# Patient Record
Sex: FEMALE | Race: WHITE | NOT HISPANIC OR LATINO | Employment: FULL TIME | URBAN - METROPOLITAN AREA
[De-identification: names, ages, dates, MRNs, and addresses within clinical notes are randomized per-mention and may not be internally consistent; named-entity substitution may affect disease eponyms.]

---

## 2017-04-07 ENCOUNTER — HOSPITAL ENCOUNTER (OUTPATIENT)
Dept: CT IMAGING | Facility: HOSPITAL | Age: 52
Discharge: HOME | End: 2017-04-07
Admitting: NURSE PRACTITIONER

## 2017-04-07 ENCOUNTER — TRANSCRIBE ORDERS (OUTPATIENT)
Dept: ADMINISTRATIVE | Facility: HOSPITAL | Age: 52
End: 2017-04-07

## 2017-04-07 DIAGNOSIS — R10.32 LLQ PAIN: Primary | ICD-10-CM

## 2017-04-07 DIAGNOSIS — R10.32 LLQ PAIN: ICD-10-CM

## 2017-04-07 PROCEDURE — 0 IOPAMIDOL 61 % SOLUTION: Performed by: NURSE PRACTITIONER

## 2017-04-07 PROCEDURE — 74170 CT ABD WO CNTRST FLWD CNTRST: CPT

## 2017-04-07 PROCEDURE — 82565 ASSAY OF CREATININE: CPT

## 2017-04-07 RX ADMIN — IOPAMIDOL 85 ML: 612 INJECTION, SOLUTION INTRAVENOUS at 13:39

## 2017-04-07 RX ADMIN — BARIUM SULFATE 450 ML: 21 SUSPENSION ORAL at 12:15

## 2017-04-11 LAB — CREAT BLDA-MCNC: 0.6 MG/DL (ref 0.6–1.3)

## 2017-07-19 ENCOUNTER — LAB (OUTPATIENT)
Dept: LAB | Facility: HOSPITAL | Age: 52
End: 2017-07-19

## 2017-07-19 ENCOUNTER — OFFICE VISIT (OUTPATIENT)
Dept: FAMILY MEDICINE CLINIC | Facility: CLINIC | Age: 52
End: 2017-07-19

## 2017-07-19 VITALS
TEMPERATURE: 97.6 F | HEART RATE: 73 BPM | OXYGEN SATURATION: 96 % | DIASTOLIC BLOOD PRESSURE: 78 MMHG | BODY MASS INDEX: 31.33 KG/M2 | HEIGHT: 59 IN | SYSTOLIC BLOOD PRESSURE: 122 MMHG | WEIGHT: 155.4 LBS

## 2017-07-19 DIAGNOSIS — Z00.00 WELLNESS EXAMINATION: Primary | ICD-10-CM

## 2017-07-19 DIAGNOSIS — E55.9 VITAMIN D DEFICIENCY: ICD-10-CM

## 2017-07-19 DIAGNOSIS — R74.8 ELEVATED LIVER ENZYMES: ICD-10-CM

## 2017-07-19 DIAGNOSIS — Z00.00 WELLNESS EXAMINATION: ICD-10-CM

## 2017-07-19 LAB
25(OH)D3 SERPL-MCNC: 37.9 NG/ML
ALBUMIN SERPL-MCNC: 4.7 G/DL (ref 3.2–4.8)
ALBUMIN/GLOB SERPL: 1.8 G/DL (ref 1.5–2.5)
ALP SERPL-CCNC: 108 U/L (ref 25–100)
ALT SERPL W P-5'-P-CCNC: 38 U/L (ref 7–40)
ANION GAP SERPL CALCULATED.3IONS-SCNC: 6 MMOL/L (ref 3–11)
ARTICHOKE IGE QN: 128 MG/DL (ref 0–130)
AST SERPL-CCNC: 34 U/L (ref 0–33)
BASOPHILS # BLD AUTO: 0.02 10*3/MM3 (ref 0–0.2)
BASOPHILS NFR BLD AUTO: 0.3 % (ref 0–1)
BILIRUB SERPL-MCNC: 0.9 MG/DL (ref 0.3–1.2)
BUN BLD-MCNC: 10 MG/DL (ref 9–23)
BUN/CREAT SERPL: 20 (ref 7–25)
CALCIUM SPEC-SCNC: 10.5 MG/DL (ref 8.7–10.4)
CHLORIDE SERPL-SCNC: 103 MMOL/L (ref 99–109)
CHOLEST SERPL-MCNC: 190 MG/DL (ref 0–200)
CO2 SERPL-SCNC: 29 MMOL/L (ref 20–31)
CREAT BLD-MCNC: 0.5 MG/DL (ref 0.6–1.3)
DEPRECATED RDW RBC AUTO: 43.2 FL (ref 37–54)
EOSINOPHIL # BLD AUTO: 0.23 10*3/MM3 (ref 0–0.3)
EOSINOPHIL NFR BLD AUTO: 3.2 % (ref 0–3)
ERYTHROCYTE [DISTWIDTH] IN BLOOD BY AUTOMATED COUNT: 13.2 % (ref 11.3–14.5)
GFR SERPL CREATININE-BSD FRML MDRD: 131 ML/MIN/1.73
GFR SERPL CREATININE-BSD FRML MDRD: >150 ML/MIN/1.73
GLOBULIN UR ELPH-MCNC: 2.6 GM/DL
GLUCOSE BLD-MCNC: 114 MG/DL (ref 70–100)
HCT VFR BLD AUTO: 44 % (ref 34.5–44)
HDLC SERPL-MCNC: 44 MG/DL (ref 40–60)
HGB BLD-MCNC: 15.2 G/DL (ref 11.5–15.5)
IMM GRANULOCYTES # BLD: 0.01 10*3/MM3 (ref 0–0.03)
IMM GRANULOCYTES NFR BLD: 0.1 % (ref 0–0.6)
LYMPHOCYTES # BLD AUTO: 2.29 10*3/MM3 (ref 0.6–4.8)
LYMPHOCYTES NFR BLD AUTO: 32 % (ref 24–44)
MCH RBC QN AUTO: 31.3 PG (ref 27–31)
MCHC RBC AUTO-ENTMCNC: 34.5 G/DL (ref 32–36)
MCV RBC AUTO: 90.5 FL (ref 80–99)
MONOCYTES # BLD AUTO: 0.36 10*3/MM3 (ref 0–1)
MONOCYTES NFR BLD AUTO: 5 % (ref 0–12)
NEUTROPHILS # BLD AUTO: 4.25 10*3/MM3 (ref 1.5–8.3)
NEUTROPHILS NFR BLD AUTO: 59.4 % (ref 41–71)
PLATELET # BLD AUTO: 245 10*3/MM3 (ref 150–450)
PMV BLD AUTO: 10.5 FL (ref 6–12)
POTASSIUM BLD-SCNC: 4 MMOL/L (ref 3.5–5.5)
PROT SERPL-MCNC: 7.3 G/DL (ref 5.7–8.2)
RBC # BLD AUTO: 4.86 10*6/MM3 (ref 3.89–5.14)
SODIUM BLD-SCNC: 138 MMOL/L (ref 132–146)
TRIGL SERPL-MCNC: 251 MG/DL (ref 0–150)
TSH SERPL DL<=0.05 MIU/L-ACNC: 1.62 MIU/ML (ref 0.35–5.35)
WBC NRBC COR # BLD: 7.16 10*3/MM3 (ref 3.5–10.8)

## 2017-07-19 PROCEDURE — 85025 COMPLETE CBC W/AUTO DIFF WBC: CPT | Performed by: NURSE PRACTITIONER

## 2017-07-19 PROCEDURE — 36415 COLL VENOUS BLD VENIPUNCTURE: CPT

## 2017-07-19 PROCEDURE — 80061 LIPID PANEL: CPT | Performed by: NURSE PRACTITIONER

## 2017-07-19 PROCEDURE — 82306 VITAMIN D 25 HYDROXY: CPT | Performed by: NURSE PRACTITIONER

## 2017-07-19 PROCEDURE — 80074 ACUTE HEPATITIS PANEL: CPT | Performed by: NURSE PRACTITIONER

## 2017-07-19 PROCEDURE — 84443 ASSAY THYROID STIM HORMONE: CPT | Performed by: NURSE PRACTITIONER

## 2017-07-19 PROCEDURE — 99396 PREV VISIT EST AGE 40-64: CPT | Performed by: NURSE PRACTITIONER

## 2017-07-19 PROCEDURE — 80053 COMPREHEN METABOLIC PANEL: CPT | Performed by: NURSE PRACTITIONER

## 2017-07-19 NOTE — PATIENT INSTRUCTIONS
Diverticulosis  Diverticulosis is the condition that develops when small pouches (diverticula) form in the wall of your colon. Your colon, or large intestine, is where water is absorbed and stool is formed. The pouches form when the inside layer of your colon pushes through weak spots in the outer layers of your colon.  CAUSES   No one knows exactly what causes diverticulosis.  RISK FACTORS  · Being older than 50. Your risk for this condition increases with age. Diverticulosis is rare in people younger than 40 years. By age 80, almost everyone has it.  · Eating a low-fiber diet.  · Being frequently constipated.  · Being overweight.  · Not getting enough exercise.  · Smoking.  · Taking over-the-counter pain medicines, like aspirin and ibuprofen.  SYMPTOMS   Most people with diverticulosis do not have symptoms.  DIAGNOSIS   Because diverticulosis often has no symptoms, health care providers often discover the condition during an exam for other colon problems. In many cases, a health care provider will diagnose diverticulosis while using a flexible scope to examine the colon (colonoscopy).  TREATMENT   If you have never developed an infection related to diverticulosis, you may not need treatment. If you have had an infection before, treatment may include:  · Eating more fruits, vegetables, and grains.  · Taking a fiber supplement.  · Taking a live bacteria supplement (probiotic).  · Taking medicine to relax your colon.  HOME CARE INSTRUCTIONS   · Drink at least 6-8 glasses of water each day to prevent constipation.  · Try not to strain when you have a bowel movement.  · Keep all follow-up appointments.  If you have had an infection before:   · Increase the fiber in your diet as directed by your health care provider or dietitian.  · Take a dietary fiber supplement if your health care provider approves.  · Only take medicines as directed by your health care provider.  SEEK MEDICAL CARE IF:   · You have abdominal  pain.  · You have bloating.  · You have cramps.  · You have not gone to the bathroom in 3 days.  SEEK IMMEDIATE MEDICAL CARE IF:   · Your pain gets worse.  · Your bloating becomes very bad.  · You have a fever or chills, and your symptoms suddenly get worse.  · You begin vomiting.  · You have bowel movements that are bloody or black.  MAKE SURE YOU:  · Understand these instructions.  · Will watch your condition.  · Will get help right away if you are not doing well or get worse.     This information is not intended to replace advice given to you by your health care provider. Make sure you discuss any questions you have with your health care provider.     Document Released: 09/14/2005 Document Revised: 12/23/2014 Document Reviewed: 11/12/2014  [employer] Interactive Patient Education ©2017 [employer]    Sciatica  Sciatica is pain, numbness, weakness, or tingling along your sciatic nerve. The sciatic nerve starts in the lower back and goes down the back of each leg. Sciatica happens when this nerve is pinched or has pressure put on it. Sciatica usually goes away on its own or with treatment. Sometimes, sciatica may keep coming back (recur).  HOME CARE  Medicines  · Take over-the-counter and prescription medicines only as told by your doctor.  · Do not drive or use heavy machinery while taking prescription pain medicine.  Managing Pain  · If directed, put ice on the affected area.    Put ice in a plastic bag.    Place a towel between your skin and the bag.    Leave the ice on for 20 minutes, 2-3 times a day.  · After icing, apply heat to the affected area before you exercise or as often as told by your doctor. Use the heat source that your doctor tells you to use, such as a moist heat pack or a heating pad.    Place a towel between your skin and the heat source.    Leave the heat on for 20-30 minutes.    Remove the heat if your skin turns bright red. This is especially important if you are unable to feel pain, heat,  or cold. You may have a greater risk of getting burned.  Activity  · Return to your normal activities as told by your doctor. Ask your doctor what activities are safe for you.  ¨ Avoid activities that make your sciatica worse.  · Take short rests during the day. Rest in a lying or standing position. This is usually better than sitting to rest.  ¨ When you rest for a long time, do some physical activity or stretching between periods of rest.  ¨ Avoid sitting for a long time without moving. Get up and move around at least one time each hour.  · Exercise and stretch regularly, as told by your doctor.  · Do not lift anything that is heavier than 10 lb (4.5 kg) while you have symptoms of sciatica.  ¨ Avoid lifting heavy things even when you do not have symptoms.  ¨ Avoid lifting heavy things over and over.  · When you lift objects, always lift in a way that is safe for your body. To do this, you should:  ¨ Bend your knees.  ¨ Keep the object close to your body.  ¨ Avoid twisting.  General Instructions  · Use good posture.    Avoid leaning forward when you are sitting.    Avoid hunching over when you are standing.  · Stay at a healthy weight.  · Wear comfortable shoes that support your feet. Avoid wearing high heels.  · Avoid sleeping on a mattress that is too soft or too hard. You might have less pain if you sleep on a mattress that is firm enough to support your back.  · Keep all follow-up visits as told by your doctor. This is important.  GET HELP IF:  · You have pain that:    Wakes you up when you are sleeping.    Gets worse when you lie down.    Is worse than the pain you have had in the past.    Lasts longer than 4 weeks.  · You lose weight for without trying.  GET HELP RIGHT AWAY IF:  · You cannot control when you pee (urinate) or poop (have a bowel movement).  · You have weakness in any of these areas and it gets worse.    Lower back.    Lower belly (pelvis).    Butt (buttocks).    Legs.  · You have redness or  swelling of your back.  · You have a burning feeling when you pee.     This information is not intended to replace advice given to you by your health care provider. Make sure you discuss any questions you have with your health care provider.     Document Released: 09/26/2009 Document Revised: 04/10/2017 Document Reviewed: 08/26/2016  [employer] Interactive Patient Education ©2017 [employer]

## 2017-07-19 NOTE — PROGRESS NOTES
Patient Care Team:  Jose Brooks MD as PCP - General (Internal Medicine)     Chief complaint: Patient is in today for a physical          Patient is a 51 y.o. female who presents for her yearly physical exam.     JUANITO Aiken is here for an annual physical, including labs. She denies chest pain, dizziness, shortness of breath, or edema. She has a history of a cystocele, rectocele and bladder prolapse with pessary use and has an appointment Friday with her urologist for follow-up. Her last pelvic exam was last year at her gynecologist at [facility] and prefers not to have a pelvic exam today, she had a hysterectomy in 2013, due to endometriosis. She was seen in the gyn office 2 weeks ago to be screened for BV due to vaginal odor, screen was negative, denies dysuria or discharge.  Patient states she has been notified that a mammogram is due and will make the appointment when her schedule allows, but does not want it ordered today.  Patient has a history of diverticulitis earlier this year, a colonoscopy in 2013 was normal. She takes Linzess for IBS with constipation, and states this controls symptoms.  Patient does not exercise regularly, she eats a regular diet without added fiber. She does feel tired at times, but this is not a new problem. She requests information about sciatica for which she sees a chiropractor.    Review of Systems   Constitutional: Positive for fatigue (chronic). Negative for activity change, appetite change, chills and fever.   HENT: Positive for postnasal drip, rhinorrhea and sneezing. Negative for ear pain.    Eyes: Negative for photophobia, pain, redness and visual disturbance.   Respiratory: Negative for cough, shortness of breath and wheezing.    Cardiovascular: Negative for chest pain, palpitations and leg swelling.   Gastrointestinal: Positive for constipation (controlled with Linzess). Negative for abdominal pain, blood in stool, diarrhea, nausea, rectal pain and  vomiting.   Endocrine: Negative for polyphagia and polyuria.   Genitourinary: Negative for dysuria, frequency, hematuria, pelvic pain, vaginal bleeding and vaginal discharge.   Musculoskeletal: Positive for arthralgias and back pain. Negative for joint swelling and myalgias.   Allergic/Immunologic: Positive for environmental allergies. Negative for food allergies and immunocompromised state.   Neurological: Positive for numbness (occasionally in left foot, sees chiropractor). Negative for dizziness, tremors, weakness, light-headedness and headaches.   Psychiatric/Behavioral: Positive for sleep disturbance (occasional insomnia). Negative for agitation, behavioral problems, confusion and dysphoric mood.           History  Past Medical History:   Diagnosis Date   • Allergic rhinitis    • BV (bacterial vaginosis)    • Cataracts, bilateral    • Diverticulitis large intestine w/o perforation or abscess w/o bleeding 04/2017   • Fibrocystic breast disease    • Glaucoma    • Hyperlipidemia    • Hypertension    • IBS (irritable bowel syndrome)    • Insomnia    • Obesity (BMI 30.0-34.9)    • Stress bladder incontinence, female    • Varicose veins of legs       Past Surgical History:   Procedure Laterality Date   • BLADDER SUSPENSION  10/27/2007   • HYSTERECTOMY  12/17/2013    NU      Allergies   Allergen Reactions   • Ace Inhibitors Cough   • Morphine And Related Rash      Family History   Problem Relation Age of Onset   • Diabetes Mother    • Dementia Mother    • Hypothyroidism Mother    • Hyperlipidemia Mother    • ALS Father 70   • Diabetes Sister    • Hyperlipidemia Sister    • Hypothyroidism Sister    • Hypertension Brother      Social History     Social History   • Marital status: Single     Spouse name: N/A   • Number of children: N/A   • Years of education: N/A     Occupational History   • Not on file.     Social History Main Topics   • Smoking status: Never Smoker   • Smokeless tobacco: Never Used   • Alcohol use 0.6  "oz/week     1 Cans of beer per week   • Drug use: No   • Sexual activity: Yes     Partners: Male     Other Topics Concern   • Not on file     Social History Narrative   • No narrative on file        Current Outpatient Prescriptions:   •  Cholecalciferol (VITAMIN D3) 5000 UNITS capsule capsule, Take 5,000 Units by mouth Daily., Disp: , Rfl:   •  ESTRACE VAGINAL 0.1 MG/GM vaginal cream, insert 1/2 gram vaginally three times a week, Disp: , Rfl: 0  •  estradiol (MINIVELLE, VIVELLE-DOT) 0.05 MG/24HR patch, , Disp: , Rfl: 0  •  fluticasone (FLONASE) 50 MCG/ACT nasal spray, 2 sprays into each nostril Daily., Disp: , Rfl:   •  folic acid (FOLVITE) 1 MG tablet, , Disp: , Rfl: 0  •  latanoprost (XALATAN) 0.005 % ophthalmic solution, , Disp: , Rfl: 1  •  linaclotide (LINZESS) 290 MCG capsule capsule, Take 290 mcg by mouth Every Morning Before Breakfast., Disp: , Rfl:   •  Loratadine (CLARITIN) 10 MG capsule, Take 1 tablet by mouth., Disp: , Rfl:   •  simvastatin (ZOCOR) 20 MG tablet, , Disp: , Rfl: 0  •  valsartan-hydrochlorothiazide (DIOVAN-HCT) 80-12.5 MG per tablet, , Disp: , Rfl: 0   Immunization History   Administered Date(s) Administered   • Influenza Whole 08/01/2016   • Tdap 01/19/2012                   /78  Pulse 73  Temp 97.6 °F (36.4 °C) (Oral)   Ht 58.5\" (148.6 cm)  Wt 155 lb 6.4 oz (70.5 kg)  SpO2 96%  BMI 31.93 kg/m2      Physical Exam   Constitutional: She is oriented to person, place, and time. She appears well-developed and well-nourished.   HENT:   Head: Normocephalic and atraumatic.   Right Ear: External ear normal. A middle ear effusion is present.   Left Ear: External ear normal. A middle ear effusion is present.   Nose: Nose normal.   Mouth/Throat: Oropharynx is clear and moist.   Eyes: Conjunctivae and EOM are normal. Pupils are equal, round, and reactive to light. Right eye exhibits no discharge. Left eye exhibits no discharge. No scleral icterus.   Neck: Normal range of motion. Neck " supple. No JVD present. No tracheal deviation present. No thyromegaly present.   Cardiovascular: Normal rate, regular rhythm, normal heart sounds and intact distal pulses.    No murmur heard.  No carotid bruit   Pulmonary/Chest: Effort normal and breath sounds normal. No respiratory distress. She has no wheezes. Right breast exhibits no inverted nipple, no mass, no nipple discharge, no skin change and no tenderness. Left breast exhibits no inverted nipple, no mass, no nipple discharge, no skin change and no tenderness.   Fibrocystic changes noted in breasts bilaterally     Abdominal: Soft. Bowel sounds are normal. She exhibits no distension and no mass. There is no tenderness. No hernia.   Genitourinary: There is no rash, tenderness or lesion on the right labia. There is no rash, tenderness or lesion on the left labia. No vaginal discharge found.   Genitourinary Comments: Patient declined pelvic exam, but did allow external exam of vulva, no discharge or lesions noted.   Musculoskeletal: She exhibits no edema, tenderness or deformity.   Normal ROM of major joints   Lymphadenopathy:     She has no cervical adenopathy.   Neurological: She is alert and oriented to person, place, and time. She has normal reflexes.   Skin: Skin is warm and dry. No rash noted. No erythema.   Psychiatric: She has a normal mood and affect. Her behavior is normal. Judgment and thought content normal.                 Diagnoses and all orders for this visit:    Wellness examination  -     Comprehensive metabolic panel; Future  -     Lipid panel; Future  -     TSH; Future  -     CBC w AUTO Differential; Future    Vitamin D deficiency  -     Vitamin D 25 Hydroxy; Future        Annual screening labs were ordered today to evaluate glucose, kidney, liver, and thyroid function. A CBC was ordered to evaluate for anemia. Vitamin D level ordered to evaluate for current deficiency.  I will contact patient regarding test results and provide instructions  regarding any necessary changes in plan of care.  Patient will continue current medications as prescribed.    Nutrition and activity goals reviewed including: mainly water to drink, limit white flour/processed sugar, high protein, high fiber carbs, good breakfast, working toward 150 mins cardio per week, weight training 2x/week.   Patient was given a handout about diverticulosis and encouraged to increase fiber, may take metamucil daily.   Patient was also given a handout on sciatica per her request.  Patient was encouraged to make appointment for mammogram and follow-up with gynecologist and urologist per their recommendations. Status post hysterectomy for endometriosis, PAP not applicable.  Patient voiced understanding of all instructions and denied further questions.  JERRICA Traylor   7/19/2017   11:25 AM

## 2017-07-20 LAB
HAV IGM SERPL QL IA: NORMAL
HBV CORE IGM SERPL QL IA: NORMAL
HBV SURFACE AG SERPL QL IA: NORMAL
HCV AB SER DONR QL: NORMAL

## 2017-07-21 ENCOUNTER — TELEPHONE (OUTPATIENT)
Dept: FAMILY MEDICINE CLINIC | Facility: CLINIC | Age: 52
End: 2017-07-21

## 2017-07-25 NOTE — TELEPHONE ENCOUNTER
PATIENT HAS ADDITIONAL QUESTIONS REGARDING LAB RESULTS.  WANTS TO KNOW IF CERTAIN CALORIE FREE SODAS (COKE ZERO/ SPRITE ZERO) ARE OKAY TO DRINK INSTEAD OF JUST DRINKING WATER.  PLEASE GIVE HER A CALL.

## 2017-07-27 NOTE — TELEPHONE ENCOUNTER
Called and left patient a message to call the office back to discuss her questions.    Patient called back and I explained to her that she should try to avoid any kind of soda. I suggested that she try putting some flavor packets in her water to make it taste better for her. Patient stated that she would try this but still might have an occasional soda every once in a while.

## 2017-07-28 RX ORDER — FOLIC ACID 1 MG/1
1 TABLET ORAL DAILY
Qty: 30 TABLET | Refills: 2 | Status: SHIPPED | OUTPATIENT
Start: 2017-07-28 | End: 2017-12-11 | Stop reason: SDUPTHER

## 2017-07-28 RX ORDER — SIMVASTATIN 20 MG
20 TABLET ORAL NIGHTLY
Qty: 30 TABLET | Refills: 2 | Status: SHIPPED | OUTPATIENT
Start: 2017-07-28 | End: 2017-12-11 | Stop reason: SDUPTHER

## 2017-07-28 RX ORDER — VALSARTAN AND HYDROCHLOROTHIAZIDE 80; 12.5 MG/1; MG/1
1 TABLET, FILM COATED ORAL DAILY
Qty: 30 TABLET | Refills: 2 | Status: SHIPPED | OUTPATIENT
Start: 2017-07-28 | End: 2017-12-19 | Stop reason: SDUPTHER

## 2017-08-07 ENCOUNTER — OFFICE VISIT (OUTPATIENT)
Dept: FAMILY MEDICINE CLINIC | Facility: CLINIC | Age: 52
End: 2017-08-07

## 2017-08-07 VITALS
HEART RATE: 79 BPM | SYSTOLIC BLOOD PRESSURE: 122 MMHG | OXYGEN SATURATION: 95 % | HEIGHT: 59 IN | DIASTOLIC BLOOD PRESSURE: 82 MMHG | BODY MASS INDEX: 30.72 KG/M2 | WEIGHT: 152.4 LBS

## 2017-08-07 DIAGNOSIS — J01.10 ACUTE FRONTAL SINUSITIS, RECURRENCE NOT SPECIFIED: Primary | ICD-10-CM

## 2017-08-07 PROCEDURE — 99213 OFFICE O/P EST LOW 20 MIN: CPT | Performed by: NURSE PRACTITIONER

## 2017-08-07 RX ORDER — AMOXICILLIN AND CLAVULANATE POTASSIUM 875; 125 MG/1; MG/1
1 TABLET, FILM COATED ORAL 2 TIMES DAILY
Qty: 14 TABLET | Refills: 0 | Status: SHIPPED | OUTPATIENT
Start: 2017-08-07 | End: 2017-08-14

## 2017-08-07 RX ORDER — AZELASTINE 1 MG/ML
2 SPRAY, METERED NASAL 2 TIMES DAILY
COMMUNITY
End: 2017-08-07

## 2017-08-07 NOTE — PATIENT INSTRUCTIONS
Take Claritin and Flonase daily. Do not use Astelin or decongestants such as sudafed because of hypertension.   Take antibiotic only if symptoms do not improve with above reccomendations  Nutrition and activity goals reviewed including: mainly water to drink, limit white flour/processed sugar, high protein, high fiber carbs, good breakfast, working toward 150 mins cardio per week, weight training 2x/week.

## 2017-08-07 NOTE — PROGRESS NOTES
Subjective   Nelli Morales is a 51 y.o. female.     History of Present Illness   Patient is here today with complaint of runny nose, sinus pressure, drainage that started early last week, has taken sudafed, helped some, also has  taken Claritin and Astelin daily for a week, but isn't helping.   States she is has having green drainage. Denies fever and chills, but feels symptoms are getting worse.  The following portions of the patient's history were reviewed and updated as appropriate: allergies, current medications, past family history, past medical history, past social history, past surgical history and problem list.    Review of Systems   Constitutional: Negative for chills and fever.   HENT: Positive for postnasal drip, rhinorrhea, sinus pressure and sneezing. Negative for congestion, ear pain and sore throat.    Eyes: Negative for redness and itching.   Respiratory: Negative for cough, shortness of breath and wheezing.    Cardiovascular: Negative for chest pain and palpitations.   Gastrointestinal: Negative for diarrhea, nausea and vomiting.   Genitourinary: Negative for difficulty urinating.   Allergic/Immunologic: Positive for environmental allergies.   Neurological: Positive for headaches (sinus pressure). Negative for dizziness and light-headedness.       Objective   Physical Exam   Constitutional: She appears well-developed and well-nourished.   HENT:   Right Ear: A middle ear effusion is present.   Left Ear: A middle ear effusion is present.   Nose: Mucosal edema present.   Mouth/Throat: Uvula is midline and oropharynx is clear and moist.   Eyes: EOM are normal. Pupils are equal, round, and reactive to light. No scleral icterus.   Neck: Normal range of motion. Neck supple.   Cardiovascular: Normal rate, regular rhythm and normal heart sounds.    No murmur heard.  Pulmonary/Chest: Effort normal. No respiratory distress.   Musculoskeletal: She exhibits no edema, tenderness or deformity.   Vitals  reviewed.      Assessment/Plan   Nelli was seen today for sinus problem.    Diagnoses and all orders for this visit:    Acute frontal sinusitis, recurrence not specified  -     amoxicillin-clavulanate (AUGMENTIN) 875-125 MG per tablet; Take 1 tablet by mouth 2 (Two) Times a Day for 7 days.        I advised patient to continue daily Claritin. Stop Astelin and start Flonase daily. Avoid decongestants such as sudafed because of hypertension.   Take antibiotic only if symptoms do not improve in a few days with Claritin and Flonase.  I discussed her lab results again .Nutrition and activity goals reviewed including: mainly water to drink, limit white flour/processed sugar, high protein, high fiber carbs, good breakfast, working toward 150 mins cardio per week, weight training 2x/week.  Patient was encouraged to keep me informed of any acute changes, lack of improvement, or any new concerning symptoms.Patient voiced understanding of all instructions and denied further questions.

## 2017-09-20 ENCOUNTER — OFFICE VISIT (OUTPATIENT)
Dept: FAMILY MEDICINE CLINIC | Facility: CLINIC | Age: 52
End: 2017-09-20

## 2017-09-20 VITALS
OXYGEN SATURATION: 98 % | SYSTOLIC BLOOD PRESSURE: 118 MMHG | BODY MASS INDEX: 32.32 KG/M2 | TEMPERATURE: 98.6 F | WEIGHT: 154 LBS | DIASTOLIC BLOOD PRESSURE: 76 MMHG | HEART RATE: 84 BPM | HEIGHT: 58 IN

## 2017-09-20 DIAGNOSIS — L23.7 POISON IVY: Primary | ICD-10-CM

## 2017-09-20 PROCEDURE — 99213 OFFICE O/P EST LOW 20 MIN: CPT | Performed by: NURSE PRACTITIONER

## 2017-09-20 PROCEDURE — 96372 THER/PROPH/DIAG INJ SC/IM: CPT | Performed by: NURSE PRACTITIONER

## 2017-09-20 RX ORDER — INFLUENZA VIRUS VACCINE 15; 15; 15; 15 UG/.5ML; UG/.5ML; UG/.5ML; UG/.5ML
SUSPENSION INTRAMUSCULAR
Refills: 0 | COMMUNITY
Start: 2017-08-26 | End: 2017-11-15 | Stop reason: HOSPADM

## 2017-09-20 RX ORDER — METHYLPREDNISOLONE ACETATE 80 MG/ML
80 INJECTION, SUSPENSION INTRA-ARTICULAR; INTRALESIONAL; INTRAMUSCULAR; SOFT TISSUE ONCE
Status: COMPLETED | OUTPATIENT
Start: 2017-09-20 | End: 2017-09-20

## 2017-09-20 RX ORDER — PNEUMOCOCCAL 13-VALENT CONJUGATE VACCINE 2.2; 2.2; 2.2; 2.2; 2.2; 4.4; 2.2; 2.2; 2.2; 2.2; 2.2; 2.2; 2.2 UG/.5ML; UG/.5ML; UG/.5ML; UG/.5ML; UG/.5ML; UG/.5ML; UG/.5ML; UG/.5ML; UG/.5ML; UG/.5ML; UG/.5ML; UG/.5ML; UG/.5ML
INJECTION, SUSPENSION INTRAMUSCULAR
Refills: 0 | COMMUNITY
Start: 2017-08-26 | End: 2017-11-15 | Stop reason: HOSPADM

## 2017-09-20 RX ADMIN — METHYLPREDNISOLONE ACETATE 80 MG: 80 INJECTION, SUSPENSION INTRA-ARTICULAR; INTRALESIONAL; INTRAMUSCULAR; SOFT TISSUE at 12:03

## 2017-09-20 NOTE — PROGRESS NOTES
Subjective   Nelli Morales is a 51 y.o. female.     History of Present Illness   Nelli is here today with complaint of pruritic rash, started Sunday, but has gotten worse. She had been working outside in the yard pulling weeds and thinks she was exposed to poison ivy. She has been using Caladryl clear that has helped some.  The following portions of the patient's history were reviewed and updated as appropriate: allergies, current medications, past family history, past medical history, past social history, past surgical history and problem list.    Review of Systems   Constitutional: Negative for chills and fever.   Eyes: Negative for itching.   Respiratory: Negative for shortness of breath and wheezing.    Cardiovascular: Negative for chest pain.   Genitourinary: Negative for difficulty urinating.   Skin: Positive for rash (scattered on arms, face and neck).   Allergic/Immunologic: Positive for environmental allergies.   Neurological: Negative for dizziness, light-headedness and headaches.       Objective   Physical Exam   Constitutional: She is oriented to person, place, and time. She appears well-developed and well-nourished. No distress.   HENT:   Head: Normocephalic and atraumatic.   Right Ear: External ear normal. A middle ear effusion is present.   Left Ear: External ear normal. A middle ear effusion is present.   Mouth/Throat: Oropharynx is clear and moist. No oropharyngeal exudate.   Eyes: Conjunctivae are normal. No scleral icterus.   Neck: Normal range of motion.   Cardiovascular: Normal rate, regular rhythm and normal heart sounds.    No murmur heard.  Pulmonary/Chest: Effort normal and breath sounds normal. No respiratory distress. She has no wheezes.   Musculoskeletal: She exhibits no edema, tenderness or deformity.   Lymphadenopathy:     She has no cervical adenopathy.   Neurological: She is alert and oriented to person, place, and time.   Skin: Skin is warm and dry. Rash ( red papules scattered from  hand to upper right arm, left forearm, face, neck, chin,) noted.   Psychiatric: She has a normal mood and affect. Her behavior is normal. Judgment and thought content normal.   Vitals reviewed.      Assessment/Plan   Nelli was seen today for rash on arm and legs.    Diagnoses and all orders for this visit:    Poison ivy  -     methylPREDNISolone acetate (DEPO-medrol) injection 80 mg; Inject 1 mL into the shoulder, thigh, or buttocks 1 (One) Time.        Patient was given a depo-medrol injection for poison ivy, advised her to continue using the Caladryl lotion, continue Claritin, wash skin with warm water and soap after any exposure. Avoid scratching, may use triple antibiotic ointment for any open areas and scratches to reduce risk of infection.  Patient was encouraged to keep me informed of any acute changes, lack of improvement, or any new concerning symptoms.Patient voiced understanding of all instructions and denied further questions.

## 2017-09-22 ENCOUNTER — TELEPHONE (OUTPATIENT)
Dept: FAMILY MEDICINE CLINIC | Facility: CLINIC | Age: 52
End: 2017-09-22

## 2017-09-25 ENCOUNTER — TELEPHONE (OUTPATIENT)
Dept: FAMILY MEDICINE CLINIC | Facility: CLINIC | Age: 52
End: 2017-09-25

## 2017-09-25 RX ORDER — FLUTICASONE PROPIONATE 50 MCG
2 SPRAY, SUSPENSION (ML) NASAL DAILY
Qty: 1 BOTTLE | Refills: 1 | Status: SHIPPED | OUTPATIENT
Start: 2017-09-25 | End: 2018-04-02 | Stop reason: SDUPTHER

## 2017-09-25 NOTE — TELEPHONE ENCOUNTER
[facility] [phone] P    NEED NOSE SPRAY    [facility] CALLED IN    TOLD PATIENT TO HAVE [facility] FAX REQUEST

## 2017-11-15 ENCOUNTER — OFFICE VISIT (OUTPATIENT)
Dept: FAMILY MEDICINE CLINIC | Facility: CLINIC | Age: 52
End: 2017-11-15

## 2017-11-15 VITALS
WEIGHT: 156.6 LBS | HEART RATE: 84 BPM | OXYGEN SATURATION: 98 % | DIASTOLIC BLOOD PRESSURE: 84 MMHG | SYSTOLIC BLOOD PRESSURE: 128 MMHG | BODY MASS INDEX: 32.87 KG/M2 | TEMPERATURE: 97.6 F | HEIGHT: 58 IN

## 2017-11-15 DIAGNOSIS — S91.332A PENETRATING WOUND OF LEFT FOOT, INITIAL ENCOUNTER: ICD-10-CM

## 2017-11-15 DIAGNOSIS — Z23 TETANUS-DIPHTHERIA (TD) VACCINATION: Primary | ICD-10-CM

## 2017-11-15 PROCEDURE — 90714 TD VACC NO PRESV 7 YRS+ IM: CPT | Performed by: NURSE PRACTITIONER

## 2017-11-15 PROCEDURE — 99213 OFFICE O/P EST LOW 20 MIN: CPT | Performed by: NURSE PRACTITIONER

## 2017-11-15 PROCEDURE — 90471 IMMUNIZATION ADMIN: CPT | Performed by: NURSE PRACTITIONER

## 2017-11-15 RX ORDER — PREDNISONE 10 MG/1
TABLET ORAL
Refills: 0 | COMMUNITY
Start: 2017-09-23 | End: 2017-11-15 | Stop reason: HOSPADM

## 2017-11-15 NOTE — PROGRESS NOTES
Subjective   Nelli Morales is a 51 y.o. female.   Chief Complaint   Patient presents with   • Foot Injury     stepped on rusted nail and needs a tetanus shot       History of Present Illness   Patient is here today requesting a tetanus shot after stepping on a sunitha nail on Saturday, denies redness, drainage or current pain.  The following portions of the patient's history were reviewed and updated as appropriate: allergies, current medications, past family history, past medical history, past social history, past surgical history and problem list.    Review of Systems   Constitutional: Negative for chills and fever.   Respiratory: Negative for shortness of breath.    Cardiovascular: Negative for chest pain.   Musculoskeletal:        Foot wound, occ pain when walking   Allergic/Immunologic: Positive for environmental allergies.       Objective   Physical Exam   Constitutional: She is oriented to person, place, and time. She appears well-developed and well-nourished. No distress.   HENT:   Head: Normocephalic and atraumatic.   Right Ear: External ear normal. Tympanic membrane is not erythematous and not bulging. A middle ear effusion is present.   Left Ear: External ear normal. Tympanic membrane is not erythematous and not bulging. A middle ear effusion is present.   Mouth/Throat: Oropharynx is clear and moist. No oropharyngeal exudate.   Eyes: Pupils are equal, round, and reactive to light. No scleral icterus.   Neck: Normal range of motion. Neck supple. No thyromegaly present.   Cardiovascular: Normal rate, regular rhythm and normal heart sounds.    No murmur heard.  Pulmonary/Chest: Effort normal and breath sounds normal. No respiratory distress. She has no wheezes. She has no rales.   Musculoskeletal:   Left 2nd metatarsal plantar surface, small puncture wound, no erythema, edema or drainage noted   Neurological: She is alert and oriented to person, place, and time.   Skin: Skin is warm and dry. She is not  diaphoretic.   Psychiatric: She has a normal mood and affect. Her behavior is normal. Judgment and thought content normal.   Vitals reviewed.      Assessment/Plan Patient was given tetanus injection  Nelli was seen today for foot injury.    Diagnoses and all orders for this visit:    Tetanus-diphtheria (Td) vaccination  -     Td Vaccine Greater Than or Equal To 6yo With Preservative IM    Penetrating wound of left foot, initial encounter        Patient was given tetanus injection and advised to keep wound clean and dry, may use neosporin, monitor for signs/symptoms of infection, fever redness, warmth, edema, or drainage. Come in for follow up if this happens.  Patient was encouraged to keep me informed of any acute changes, lack of improvement, or any new concerning symptoms.Patient voiced understanding of all instructions and denied further questions.

## 2017-11-30 ENCOUNTER — TELEPHONE (OUTPATIENT)
Dept: FAMILY MEDICINE CLINIC | Facility: CLINIC | Age: 52
End: 2017-11-30

## 2017-11-30 NOTE — PATIENT INSTRUCTIONS
Sciatica  Sciatica is pain, numbness, weakness, or tingling along your sciatic nerve. The sciatic nerve starts in the lower back and goes down the back of each leg. Sciatica happens when this nerve is pinched or has pressure put on it. Sciatica usually goes away on its own or with treatment. Sometimes, sciatica may keep coming back (recur).  HOME CARE  Medicines  · Take over-the-counter and prescription medicines only as told by your doctor.  · Do not drive or use heavy machinery while taking prescription pain medicine.  Managing Pain  · If directed, put ice on the affected area.    Put ice in a plastic bag.    Place a towel between your skin and the bag.    Leave the ice on for 20 minutes, 2-3 times a day.  · After icing, apply heat to the affected area before you exercise or as often as told by your doctor. Use the heat source that your doctor tells you to use, such as a moist heat pack or a heating pad.    Place a towel between your skin and the heat source.    Leave the heat on for 20-30 minutes.    Remove the heat if your skin turns bright red. This is especially important if you are unable to feel pain, heat, or cold. You may have a greater risk of getting burned.  Activity  · Return to your normal activities as told by your doctor. Ask your doctor what activities are safe for you.  ¨ Avoid activities that make your sciatica worse.  · Take short rests during the day. Rest in a lying or standing position. This is usually better than sitting to rest.  ¨ When you rest for a long time, do some physical activity or stretching between periods of rest.  ¨ Avoid sitting for a long time without moving. Get up and move around at least one time each hour.  · Exercise and stretch regularly, as told by your doctor.  · Do not lift anything that is heavier than 10 lb (4.5 kg) while you have symptoms of sciatica.  ¨ Avoid lifting heavy things even when you do not have symptoms.  ¨ Avoid lifting heavy things over and  over.  · When you lift objects, always lift in a way that is safe for your body. To do this, you should:  ¨ Bend your knees.  ¨ Keep the object close to your body.  ¨ Avoid twisting.  General Instructions  · Use good posture.    Avoid leaning forward when you are sitting.    Avoid hunching over when you are standing.  · Stay at a healthy weight.  · Wear comfortable shoes that support your feet. Avoid wearing high heels.  · Avoid sleeping on a mattress that is too soft or too hard. You might have less pain if you sleep on a mattress that is firm enough to support your back.  · Keep all follow-up visits as told by your doctor. This is important.  GET HELP IF:  · You have pain that:    Wakes you up when you are sleeping.    Gets worse when you lie down.    Is worse than the pain you have had in the past.    Lasts longer than 4 weeks.  · You lose weight for without trying.  GET HELP RIGHT AWAY IF:  · You cannot control when you pee (urinate) or poop (have a bowel movement).  · You have weakness in any of these areas and it gets worse.    Lower back.    Lower belly (pelvis).    Butt (buttocks).    Legs.  · You have redness or swelling of your back.  · You have a burning feeling when you pee.     This information is not intended to replace advice given to you by your health care provider. Make sure you discuss any questions you have with your health care provider.     Document Released: 09/26/2009 Document Revised: 04/10/2017 Document Reviewed: 08/26/2016  [employer] Patient Education ©2017 [employer]

## 2017-12-11 RX ORDER — SIMVASTATIN 20 MG
20 TABLET ORAL NIGHTLY
Qty: 30 TABLET | Refills: 2 | Status: SHIPPED | OUTPATIENT
Start: 2017-12-11 | End: 2018-03-15 | Stop reason: SDUPTHER

## 2017-12-11 RX ORDER — FOLIC ACID 1 MG/1
1 TABLET ORAL DAILY
Qty: 30 TABLET | Refills: 2 | Status: SHIPPED | OUTPATIENT
Start: 2017-12-11 | End: 2018-03-15 | Stop reason: SDUPTHER

## 2017-12-19 ENCOUNTER — TELEPHONE (OUTPATIENT)
Dept: FAMILY MEDICINE CLINIC | Facility: CLINIC | Age: 52
End: 2017-12-19

## 2017-12-19 RX ORDER — VALSARTAN AND HYDROCHLOROTHIAZIDE 80; 12.5 MG/1; MG/1
1 TABLET, FILM COATED ORAL DAILY
Qty: 30 TABLET | Refills: 2 | Status: SHIPPED | OUTPATIENT
Start: 2017-12-19 | End: 2018-03-15 | Stop reason: SDUPTHER

## 2017-12-19 NOTE — TELEPHONE ENCOUNTER
E-Rx Valsartan HCTZ 80-12.5 mg tablets, take 1 tablet po qd #30, 2 refills to [facility] on [address] [facility] [address].

## 2017-12-22 ENCOUNTER — OFFICE VISIT (OUTPATIENT)
Dept: FAMILY MEDICINE CLINIC | Facility: CLINIC | Age: 52
End: 2017-12-22

## 2017-12-22 VITALS
TEMPERATURE: 96.4 F | OXYGEN SATURATION: 98 % | HEART RATE: 80 BPM | DIASTOLIC BLOOD PRESSURE: 78 MMHG | SYSTOLIC BLOOD PRESSURE: 122 MMHG | WEIGHT: 156 LBS | BODY MASS INDEX: 32.75 KG/M2 | HEIGHT: 58 IN

## 2017-12-22 DIAGNOSIS — N81.6 CYSTOCELE WITH RECTOCELE: ICD-10-CM

## 2017-12-22 DIAGNOSIS — B96.89 BACTERIAL VAGINOSIS: ICD-10-CM

## 2017-12-22 DIAGNOSIS — M54.31 SCIATICA OF RIGHT SIDE: ICD-10-CM

## 2017-12-22 DIAGNOSIS — N81.10 CYSTOCELE WITH RECTOCELE: ICD-10-CM

## 2017-12-22 DIAGNOSIS — N76.0 BACTERIAL VAGINOSIS: ICD-10-CM

## 2017-12-22 DIAGNOSIS — R35.0 URINARY FREQUENCY: Primary | ICD-10-CM

## 2017-12-22 PROBLEM — E66.09 CLASS 1 OBESITY DUE TO EXCESS CALORIES WITHOUT SERIOUS COMORBIDITY WITH BODY MASS INDEX (BMI) OF 32.0 TO 32.9 IN ADULT: Status: ACTIVE | Noted: 2017-12-22

## 2017-12-22 PROBLEM — E66.811 CLASS 1 OBESITY DUE TO EXCESS CALORIES WITHOUT SERIOUS COMORBIDITY WITH BODY MASS INDEX (BMI) OF 32.0 TO 32.9 IN ADULT: Status: ACTIVE | Noted: 2017-12-22

## 2017-12-22 LAB
BILIRUB BLD-MCNC: NEGATIVE MG/DL
CLARITY, POC: ABNORMAL
COLOR UR: YELLOW
GLUCOSE UR STRIP-MCNC: NEGATIVE MG/DL
KETONES UR QL: NEGATIVE
LEUKOCYTE EST, POC: NEGATIVE
NITRITE UR-MCNC: NEGATIVE MG/ML
PH UR: 7 [PH] (ref 5–8)
PROT UR STRIP-MCNC: ABNORMAL MG/DL
RBC # UR STRIP: NEGATIVE /UL
SP GR UR: 1.01 (ref 1–1.03)
UROBILINOGEN UR QL: NORMAL

## 2017-12-22 PROCEDURE — 99213 OFFICE O/P EST LOW 20 MIN: CPT | Performed by: NURSE PRACTITIONER

## 2017-12-22 PROCEDURE — 81003 URINALYSIS AUTO W/O SCOPE: CPT | Performed by: NURSE PRACTITIONER

## 2017-12-22 RX ORDER — METRONIDAZOLE 500 MG/1
2000 TABLET ORAL ONCE
Qty: 4 TABLET | Refills: 0 | Status: SHIPPED | OUTPATIENT
Start: 2017-12-22 | End: 2017-12-22

## 2017-12-22 NOTE — PROGRESS NOTES
Chief Complaint   Patient presents with   • Urinary Frequency       Subjective   Nelli Morales is a 52 y.o. female    Urinary Frequency    This is a new problem. Episode onset: 2 months, freq, no dysuria, no discharge, does have an odor. The problem occurs every urination. The problem has been unchanged. The patient is experiencing no pain. There has been no fever. She is sexually active. There is no history of pyelonephritis. Associated symptoms include flank pain (right) and frequency. Pertinent negatives include no chills, discharge, hematuria, hesitancy, nausea, possible pregnancy, sweats, urgency or vomiting. She has tried nothing for the symptoms. The treatment provided no relief. rectocele, and cystocele         Allergies   Allergen Reactions   • Ace Inhibitors Cough   • Morphine And Related Rash     Past Medical History:   Diagnosis Date   • Allergic rhinitis    • BV (bacterial vaginosis)    • Cataracts, bilateral    • Diverticulitis large intestine w/o perforation or abscess w/o bleeding 04/2017   • Fibrocystic breast disease    • Glaucoma    • Hyperlipidemia    • Hypertension    • IBS (irritable bowel syndrome)    • Insomnia    • Obesity (BMI 30.0-34.9)    • Stress bladder incontinence, female    • Varicose veins of legs       Past Surgical History:   Procedure Laterality Date   • BLADDER SUSPENSION  10/27/2007   • HYSTERECTOMY  12/17/2013    [facility]     Social History     Social History   • Marital status: Single     Spouse name: N/A   • Number of children: N/A   • Years of education: N/A     Occupational History   • Not on file.     Social History Main Topics   • Smoking status: Never Smoker   • Smokeless tobacco: Never Used   • Alcohol use 0.6 oz/week     1 Cans of beer per week      Comment: occasional   • Drug use: No   • Sexual activity: Yes     Partners: Male     Other Topics Concern   • Not on file     Social History Narrative        Current Outpatient Prescriptions:   •  Cholecalciferol (VITAMIN D3)  5000 UNITS capsule capsule, Take 5,000 Units by mouth Daily., Disp: , Rfl:   •  ESTRACE VAGINAL 0.1 MG/GM vaginal cream, insert 1/2 gram vaginally three times a week, Disp: , Rfl: 0  •  estradiol (MINIVELLE, VIVELLE-DOT) 0.05 MG/24HR patch, , Disp: , Rfl: 0  •  fluticasone (FLONASE) 50 MCG/ACT nasal spray, 2 sprays into each nostril Daily., Disp: 1 bottle, Rfl: 1  •  folic acid (FOLVITE) 1 MG tablet, Take 1 tablet by mouth Daily., Disp: 30 tablet, Rfl: 2  •  latanoprost (XALATAN) 0.005 % ophthalmic solution, , Disp: , Rfl: 1  •  linaclotide (LINZESS) 290 MCG capsule capsule, Take 290 mcg by mouth As Needed., Disp: , Rfl:   •  Loratadine (CLARITIN) 10 MG capsule, Take 1 tablet by mouth., Disp: , Rfl:   •  simvastatin (ZOCOR) 20 MG tablet, Take 1 tablet by mouth Every Night., Disp: 30 tablet, Rfl: 2  •  valsartan-hydrochlorothiazide (DIOVAN-HCT) 80-12.5 MG per tablet, Take 1 tablet by mouth Daily., Disp: 30 tablet, Rfl: 2  •  metroNIDAZOLE (FLAGYL) 500 MG tablet, Take 4 tablets by mouth 1 (One) Time for 1 dose., Disp: 4 tablet, Rfl: 0     Review of Systems   Constitutional: Negative for chills.   Respiratory: Negative for cough, shortness of breath and wheezing.    Cardiovascular: Negative for chest pain and palpitations.   Gastrointestinal: Negative for constipation, diarrhea, nausea and vomiting.   Genitourinary: Positive for difficulty urinating, flank pain (right) and frequency. Negative for dysuria, hematuria, hesitancy, urgency and vaginal bleeding.   Psychiatric/Behavioral: Negative for sleep disturbance.       Objective     Vitals:    12/22/17 1354   BP: 122/78   Pulse: 80   Temp: 96.4 °F (35.8 °C)   SpO2: 98%       Results for orders placed or performed in visit on 12/22/17   POCT urinalysis dipstick, automated   Result Value Ref Range    Color Yellow Yellow, Straw, Dark Yellow, Janis    Clarity, UA Slightly Cloudy (A) Clear    Glucose, UA Negative Negative, 1000 mg/dL (3+) mg/dL    Bilirubin Negative  Negative    Ketones, UA Negative Negative    Specific Gravity  1.015 1.005 - 1.030    Blood, UA Negative Negative    pH, Urine 7.0 5.0 - 8.0    Protein, POC Trace (A) Negative mg/dL    Urobilinogen, UA Normal Normal    Leukocytes Negative Negative    Nitrite, UA Negative Negative       Physical Exam   Constitutional: She is oriented to person, place, and time. She appears well-developed and well-nourished. No distress.   Neck: No JVD present.   Cardiovascular: Normal rate, regular rhythm, normal heart sounds and intact distal pulses.    No murmur heard.  Pulmonary/Chest: Effort normal and breath sounds normal. No respiratory distress. She exhibits no tenderness.   Abdominal: Soft. She exhibits no distension. There is no tenderness.   Musculoskeletal: She exhibits no edema.   Neurological: She is alert and oriented to person, place, and time. She has normal strength. No sensory deficit.   Reflex Scores:       Patellar reflexes are 1+ on the right side and 1+ on the left side.  Skin: Skin is warm and dry. She is not diaphoretic. No erythema. No pallor.   Psychiatric: She has a normal mood and affect.   Nursing note and vitals reviewed.      Assessment/Plan   Problem List Items Addressed This Visit        Digestive    Cystocele with rectocele    Relevant Orders    Ambulatory Referral to Gynecology       Nervous and Auditory    Sciatica of right side       Genitourinary    Bacterial vaginosis    Relevant Medications    metroNIDAZOLE (FLAGYL) 500 MG tablet      Other Visit Diagnoses     Urinary frequency    -  Primary    Relevant Orders    POCT urinalysis dipstick, automated (Completed)      Sciatica- See chiropractor, Do exercises, list given.  BV- will treat for now.   Rectocele- see GYN/urology.   Patient was encouraged to keep me informed of any acute changes, lack of improvement, or any new concerning symptoms.Plan of care discussed with pt. They verbalized understanding and agreement.   FU- PRN  Counseling provided  on Sciatica.    Flaquito Wallace, APRN   12/22/2017   2:41 PM

## 2018-03-15 ENCOUNTER — TELEPHONE (OUTPATIENT)
Dept: FAMILY MEDICINE CLINIC | Facility: CLINIC | Age: 53
End: 2018-03-15

## 2018-03-15 RX ORDER — FOLIC ACID 1 MG/1
1 TABLET ORAL DAILY
Qty: 30 TABLET | Refills: 0 | Status: SHIPPED | OUTPATIENT
Start: 2018-03-15 | End: 2018-04-02 | Stop reason: SDUPTHER

## 2018-03-15 RX ORDER — SIMVASTATIN 20 MG
20 TABLET ORAL NIGHTLY
Qty: 30 TABLET | Refills: 0 | Status: SHIPPED | OUTPATIENT
Start: 2018-03-15 | End: 2018-04-02 | Stop reason: SDUPTHER

## 2018-03-15 RX ORDER — VALSARTAN AND HYDROCHLOROTHIAZIDE 80; 12.5 MG/1; MG/1
1 TABLET, FILM COATED ORAL DAILY
Qty: 30 TABLET | Refills: 0 | Status: SHIPPED | OUTPATIENT
Start: 2018-03-15 | End: 2018-04-02 | Stop reason: SDUPTHER

## 2018-03-15 NOTE — TELEPHONE ENCOUNTER
FOLIC ACID NEED REFILL   NINETY DAY SUPPLY    SIMVASTATIN 20 MG ONCE DAILY    NINETY DAY SUPPLY  VALSARTIN   ONCE DAILY   NINETY DAY SUPPLY      [phone]  [phone]

## 2018-04-02 ENCOUNTER — OFFICE VISIT (OUTPATIENT)
Dept: FAMILY MEDICINE CLINIC | Facility: CLINIC | Age: 53
End: 2018-04-02

## 2018-04-02 VITALS
BODY MASS INDEX: 31.98 KG/M2 | SYSTOLIC BLOOD PRESSURE: 104 MMHG | OXYGEN SATURATION: 99 % | HEART RATE: 63 BPM | WEIGHT: 153 LBS | DIASTOLIC BLOOD PRESSURE: 76 MMHG

## 2018-04-02 DIAGNOSIS — J30.2 CHRONIC SEASONAL ALLERGIC RHINITIS, UNSPECIFIED TRIGGER: ICD-10-CM

## 2018-04-02 DIAGNOSIS — I10 ESSENTIAL HYPERTENSION: Primary | ICD-10-CM

## 2018-04-02 DIAGNOSIS — E78.2 MIXED HYPERLIPIDEMIA: ICD-10-CM

## 2018-04-02 DIAGNOSIS — D64.9 ANEMIA, UNSPECIFIED TYPE: ICD-10-CM

## 2018-04-02 PROCEDURE — 99214 OFFICE O/P EST MOD 30 MIN: CPT | Performed by: NURSE PRACTITIONER

## 2018-04-02 RX ORDER — VALSARTAN AND HYDROCHLOROTHIAZIDE 80; 12.5 MG/1; MG/1
1 TABLET, FILM COATED ORAL DAILY
Qty: 90 TABLET | Refills: 1 | Status: SHIPPED | OUTPATIENT
Start: 2018-04-02 | End: 2018-08-02

## 2018-04-02 RX ORDER — FLUTICASONE PROPIONATE 50 MCG
2 SPRAY, SUSPENSION (ML) NASAL DAILY
Qty: 1 BOTTLE | Refills: 5 | Status: SHIPPED | OUTPATIENT
Start: 2018-04-02 | End: 2020-12-31 | Stop reason: SDUPTHER

## 2018-04-02 RX ORDER — SIMVASTATIN 20 MG
20 TABLET ORAL NIGHTLY
Qty: 90 TABLET | Refills: 1 | Status: SHIPPED | OUTPATIENT
Start: 2018-04-02 | End: 2018-08-02 | Stop reason: SDUPTHER

## 2018-04-02 RX ORDER — FOLIC ACID 1 MG/1
1 TABLET ORAL DAILY
Qty: 90 TABLET | Refills: 2 | Status: SHIPPED | OUTPATIENT
Start: 2018-04-02 | End: 2019-02-08 | Stop reason: SDUPTHER

## 2018-04-02 RX ORDER — CYCLOBENZAPRINE HCL 10 MG
TABLET ORAL
Refills: 1 | COMMUNITY
Start: 2018-02-19 | End: 2018-08-02 | Stop reason: SDUPTHER

## 2018-04-02 NOTE — PATIENT INSTRUCTIONS

## 2018-04-02 NOTE — PROGRESS NOTES
Chief Complaint   Patient presents with   • Hypertension     Medication refills       Subjective   Nelli Morales is a 52 y.o. female    Hypertension   This is a chronic problem. The current episode started more than 1 year ago. The problem is unchanged. The problem is controlled. Pertinent negatives include no anxiety, blurred vision, chest pain, headaches, malaise/fatigue, neck pain, orthopnea, palpitations, peripheral edema, PND, shortness of breath or sweats. There are no associated agents to hypertension. Risk factors for coronary artery disease include post-menopausal state, obesity and sedentary lifestyle. Past treatments include angiotensin blockers and diuretics. Current antihypertension treatment includes angiotensin blockers and diuretics. The current treatment provides significant improvement. Compliance problems include exercise.  There is no history of angina, kidney disease, CAD/MI, CVA, heart failure, left ventricular hypertrophy, PVD or retinopathy.     Sinus/Post nasal drip- Going on for a few weeks, mostly post nasal drip, cough up yellow-brown, denies fevers, chills, sore throat, has a HX of seasonal allergies, usually Flonase helps, not used in quite awhile.     Allergies   Allergen Reactions   • Ace Inhibitors Cough   • Morphine And Related Rash     Past Medical History:   Diagnosis Date   • Allergic rhinitis    • BV (bacterial vaginosis)    • Cataracts, bilateral    • Diverticulitis large intestine w/o perforation or abscess w/o bleeding 04/2017   • Fibrocystic breast disease    • Glaucoma    • Hyperlipidemia    • Hypertension    • IBS (irritable bowel syndrome)    • Insomnia    • Obesity (BMI 30.0-34.9)    • Stress bladder incontinence, female    • Varicose veins of legs       Past Surgical History:   Procedure Laterality Date   • BLADDER SUSPENSION  10/27/2007   • HYSTERECTOMY  12/17/2013    [facility]     Social History     Social History   • Marital status: Single     Spouse name: N/A   • Number  of children: N/A   • Years of education: N/A     Occupational History   • Not on file.     Social History Main Topics   • Smoking status: Never Smoker   • Smokeless tobacco: Never Used   • Alcohol use 0.6 oz/week     1 Cans of beer per week      Comment: occasional   • Drug use: No   • Sexual activity: Yes     Partners: Male     Other Topics Concern   • Not on file     Social History Narrative   • No narrative on file        Current Outpatient Prescriptions:   •  cyclobenzaprine (FLEXERIL) 10 MG tablet, take 1/2 to 1 tablet by mouth at bedtime and INCREASE TO 1 tablet...  (REFER TO PRESCRIPTION NOTES)., Disp: , Rfl: 1  •  ESTRACE VAGINAL 0.1 MG/GM vaginal cream, insert 1/2 gram vaginally three times a week, Disp: , Rfl: 0  •  estradiol (MINIVELLE, VIVELLE-DOT) 0.05 MG/24HR patch, , Disp: , Rfl: 0  •  fluticasone (FLONASE) 50 MCG/ACT nasal spray, 2 sprays into each nostril Daily., Disp: 1 bottle, Rfl: 5  •  folic acid (FOLVITE) 1 MG tablet, Take 1 tablet by mouth Daily., Disp: 90 tablet, Rfl: 2  •  latanoprost (XALATAN) 0.005 % ophthalmic solution, , Disp: , Rfl: 1  •  linaclotide (LINZESS) 290 MCG capsule capsule, Take 290 mcg by mouth As Needed., Disp: , Rfl:   •  Loratadine (CLARITIN) 10 MG capsule, Take 1 tablet by mouth., Disp: , Rfl:   •  simvastatin (ZOCOR) 20 MG tablet, Take 1 tablet by mouth Every Night., Disp: 90 tablet, Rfl: 1  •  valsartan-hydrochlorothiazide (DIOVAN-HCT) 80-12.5 MG per tablet, Take 1 tablet by mouth Daily., Disp: 90 tablet, Rfl: 1  •  Cholecalciferol (VITAMIN D3) 5000 UNITS capsule capsule, Take 5,000 Units by mouth Daily., Disp: , Rfl:      Review of Systems   Constitutional: Negative for chills, fever and malaise/fatigue.   HENT: Positive for congestion and postnasal drip. Negative for rhinorrhea, sinus pain and sinus pressure.    Eyes: Negative for blurred vision.   Respiratory: Negative for shortness of breath.    Cardiovascular: Negative for chest pain, palpitations, orthopnea and  PND.   Gastrointestinal: Negative for constipation, diarrhea, nausea and vomiting.   Genitourinary: Negative for difficulty urinating and dysuria.   Musculoskeletal: Negative for neck pain.   Neurological: Negative for headaches.   Psychiatric/Behavioral: Negative for sleep disturbance.       Objective     Vitals:    04/02/18 0804   BP: 104/76   Pulse: 63   SpO2: 99%       Results for orders placed or performed in visit on 12/22/17   POCT urinalysis dipstick, automated   Result Value Ref Range    Color Yellow Yellow, Straw, Dark Yellow, Janis    Clarity, UA Slightly Cloudy (A) Clear    Glucose, UA Negative Negative, 1000 mg/dL (3+) mg/dL    Bilirubin Negative Negative    Ketones, UA Negative Negative    Specific Gravity  1.015 1.005 - 1.030    Blood, UA Negative Negative    pH, Urine 7.0 5.0 - 8.0    Protein, POC Trace (A) Negative mg/dL    Urobilinogen, UA Normal Normal    Leukocytes Negative Negative    Nitrite, UA Negative Negative       Physical Exam   Constitutional: She is oriented to person, place, and time. She appears well-developed and well-nourished.   HENT:   Head: Normocephalic and atraumatic.   Right Ear: Hearing and external ear normal.   Left Ear: Hearing and external ear normal. A middle ear effusion is present.   Nose: Nose normal. Right sinus exhibits no maxillary sinus tenderness and no frontal sinus tenderness. Left sinus exhibits no maxillary sinus tenderness and no frontal sinus tenderness.   Mouth/Throat: Mucous membranes are normal. Posterior oropharyngeal erythema present.   Cardiovascular: Normal rate, regular rhythm and normal heart sounds.    Pulmonary/Chest: Effort normal and breath sounds normal.   Musculoskeletal: She exhibits no edema.   Neurological: She is alert and oriented to person, place, and time.   Skin: Skin is warm and dry.   Psychiatric: She has a normal mood and affect. Her behavior is normal.   Vitals reviewed.      Assessment/Plan   Problem List Items Addressed This  Visit        Cardiovascular and Mediastinum    Essential hypertension - Primary    Relevant Medications    valsartan-hydrochlorothiazide (DIOVAN-HCT) 80-12.5 MG per tablet    Mixed hyperlipidemia    Relevant Medications    simvastatin (ZOCOR) 20 MG tablet       Respiratory    Chronic seasonal allergic rhinitis    Relevant Medications    fluticasone (FLONASE) 50 MCG/ACT nasal spray       Hematopoietic and Hemostatic    Anemia    Relevant Medications    folic acid (FOLVITE) 1 MG tablet      Other Visit Diagnoses    None.     Patient instructed to check blood pressure daily, record, and bring to next visit. If greater than 150/90, patient is to call my office or return sooner for evaluation. Pt advised to eat a heart healthy diet and get regular aerobic exercise. Patient was encouraged to keep me informed of any acute changes, lack of improvement, or any new concerning symptoms.Plan of care discussed with pt. They verbalized understanding and agreement.     RV- July for Physical    Counseling provided on hypertension.    Flaquito Wallace, APRN   4/2/2018   8:29 AM

## 2018-04-17 ENCOUNTER — TELEPHONE (OUTPATIENT)
Dept: FAMILY MEDICINE CLINIC | Facility: CLINIC | Age: 53
End: 2018-04-17

## 2018-04-17 NOTE — TELEPHONE ENCOUNTER
PATIENT CALLED TO ASK WHAT SHE CAN TAKE OTHER GAS X FOR HER GAS. SHE SAYS IT DOES NOTHING FOR HER.

## 2018-04-18 NOTE — TELEPHONE ENCOUNTER
Have patient keep a diary of foods she eats, common gas causing foods: beans, peas, cabbage, onions, broccoli, cauliflower, whole grains, certain fruits, beer, carbonated drinks. Try removing one food at a time to see if gas improves,    Simethicone in Gas-X is the recommended treatment, if she is having a lot of issues, it is likely there is something she is eating or drinking that is making it worse. She can also try Beano before eating certain foods.    If she drinks milk or eats dairy products, she may have a lactose intolerance, try lactose-free, or take Lactaid    Avoid sugar free foods that have sorbitol, mannitol, or xylitol, these are sugar alcohols that increase gas and can cause diarrhea    Temporarily cut back on fiber, slowly add this back to her diet    I f she continues to have problems or is concerned she may schedule an appt.

## 2018-06-04 ENCOUNTER — TELEPHONE (OUTPATIENT)
Dept: FAMILY MEDICINE CLINIC | Facility: CLINIC | Age: 53
End: 2018-06-04

## 2018-08-02 ENCOUNTER — OFFICE VISIT (OUTPATIENT)
Dept: FAMILY MEDICINE CLINIC | Facility: CLINIC | Age: 53
End: 2018-08-02

## 2018-08-02 ENCOUNTER — LAB (OUTPATIENT)
Dept: LAB | Facility: HOSPITAL | Age: 53
End: 2018-08-02

## 2018-08-02 VITALS
WEIGHT: 152.6 LBS | DIASTOLIC BLOOD PRESSURE: 82 MMHG | HEIGHT: 58 IN | OXYGEN SATURATION: 98 % | BODY MASS INDEX: 32.03 KG/M2 | SYSTOLIC BLOOD PRESSURE: 122 MMHG | HEART RATE: 102 BPM

## 2018-08-02 DIAGNOSIS — Z80.3 FAMILY HISTORY OF BREAST CANCER IN MOTHER: ICD-10-CM

## 2018-08-02 DIAGNOSIS — D64.9 ANEMIA, UNSPECIFIED TYPE: ICD-10-CM

## 2018-08-02 DIAGNOSIS — Z00.00 WELLNESS EXAMINATION: Primary | ICD-10-CM

## 2018-08-02 DIAGNOSIS — I10 ESSENTIAL HYPERTENSION: ICD-10-CM

## 2018-08-02 DIAGNOSIS — R79.89 LOW VITAMIN D LEVEL: ICD-10-CM

## 2018-08-02 DIAGNOSIS — E78.2 MIXED HYPERLIPIDEMIA: ICD-10-CM

## 2018-08-02 DIAGNOSIS — K57.32 DIVERTICULITIS OF LARGE INTESTINE WITHOUT PERFORATION OR ABSCESS WITHOUT BLEEDING: ICD-10-CM

## 2018-08-02 DIAGNOSIS — K62.5 BRIGHT RED RECTAL BLEEDING: ICD-10-CM

## 2018-08-02 DIAGNOSIS — R92.2 DENSE BREAST TISSUE ON MAMMOGRAM: ICD-10-CM

## 2018-08-02 DIAGNOSIS — E66.09 CLASS 1 OBESITY DUE TO EXCESS CALORIES WITHOUT SERIOUS COMORBIDITY WITH BODY MASS INDEX (BMI) OF 31.0 TO 31.9 IN ADULT: ICD-10-CM

## 2018-08-02 DIAGNOSIS — Z00.00 WELLNESS EXAMINATION: ICD-10-CM

## 2018-08-02 DIAGNOSIS — M54.31 SCIATICA OF RIGHT SIDE: ICD-10-CM

## 2018-08-02 DIAGNOSIS — L23.7 ALLERGIC CONTACT DERMATITIS DUE TO PLANTS, EXCEPT FOOD: ICD-10-CM

## 2018-08-02 DIAGNOSIS — Z12.11 SCREEN FOR COLON CANCER: ICD-10-CM

## 2018-08-02 PROBLEM — R92.30 DENSE BREAST TISSUE ON MAMMOGRAM: Status: ACTIVE | Noted: 2018-08-02

## 2018-08-02 LAB
25(OH)D3 SERPL-MCNC: 32 NG/ML
ALBUMIN SERPL-MCNC: 4.55 G/DL (ref 3.2–4.8)
ALBUMIN/GLOB SERPL: 1.9 G/DL (ref 1.5–2.5)
ALP SERPL-CCNC: 101 U/L (ref 25–100)
ALT SERPL W P-5'-P-CCNC: 35 U/L (ref 7–40)
ANION GAP SERPL CALCULATED.3IONS-SCNC: 16 MMOL/L (ref 3–11)
ARTICHOKE IGE QN: 113 MG/DL (ref 0–130)
AST SERPL-CCNC: 30 U/L (ref 0–33)
BASOPHILS # BLD AUTO: 0.01 10*3/MM3 (ref 0–0.2)
BASOPHILS NFR BLD AUTO: 0.2 % (ref 0–1)
BILIRUB SERPL-MCNC: 0.5 MG/DL (ref 0.3–1.2)
BUN BLD-MCNC: 13 MG/DL (ref 9–23)
BUN/CREAT SERPL: 22 (ref 7–25)
CALCIUM SPEC-SCNC: 9.6 MG/DL (ref 8.7–10.4)
CHLORIDE SERPL-SCNC: 103 MMOL/L (ref 99–109)
CHOLEST SERPL-MCNC: 166 MG/DL (ref 0–200)
CO2 SERPL-SCNC: 24 MMOL/L (ref 20–31)
CREAT BLD-MCNC: 0.59 MG/DL (ref 0.6–1.3)
DEPRECATED RDW RBC AUTO: 41.3 FL (ref 37–54)
EOSINOPHIL # BLD AUTO: 0.09 10*3/MM3 (ref 0–0.3)
EOSINOPHIL NFR BLD AUTO: 1.6 % (ref 0–3)
ERYTHROCYTE [DISTWIDTH] IN BLOOD BY AUTOMATED COUNT: 12.8 % (ref 11.3–14.5)
GFR SERPL CREATININE-BSD FRML MDRD: 107 ML/MIN/1.73
GFR SERPL CREATININE-BSD FRML MDRD: 130 ML/MIN/1.73
GLOBULIN UR ELPH-MCNC: 2.4 GM/DL
GLUCOSE BLD-MCNC: 124 MG/DL (ref 70–100)
HCT VFR BLD AUTO: 44 % (ref 34.5–44)
HDLC SERPL-MCNC: 42 MG/DL (ref 40–60)
HGB BLD-MCNC: 14.9 G/DL (ref 11.5–15.5)
IMM GRANULOCYTES # BLD: 0.01 10*3/MM3 (ref 0–0.03)
IMM GRANULOCYTES NFR BLD: 0.2 % (ref 0–0.6)
LYMPHOCYTES # BLD AUTO: 2.42 10*3/MM3 (ref 0.6–4.8)
LYMPHOCYTES NFR BLD AUTO: 43.9 % (ref 24–44)
MCH RBC QN AUTO: 30.3 PG (ref 27–31)
MCHC RBC AUTO-ENTMCNC: 33.9 G/DL (ref 32–36)
MCV RBC AUTO: 89.6 FL (ref 80–99)
MONOCYTES # BLD AUTO: 0.31 10*3/MM3 (ref 0–1)
MONOCYTES NFR BLD AUTO: 5.6 % (ref 0–12)
NEUTROPHILS # BLD AUTO: 2.68 10*3/MM3 (ref 1.5–8.3)
NEUTROPHILS NFR BLD AUTO: 48.7 % (ref 41–71)
PLATELET # BLD AUTO: 201 10*3/MM3 (ref 150–450)
PMV BLD AUTO: 11.3 FL (ref 6–12)
POTASSIUM BLD-SCNC: 3.8 MMOL/L (ref 3.5–5.5)
PROT SERPL-MCNC: 6.9 G/DL (ref 5.7–8.2)
RBC # BLD AUTO: 4.91 10*6/MM3 (ref 3.89–5.14)
SODIUM BLD-SCNC: 143 MMOL/L (ref 132–146)
TRIGL SERPL-MCNC: 241 MG/DL (ref 0–150)
WBC NRBC COR # BLD: 5.51 10*3/MM3 (ref 3.5–10.8)

## 2018-08-02 PROCEDURE — 85025 COMPLETE CBC W/AUTO DIFF WBC: CPT

## 2018-08-02 PROCEDURE — 96372 THER/PROPH/DIAG INJ SC/IM: CPT | Performed by: NURSE PRACTITIONER

## 2018-08-02 PROCEDURE — 99396 PREV VISIT EST AGE 40-64: CPT | Performed by: NURSE PRACTITIONER

## 2018-08-02 PROCEDURE — 80061 LIPID PANEL: CPT

## 2018-08-02 PROCEDURE — 82306 VITAMIN D 25 HYDROXY: CPT

## 2018-08-02 PROCEDURE — 80053 COMPREHEN METABOLIC PANEL: CPT

## 2018-08-02 PROCEDURE — 36415 COLL VENOUS BLD VENIPUNCTURE: CPT

## 2018-08-02 RX ORDER — SIMVASTATIN 20 MG
20 TABLET ORAL NIGHTLY
Qty: 90 TABLET | Refills: 3 | Status: SHIPPED | OUTPATIENT
Start: 2018-08-02 | End: 2019-08-26 | Stop reason: SDUPTHER

## 2018-08-02 RX ORDER — METHYLPREDNISOLONE ACETATE 80 MG/ML
80 INJECTION, SUSPENSION INTRA-ARTICULAR; INTRALESIONAL; INTRAMUSCULAR; SOFT TISSUE ONCE
Status: COMPLETED | OUTPATIENT
Start: 2018-08-02 | End: 2018-08-02

## 2018-08-02 RX ORDER — LOSARTAN POTASSIUM AND HYDROCHLOROTHIAZIDE 12.5; 5 MG/1; MG/1
1 TABLET ORAL DAILY
Qty: 90 TABLET | Refills: 3 | Status: SHIPPED | OUTPATIENT
Start: 2018-08-02 | End: 2018-08-15

## 2018-08-02 RX ORDER — CYCLOBENZAPRINE HCL 10 MG
10 TABLET ORAL 2 TIMES DAILY PRN
Qty: 90 TABLET | Refills: 1 | Status: SHIPPED | OUTPATIENT
Start: 2018-08-02 | End: 2022-05-24

## 2018-08-02 RX ADMIN — METHYLPREDNISOLONE ACETATE 80 MG: 80 INJECTION, SUSPENSION INTRA-ARTICULAR; INTRALESIONAL; INTRAMUSCULAR; SOFT TISSUE at 10:00

## 2018-08-02 NOTE — PROGRESS NOTES
Patient Care Team:  Flaquito Wallace APRN as PCP - General (Family Medicine)     Chief complaint: Patient is in today for a physical     Patient is a 52 y.o. female who presents for her yearly physical exam.     HPI Pt in for yearly Physical.   Has been having rectal bleeding with BMs, past 2 months, Bright red, Only with BM, most every BM, PT does have a HX of constipation, going 2-3 times per week, Has used Linzess in past, But does not take daily. Pt does have a HX of diverticulitis. Last colonoscopy was 2013-to be repeated in 5 yrs.-This year.     Had recent Mammogram, was benign but they recommended breast MRI since she has a calculated risk of 20% with a family HX of breast cancer.     Review of Systems   Constitutional: Negative for appetite change, chills, fatigue and fever.   HENT: Positive for rhinorrhea. Negative for congestion, ear pain, sinus pressure and sore throat.    Eyes: Negative for itching and visual disturbance (glasses).   Respiratory: Negative for cough, shortness of breath and wheezing.    Cardiovascular: Negative for chest pain, palpitations and leg swelling.   Gastrointestinal: Positive for blood in stool, constipation and rectal pain. Negative for abdominal pain, diarrhea, nausea and vomiting.   Endocrine: Negative for cold intolerance and heat intolerance.   Genitourinary: Positive for difficulty urinating (leaking, has a pessary). Negative for dysuria and hematuria.   Musculoskeletal: Positive for arthralgias (left knee with steps) and back pain (right sciatic nerve, seeing chiropractor). Negative for joint swelling and myalgias.   Skin: Positive for rash (posion IVY, since Sunday, cleaning fence rows.). Negative for wound.   Allergic/Immunologic: Negative for environmental allergies and food allergies.   Neurological: Negative for dizziness, numbness and headaches.   Hematological: Negative for adenopathy. Does not bruise/bleed easily.   Psychiatric/Behavioral: Negative for  dysphoric mood and sleep disturbance. The patient is not nervous/anxious.       History  Past Medical History:   Diagnosis Date   • Allergic rhinitis    • BV (bacterial vaginosis)    • Cataracts, bilateral    • Diverticulitis large intestine w/o perforation or abscess w/o bleeding 04/2017   • Fibrocystic breast disease    • Glaucoma    • Hyperlipidemia    • Hypertension    • IBS (irritable bowel syndrome)    • Insomnia    • Obesity (BMI 30.0-34.9)    • Stress bladder incontinence, female    • Varicose veins of legs       Past Surgical History:   Procedure Laterality Date   • BLADDER SUSPENSION  10/27/2007   • HYSTERECTOMY  12/17/2013    NU      Allergies   Allergen Reactions   • Ace Inhibitors Cough   • Morphine And Related Rash      Family History   Problem Relation Age of Onset   • Diabetes Mother    • Dementia Mother    • Hypothyroidism Mother    • Hyperlipidemia Mother    • ALS Father 70   • Diabetes Sister    • Hyperlipidemia Sister    • Hypothyroidism Sister    • Hypertension Brother      Social History     Social History   • Marital status: Single     Spouse name: N/A   • Number of children: N/A   • Years of education: N/A     Occupational History   • Not on file.     Social History Main Topics   • Smoking status: Never Smoker   • Smokeless tobacco: Never Used   • Alcohol use 0.6 oz/week     1 Cans of beer per week      Comment: occasional   • Drug use: No   • Sexual activity: Yes     Partners: Male     Other Topics Concern   • Not on file     Social History Narrative   • No narrative on file        Current Outpatient Prescriptions:   •  Cholecalciferol (VITAMIN D3) 5000 UNITS capsule capsule, Take 5,000 Units by mouth Daily., Disp: , Rfl:   •  cyclobenzaprine (FLEXERIL) 10 MG tablet, Take 1 tablet by mouth 2 (Two) Times a Day As Needed for Muscle Spasms., Disp: 90 tablet, Rfl: 1  •  ESTRACE VAGINAL 0.1 MG/GM vaginal cream, insert 1/2 gram vaginally three times a week, Disp: , Rfl: 0  •  estradiol  (MINIVELLE, VIVELLE-DOT) 0.05 MG/24HR patch, , Disp: , Rfl: 0  •  fluticasone (FLONASE) 50 MCG/ACT nasal spray, 2 sprays into each nostril Daily., Disp: 1 bottle, Rfl: 5  •  folic acid (FOLVITE) 1 MG tablet, Take 1 tablet by mouth Daily., Disp: 90 tablet, Rfl: 2  •  latanoprost (XALATAN) 0.005 % ophthalmic solution, , Disp: , Rfl: 1  •  linaclotide (LINZESS) 290 MCG capsule capsule, Take 1 capsule by mouth As Needed (prn)., Disp: 90 capsule, Rfl: 3  •  Loratadine (CLARITIN) 10 MG capsule, Take 1 tablet by mouth., Disp: , Rfl:   •  simvastatin (ZOCOR) 20 MG tablet, Take 1 tablet by mouth Every Night., Disp: 90 tablet, Rfl: 3  •  losartan-hydrochlorothiazide (HYZAAR) 50-12.5 MG per tablet, Take 1 tablet by mouth Daily., Disp: 90 tablet, Rfl: 3  No current facility-administered medications for this visit.     Immunizations   N/A   Prescribed/Refused   Date     Td/Tdap  (Booster Q 10 yrs)   [x]           Prescribed    []     Refused        []           Flu  (Yearly)   []        Prescribed    [x]     Refused        []           Pneumovax  (1 yr after Prevnar)   [x]        Prescribed    []     Refused        []           Prevnar 13  (1 yr after Pneumo)   [x]        Prescribed    []     Refused        []           Hep B     [x]        Prescribed    []     Refused        []           Shingles  (Age 50 and older)   []        Prescribed    [x]     Refused        []           Immunization History   Administered Date(s) Administered   • Influenza Whole 08/01/2016   • Influenza, Unspecified 08/26/2017   • Pneumococcal Conjugate 13-Valent (PCV13) 08/26/2017   • Td 11/15/2017   • Tdap 01/19/2012     Health Maintenance   Topic Date Due   • ZOSTER VACCINE (1 of 2) 12/19/2016   • COLONOSCOPY  06/03/2018   • INFLUENZA VACCINE  08/01/2018   • LIPID PANEL  08/02/2019   • ANNUAL PHYSICAL  08/03/2019   • MAMMOGRAM  06/15/2020   • PAP SMEAR  06/01/2021   • TDAP/TD VACCINES (3 - Td) 11/15/2027        Diabetes  [] Yes  [x] No   N/A      Date  "    Eye Exam     []             []   Complete     []   Recommended Date:  Where:       Foot Exam     []         []   Complete          Obesity Counseling     []       []   Complete     No results found for: HGBA1C, MICROALBUR    Additional Testing      Date     Colorectal Screening       []   N/A   []   Complete    [x]   Ordered     Date:    Where:       Pap      []   N/A   [x]   Complete   []   OB/GYN Date:    Where:       Mammogram        []   N/A   [x]   Complete   []   Ordered Date: But needs Breast MRI    Where:     PSA  (Over age 50)    [x]   N/A   []   Complete   []   Ordered Date:    Where:     US Aorta  (For male smokers, age 65)     [x]   N/A   []   Complete   []   Ordered Date:    Where:     CT for Smoker  (Age 55-75, 30 pk yr)    [x]   N/A   []   Complete   []   Ordered Date:    Where:     Bone Density/DEXA      [x]   N/A   []   Complete   []   Ordered Date:    Follow-up:     Hep. C  ( 3078-0694)      [x]   N/A   []   Complete   []   Ordered Date:    Where:     Results for orders placed or performed in visit on 17   POCT urinalysis dipstick, automated   Result Value Ref Range    Color Yellow Yellow, Straw, Dark Yellow, Janis    Clarity, UA Slightly Cloudy (A) Clear    Glucose, UA Negative Negative, 1000 mg/dL (3+) mg/dL    Bilirubin Negative Negative    Ketones, UA Negative Negative    Specific Gravity  1.015 1.005 - 1.030    Blood, UA Negative Negative    pH, Urine 7.0 5.0 - 8.0    Protein, POC Trace (A) Negative mg/dL    Urobilinogen, UA Normal Normal    Leukocytes Negative Negative    Nitrite, UA Negative Negative            /82   Pulse 102   Ht 147.3 cm (58\")   Wt 69.2 kg (152 lb 9.6 oz)   SpO2 98%   Breastfeeding? No   BMI 31.89 kg/m²       Physical Exam   Constitutional: She is oriented to person, place, and time. She appears well-developed and well-nourished. No distress.   HENT:   Head: Normocephalic and atraumatic.   Right Ear: External ear normal.   Left Ear: External ear " normal.   Nose: Nose normal.   Mouth/Throat: Oropharynx is clear and moist.   Eyes: Pupils are equal, round, and reactive to light. Conjunctivae and EOM are normal. Right eye exhibits no discharge. Left eye exhibits no discharge. No scleral icterus.   Neck: Normal range of motion. Neck supple. No JVD (no bruits) present. No tracheal deviation present. No thyromegaly present.   Cardiovascular: Normal rate, regular rhythm, normal heart sounds and intact distal pulses.  Exam reveals no gallop and no friction rub.    No murmur heard.  Pulmonary/Chest: Effort normal and breath sounds normal. No respiratory distress. She has no wheezes. She has no rales.   Breast deferred, recent saw OB/GYN   Abdominal: Soft. Normal appearance and bowel sounds are normal. She exhibits no distension and no mass. There is no hepatosplenomegaly. There is no tenderness. No hernia.   Genitourinary:   Genitourinary Comments: deferred   Musculoskeletal: Normal range of motion. She exhibits no edema, tenderness or deformity.   Lymphadenopathy:     She has no cervical adenopathy.   Neurological: She is alert and oriented to person, place, and time. She has normal reflexes. She displays normal reflexes.   Skin: Skin is warm and dry. Rash (right arm, neck, right face, raised, maculo-pap lesions, no drainage) noted. She is not diaphoretic. No erythema. No pallor.   Psychiatric: She has a normal mood and affect. Her behavior is normal. Thought content normal.   Nursing note and vitals reviewed.          Counseling provided on weight management.    Diagnoses and all orders for this visit:    Wellness examination  -     Comprehensive Metabolic Panel; Future  -     Lipid Panel; Future  -     CBC & Differential; Future  -     Vitamin D 25 Hydroxy; Future    Essential hypertension  -     Comprehensive Metabolic Panel; Future    Mixed hyperlipidemia  -     simvastatin (ZOCOR) 20 MG tablet; Take 1 tablet by mouth Every Night.  -      losartan-hydrochlorothiazide (HYZAAR) 50-12.5 MG per tablet; Take 1 tablet by mouth Daily.  -     Lipid Panel; Future    Class 1 obesity due to excess calories without serious comorbidity with body mass index (BMI) of 31.0 to 31.9 in adult    Sciatica of right side  -     cyclobenzaprine (FLEXERIL) 10 MG tablet; Take 1 tablet by mouth 2 (Two) Times a Day As Needed for Muscle Spasms.    Anemia, unspecified type  -     CBC & Differential; Future    Low vitamin D level  -     Vitamin D 25 Hydroxy; Future    Family history of breast cancer in mother  -     MRI Breast Bilateral Without Contrast; Future    Dense breast tissue on mammogram  -     MRI Breast Bilateral Without Contrast; Future    Bright red rectal bleeding  -     Ambulatory Referral For Screening Colonoscopy    Diverticulitis of large intestine without perforation or abscess without bleeding  -     Ambulatory Referral For Screening Colonoscopy    Screen for colon cancer  -     Ambulatory Referral For Screening Colonoscopy    Allergic contact dermatitis due to plants, except food  -     methylPREDNISolone acetate (DEPO-medrol) injection 80 mg; Inject 1 mL into the appropriate muscle as directed by prescriber 1 (One) Time.    Other orders  -     linaclotide (LINZESS) 290 MCG capsule capsule; Take 1 capsule by mouth As Needed (prn).       Will obtain routine labs today and make further recommendations pending results. Refer for colonoscopy for screening, rectal bleeding, and HX diverticulitis.    Due to dense breasts on recent Mammogram and increased risk of 20% due to family HX, will get MRI breasts.    For contact Derm-Depo-Medrol IM    Discussed need for weight management. Discussed weight loss with diet, recommend Weight Watchers or Mediterranean Diet, but stated that whatever method works for this patient is best. Reviewed need for regular exercise.   The patient agrees with all recommendations at this time.    Decrease Vit D to 2000 IU QD. Check level  today    Will change Diovan to Losartan due to recent recall of this med. Patient instructed to check blood pressure daily, record, and bring to next visit. If greater than 150/90, patient is to call my office or return sooner for evaluation.     RV- 1 mo or 1 yr pending results of testing      Flaquito Wallace, JERRICA   8/2/2018   10:50 AM

## 2018-08-02 NOTE — PATIENT INSTRUCTIONS

## 2018-08-06 ENCOUNTER — TELEPHONE (OUTPATIENT)
Dept: FAMILY MEDICINE CLINIC | Facility: CLINIC | Age: 53
End: 2018-08-06

## 2018-08-08 ENCOUNTER — CLINICAL SUPPORT (OUTPATIENT)
Dept: FAMILY MEDICINE CLINIC | Facility: CLINIC | Age: 53
End: 2018-08-08

## 2018-08-08 DIAGNOSIS — R73.03 PREDIABETES: Primary | ICD-10-CM

## 2018-08-08 LAB — HBA1C MFR BLD: 7.1 %

## 2018-08-08 PROCEDURE — 83036 HEMOGLOBIN GLYCOSYLATED A1C: CPT | Performed by: NURSE PRACTITIONER

## 2018-08-08 NOTE — PROGRESS NOTES
Patient walked in to clinic to get an A1C test per Flaquito.  Patient tolerated test well.  Per Flaquito patient to be scheduled for a f/u to discuss treatment.

## 2018-08-09 ENCOUNTER — TELEPHONE (OUTPATIENT)
Dept: FAMILY MEDICINE CLINIC | Facility: CLINIC | Age: 53
End: 2018-08-09

## 2018-08-09 NOTE — TELEPHONE ENCOUNTER
Called patient, advised per Flaquito she would need follow up to discuss medications for diabetes.  Patient scheduled f/u for 09/05/2018, will discuss then.

## 2018-08-14 ENCOUNTER — TELEPHONE (OUTPATIENT)
Dept: FAMILY MEDICINE CLINIC | Facility: CLINIC | Age: 53
End: 2018-08-14

## 2018-08-15 ENCOUNTER — OFFICE VISIT (OUTPATIENT)
Dept: FAMILY MEDICINE CLINIC | Facility: CLINIC | Age: 53
End: 2018-08-15

## 2018-08-15 VITALS
HEIGHT: 58 IN | BODY MASS INDEX: 31.65 KG/M2 | DIASTOLIC BLOOD PRESSURE: 82 MMHG | HEART RATE: 96 BPM | WEIGHT: 150.8 LBS | OXYGEN SATURATION: 98 % | SYSTOLIC BLOOD PRESSURE: 114 MMHG

## 2018-08-15 DIAGNOSIS — L23.7 ALLERGIC CONTACT DERMATITIS DUE TO PLANTS, EXCEPT FOOD: ICD-10-CM

## 2018-08-15 DIAGNOSIS — E11.9 TYPE 2 DIABETES MELLITUS WITHOUT COMPLICATION, WITHOUT LONG-TERM CURRENT USE OF INSULIN (HCC): ICD-10-CM

## 2018-08-15 DIAGNOSIS — E66.09 CLASS 1 OBESITY DUE TO EXCESS CALORIES WITHOUT SERIOUS COMORBIDITY WITH BODY MASS INDEX (BMI) OF 32.0 TO 32.9 IN ADULT: ICD-10-CM

## 2018-08-15 DIAGNOSIS — I10 ESSENTIAL HYPERTENSION: Primary | ICD-10-CM

## 2018-08-15 PROCEDURE — 99214 OFFICE O/P EST MOD 30 MIN: CPT | Performed by: NURSE PRACTITIONER

## 2018-08-15 RX ORDER — LOSARTAN POTASSIUM AND HYDROCHLOROTHIAZIDE 12.5; 5 MG/1; MG/1
1 TABLET ORAL DAILY
Qty: 90 TABLET | Refills: 3
Start: 2018-08-15 | End: 2019-08-26 | Stop reason: SDUPTHER

## 2018-08-15 RX ORDER — PREDNISONE 10 MG/1
TABLET ORAL
Qty: 45 TABLET | Refills: 0 | Status: SHIPPED | OUTPATIENT
Start: 2018-08-15 | End: 2018-11-21

## 2018-08-15 NOTE — PATIENT INSTRUCTIONS
Cooking With Less Salt  Cooking with less salt is one way to reduce the amount of sodium you get from food. Depending on your condition and overall health, your health care provider or diet and nutrition specialist (dietitian) may recommend that you reduce your sodium intake. Most people should have less than 2,300 milligrams (mg) of sodium each day. If you have high blood pressure (hypertension), you may need to limit your sodium to 1,500 mg each day. Follow the tips below to help reduce your sodium intake.  What do I need to know about cooking with less salt?  Shopping  · Buy sodium-free or low-sodium products. Look for the following words on food labels:  ? Low-sodium.  ? Sodium-free.  ? Reduced-sodium.  ? No salt added.  ? Unsalted.  · Buy fresh or frozen vegetables. Avoid canned vegetables.  · Avoid buying meats or protein foods that have been injected with broth or saline solution.  · Avoid cured or smoked meats, such as hot dogs, aragon, salami, ham, and bologna.  Reading food labels  · Check the food label before buying or using packaged ingredients.  · Look for products with no more than 140 mg of sodium in one serving.  · Do not choose foods with salt as one of the first three ingredients on the ingredients list. If salt is one of the first three ingredients, it usually means the item is high in sodium, because ingredients are listed in order of amount in the food item.  Cooking  · Use herbs, seasonings without salt, and spices as substitutes for salt in foods.  · Use sodium-free baking soda when baking.  · [place], braise, or roast foods to add flavor with less salt.  · Avoid adding salt to pasta, rice, or hot cereals while cooking.  · Drain and rinse canned vegetables before use.  · Avoid adding salt when cooking sweets and desserts.  · Cook with low-sodium ingredients.  What are some salt alternatives?  The following are herbs, seasonings, and spices that can be used instead of salt to give taste to your  food. Herbs should be fresh or dried. Do not choose packaged mixes. Next to the name of the herb, spice, or seasoning are some examples of foods you can pair it with.  Herbs  · Bay leaves - Soups, meat and vegetable dishes, and spaghetti sauce.  · Basil - Italian dishes, soups, pasta, and fish dishes.  · Cilantro - Meat, poultry, and vegetable dishes.  · Chili powder - Marinades and Mexican dishes.  · Chives - Salad dressings and potato dishes.  · Cumin - Mexican dishes, couscous, and meat dishes.  · Dill - Fish dishes, sauces, and salads.  · Fennel - Meat and vegetable dishes, breads, and cookies.  · Garlic (do not use garlic salt) - Italian dishes, meat dishes, salad dressings, and sauces.  · Marjoram - Soups, potato dishes, and meat dishes.  · Oregano - Pizza and spaghetti sauce.  · Parsley - Salads, soups, pasta, and meat dishes.  · Roslyn - Italian dishes, salad dressings, soups, and red meats.  · Saffron - Fish dishes, pasta, and some poultry dishes.  · Gabriel - Stuffings and sauces.  · Tarragon - Fish and poultry dishes.  · Thyme - Stuffing, meat, and fish dishes.  Seasonings  · Lemon juice - Fish dishes, poultry dishes, vegetables, and salads.  · Vinegar - Salad dressings, vegetables, and fish dishes.  Spices  · Cinnamon - Sweet dishes, such as cakes, cookies, and puddings.  · Cloves - Gingerbread, puddings, and marinades for meats.  · Vargas - Vegetable dishes, fish and poultry dishes, and stir-redman dishes.  · Ronda - Vegetables dishes, fish dishes, and stir-redman dishes.  · Nutmeg - Pasta, vegetables, poultry, fish dishes, and custard.  What are some low-sodium ingredients and foods?  · Fresh or frozen fruits and vegetables with no sauce added.  · Fresh or frozen whole meats, poultry, and fish with no sauce added.  · Eggs.  · Noodles, pasta, quinoa, rice.  · Shredded or puffed wheat or puffed rice.  · Regular or quick oats.  · Milk, yogurt, hard cheeses, and low-sodium cheeses. Good cheese choices include  Swiss, [place], and mozzarella. Always check the label for the serving size and sodium content.  · Unsalted butter or margarine.  · Unsalted nuts.  · Sherbet or ice cream (keep to ½ cup per serving).  · Homemade pudding.  · Sodium-free baking soda and baking powder.  This is not a complete list of low-sodium ingredients and foods. Contact your dietitian for more options.  Summary  · Cooking with less salt is one way to reduce the amount of sodium that you get from food.  · Buy sodium-free or low-sodium products.  · Check the food label before using or buying packaged ingredients.  · Use herbs, seasonings without salt, and spices as substitutes for salt in foods.  This information is not intended to replace advice given to you by your health care provider. Make sure you discuss any questions you have with your health care provider.  Document Released: 12/18/2006 Document Revised: 12/26/2017 Document Reviewed: 12/26/2017  [employer] Interactive Patient Education © 2017 [employer]  Exercising to Lose Weight  Exercising can help you to lose weight. In order to lose weight through exercise, you need to do vigorous-intensity exercise. You can tell that you are exercising with vigorous intensity if you are breathing very hard and fast and cannot hold a conversation while exercising.  Moderate-intensity exercise helps to maintain your current weight. You can tell that you are exercising at a moderate level if you have a higher heart rate and faster breathing, but you are still able to hold a conversation.  How often should I exercise?  Choose an activity that you enjoy and set realistic goals. Your health care provider can help you to make an activity plan that works for you. Exercise regularly as directed by your health care provider. This may include:  · Doing resistance training twice each week, such as:  ? Push-ups.  ? Sit-ups.  ? Lifting weights.  ? Using resistance bands.  · Doing a given intensity of exercise  for a given amount of time. Choose from these options:  ? 150 minutes of moderate-intensity exercise every week.  ? 75 minutes of vigorous-intensity exercise every week.  ? A mix of moderate-intensity and vigorous-intensity exercise every week.    Children, pregnant women, people who are out of shape, people who are overweight, and older adults may need to consult a health care provider for individual recommendations. If you have any sort of medical condition, be sure to consult your health care provider before starting a new exercise program.  What are some activities that can help me to lose weight?  · Walking at a rate of at least 4.5 miles an hour.  · Jogging or running at a rate of 5 miles per hour.  · Biking at a rate of at least 10 miles per hour.  · Lap swimming.  · Roller-skating or in-line skating.  · Cross-country skiing.  · Vigorous competitive sports, such as football, basketball, and soccer.  · Jumping rope.  · Aerobic dancing.  How can I be more active in my day-to-day activities?  · Use the stairs instead of the elevator.  · Take a walk during your lunch break.  · If you drive, park your car farther away from work or school.  · If you take public transportation, get off one stop early and walk the rest of the way.  · Make all of your phone calls while standing up and walking around.  · Get up, stretch, and walk around every 30 minutes throughout the day.  What guidelines should I follow while exercising?  · Do not exercise so much that you hurt yourself, feel dizzy, or get very short of breath.  · Consult your health care provider prior to starting a new exercise program.  · Wear comfortable clothes and shoes with good support.  · Drink plenty of water while you exercise to prevent dehydration or heat stroke. Body water is lost during exercise and must be replaced.  · Work out until you breathe faster and your heart beats faster.  This information is not intended to replace advice given to you by  "your health care provider. Make sure you discuss any questions you have with your health care provider.  Document Released: 01/20/2012 Document Revised: 05/25/2017 Document Reviewed: 05/21/2015  [employer] Interactive Patient Education © 2018 [employer]  Carbohydrate Counting for Diabetes Mellitus, Adult  Carbohydrate counting is a method for keeping track of how many carbohydrates you eat. Eating carbohydrates naturally increases the amount of sugar (glucose) in the blood. Counting how many carbohydrates you eat helps keep your blood glucose within normal limits, which helps you manage your diabetes (diabetes mellitus).  It is important to know how many carbohydrates you can safely have in each meal. This is different for every person. A diet and nutrition specialist (registered dietitian) can help you make a meal plan and calculate how many carbohydrates you should have at each meal and snack.  Carbohydrates are found in the following foods:  · Grains, such as breads and cereals.  · Dried beans and soy products.  · Starchy vegetables, such as potatoes, peas, and corn.  · Fruit and fruit juices.  · Milk and yogurt.  · Sweets and snack foods, such as cake, cookies, candy, chips, and soft drinks.    How do I count carbohydrates?  There are two ways to count carbohydrates in food. You can use either of the methods or a combination of both.  Reading \"Nutrition Facts\" on packaged food  The \"Nutrition Facts\" list is included on the labels of almost all packaged foods and beverages in the [place] It includes:  · The serving size.  · Information about nutrients in each serving, including the grams (g) of carbohydrate per serving.    To use the “Nutrition Facts\":  · Decide how many servings you will have.  · Multiply the number of servings by the number of carbohydrates per serving.  · The resulting number is the total amount of carbohydrates that you will be having.    Learning standard serving sizes of other foods  When you " "eat foods containing carbohydrates that are not packaged or do not include \"Nutrition Facts\" on the label, you need to measure the servings in order to count the amount of carbohydrates:  · Measure the foods that you will eat with a food scale or measuring cup, if needed.  · Decide how many standard-size servings you will eat.  · Multiply the number of servings by 15. Most carbohydrate-rich foods have about 15 g of carbohydrates per serving.  ? For example, if you eat 8 oz (170 g) of strawberries, you will have eaten 2 servings and 30 g of carbohydrates (2 servings x 15 g = 30 g).  · For foods that have more than one food mixed, such as soups and casseroles, you must count the carbohydrates in each food that is included.    The following list contains standard serving sizes of common carbohydrate-rich foods. Each of these servings has about 15 g of carbohydrates:  · ½ hamburger bun or ½ English muffin.  · ½ oz (15 mL) syrup.  · ½ oz (14 g) jelly.  · 1 slice of bread.  · 1 six-inch tortilla.  · 3 oz (85 g) cooked rice or pasta.  · 4 oz (113 g) cooked dried beans.  · 4 oz (113 g) starchy vegetable, such as peas, corn, or potatoes.  · 4 oz (113 g) hot cereal.  · 4 oz (113 g) mashed potatoes or ¼ of a large baked potato.  · 4 oz (113 g) canned or frozen fruit.  · 4 oz (120 mL) fruit juice.  · 4-6 crackers.  · 6 chicken nuggets.  · 6 oz (170 g) unsweetened dry cereal.  · 6 oz (170 g) plain fat-free yogurt or yogurt sweetened with artificial sweeteners.  · 8 oz (240 mL) milk.  · 8 oz (170 g) fresh fruit or one small piece of fruit.  · 24 oz (680 g) popped popcorn.    Example of carbohydrate counting  Sample meal  · 3 oz (85 g) chicken breast.  · 6 oz (170 g) brown rice.  · 4 oz (113 g) corn.  · 8 oz (240 mL) milk.  · 8 oz (170 g) strawberries with sugar-free whipped topping.  Carbohydrate calculation  1. Identify the foods that contain carbohydrates:  ? Rice.  ? Corn.  ? Milk.  ? Strawberries.  2. Calculate how many " servings you have of each food:  ? 2 servings rice.  ? 1 serving corn.  ? 1 serving milk.  ? 1 serving strawberries.  3. Multiply each number of servings by 15 g:  ? 2 servings rice x 15 g = 30 g.  ? 1 serving corn x 15 g = 15 g.  ? 1 serving milk x 15 g = 15 g.  ? 1 serving strawberries x 15 g = 15 g.  4. Add together all of the amounts to find the total grams of carbohydrates eaten:  ? 30 g + 15 g + 15 g + 15 g = 75 g of carbohydrates total.  This information is not intended to replace advice given to you by your health care provider. Make sure you discuss any questions you have with your health care provider.  Document Released: 12/18/2006 Document Revised: 07/07/2017 Document Reviewed: 05/31/2017  [employer] Interactive Patient Education © 2018 [employer]

## 2018-08-15 NOTE — PROGRESS NOTES
Chief Complaint   Patient presents with   • Hypertension   • Diabetes       Subjective   Nelli Morales is a 52 y.o. female    History of Present Illness  HTN- Was on Diovan, changed to Losartan and notes sweating too much since changed,  Pt is also taking hormones. PT has missed her hormone patches and not using the cream.     DM- Fasting glucose 124, then A1c was checked and was 7.1, Pt wants to try diet and exercise. Pt has taken steroids recently.     Rash- Has worsening rash, thinks getting poison ivy from dogs, Had steroids recently.       Allergies   Allergen Reactions   • Ace Inhibitors Cough   • Morphine And Related Rash     Past Medical History:   Diagnosis Date   • Allergic rhinitis    • BV (bacterial vaginosis)    • Cataracts, bilateral    • Diverticulitis large intestine w/o perforation or abscess w/o bleeding 04/2017   • Fibrocystic breast disease    • Glaucoma    • Hyperlipidemia    • Hypertension    • IBS (irritable bowel syndrome)    • Insomnia    • Obesity (BMI 30.0-34.9)    • Stress bladder incontinence, female    • Type 2 diabetes mellitus without complication, without long-term current use of insulin (CMS/[facility]) 8/15/2018   • Varicose veins of legs       Past Surgical History:   Procedure Laterality Date   • BLADDER SUSPENSION  10/27/2007   • HYSTERECTOMY  12/17/2013    [facility]     Social History     Social History   • Marital status: Single     Spouse name: N/A   • Number of children: N/A   • Years of education: N/A     Occupational History   • Not on file.     Social History Main Topics   • Smoking status: Never Smoker   • Smokeless tobacco: Never Used   • Alcohol use 0.6 oz/week     1 Cans of beer per week      Comment: occasional   • Drug use: No   • Sexual activity: Yes     Partners: Male     Other Topics Concern   • Not on file     Social History Narrative   • No narrative on file        Current Outpatient Prescriptions:   •  Cholecalciferol (VITAMIN D3) 5000 UNITS capsule capsule, Take 5,000  Units by mouth Daily., Disp: , Rfl:   •  cyclobenzaprine (FLEXERIL) 10 MG tablet, Take 1 tablet by mouth 2 (Two) Times a Day As Needed for Muscle Spasms., Disp: 90 tablet, Rfl: 1  •  ESTRACE VAGINAL 0.1 MG/GM vaginal cream, insert 1/2 gram vaginally three times a week, Disp: , Rfl: 0  •  estradiol (MINIVELLE, VIVELLE-DOT) 0.05 MG/24HR patch, , Disp: , Rfl: 0  •  fluticasone (FLONASE) 50 MCG/ACT nasal spray, 2 sprays into each nostril Daily., Disp: 1 bottle, Rfl: 5  •  folic acid (FOLVITE) 1 MG tablet, Take 1 tablet by mouth Daily., Disp: 90 tablet, Rfl: 2  •  latanoprost (XALATAN) 0.005 % ophthalmic solution, , Disp: , Rfl: 1  •  linaclotide (LINZESS) 290 MCG capsule capsule, Take 1 capsule by mouth As Needed (prn)., Disp: 90 capsule, Rfl: 3  •  Loratadine (CLARITIN) 10 MG capsule, Take 1 tablet by mouth., Disp: , Rfl:   •  losartan-hydrochlorothiazide (HYZAAR) 50-12.5 MG per tablet, Take 1 tablet by mouth Daily., Disp: 90 tablet, Rfl: 3  •  simvastatin (ZOCOR) 20 MG tablet, Take 1 tablet by mouth Every Night., Disp: 90 tablet, Rfl: 3  •  predniSONE (DELTASONE) 10 MG tablet, 5 tabs qd X3 days, 4 tabs qd X 3 days, 3 tabs qd X 3 days, 2 QD X 3 days, 1 qd X 3 days, Disp: 45 tablet, Rfl: 0     Review of Systems   Constitutional: Negative for chills and fever.   Respiratory: Negative for cough, shortness of breath and wheezing.    Cardiovascular: Negative for chest pain.   Gastrointestinal: Negative for constipation, diarrhea, nausea and vomiting.   Genitourinary: Negative for difficulty urinating and dysuria.   Skin: Positive for rash.   Psychiatric/Behavioral: Negative for sleep disturbance.       Objective     Vitals:    08/15/18 1113   BP: 114/82   Pulse: 96   SpO2: 98%       Results for orders placed or performed in visit on 08/08/18   POC Glycosylated Hemoglobin (Hb A1C)   Result Value Ref Range    Hemoglobin A1C 7.1 %       Physical Exam   Constitutional: She is oriented to person, place, and time. She appears  well-developed and well-nourished.   Cardiovascular: Normal rate, regular rhythm and normal heart sounds.    Pulmonary/Chest: Effort normal and breath sounds normal.   Neurological: She is alert and oriented to person, place, and time.   Skin: Skin is warm and dry. Rash noted.   Psychiatric: She has a normal mood and affect. Her behavior is normal.   Vitals reviewed.      Assessment/Plan   Problem List Items Addressed This Visit        Cardiovascular and Mediastinum    Essential hypertension - Primary    Relevant Medications    losartan-hydrochlorothiazide (HYZAAR) 50-12.5 MG per tablet       Digestive    Class 1 obesity due to excess calories without serious comorbidity with body mass index (BMI) of 32.0 to 32.9 in adult       Endocrine    Type 2 diabetes mellitus without complication, without long-term current use of insulin (CMS/[facility])      Other Visit Diagnoses     Allergic contact dermatitis due to plants, except food        Relevant Medications    predniSONE (DELTASONE) 10 MG tablet      Patient instructed to check blood pressure daily, record, and bring to next visit. If greater than 150/90, patient is to call my office or return sooner for evaluation. Pt advised to eat a heart healthy diet and get regular aerobic exercise.    Discussed diabetes diet, Watch carb intake and checking blood sugar at least 2-3 times per week. Record and bring to next visit. Reminded about need for yearly eye exam and foot care. Discussed need for exercise to improve glucose control and overall health.     Discussed need for weight management. Discussed weight loss with diet, recommend Weight Watchers or Mediterranean Diet, but stated that whatever method works for this patient is best. Reviewed need for regular exercise.  The patient agrees with all recommendations at this time.    What are the possible side effects of steroids?    The chance of side effects depends on the dose, type of steroid, and length of treatment. Some side  "effects are more serious than others. Common side effects of systemic steroids include:    Increased appetite, weight gain  Sudden mood swings  Muscle weakness  Blurred vision  Increased growth of body hair  Easy bruising  Lower resistance to infection  Swollen, \"puffy\" face  Acne  Osteoporosis (bone weakening disease)  Worsening of diabetes  High blood pressure  Stomach irritation  Nervousness, restlessness  Having difficulty sleeping  Cataracts or glaucoma  Water retention, swelling    Please note: These side effects are the most common side effects. All possible side effects are not included.    RV- 3 months    Counseling provided on weight management and diabetes.    Flaquito Wallace, APRN   8/15/2018   12:21 PM        "

## 2018-11-21 ENCOUNTER — OFFICE VISIT (OUTPATIENT)
Dept: FAMILY MEDICINE CLINIC | Facility: CLINIC | Age: 53
End: 2018-11-21

## 2018-11-21 VITALS
BODY MASS INDEX: 32.12 KG/M2 | RESPIRATION RATE: 18 BRPM | HEART RATE: 65 BPM | WEIGHT: 153 LBS | HEIGHT: 58 IN | SYSTOLIC BLOOD PRESSURE: 122 MMHG | DIASTOLIC BLOOD PRESSURE: 78 MMHG | OXYGEN SATURATION: 98 %

## 2018-11-21 DIAGNOSIS — E11.9 TYPE 2 DIABETES MELLITUS WITHOUT COMPLICATION, WITHOUT LONG-TERM CURRENT USE OF INSULIN (HCC): Primary | ICD-10-CM

## 2018-11-21 DIAGNOSIS — J06.9 UPPER RESPIRATORY TRACT INFECTION, UNSPECIFIED TYPE: ICD-10-CM

## 2018-11-21 DIAGNOSIS — R35.0 URINARY FREQUENCY: ICD-10-CM

## 2018-11-21 LAB
BILIRUB BLD-MCNC: NEGATIVE MG/DL
CLARITY, POC: CLEAR
COLOR UR: YELLOW
EXPIRATION DATE: ABNORMAL
GLUCOSE UR STRIP-MCNC: NEGATIVE MG/DL
HBA1C MFR BLD: 6.2 %
KETONES UR QL: NEGATIVE
LEUKOCYTE EST, POC: NEGATIVE
Lab: ABNORMAL
NITRITE UR-MCNC: NEGATIVE MG/ML
PH UR: 6 [PH] (ref 5–8)
PROT UR STRIP-MCNC: NEGATIVE MG/DL
RBC # UR STRIP: NEGATIVE /UL
SP GR UR: 1.01 (ref 1–1.03)
UROBILINOGEN UR QL: NORMAL

## 2018-11-21 PROCEDURE — 99214 OFFICE O/P EST MOD 30 MIN: CPT | Performed by: PHYSICIAN ASSISTANT

## 2018-11-21 PROCEDURE — 83036 HEMOGLOBIN GLYCOSYLATED A1C: CPT | Performed by: PHYSICIAN ASSISTANT

## 2018-11-21 PROCEDURE — 81003 URINALYSIS AUTO W/O SCOPE: CPT | Performed by: PHYSICIAN ASSISTANT

## 2018-11-21 NOTE — PATIENT INSTRUCTIONS
"· Attain adequate rest and increase clear fluid intake.   · Use warm salt water gargles, lozenges, honey as a natural antitussive, and/or Delsym syrup as needed for cough.   · Use Mucinex 600 mg twice daily and nasal saline irrigation with \"ocean\" or \"simply saline\" brand sterile nasal spray twice daily.   · Use Loratadine (Claritin), Cetrizine (Zyrtec), or Fexofenadine (Allegra) once daily over the counter, Flonase 1 spray each nostril daily    Call or Return to office if symptoms worsen or persist.   "

## 2018-11-21 NOTE — PROGRESS NOTES
Chief Complaint   Patient presents with   • Hypertension     follow up       HPI     Nelli Morales is a pleasant 52 y.o. female who is here for 3 month follow-up of new diagnosis of diabetes. She states she tends to eat a lot of sweets and had significant halloween candy. Has not made carb reductions since her last visit. Current plantar fasciitis being treated by her podiatrist but has limited exercise. A1C was 7.1% improved to 6.2% today.      Patient reports postnasal drip for a week or two. Spitting yellow/green sputum. No fever or chills. It feels like her ears are full. Using flonase intermittently. Claritin daily.     Reports urine has smelled bad for about 2 months. Also has appointment with her GYN later today.     Past Medical History:   Diagnosis Date   • Allergic rhinitis    • BV (bacterial vaginosis)    • Cataracts, bilateral    • Diverticulitis large intestine w/o perforation or abscess w/o bleeding 04/2017   • Fibrocystic breast disease    • Glaucoma    • Hyperlipidemia    • Hypertension    • IBS (irritable bowel syndrome)    • Insomnia    • Obesity (BMI 30.0-34.9)    • Stress bladder incontinence, female    • Type 2 diabetes mellitus without complication, without long-term current use of insulin (CMS/[facility]) 8/15/2018   • Varicose veins of legs        Past Surgical History:   Procedure Laterality Date   • BLADDER SUSPENSION  10/27/2007   • HYSTERECTOMY  12/17/2013    [facility]       Family History   Problem Relation Age of Onset   • Diabetes Mother    • Dementia Mother    • Hypothyroidism Mother    • Hyperlipidemia Mother    • ALS Father 70   • Diabetes Sister    • Hyperlipidemia Sister    • Hypothyroidism Sister    • Hypertension Brother        Social History     Socioeconomic History   • Marital status: Single     Spouse name: Not on file   • Number of children: Not on file   • Years of education: Not on file   • Highest education level: Not on file   Social Needs   • Financial resource strain: Not on file    • Food insecurity - worry: Not on file   • Food insecurity - inability: Not on file   • Transportation needs - medical: Not on file   • Transportation needs - non-medical: Not on file   Occupational History   • Not on file   Tobacco Use   • Smoking status: Never Smoker   • Smokeless tobacco: Never Used   Substance and Sexual Activity   • Alcohol use: Yes     Alcohol/week: 0.6 oz     Types: 1 Cans of beer per week     Comment: occasional   • Drug use: No   • Sexual activity: Yes     Partners: Male   Other Topics Concern   • Not on file   Social History Narrative   • Not on file       Allergies   Allergen Reactions   • Ace Inhibitors Cough   • Morphine And Related Rash       ROS    Review of Systems   Constitutional: Negative for chills and fever.   HENT: Positive for postnasal drip.    Genitourinary: Positive for frequency. Negative for dysuria.       Vitals:    11/21/18 0845   BP: 122/78   Pulse: 65   Resp: 18   SpO2: 98%         Current Outpatient Medications:   •  Cholecalciferol (VITAMIN D3) 5000 UNITS capsule capsule, Take 5,000 Units by mouth Daily., Disp: , Rfl:   •  cyclobenzaprine (FLEXERIL) 10 MG tablet, Take 1 tablet by mouth 2 (Two) Times a Day As Needed for Muscle Spasms., Disp: 90 tablet, Rfl: 1  •  ESTRACE VAGINAL 0.1 MG/GM vaginal cream, insert 1/2 gram vaginally three times a week, Disp: , Rfl: 0  •  estradiol (MINIVELLE, VIVELLE-DOT) 0.05 MG/24HR patch, , Disp: , Rfl: 0  •  fluticasone (FLONASE) 50 MCG/ACT nasal spray, 2 sprays into each nostril Daily., Disp: 1 bottle, Rfl: 5  •  folic acid (FOLVITE) 1 MG tablet, Take 1 tablet by mouth Daily., Disp: 90 tablet, Rfl: 2  •  latanoprost (XALATAN) 0.005 % ophthalmic solution, , Disp: , Rfl: 1  •  linaclotide (LINZESS) 290 MCG capsule capsule, Take 1 capsule by mouth As Needed (prn)., Disp: 90 capsule, Rfl: 3  •  Loratadine (CLARITIN) 10 MG capsule, Take 1 tablet by mouth., Disp: , Rfl:   •  losartan-hydrochlorothiazide (HYZAAR) 50-12.5 MG per tablet,  Take 1 tablet by mouth Daily., Disp: 90 tablet, Rfl: 3  •  simvastatin (ZOCOR) 20 MG tablet, Take 1 tablet by mouth Every Night., Disp: 90 tablet, Rfl: 3    PE    Physical Exam   Constitutional: She appears well-developed and well-nourished.   obese   HENT:   Head: Normocephalic.   Right Ear: Tympanic membrane and ear canal normal. Tympanic membrane is not erythematous.   Left Ear: Tympanic membrane is not erythematous. An impacted cerumen (cannot visualize TM due to EAC wax) is present.  Nose: Nose normal. Right sinus exhibits no maxillary sinus tenderness and no frontal sinus tenderness. Left sinus exhibits no maxillary sinus tenderness and no frontal sinus tenderness.   Mouth/Throat: Oropharynx is clear and moist and mucous membranes are normal. No posterior oropharyngeal erythema. No tonsillar exudate.   Eyes: Conjunctivae are normal. Right eye exhibits no discharge. Left eye exhibits no discharge.   Neck: Normal range of motion. Neck supple.   Cardiovascular: Normal rate, regular rhythm and normal heart sounds.   Pulmonary/Chest: Effort normal and breath sounds normal. No respiratory distress. She has no wheezes. She has no rhonchi. She has no rales.   Lymphadenopathy:     She has no cervical adenopathy.   Skin: Skin is warm and dry. She is not diaphoretic.   Vitals reviewed.      Results    Results for orders placed or performed in visit on 11/21/18   POC Urinalysis Dipstick, Automated   Result Value Ref Range    Color Yellow Yellow, Straw, Dark Yellow, Janis    Clarity, UA Clear Clear    Specific Gravity  1.015 1.005 - 1.030    pH, Urine 6.0 5.0 - 8.0    Leukocytes Negative Negative    Nitrite, UA Negative Negative    Protein, POC Negative Negative mg/dL    Glucose, UA Negative Negative, 1000 mg/dL (3+) mg/dL    Ketones, UA Negative Negative    Urobilinogen, UA Normal Normal    Bilirubin Negative Negative    Blood, UA Negative Negative   POC Glycosylated Hemoglobin (Hb A1C)   Result Value Ref Range     Hemoglobin A1C 6.2 %    Lot Number [id number]     Expiration Date 6/2,020        A/P    Problem List Items Addressed This Visit        Endocrine    Type 2 diabetes mellitus without complication, without long-term current use of insulin (CMS/[facility]) - Primary  -A1C improved today. Controlled without medications.   -Discussed diabetic diet and working on carb reduction and exercise as her feet allow.   -On statin/ARB    Relevant Orders    POC Glycosylated Hemoglobin (Hb A1C) (Completed)      Other Visit Diagnoses     Urinary frequency      -Abnormal urine odor. Normal urine dip. Pt has hx BV. Recommend keeping GYN f/u later today    Relevant Orders    POC Urinalysis Dipstick, Automated (Completed)    Upper respiratory tract infection, unspecified type      -Conservative/supportive measures discussed.           Plan of care reviewed with patient at the conclusion of today's visit. Education was provided regarding diagnosis, management and any prescribed or recommended OTC medications.  Patient verbalizes understanding of and agreement with management plan.        IGNACIO Rincon

## 2018-12-21 ENCOUNTER — OFFICE VISIT (OUTPATIENT)
Dept: FAMILY MEDICINE CLINIC | Facility: CLINIC | Age: 53
End: 2018-12-21

## 2018-12-21 VITALS
SYSTOLIC BLOOD PRESSURE: 118 MMHG | WEIGHT: 149.6 LBS | HEIGHT: 58 IN | HEART RATE: 97 BPM | BODY MASS INDEX: 31.4 KG/M2 | TEMPERATURE: 97.4 F | OXYGEN SATURATION: 97 % | DIASTOLIC BLOOD PRESSURE: 74 MMHG

## 2018-12-21 DIAGNOSIS — R22.1 MASS IN NECK: Primary | ICD-10-CM

## 2018-12-21 PROCEDURE — 99213 OFFICE O/P EST LOW 20 MIN: CPT | Performed by: NURSE PRACTITIONER

## 2018-12-21 NOTE — PROGRESS NOTES
Chief Complaint   Patient presents with   • bump on collar bone       Subjective   Nelli Morales is a 53 y.o. female    History of Present Illness  Pt in with swollen mass on right ant shoulder/clavical region, has been there many months, noted in the summer. Her Mom has a HX of breast CA. Pt had mammogram 6/15/2018, Benign then. Pt denies pain. Just noted the swelling.       Allergies   Allergen Reactions   • Ace Inhibitors Cough   • Morphine And Related Rash     Past Medical History:   Diagnosis Date   • Allergic rhinitis    • BV (bacterial vaginosis)    • Cataracts, bilateral    • Diverticulitis large intestine w/o perforation or abscess w/o bleeding 04/2017   • Fibrocystic breast disease    • Glaucoma    • Hyperlipidemia    • Hypertension    • IBS (irritable bowel syndrome)    • Insomnia    • Obesity (BMI 30.0-34.9)    • Stress bladder incontinence, female    • Type 2 diabetes mellitus without complication, without long-term current use of insulin (CMS/[facility]) 8/15/2018   • Varicose veins of legs       Past Surgical History:   Procedure Laterality Date   • BLADDER SUSPENSION  10/27/2007   • HYSTERECTOMY  12/17/2013    [facility]     Social History     Socioeconomic History   • Marital status: Single     Spouse name: Not on file   • Number of children: Not on file   • Years of education: Not on file   • Highest education level: Not on file   Social Needs   • Financial resource strain: Not on file   • Food insecurity - worry: Not on file   • Food insecurity - inability: Not on file   • Transportation needs - medical: Not on file   • Transportation needs - non-medical: Not on file   Occupational History   • Not on file   Tobacco Use   • Smoking status: Never Smoker   • Smokeless tobacco: Never Used   Substance and Sexual Activity   • Alcohol use: Yes     Alcohol/week: 0.6 oz     Types: 1 Cans of beer per week     Comment: occasional   • Drug use: No   • Sexual activity: Yes     Partners: Male   Other Topics Concern   • Not  on file   Social History Narrative   • Not on file        Current Outpatient Medications:   •  Cholecalciferol (VITAMIN D3) 5000 UNITS capsule capsule, Take 5,000 Units by mouth Daily., Disp: , Rfl:   •  cyclobenzaprine (FLEXERIL) 10 MG tablet, Take 1 tablet by mouth 2 (Two) Times a Day As Needed for Muscle Spasms., Disp: 90 tablet, Rfl: 1  •  ESTRACE VAGINAL 0.1 MG/GM vaginal cream, insert 1/2 gram vaginally three times a week, Disp: , Rfl: 0  •  estradiol (MINIVELLE, VIVELLE-DOT) 0.05 MG/24HR patch, 2.7 patches., Disp: , Rfl: 0  •  fluticasone (FLONASE) 50 MCG/ACT nasal spray, 2 sprays into each nostril Daily., Disp: 1 bottle, Rfl: 5  •  folic acid (FOLVITE) 1 MG tablet, Take 1 tablet by mouth Daily., Disp: 90 tablet, Rfl: 2  •  latanoprost (XALATAN) 0.005 % ophthalmic solution, , Disp: , Rfl: 1  •  linaclotide (LINZESS) 290 MCG capsule capsule, Take 1 capsule by mouth As Needed (prn)., Disp: 90 capsule, Rfl: 3  •  Loratadine (CLARITIN) 10 MG capsule, Take 1 tablet by mouth., Disp: , Rfl:   •  losartan-hydrochlorothiazide (HYZAAR) 50-12.5 MG per tablet, Take 1 tablet by mouth Daily., Disp: 90 tablet, Rfl: 3  •  simvastatin (ZOCOR) 20 MG tablet, Take 1 tablet by mouth Every Night., Disp: 90 tablet, Rfl: 3     Review of Systems   Constitutional: Negative for chills and fever.   Respiratory: Negative for cough, shortness of breath and wheezing.    Cardiovascular: Negative for chest pain.   Gastrointestinal: Negative.    Genitourinary: Negative.    Skin:        swollen area right shoulder/neck region   Neurological: Negative for headaches.   Psychiatric/Behavioral: Negative for sleep disturbance.       Objective     Vitals:    12/21/18 1138   BP: 118/74   Pulse: 97   Temp: 97.4 °F (36.3 °C)   SpO2: 97%       Results for orders placed or performed in visit on 11/21/18   POC Urinalysis Dipstick, Automated   Result Value Ref Range    Color Yellow Yellow, Straw, Dark Yellow, Janis    Clarity, UA Clear Clear    Specific  Gravity  1.015 1.005 - 1.030    pH, Urine 6.0 5.0 - 8.0    Leukocytes Negative Negative    Nitrite, UA Negative Negative    Protein, POC Negative Negative mg/dL    Glucose, UA Negative Negative, 1000 mg/dL (3+) mg/dL    Ketones, UA Negative Negative    Urobilinogen, UA Normal Normal    Bilirubin Negative Negative    Blood, UA Negative Negative   POC Glycosylated Hemoglobin (Hb A1C)   Result Value Ref Range    Hemoglobin A1C 6.2 %    Lot Number [id number]     Expiration Date 6/2,020        Physical Exam   Constitutional: She is oriented to person, place, and time. She appears well-developed and well-nourished.   HENT:   Head: Normocephalic and atraumatic.   Neck:       Cardiovascular: Normal rate, regular rhythm and normal heart sounds.   Pulmonary/Chest: Effort normal and breath sounds normal.   Musculoskeletal: She exhibits no edema.   Neurological: She is alert and oriented to person, place, and time.   Skin: Skin is warm and dry.   Psychiatric: She has a normal mood and affect. Her behavior is normal.   Vitals reviewed.      Assessment/Plan   Problem List Items Addressed This Visit     None      Visit Diagnoses     Mass in neck    -  Primary    Relevant Orders    CT Soft Tissue Neck Without Contrast      will get CT to make sure possible Lipoma.     Patient was encouraged to keep me informed of any acute changes, lack of improvement, or any new concerning symptoms.Plan of care discussed with pt. They verbalized understanding and agreement.     Flaquito Wallace, JERRICA   12/21/2018   12:09 PM

## 2019-01-09 ENCOUNTER — HOSPITAL ENCOUNTER (OUTPATIENT)
Dept: CT IMAGING | Facility: HOSPITAL | Age: 54
Discharge: HOME | End: 2019-01-09
Admitting: NURSE PRACTITIONER

## 2019-01-09 DIAGNOSIS — R22.1 MASS IN NECK: ICD-10-CM

## 2019-01-09 PROCEDURE — 70490 CT SOFT TISSUE NECK W/O DYE: CPT

## 2019-01-11 ENCOUNTER — TELEPHONE (OUTPATIENT)
Dept: FAMILY MEDICINE CLINIC | Facility: CLINIC | Age: 54
End: 2019-01-11

## 2019-01-11 DIAGNOSIS — R22.1 MASS OF RIGHT SIDE OF NECK: Primary | ICD-10-CM

## 2019-01-21 ENCOUNTER — HOSPITAL ENCOUNTER (OUTPATIENT)
Dept: MRI IMAGING | Facility: HOSPITAL | Age: 54
Discharge: HOME | End: 2019-01-21
Admitting: NURSE PRACTITIONER

## 2019-01-21 DIAGNOSIS — R22.1 MASS OF RIGHT SIDE OF NECK: ICD-10-CM

## 2019-01-21 PROCEDURE — A9577 INJ MULTIHANCE: HCPCS | Performed by: NURSE PRACTITIONER

## 2019-01-21 PROCEDURE — 0 GADOBENATE DIMEGLUMINE 529 MG/ML SOLUTION: Performed by: NURSE PRACTITIONER

## 2019-01-21 PROCEDURE — 70543 MRI ORBT/FAC/NCK W/O &W/DYE: CPT

## 2019-01-21 PROCEDURE — 82565 ASSAY OF CREATININE: CPT

## 2019-01-21 RX ADMIN — GADOBENATE DIMEGLUMINE 15 ML: 529 INJECTION, SOLUTION INTRAVENOUS at 14:17

## 2019-01-22 ENCOUNTER — TELEPHONE (OUTPATIENT)
Dept: FAMILY MEDICINE CLINIC | Facility: CLINIC | Age: 54
End: 2019-01-22

## 2019-01-22 LAB — CREAT BLDA-MCNC: 0.5 MG/DL (ref 0.6–1.3)

## 2019-01-22 NOTE — TELEPHONE ENCOUNTER
CALLED PT AND GAVE MESSAGE BELOW. PT VERBALIZED UNDERSTANDING AND HAD NO FURTHER QUESTIONS OR CONCERNS AT THIS TIME.

## 2019-01-24 ENCOUNTER — TELEPHONE (OUTPATIENT)
Dept: FAMILY MEDICINE CLINIC | Facility: CLINIC | Age: 54
End: 2019-01-24

## 2019-01-24 NOTE — TELEPHONE ENCOUNTER
----- Message from JERRICA Eckert sent at 1/23/2019  5:38 PM EST -----  Pt has a fatty tumor, lipoma, of her neck. As long as not significantly causing problems, let watch. If worsening, let me know, please

## 2019-01-24 NOTE — TELEPHONE ENCOUNTER
Pt called back. Advised her of MRI results and explained that if it becomes worse or starts bothering her to let us know. Pt verbalized understanding.

## 2019-01-28 ENCOUNTER — OFFICE VISIT (OUTPATIENT)
Dept: FAMILY MEDICINE CLINIC | Facility: CLINIC | Age: 54
End: 2019-01-28

## 2019-01-28 VITALS
BODY MASS INDEX: 31.28 KG/M2 | WEIGHT: 149 LBS | DIASTOLIC BLOOD PRESSURE: 70 MMHG | SYSTOLIC BLOOD PRESSURE: 122 MMHG | HEIGHT: 58 IN | TEMPERATURE: 98 F | HEART RATE: 84 BPM | OXYGEN SATURATION: 99 %

## 2019-01-28 DIAGNOSIS — H92.03 EARACHE SYMPTOMS, BILATERAL: ICD-10-CM

## 2019-01-28 DIAGNOSIS — S16.1XXA ACUTE STRAIN OF NECK MUSCLE, INITIAL ENCOUNTER: Primary | ICD-10-CM

## 2019-01-28 DIAGNOSIS — Z20.828 EXPOSURE TO INFLUENZA: ICD-10-CM

## 2019-01-28 LAB
EXPIRATION DATE: NORMAL
FLUAV AG NPH QL: NEGATIVE
FLUBV AG NPH QL: NEGATIVE
INTERNAL CONTROL: NORMAL
Lab: NORMAL

## 2019-01-28 PROCEDURE — 87804 INFLUENZA ASSAY W/OPTIC: CPT | Performed by: PHYSICIAN ASSISTANT

## 2019-01-28 PROCEDURE — 99214 OFFICE O/P EST MOD 30 MIN: CPT | Performed by: PHYSICIAN ASSISTANT

## 2019-01-28 NOTE — PROGRESS NOTES
"    Chief Complaint   Patient presents with   • Bilateral earache   • Headache   • Exposure to influenza       HPI     Nelli Morales is a pleasant 53 y.o. female who presents for evaluation of \"chief complaint.\" A bilateral earache began after she had an MRI on 1/11. It feels like the back of her right neck has also been sore since then sore. She denies any alleviating/exacerbating factors. No associated symptoms aside from mild, intermittent frontal headache. She has not tried otc analgesics. She thinks she may have fluid in her ears. She has a history of seasonal allergies, mild runny nose intermittently. She takes claritin daily but does not regularly use flonase. She was exposed to influenza and requests testing.     Past Medical History:   Diagnosis Date   • Allergic rhinitis    • BV (bacterial vaginosis)    • Cataracts, bilateral    • Diverticulitis large intestine w/o perforation or abscess w/o bleeding 04/2017   • Fibrocystic breast disease    • Glaucoma    • Hyperlipidemia    • Hypertension    • IBS (irritable bowel syndrome)    • Insomnia    • Obesity (BMI 30.0-34.9)    • Stress bladder incontinence, female    • Type 2 diabetes mellitus without complication, without long-term current use of insulin (CMS/[facility]) 8/15/2018   • Varicose veins of legs        Past Surgical History:   Procedure Laterality Date   • BLADDER SUSPENSION  10/27/2007   • HYSTERECTOMY  12/17/2013    [facility]       Family History   Problem Relation Age of Onset   • Diabetes Mother    • Dementia Mother    • Hypothyroidism Mother    • Hyperlipidemia Mother    • ALS Father 70   • Diabetes Sister    • Hyperlipidemia Sister    • Hypothyroidism Sister    • Hypertension Brother        Social History     Socioeconomic History   • Marital status: Single     Spouse name: Not on file   • Number of children: Not on file   • Years of education: Not on file   • Highest education level: Not on file   Social Needs   • Financial resource strain: Not on file   • " Food insecurity - worry: Not on file   • Food insecurity - inability: Not on file   • Transportation needs - medical: Not on file   • Transportation needs - non-medical: Not on file   Occupational History   • Not on file   Tobacco Use   • Smoking status: Never Smoker   • Smokeless tobacco: Never Used   Substance and Sexual Activity   • Alcohol use: Yes     Alcohol/week: 0.6 oz     Types: 1 Cans of beer per week     Comment: occasional   • Drug use: No   • Sexual activity: Yes     Partners: Male   Other Topics Concern   • Not on file   Social History Narrative   • Not on file       Allergies   Allergen Reactions   • Ace Inhibitors Cough   • Morphine And Related Rash       ROS    Review of Systems   Constitutional: Negative for chills and fever.   HENT: Positive for ear pain. Negative for congestion, ear discharge, hearing loss and sore throat.    Respiratory: Negative for cough, shortness of breath and wheezing.    Gastrointestinal: Negative for diarrhea and vomiting.   Musculoskeletal: Positive for arthralgias and neck stiffness. Negative for myalgias.   Neurological: Positive for headache.       Vitals:    01/28/19 1216   BP: 122/70   Pulse: 84   Temp: 98 °F (36.7 °C)   SpO2: 99%         Current Outpatient Medications:   •  Cholecalciferol (VITAMIN D3) 5000 UNITS capsule capsule, Take 5,000 Units by mouth Daily., Disp: , Rfl:   •  cyclobenzaprine (FLEXERIL) 10 MG tablet, Take 1 tablet by mouth 2 (Two) Times a Day As Needed for Muscle Spasms., Disp: 90 tablet, Rfl: 1  •  ESTRACE VAGINAL 0.1 MG/GM vaginal cream, insert 1/2 gram vaginally three times a week, Disp: , Rfl: 0  •  estradiol (MINIVELLE, VIVELLE-DOT) 0.05 MG/24HR patch, 2.7 patches., Disp: , Rfl: 0  •  fluticasone (FLONASE) 50 MCG/ACT nasal spray, 2 sprays into each nostril Daily., Disp: 1 bottle, Rfl: 5  •  folic acid (FOLVITE) 1 MG tablet, Take 1 tablet by mouth Daily., Disp: 90 tablet, Rfl: 2  •  latanoprost (XALATAN) 0.005 % ophthalmic solution, , Disp:  , Rfl: 1  •  linaclotide (LINZESS) 290 MCG capsule capsule, Take 1 capsule by mouth As Needed (prn)., Disp: 90 capsule, Rfl: 3  •  Loratadine (CLARITIN) 10 MG capsule, Take 1 tablet by mouth., Disp: , Rfl:   •  losartan-hydrochlorothiazide (HYZAAR) 50-12.5 MG per tablet, Take 1 tablet by mouth Daily., Disp: 90 tablet, Rfl: 3  •  simvastatin (ZOCOR) 20 MG tablet, Take 1 tablet by mouth Every Night., Disp: 90 tablet, Rfl: 3    PE    Physical Exam   Constitutional: She appears well-developed and well-nourished. She does not appear ill. No distress.   HENT:   Head: Normocephalic.   Right Ear: Tympanic membrane and ear canal normal. Tympanic membrane is not erythematous.   Left Ear: Tympanic membrane and ear canal normal. Tympanic membrane is not erythematous.   Nose: Nose normal. Right sinus exhibits no maxillary sinus tenderness and no frontal sinus tenderness. Left sinus exhibits no maxillary sinus tenderness and no frontal sinus tenderness.   Mouth/Throat: Oropharynx is clear and moist and mucous membranes are normal. No posterior oropharyngeal erythema. No tonsillar exudate.   Eyes: Conjunctivae are normal. Right eye exhibits no discharge. Left eye exhibits no discharge.   Neck: Normal range of motion. Muscular tenderness (right cervical paraspinal muscle TTP and spasm noted) present. No neck rigidity.   Cardiovascular: Normal rate, regular rhythm and normal heart sounds.   Pulmonary/Chest: Effort normal and breath sounds normal. No respiratory distress. She has no wheezes. She has no rhonchi. She has no rales.   Lymphadenopathy:     She has no cervical adenopathy.        Right: No supraclavicular adenopathy present.        Left: No supraclavicular adenopathy present.   Skin: Skin is warm and dry.   Nursing note and vitals reviewed.       A/P    Problem List Items Addressed This Visit     None      Visit Diagnoses     Acute strain of neck muscle, initial encounter    -  Primary  -Likely related to positioning during  MRI. Pt currently on flexeril-continue. Recommend 400-600 mg Ibuprofen BID PRN. Refer to physical therapy      Earache symptoms, bilateral      -Hx allergic rhinitis. Afebrile. Benign exam today. Resume flonase daily      Exposure to influenza      -In office flu swab negative. Advised patient to call the office or rtc if flu-like symptoms develop    Relevant Orders    POCT Influenza A/B (Completed)          Plan of care reviewed with patient at the conclusion of today's visit. Education was provided regarding diagnosis, management and any prescribed or recommended OTC medications.  Patient verbalizes understanding of and agreement with management plan.        IGNACIO Rincon

## 2019-01-28 NOTE — PATIENT INSTRUCTIONS
-May use 400-600 mg Ibuprofen twice daily for up to 1-2 weeks as needed for neck pain  -Use Flonase daily

## 2019-01-31 ENCOUNTER — TELEPHONE (OUTPATIENT)
Dept: FAMILY MEDICINE CLINIC | Facility: CLINIC | Age: 54
End: 2019-01-31

## 2019-02-01 RX ORDER — OSELTAMIVIR PHOSPHATE 75 MG/1
75 CAPSULE ORAL DAILY
Qty: 10 CAPSULE | Refills: 0 | Status: SHIPPED | OUTPATIENT
Start: 2019-02-01 | End: 2019-03-08

## 2019-02-08 DIAGNOSIS — D64.9 ANEMIA, UNSPECIFIED TYPE: ICD-10-CM

## 2019-02-08 RX ORDER — FOLIC ACID 1 MG/1
1 TABLET ORAL DAILY
Qty: 90 TABLET | Refills: 2 | Status: SHIPPED | OUTPATIENT
Start: 2019-02-08 | End: 2019-11-25 | Stop reason: SDUPTHER

## 2019-03-08 ENCOUNTER — OFFICE VISIT (OUTPATIENT)
Dept: FAMILY MEDICINE CLINIC | Facility: CLINIC | Age: 54
End: 2019-03-08

## 2019-03-08 VITALS
HEIGHT: 58 IN | DIASTOLIC BLOOD PRESSURE: 60 MMHG | SYSTOLIC BLOOD PRESSURE: 120 MMHG | OXYGEN SATURATION: 98 % | HEART RATE: 86 BPM | WEIGHT: 149 LBS | TEMPERATURE: 98.6 F | BODY MASS INDEX: 31.28 KG/M2

## 2019-03-08 DIAGNOSIS — D17.1 LIPOMA OF ANTERIOR CHEST WALL: ICD-10-CM

## 2019-03-08 DIAGNOSIS — E11.9 TYPE 2 DIABETES MELLITUS WITHOUT COMPLICATION, WITHOUT LONG-TERM CURRENT USE OF INSULIN (HCC): Primary | ICD-10-CM

## 2019-03-08 PROBLEM — N81.11 MIDLINE CYSTOCELE: Status: ACTIVE | Noted: 2017-12-22

## 2019-03-08 LAB — HBA1C MFR BLD: 6.4 %

## 2019-03-08 PROCEDURE — 99213 OFFICE O/P EST LOW 20 MIN: CPT | Performed by: NURSE PRACTITIONER

## 2019-03-08 PROCEDURE — 83036 HEMOGLOBIN GLYCOSYLATED A1C: CPT | Performed by: NURSE PRACTITIONER

## 2019-03-08 RX ORDER — CLINDAMYCIN PHOSPHATE 20 MG/G
CREAM VAGINAL
Refills: 0 | COMMUNITY
Start: 2018-12-06 | End: 2021-12-20

## 2019-03-08 RX ORDER — ESTRADIOL 0.07 MG/D
FILM, EXTENDED RELEASE TRANSDERMAL
Refills: 0 | COMMUNITY
Start: 2019-02-16 | End: 2021-09-08 | Stop reason: SDUPTHER

## 2019-03-08 RX ORDER — FLUTICASONE PROPIONATE 50 MCG
SPRAY, SUSPENSION (ML) NASAL
COMMUNITY
End: 2019-03-08 | Stop reason: SDUPTHER

## 2019-03-08 NOTE — PROGRESS NOTES
Chief Complaint   Patient presents with   • Diabetes       Subjective   Nelli Morales is a 53 y.o. female    History of Present Illness  Patient in for follow-up for her diabetes.  She does not check her blood sugars at home.  She is a diet-controlled diabetic.  Weight is unchanged since December 2018.  She is scheduled to go Monday get fitted for her shoe inserts so she can start walking for exercise.  Pressure is well controlled currently.  She is taking losartan/HCTZ 50/12.5 daily. Patient denies any symptoms of low blood sugars.  Patient denies any dysuria, frequency, vision changes, chest pain but does complain of some odor in her urine.  Patient has seen GYN and urology and neither one has been unable to find a reason for her odor.  Patient has not had any change in her lipoma on her right clavicular area.    Allergies   Allergen Reactions   • Ace Inhibitors Cough   • Morphine And Related Rash     Past Medical History:   Diagnosis Date   • Allergic rhinitis    • BV (bacterial vaginosis)    • Cataracts, bilateral    • Diverticulitis large intestine w/o perforation or abscess w/o bleeding 04/2017   • Fibrocystic breast disease    • Glaucoma    • Hyperlipidemia    • Hypertension    • IBS (irritable bowel syndrome)    • Insomnia    • Obesity (BMI 30.0-34.9)    • Stress bladder incontinence, female    • Type 2 diabetes mellitus without complication, without long-term current use of insulin (CMS/[facility]) 8/15/2018   • Varicose veins of legs       Past Surgical History:   Procedure Laterality Date   • BLADDER SUSPENSION  10/27/2007   • HYSTERECTOMY  12/17/2013    [facility]     Social History     Socioeconomic History   • Marital status: Single     Spouse name: Not on file   • Number of children: Not on file   • Years of education: Not on file   • Highest education level: Not on file   Social Needs   • Financial resource strain: Not on file   • Food insecurity - worry: Not on file   • Food insecurity - inability: Not on file    • Transportation needs - medical: Not on file   • Transportation needs - non-medical: Not on file   Occupational History   • Not on file   Tobacco Use   • Smoking status: Never Smoker   • Smokeless tobacco: Never Used   Substance and Sexual Activity   • Alcohol use: Yes     Alcohol/week: 0.6 oz     Types: 1 Cans of beer per week     Comment: occasional   • Drug use: No   • Sexual activity: Yes     Partners: Male   Other Topics Concern   • Not on file   Social History Narrative   • Not on file        Current Outpatient Medications:   •  Cholecalciferol (VITAMIN D3) 5000 UNITS capsule capsule, Take 5,000 Units by mouth Daily., Disp: , Rfl:   •  clindamycin (CLEOCIN) 2 % vaginal cream, , Disp: , Rfl: 0  •  cyclobenzaprine (FLEXERIL) 10 MG tablet, Take 1 tablet by mouth 2 (Two) Times a Day As Needed for Muscle Spasms., Disp: 90 tablet, Rfl: 1  •  ESTRACE VAGINAL 0.1 MG/GM vaginal cream, insert 1/2 gram vaginally three times a week, Disp: , Rfl: 0  •  estradiol (MINIVELLE, VIVELLE-DOT) 0.075 MG/24HR patch, , Disp: , Rfl: 0  •  fluticasone (FLONASE) 50 MCG/ACT nasal spray, 2 sprays into each nostril Daily., Disp: 1 bottle, Rfl: 5  •  folic acid (FOLVITE) 1 MG tablet, Take 1 tablet by mouth Daily., Disp: 90 tablet, Rfl: 2  •  latanoprost (XALATAN) 0.005 % ophthalmic solution, , Disp: , Rfl: 1  •  linaclotide (LINZESS) 290 MCG capsule capsule, Take 1 capsule by mouth As Needed (prn)., Disp: 90 capsule, Rfl: 3  •  Loratadine (CLARITIN) 10 MG capsule, Take 1 tablet by mouth., Disp: , Rfl:   •  losartan-hydrochlorothiazide (HYZAAR) 50-12.5 MG per tablet, Take 1 tablet by mouth Daily., Disp: 90 tablet, Rfl: 3  •  simvastatin (ZOCOR) 20 MG tablet, Take 1 tablet by mouth Every Night., Disp: 90 tablet, Rfl: 3     Review of Systems   Constitutional: Negative for appetite change, diaphoresis, fatigue and unexpected weight change.   Eyes: Negative for visual disturbance.   Respiratory: Negative for cough, chest tightness, shortness  of breath and wheezing.    Cardiovascular: Negative for chest pain, palpitations and leg swelling.   Gastrointestinal: Positive for constipation (  Better with Linzess.). Negative for diarrhea, nausea and vomiting.   Endocrine: Negative for polydipsia, polyphagia and polyuria.   Genitourinary: Negative for difficulty urinating and dysuria.        Positive for odor with urine.   Skin: Negative for color change.   Neurological: Negative for dizziness, weakness, light-headedness and numbness.   Psychiatric/Behavioral: Negative for sleep disturbance.       Objective     Vitals:    03/08/19 1201   BP: 120/60   Pulse: 86   Temp: 98.6 °F (37 °C)   SpO2: 98%       Results for orders placed or performed in visit on 03/08/19   POC Glycosylated Hemoglobin (Hb A1C)   Result Value Ref Range    Hemoglobin A1C 6.4 %       Physical Exam   Constitutional: She is oriented to person, place, and time. She appears well-developed and well-nourished.   HENT:   Head: Normocephalic and atraumatic.   Cardiovascular: Normal rate, regular rhythm and normal heart sounds.   Pulmonary/Chest: Effort normal and breath sounds normal.   Musculoskeletal: She exhibits no edema.   Neurological: She is alert and oriented to person, place, and time.   Skin: Skin is warm and dry.   Psychiatric: She has a normal mood and affect. Her behavior is normal.   Vitals reviewed.      Assessment/Plan   Problem List Items Addressed This Visit        Endocrine    Type 2 diabetes mellitus without complication, without long-term current use of insulin (CMS/[facility]) - Primary    Relevant Orders    POC Glycosylated Hemoglobin (Hb A1C) (Completed)       Other    Lipoma of anterior chest wall      Discussed diabetes diet, Watch carb intake and checking blood sugar as instructed. Record and bring to next visit. Reminded about need for yearly eye exam and foot care. Discussed need for exercise to improve glucose control and overall health. Pt advised to eat a heart healthy diet  and get regular aerobic exercise.    Continue to observe lipoma, let us know if she wants to do any kind of surgical procedure in the future.    For urinary odor try tracking to see if certain foods are contributing to this.    Return visit, August for physical.    Counseling provided on diabetes.    Flaquito Wallace, APRN   3/8/2019   12:44 PM

## 2019-06-10 ENCOUNTER — TELEPHONE (OUTPATIENT)
Dept: FAMILY MEDICINE CLINIC | Facility: CLINIC | Age: 54
End: 2019-06-10

## 2019-07-02 ENCOUNTER — TELEPHONE (OUTPATIENT)
Dept: URGENT CARE | Facility: CLINIC | Age: 54
End: 2019-07-02

## 2019-07-02 NOTE — TELEPHONE ENCOUNTER
Please call patient back. Patient received a letter stating would be beneficial to have MRI and mammogram. Likes to have it done at [facility].     [phone]    Wants to talk about Losartin being recalled.

## 2019-07-03 NOTE — TELEPHONE ENCOUNTER
The patient has an appt on 7/29/19 and will wait until then to discuss her mammogram referrals.     She is mostly concerned with Losartan. She has already tried to contact her pharmacy regarding the recall and they transferred to another number that did not give her a person to talk to. The patient would like to know other options.

## 2019-07-03 NOTE — TELEPHONE ENCOUNTER
If her Losartan is the lot number and  recalled, the pharmacy should replace. If not, it is OK to take.

## 2019-07-05 NOTE — TELEPHONE ENCOUNTER
Called patient, gave message below. Pt states that she will go to her pharmacy today and see if her lot/ is included in the recall. If it is she will contact us back for a new prescription, if it is not she will continue to take losartan

## 2019-07-29 ENCOUNTER — OFFICE VISIT (OUTPATIENT)
Dept: FAMILY MEDICINE CLINIC | Facility: CLINIC | Age: 54
End: 2019-07-29

## 2019-07-29 VITALS
BODY MASS INDEX: 30.44 KG/M2 | WEIGHT: 145 LBS | HEIGHT: 58 IN | TEMPERATURE: 96.5 F | HEART RATE: 86 BPM | DIASTOLIC BLOOD PRESSURE: 80 MMHG | OXYGEN SATURATION: 98 % | SYSTOLIC BLOOD PRESSURE: 112 MMHG

## 2019-07-29 DIAGNOSIS — E11.9 TYPE 2 DIABETES MELLITUS WITHOUT COMPLICATION, WITHOUT LONG-TERM CURRENT USE OF INSULIN (HCC): Primary | ICD-10-CM

## 2019-07-29 DIAGNOSIS — E66.09 CLASS 1 OBESITY DUE TO EXCESS CALORIES WITHOUT SERIOUS COMORBIDITY WITH BODY MASS INDEX (BMI) OF 32.0 TO 32.9 IN ADULT: ICD-10-CM

## 2019-07-29 LAB — HBA1C MFR BLD: 6.1 %

## 2019-07-29 PROCEDURE — 99214 OFFICE O/P EST MOD 30 MIN: CPT | Performed by: NURSE PRACTITIONER

## 2019-07-29 PROCEDURE — 83036 HEMOGLOBIN GLYCOSYLATED A1C: CPT | Performed by: NURSE PRACTITIONER

## 2019-07-29 RX ORDER — PEN NEEDLE, DIABETIC 31 GX5/16"
1 NEEDLE, DISPOSABLE MISCELLANEOUS 2 TIMES DAILY
Qty: 100 EACH | Refills: 11 | Status: SHIPPED | OUTPATIENT
Start: 2019-07-29 | End: 2021-05-20 | Stop reason: SDUPTHER

## 2019-07-29 RX ORDER — BLOOD-GLUCOSE METER
1 KIT MISCELLANEOUS 2 TIMES DAILY
Qty: 1 EACH | Refills: 0 | Status: SHIPPED | OUTPATIENT
Start: 2019-07-29 | End: 2021-12-07 | Stop reason: SDUPTHER

## 2019-07-29 NOTE — PROGRESS NOTES
Chief Complaint   Patient presents with   • Diabetes       Subjective   Nelli Morales is a 53 y.o. female    History of Present Illness  In for follow-up of her diabetes.  Her weight is down 4 pounds from the last visit.  She is diet controlled and does not check her blood sugar at home.  She does not have a weight management plan.  She does see ophthalmology and has an appointment the next month or 2.  She does have a history of glaucoma.  She does take losartan/hydrochlorothiazide 50/12 and a half daily for her blood pressure.  A1c today is 6.1.  Her A1c 3 months ago was 6.4.  Patient does see podiatry.    Allergies   Allergen Reactions   • Ace Inhibitors Cough   • Morphine And Related Rash     Past Medical History:   Diagnosis Date   • Allergic rhinitis    • BV (bacterial vaginosis)    • Cataracts, bilateral    • Diverticulitis large intestine w/o perforation or abscess w/o bleeding 04/2017   • Fibrocystic breast disease    • Glaucoma    • Hyperlipidemia    • Hypertension    • IBS (irritable bowel syndrome)    • Insomnia    • Obesity (BMI 30.0-34.9)    • Stress bladder incontinence, female    • Type 2 diabetes mellitus without complication, without long-term current use of insulin (CMS/[facility]) 8/15/2018   • Varicose veins of legs       Past Surgical History:   Procedure Laterality Date   • BLADDER SUSPENSION  10/27/2007   • HYSTERECTOMY  12/17/2013    [facility]     Social History     Socioeconomic History   • Marital status: Single     Spouse name: Not on file   • Number of children: Not on file   • Years of education: Not on file   • Highest education level: Not on file   Tobacco Use   • Smoking status: Never Smoker   • Smokeless tobacco: Never Used   Substance and Sexual Activity   • Alcohol use: Yes     Alcohol/week: 0.6 oz     Types: 1 Cans of beer per week     Comment: occasional   • Drug use: No   • Sexual activity: Yes     Partners: Male        Current Outpatient Medications:   •  Cholecalciferol (VITAMIN D3) 5000  UNITS capsule capsule, Take 5,000 Units by mouth Daily., Disp: , Rfl:   •  clindamycin (CLEOCIN) 2 % vaginal cream, , Disp: , Rfl: 0  •  cyclobenzaprine (FLEXERIL) 10 MG tablet, Take 1 tablet by mouth 2 (Two) Times a Day As Needed for Muscle Spasms., Disp: 90 tablet, Rfl: 1  •  ESTRACE VAGINAL 0.1 MG/GM vaginal cream, insert 1/2 gram vaginally three times a week, Disp: , Rfl: 0  •  estradiol (MINIVELLE, VIVELLE-DOT) 0.075 MG/24HR patch, , Disp: , Rfl: 0  •  fluticasone (FLONASE) 50 MCG/ACT nasal spray, 2 sprays into each nostril Daily., Disp: 1 bottle, Rfl: 5  •  folic acid (FOLVITE) 1 MG tablet, Take 1 tablet by mouth Daily., Disp: 90 tablet, Rfl: 2  •  latanoprost (XALATAN) 0.005 % ophthalmic solution, , Disp: , Rfl: 1  •  linaclotide (LINZESS) 290 MCG capsule capsule, Take 1 capsule by mouth As Needed (prn)., Disp: 90 capsule, Rfl: 3  •  Loratadine (CLARITIN) 10 MG capsule, Take 1 tablet by mouth., Disp: , Rfl:   •  losartan-hydrochlorothiazide (HYZAAR) 50-12.5 MG per tablet, Take 1 tablet by mouth Daily., Disp: 90 tablet, Rfl: 3  •  simvastatin (ZOCOR) 20 MG tablet, Take 1 tablet by mouth Every Night., Disp: 90 tablet, Rfl: 3  •  glucose blood test strip, Check blood sugar once or twice daily., Disp: 100 each, Rfl: 12  •  glucose monitor monitoring kit, 1 each 2 (Two) Times a Day., Disp: 1 each, Rfl: 0  •  LANCETS ULTRA THIN misc, 1 each 2 (Two) Times a Day., Disp: 100 each, Rfl: 11     Review of Systems   Constitutional: Negative for appetite change, diaphoresis, fatigue and unexpected weight change.   Eyes: Negative for visual disturbance.   Respiratory: Negative for cough, chest tightness, shortness of breath and wheezing.    Cardiovascular: Negative for chest pain, palpitations and leg swelling.   Gastrointestinal: Negative for constipation, diarrhea, nausea and vomiting.   Endocrine: Negative for polydipsia, polyphagia and polyuria.   Genitourinary: Negative for difficulty urinating and dysuria.   Skin:  Positive for wound (foot, seeing podiatry). Negative for color change.   Neurological: Negative for dizziness, weakness, light-headedness and numbness.   Psychiatric/Behavioral: Negative for sleep disturbance.       Objective     Vitals:    07/29/19 1001   BP: 112/80   Pulse: 86   Temp: 96.5 °F (35.8 °C)   SpO2: 98%       Results for orders placed or performed in visit on 07/29/19   POC Glycosylated Hemoglobin (Hb A1C)   Result Value Ref Range    Hemoglobin A1C 6.1 %       Physical Exam   Constitutional: She appears well-developed and well-nourished. No distress.   HENT:   Head: Normocephalic and atraumatic.   Eyes: Conjunctivae are normal.   Neck: No JVD present.   Cardiovascular: Normal rate, regular rhythm, normal heart sounds and intact distal pulses.   No murmur heard.  Pulses:       Dorsalis pedis pulses are 2+ on the right side, and 2+ on the left side.        Posterior tibial pulses are 2+ on the right side, and 2+ on the left side.   Pulmonary/Chest: Effort normal and breath sounds normal. No respiratory distress.   Abdominal: Soft. She exhibits no distension. There is no tenderness.   Musculoskeletal: She exhibits no edema.     Skin Integrity  -  She has callous right foot and right heel is dry and cracked.She has callous left foot and left heel dry and cracked..  Neurological: Coordination normal.   Skin: Skin is warm and dry. No rash noted. She is not diaphoretic. No erythema. No pallor.   Psychiatric: She has a normal mood and affect.   Nursing note and vitals reviewed.      Assessment/Plan   Problem List Items Addressed This Visit        Digestive    Class 1 obesity due to excess calories without serious comorbidity with body mass index (BMI) of 32.0 to 32.9 in adult       Endocrine    Type 2 diabetes mellitus without complication, without long-term current use of insulin (CMS/[facility]) - Primary    Relevant Medications    glucose blood test strip    glucose monitor monitoring kit    LANCETS ULTRA THIN misc     Other Relevant Orders    POC Glycosylated Hemoglobin (Hb A1C) (Completed)      Discussed need for weight management. Discussed weight loss with diet, recommend Weight Watchers or Mediterranean Diet, but stated that whatever method works for this patient is best. Reviewed need for regular exercise.  The patient agrees with all recommendations at this time. I have discussed a weight loss plan to be determined by this patient. We have determined a weight loss goal at 6 months of     10 lbs   .    We sent in prescriptions for blood glucose monitoring kit with test strips and lancets.  She has been instructed to check her blood sugar once or twice daily and record it and bring in next visit.  Discussed diabetes diet, Watch carb intake and checking blood sugar as instructed. Record and bring to next visit. Reminded about need for yearly eye exam and foot care.  I do recommend she see podiatry soon for her feet.  Discussed need for exercise to improve glucose control and overall health.  Patient advised to eat a heart healthy diet and get regular aerobic exercise.    RV-3 months for physical.    Counseling provided on weight management and diabetes.    Flaquito Wallace, APRN   7/29/2019   10:30 AM

## 2019-07-29 NOTE — PATIENT INSTRUCTIONS
Calorie Counting for Weight Loss  Calories are units of energy. Your body needs a certain amount of calories from food to keep you going throughout the day. When you eat more calories than your body needs, your body stores the extra calories as fat. When you eat fewer calories than your body needs, your body burns fat to get the energy it needs.  Calorie counting means keeping track of how many calories you eat and drink each day. Calorie counting can be helpful if you need to lose weight. If you make sure to eat fewer calories than your body needs, you should lose weight. Ask your health care provider what a healthy weight is for you.  For calorie counting to work, you will need to eat the right number of calories in a day in order to lose a healthy amount of weight per week. A dietitian can help you determine how many calories you need in a day and will give you suggestions on how to reach your calorie goal.  · A healthy amount of weight to lose per week is usually 1-2 lb (0.5-0.9 kg). This usually means that your daily calorie intake should be reduced by 500-750 calories.  · Eating 1,200 - 1,500 calories per day can help most women lose weight.  · Eating 1,500 - 1,800 calories per day can help most men lose weight.    What is my plan?  My goal is to have __________ calories per day.  If I have this many calories per day, I should lose around __________ pounds per week.  What do I need to know about calorie counting?  In order to meet your daily calorie goal, you will need to:  · Find out how many calories are in each food you would like to eat. Try to do this before you eat.  · Decide how much of the food you plan to eat.  · Write down what you ate and how many calories it had. Doing this is called keeping a food log.    To successfully lose weight, it is important to balance calorie counting with a healthy lifestyle that includes regular activity. Aim for 150 minutes of moderate exercise (such as walking) or 75  minutes of vigorous exercise (such as running) each week.  Where do I find calorie information?    The number of calories in a food can be found on a Nutrition Facts label. If a food does not have a Nutrition Facts label, try to look up the calories online or ask your dietitian for help.  Remember that calories are listed per serving. If you choose to have more than one serving of a food, you will have to multiply the calories per serving by the amount of servings you plan to eat. For example, the label on a package of bread might say that a serving size is 1 slice and that there are 90 calories in a serving. If you eat 1 slice, you will have eaten 90 calories. If you eat 2 slices, you will have eaten 180 calories.  How do I keep a food log?  Immediately after each meal, record the following information in your food log:  · What you ate. Don't forget to include toppings, sauces, and other extras on the food.  · How much you ate. This can be measured in cups, ounces, or number of items.  · How many calories each food and drink had.  · The total number of calories in the meal.    Keep your food log near you, such as in a small notebook in your pocket, or use a mobile chavo or website. Some programs will calculate calories for you and show you how many calories you have left for the day to meet your goal.  What are some calorie counting tips?  · Use your calories on foods and drinks that will fill you up and not leave you hungry:  ? Some examples of foods that fill you up are nuts and nut butters, vegetables, lean proteins, and high-fiber foods like whole grains. High-fiber foods are foods with more than 5 g fiber per serving.  ? Drinks such as sodas, specialty coffee drinks, alcohol, and juices have a lot of calories, yet do not fill you up.  · Eat nutritious foods and avoid empty calories. Empty calories are calories you get from foods or beverages that do not have many vitamins or protein, such as candy, sweets, and  "soda. It is better to have a nutritious high-calorie food (such as an avocado) than a food with few nutrients (such as a bag of chips).  · Know how many calories are in the foods you eat most often. This will help you calculate calorie counts faster.  · Pay attention to calories in drinks. Low-calorie drinks include water and unsweetened drinks.  · Pay attention to nutrition labels for \"low fat\" or \"fat free\" foods. These foods sometimes have the same amount of calories or more calories than the full fat versions. They also often have added sugar, starch, or salt, to make up for flavor that was removed with the fat.  · Find a way of tracking calories that works for you. Get creative. Try different apps or programs if writing down calories does not work for you.  What are some portion control tips?  · Know how many calories are in a serving. This will help you know how many servings of a certain food you can have.  · Use a measuring cup to measure serving sizes. You could also try weighing out portions on a kitchen scale. With time, you will be able to estimate serving sizes for some foods.  · Take some time to put servings of different foods on your favorite plates, bowls, and cups so you know what a serving looks like.  · Try not to eat straight from a bag or box. Doing this can lead to overeating. Put the amount you would like to eat in a cup or on a plate to make sure you are eating the right portion.  · Use smaller plates, glasses, and bowls to prevent overeating.  · Try not to multitask (for example, watch TV or use your computer) while eating. If it is time to eat, sit down at a table and enjoy your food. This will help you to know when you are full. It will also help you to be aware of what you are eating and how much you are eating.  What are tips for following this plan?  Reading food labels  · Check the calorie count compared to the serving size. The serving size may be smaller than what you are used to " eating.  · Check the source of the calories. Make sure the food you are eating is high in vitamins and protein and low in saturated and trans fats.  Shopping  · Read nutrition labels while you shop. This will help you make healthy decisions before you decide to purchase your food.  · Make a grocery list and stick to it.  Cooking  · Try to cook your favorite foods in a healthier way. For example, try baking instead of frying.  · Use low-fat dairy products.  Meal planning  · Use more fruits and vegetables. Half of your plate should be fruits and vegetables.  · Include lean proteins like poultry and fish.  How do I count calories when eating out?  · Ask for smaller portion sizes.  · Consider sharing an entree and sides instead of getting your own entree.  · If you get your own entree, eat only half. Ask for a box at the beginning of your meal and put the rest of your entree in it so you are not tempted to eat it.  · If calories are listed on the menu, choose the lower calorie options.  · Choose dishes that include vegetables, fruits, whole grains, low-fat dairy products, and lean protein.  · Choose items that are boiled, broiled, grilled, or steamed. Stay away from items that are buttered, battered, fried, or served with cream sauce. Items labeled “crispy” are usually fried, unless stated otherwise.  · Choose water, low-fat milk, unsweetened iced tea, or other drinks without added sugar. If you want an alcoholic beverage, choose a lower calorie option such as a glass of wine or light beer.  · Ask for dressings, sauces, and syrups on the side. These are usually high in calories, so you should limit the amount you eat.  · If you want a salad, choose a garden salad and ask for grilled meats. Avoid extra toppings like aragon, cheese, or fried items. Ask for the dressing on the side, or ask for olive oil and vinegar or lemon to use as dressing.  · Estimate how many servings of a food you are given. For example, a serving of  cooked rice is ½ cup or about the size of half a baseball. Knowing serving sizes will help you be aware of how much food you are eating at restaurants. The list below tells you how big or small some common portion sizes are based on everyday objects:  ? 1 oz--4 stacked dice.  ? 3 oz--1 deck of cards.  ? 1 tsp--1 die.  ? 1 Tbsp--½ a ping-pong ball.  ? 2 Tbsp--1 ping-pong ball.  ? ½ cup--½ baseball.  ? 1 cup--1 baseball.  Summary  · Calorie counting means keeping track of how many calories you eat and drink each day. If you eat fewer calories than your body needs, you should lose weight.  · A healthy amount of weight to lose per week is usually 1-2 lb (0.5-0.9 kg). This usually means reducing your daily calorie intake by 500-750 calories.  · The number of calories in a food can be found on a Nutrition Facts label. If a food does not have a Nutrition Facts label, try to look up the calories online or ask your dietitian for help.  · Use your calories on foods and drinks that will fill you up, and not on foods and drinks that will leave you hungry.  · Use smaller plates, glasses, and bowls to prevent overeating.  This information is not intended to replace advice given to you by your health care provider. Make sure you discuss any questions you have with your health care provider.  Document Released: 12/18/2006 Document Revised: 11/17/2017 Document Reviewed: 11/17/2017  [employer] Interactive Patient Education © 2019 [employer]  Diabetes Mellitus and Foot Care  Foot care is an important part of your health, especially when you have diabetes. Diabetes may cause you to have problems because of poor blood flow (circulation) to your feet and legs, which can cause your skin to:  · Become thinner and drier.  · Break more easily.  · Heal more slowly.  · Peel and crack.    You may also have nerve damage (neuropathy) in your legs and feet, causing decreased feeling in them. This means that you may not notice minor injuries to  your feet that could lead to more serious problems. Noticing and addressing any potential problems early is the best way to prevent future foot problems.  How to care for your feet  Foot hygiene  · Wash your feet daily with warm water and mild soap. Do not use hot water. Then, pat your feet and the areas between your toes until they are completely dry. Do not soak your feet as this can dry your skin.  · Trim your toenails straight across. Do not dig under them or around the cuticle. File the edges of your nails with an emery board or nail file.  · Apply a moisturizing lotion or petroleum jelly to the skin on your feet and to dry, brittle toenails. Use lotion that does not contain alcohol and is unscented. Do not apply lotion between your toes.  Shoes and socks  · Wear clean socks or stockings every day. Make sure they are not too tight. Do not wear knee-high stockings since they may decrease blood flow to your legs.  · Wear shoes that fit properly and have enough cushioning. Always look in your shoes before you put them on to be sure there are no objects inside.  · To break in new shoes, wear them for just a few hours a day. This prevents injuries on your feet.  Wounds, scrapes, corns, and calluses  · Check your feet daily for blisters, cuts, bruises, sores, and redness. If you cannot see the bottom of your feet, use a mirror or ask someone for help.  · Do not cut corns or calluses or try to remove them with medicine.  · If you find a minor scrape, cut, or break in the skin on your feet, keep it and the skin around it clean and dry. You may clean these areas with mild soap and water. Do not clean the area with peroxide, alcohol, or iodine.  · If you have a wound, scrape, corn, or callus on your foot, look at it several times a day to make sure it is healing and not infected. Check for:  ? Redness, swelling, or pain.  ? Fluid or blood.  ? Warmth.  ? Pus or a bad smell.  General instructions  · Do not cross your legs.  This may decrease blood flow to your feet.  · Do not use heating pads or hot water bottles on your feet. They may burn your skin. If you have lost feeling in your feet or legs, you may not know this is happening until it is too late.  · Protect your feet from hot and cold by wearing shoes, such as at the beach or on hot pavement.  · Schedule a complete foot exam at least once a year (annually) or more often if you have foot problems. If you have foot problems, report any cuts, sores, or bruises to your health care provider immediately.  Contact a health care provider if:  · You have a medical condition that increases your risk of infection and you have any cuts, sores, or bruises on your feet.  · You have an injury that is not healing.  · You have redness on your legs or feet.  · You feel burning or tingling in your legs or feet.  · You have pain or cramps in your legs and feet.  · Your legs or feet are numb.  · Your feet always feel cold.  · You have pain around a toenail.  Get help right away if:  · You have a wound, scrape, corn, or callus on your foot and:  ? You have pain, swelling, or redness that gets worse.  ? You have fluid or blood coming from the wound, scrape, corn, or callus.  ? Your wound, scrape, corn, or callus feels warm to the touch.  ? You have pus or a bad smell coming from the wound, scrape, corn, or callus.  ? You have a fever.  ? You have a red line going up your leg.  Summary  · Check your feet every day for cuts, sores, red spots, swelling, and blisters.  · Moisturize feet and legs daily.  · Wear shoes that fit properly and have enough cushioning.  · If you have foot problems, report any cuts, sores, or bruises to your health care provider immediately.  · Schedule a complete foot exam at least once a year (annually) or more often if you have foot problems.  This information is not intended to replace advice given to you by your health care provider. Make sure you discuss any questions you  have with your health care provider.  Document Released: 12/15/2001 Document Revised: 01/19/2018 Document Reviewed: 01/19/2018  [employer] Interactive Patient Education © 2019 [employer]

## 2019-08-17 ENCOUNTER — TELEPHONE (OUTPATIENT)
Dept: FAMILY MEDICINE CLINIC | Facility: CLINIC | Age: 54
End: 2019-08-17

## 2019-08-17 NOTE — TELEPHONE ENCOUNTER
Pt has poison ivy that has spread to her face and is wondering if something can be called in for her.    Ashely maurice [place] [facility]    Pt contact [phone]

## 2019-08-17 NOTE — TELEPHONE ENCOUNTER
MARÍA for return call, advised patient that she should be seen at [facility] for evaluation.

## 2019-08-26 ENCOUNTER — TELEPHONE (OUTPATIENT)
Dept: FAMILY MEDICINE CLINIC | Facility: CLINIC | Age: 54
End: 2019-08-26

## 2019-08-26 DIAGNOSIS — E78.2 MIXED HYPERLIPIDEMIA: ICD-10-CM

## 2019-08-26 DIAGNOSIS — I10 ESSENTIAL HYPERTENSION: ICD-10-CM

## 2019-08-26 RX ORDER — SIMVASTATIN 20 MG
20 TABLET ORAL NIGHTLY
Qty: 90 TABLET | Refills: 3 | Status: SHIPPED | OUTPATIENT
Start: 2019-08-26 | End: 2020-08-18

## 2019-08-26 RX ORDER — LOSARTAN POTASSIUM AND HYDROCHLOROTHIAZIDE 12.5; 5 MG/1; MG/1
1 TABLET ORAL DAILY
Qty: 90 TABLET | Refills: 3
Start: 2019-08-26 | End: 2019-08-27 | Stop reason: SDUPTHER

## 2019-08-27 RX ORDER — LOSARTAN POTASSIUM AND HYDROCHLOROTHIAZIDE 12.5; 5 MG/1; MG/1
1 TABLET ORAL DAILY
Qty: 90 TABLET | Refills: 1 | Status: SHIPPED | OUTPATIENT
Start: 2019-08-27 | End: 2020-02-24

## 2019-08-27 NOTE — TELEPHONE ENCOUNTER
Called pharmacy who explained someone had already called yesterday to confirm and verify prescriptions.

## 2019-10-23 ENCOUNTER — OFFICE VISIT (OUTPATIENT)
Dept: FAMILY MEDICINE CLINIC | Facility: CLINIC | Age: 54
End: 2019-10-23

## 2019-10-23 VITALS
HEIGHT: 58 IN | HEART RATE: 84 BPM | DIASTOLIC BLOOD PRESSURE: 80 MMHG | BODY MASS INDEX: 31.07 KG/M2 | WEIGHT: 148 LBS | SYSTOLIC BLOOD PRESSURE: 110 MMHG | OXYGEN SATURATION: 98 %

## 2019-10-23 DIAGNOSIS — J30.9 ALLERGIC RHINITIS, UNSPECIFIED SEASONALITY, UNSPECIFIED TRIGGER: ICD-10-CM

## 2019-10-23 DIAGNOSIS — J06.9 ACUTE URI: Primary | ICD-10-CM

## 2019-10-23 PROCEDURE — 99213 OFFICE O/P EST LOW 20 MIN: CPT | Performed by: PHYSICIAN ASSISTANT

## 2019-10-23 RX ORDER — BLOOD-GLUCOSE METER
EACH MISCELLANEOUS
COMMUNITY
Start: 2019-07-30 | End: 2021-12-06

## 2019-10-23 NOTE — PATIENT INSTRUCTIONS
"· Attain adequate rest and increase clear fluid intake.   · Use warm salt water gargles, lozenges, and/or Delsym syrup as needed for cough and sore throat  · Use Mucinex 600 mg twice daily and nasal saline irrigation with \"ocean\" or \"simply saline\" brand sterile nasal spray twice daily.   · Use warm compresses over sinuses for comfort.   · Use Flonase 1 spray each nostril daily, and auto-inflate ears using modified valsalva maneuver approximately 20 times daily for ear congestion.     Call or Return to office if symptoms worsen or persist.   "

## 2019-10-23 NOTE — PROGRESS NOTES
"    Chief Complaint   Patient presents with   • Sinus Problem     noticed 2 days ago, drainage, sneezing, runny nose, congestion       HPI     Nelli Morales is a pleasant 53 y.o. female who presents for evaluation of \"chief complaint.\"  Patient states sneezing, sinus drainage, and spitting up yellow and at times brown sputum started yesterday. She is taking loratadine. She feels hot and cold intermittently. No documented fever, cp, soa, wheezing, or myalgias. She used flonase last night but does not use it daily. She has been in the hospital visiting her sister with pneumonia recently.     Past Medical History:   Diagnosis Date   • Allergic rhinitis    • BV (bacterial vaginosis)    • Cataracts, bilateral    • Diverticulitis large intestine w/o perforation or abscess w/o bleeding 04/2017   • Fibrocystic breast disease    • Glaucoma    • Hyperlipidemia    • Hypertension    • IBS (irritable bowel syndrome)    • Insomnia    • Obesity (BMI 30.0-34.9)    • Stress bladder incontinence, female    • Type 2 diabetes mellitus without complication, without long-term current use of insulin (CMS/[facility]) 8/15/2018   • Varicose veins of legs        Past Surgical History:   Procedure Laterality Date   • BLADDER SUSPENSION  10/27/2007   • HYSTERECTOMY  12/17/2013    [facility]       Family History   Problem Relation Age of Onset   • Diabetes Mother    • Dementia Mother    • Hypothyroidism Mother    • Hyperlipidemia Mother    • ALS Father 70   • Diabetes Sister    • Hyperlipidemia Sister    • Hypothyroidism Sister    • Hypertension Brother        Social History     Socioeconomic History   • Marital status: Single     Spouse name: Not on file   • Number of children: Not on file   • Years of education: Not on file   • Highest education level: Not on file   Tobacco Use   • Smoking status: Never Smoker   • Smokeless tobacco: Never Used   Substance and Sexual Activity   • Alcohol use: Yes     Alcohol/week: 0.6 oz     Types: 1 Cans of beer per week "     Comment: occasional   • Drug use: No   • Sexual activity: Yes     Partners: Male       Allergies   Allergen Reactions   • Ace Inhibitors Cough   • Morphine And Related Rash       ROS    Review of Systems   Constitutional: Negative for chills and fever.   HENT: Positive for congestion, postnasal drip and rhinorrhea. Negative for ear pain, sinus pressure and sore throat.    Respiratory: Positive for cough. Negative for shortness of breath and wheezing.    Cardiovascular: Negative for chest pain.       Vitals:    10/23/19 1130   BP: 110/80   Pulse: 84   SpO2: 98%         Current Outpatient Medications:   •  Blood Glucose Monitoring Suppl (ACCU-CHEK VIKASH PLUS) w/Device kit, , Disp: , Rfl:   •  Cholecalciferol (VITAMIN D3) 5000 UNITS capsule capsule, Take 5,000 Units by mouth Daily., Disp: , Rfl:   •  clindamycin (CLEOCIN) 2 % vaginal cream, , Disp: , Rfl: 0  •  cyclobenzaprine (FLEXERIL) 10 MG tablet, Take 1 tablet by mouth 2 (Two) Times a Day As Needed for Muscle Spasms., Disp: 90 tablet, Rfl: 1  •  ESTRACE VAGINAL 0.1 MG/GM vaginal cream, insert 1/2 gram vaginally three times a week, Disp: , Rfl: 0  •  estradiol (MINIVELLE, VIVELLE-DOT) 0.075 MG/24HR patch, , Disp: , Rfl: 0  •  fluticasone (FLONASE) 50 MCG/ACT nasal spray, 2 sprays into each nostril Daily., Disp: 1 bottle, Rfl: 5  •  folic acid (FOLVITE) 1 MG tablet, Take 1 tablet by mouth Daily., Disp: 90 tablet, Rfl: 2  •  glucose blood test strip, Check blood sugar once or twice daily., Disp: 100 each, Rfl: 12  •  glucose monitor monitoring kit, 1 each 2 (Two) Times a Day., Disp: 1 each, Rfl: 0  •  LANCETS ULTRA THIN misc, 1 each 2 (Two) Times a Day., Disp: 100 each, Rfl: 11  •  latanoprost (XALATAN) 0.005 % ophthalmic solution, , Disp: , Rfl: 1  •  linaclotide (LINZESS) 290 MCG capsule capsule, Take 1 capsule by mouth As Needed (prn)., Disp: 90 capsule, Rfl: 3  •  Loratadine (CLARITIN) 10 MG capsule, Take 1 tablet by mouth., Disp: , Rfl:   •   losartan-hydrochlorothiazide (HYZAAR) 50-12.5 MG per tablet, Take 1 tablet by mouth Daily., Disp: 90 tablet, Rfl: 1  •  methylPREDNISolone (MEDROL, KARIME,) 4 MG tablet, Take as directed on package instructions., Disp: 21 tablet, Rfl: 0  •  simvastatin (ZOCOR) 20 MG tablet, Take 1 tablet by mouth Every Night., Disp: 90 tablet, Rfl: 3    PE    Physical Exam   Constitutional: She appears well-developed and well-nourished. She does not appear ill. No distress.   HENT:   Head: Normocephalic.   Right Ear: Tympanic membrane and ear canal normal. Tympanic membrane is not erythematous.   Left Ear: Tympanic membrane and ear canal normal. Tympanic membrane is not erythematous.   Nose: Mucosal edema present. Right sinus exhibits no maxillary sinus tenderness and no frontal sinus tenderness. Left sinus exhibits no maxillary sinus tenderness and no frontal sinus tenderness.   Mouth/Throat: Uvula is midline, oropharynx is clear and moist and mucous membranes are normal. No posterior oropharyngeal erythema. No tonsillar exudate.   Eyes: Conjunctivae are normal. Right eye exhibits no discharge. Left eye exhibits no discharge.   Neck: Normal range of motion. Neck supple.   Cardiovascular: Normal rate, regular rhythm and normal heart sounds.   Pulmonary/Chest: Effort normal and breath sounds normal. No respiratory distress. She has no wheezes. She has no rhonchi. She has no rales.   Lymphadenopathy:     She has no cervical adenopathy.        Right: No supraclavicular adenopathy present.        Left: No supraclavicular adenopathy present.   Skin: Skin is warm and dry.   Psychiatric: She has a normal mood and affect. Her behavior is normal.   Vitals reviewed.       A/P    Problem List Items Addressed This Visit     None      Visit Diagnoses     Acute URI    -  Primary  Discussed likely viral etiology and recommended initial symptomatic treatment for up to 1 week. The patient was given written instructions recommending over-the-counter  products and home remedies. Advised patient call the office or return if she develops fever, worsening symptoms, or if her symptoms persist for more than 1 week. Resume flonase daily. Hold claritin for now to promote sinus clearing      Allergic rhinitis, unspecified seasonality, unspecified trigger              Plan of care reviewed with patient at the conclusion of today's visit. Education was provided regarding diagnosis, management and any prescribed or recommended OTC medications.  Patient verbalizes understanding of and agreement with management plan.        IGNACIO Rincon

## 2019-11-05 ENCOUNTER — OFFICE VISIT (OUTPATIENT)
Dept: FAMILY MEDICINE CLINIC | Facility: CLINIC | Age: 54
End: 2019-11-05

## 2019-11-05 ENCOUNTER — LAB (OUTPATIENT)
Dept: LAB | Facility: HOSPITAL | Age: 54
End: 2019-11-05

## 2019-11-05 VITALS
HEIGHT: 67 IN | DIASTOLIC BLOOD PRESSURE: 78 MMHG | OXYGEN SATURATION: 98 % | WEIGHT: 151.1 LBS | SYSTOLIC BLOOD PRESSURE: 116 MMHG | BODY MASS INDEX: 23.72 KG/M2 | HEART RATE: 86 BPM

## 2019-11-05 DIAGNOSIS — Z00.00 PHYSICAL EXAM, ANNUAL: ICD-10-CM

## 2019-11-05 DIAGNOSIS — E11.9 TYPE 2 DIABETES MELLITUS WITHOUT COMPLICATION, WITHOUT LONG-TERM CURRENT USE OF INSULIN (HCC): ICD-10-CM

## 2019-11-05 DIAGNOSIS — Z80.3 FAMILY HISTORY OF BREAST CANCER IN MOTHER: ICD-10-CM

## 2019-11-05 DIAGNOSIS — E55.9 VITAMIN D DEFICIENCY: ICD-10-CM

## 2019-11-05 DIAGNOSIS — Z00.00 PHYSICAL EXAM, ANNUAL: Primary | ICD-10-CM

## 2019-11-05 DIAGNOSIS — I10 ESSENTIAL HYPERTENSION: ICD-10-CM

## 2019-11-05 DIAGNOSIS — E78.2 MIXED HYPERLIPIDEMIA: ICD-10-CM

## 2019-11-05 DIAGNOSIS — Z12.11 SCREEN FOR COLON CANCER: ICD-10-CM

## 2019-11-05 PROBLEM — E66.811 CLASS 1 OBESITY DUE TO EXCESS CALORIES WITHOUT SERIOUS COMORBIDITY WITH BODY MASS INDEX (BMI) OF 32.0 TO 32.9 IN ADULT: Status: RESOLVED | Noted: 2017-12-22 | Resolved: 2019-11-05

## 2019-11-05 PROBLEM — E66.09 CLASS 1 OBESITY DUE TO EXCESS CALORIES WITHOUT SERIOUS COMORBIDITY WITH BODY MASS INDEX (BMI) OF 32.0 TO 32.9 IN ADULT: Status: RESOLVED | Noted: 2017-12-22 | Resolved: 2019-11-05

## 2019-11-05 LAB
25(OH)D3 SERPL-MCNC: 31.5 NG/ML (ref 30–100)
ALBUMIN SERPL-MCNC: 4.3 G/DL (ref 3.5–5.2)
ALBUMIN UR-MCNC: <1.2 MG/DL
ALBUMIN/GLOB SERPL: 1.4 G/DL
ALP SERPL-CCNC: 89 U/L (ref 39–117)
ALT SERPL W P-5'-P-CCNC: 23 U/L (ref 1–33)
ANION GAP SERPL CALCULATED.3IONS-SCNC: 13 MMOL/L (ref 5–15)
AST SERPL-CCNC: 24 U/L (ref 1–32)
BASOPHILS # BLD AUTO: 0.04 10*3/MM3 (ref 0–0.2)
BASOPHILS NFR BLD AUTO: 0.6 % (ref 0–1.5)
BILIRUB SERPL-MCNC: 0.5 MG/DL (ref 0.2–1.2)
BUN BLD-MCNC: 9 MG/DL (ref 6–20)
BUN/CREAT SERPL: 16.4 (ref 7–25)
CALCIUM SPEC-SCNC: 9.8 MG/DL (ref 8.6–10.5)
CHLORIDE SERPL-SCNC: 101 MMOL/L (ref 98–107)
CHOLEST SERPL-MCNC: 167 MG/DL (ref 0–200)
CO2 SERPL-SCNC: 28 MMOL/L (ref 22–29)
CREAT BLD-MCNC: 0.55 MG/DL (ref 0.57–1)
CREAT UR-MCNC: 139.1 MG/DL
DEPRECATED RDW RBC AUTO: 38.8 FL (ref 37–54)
EOSINOPHIL # BLD AUTO: 0.12 10*3/MM3 (ref 0–0.4)
EOSINOPHIL NFR BLD AUTO: 1.9 % (ref 0.3–6.2)
ERYTHROCYTE [DISTWIDTH] IN BLOOD BY AUTOMATED COUNT: 12.1 % (ref 12.3–15.4)
GFR SERPL CREATININE-BSD FRML MDRD: 116 ML/MIN/1.73
GFR SERPL CREATININE-BSD FRML MDRD: 141 ML/MIN/1.73
GLOBULIN UR ELPH-MCNC: 3 GM/DL
GLUCOSE BLD-MCNC: 129 MG/DL (ref 65–99)
HBA1C MFR BLD: 6.8 % (ref 4.8–5.6)
HCT VFR BLD AUTO: 41.4 % (ref 34–46.6)
HDLC SERPL-MCNC: 45 MG/DL (ref 40–60)
HGB BLD-MCNC: 14.6 G/DL (ref 12–15.9)
IMM GRANULOCYTES # BLD AUTO: 0.02 10*3/MM3 (ref 0–0.05)
IMM GRANULOCYTES NFR BLD AUTO: 0.3 % (ref 0–0.5)
LDLC SERPL CALC-MCNC: 87 MG/DL (ref 0–100)
LDLC/HDLC SERPL: 1.94 {RATIO}
LYMPHOCYTES # BLD AUTO: 1.81 10*3/MM3 (ref 0.7–3.1)
LYMPHOCYTES NFR BLD AUTO: 28.5 % (ref 19.6–45.3)
MCH RBC QN AUTO: 30.7 PG (ref 26.6–33)
MCHC RBC AUTO-ENTMCNC: 35.3 G/DL (ref 31.5–35.7)
MCV RBC AUTO: 87.2 FL (ref 79–97)
MICROALBUMIN/CREAT UR: NORMAL MG/G{CREAT}
MONOCYTES # BLD AUTO: 0.3 10*3/MM3 (ref 0.1–0.9)
MONOCYTES NFR BLD AUTO: 4.7 % (ref 5–12)
NEUTROPHILS # BLD AUTO: 4.06 10*3/MM3 (ref 1.7–7)
NEUTROPHILS NFR BLD AUTO: 64 % (ref 42.7–76)
NRBC BLD AUTO-RTO: 0 /100 WBC (ref 0–0.2)
PLATELET # BLD AUTO: 267 10*3/MM3 (ref 140–450)
PMV BLD AUTO: 10.6 FL (ref 6–12)
POTASSIUM BLD-SCNC: 4.2 MMOL/L (ref 3.5–5.2)
PROT SERPL-MCNC: 7.3 G/DL (ref 6–8.5)
RBC # BLD AUTO: 4.75 10*6/MM3 (ref 3.77–5.28)
SODIUM BLD-SCNC: 142 MMOL/L (ref 136–145)
TRIGL SERPL-MCNC: 174 MG/DL (ref 0–150)
TSH SERPL DL<=0.05 MIU/L-ACNC: 1.94 UIU/ML (ref 0.27–4.2)
VLDLC SERPL-MCNC: 34.8 MG/DL (ref 5–40)
WBC NRBC COR # BLD: 6.35 10*3/MM3 (ref 3.4–10.8)

## 2019-11-05 PROCEDURE — 80061 LIPID PANEL: CPT

## 2019-11-05 PROCEDURE — 80053 COMPREHEN METABOLIC PANEL: CPT

## 2019-11-05 PROCEDURE — 85025 COMPLETE CBC W/AUTO DIFF WBC: CPT

## 2019-11-05 PROCEDURE — 82043 UR ALBUMIN QUANTITATIVE: CPT

## 2019-11-05 PROCEDURE — 36415 COLL VENOUS BLD VENIPUNCTURE: CPT

## 2019-11-05 PROCEDURE — 99396 PREV VISIT EST AGE 40-64: CPT | Performed by: PHYSICIAN ASSISTANT

## 2019-11-05 PROCEDURE — 84443 ASSAY THYROID STIM HORMONE: CPT

## 2019-11-05 PROCEDURE — 83036 HEMOGLOBIN GLYCOSYLATED A1C: CPT

## 2019-11-05 PROCEDURE — 82570 ASSAY OF URINE CREATININE: CPT

## 2019-11-05 PROCEDURE — 82306 VITAMIN D 25 HYDROXY: CPT

## 2019-11-05 NOTE — PROGRESS NOTES
Patient Care Team:  Flaquito Wallace APRN as PCP - General (Family Medicine)     Chief complaint: Patient is in today for a physical     Nelli Morales is a 53 y.o. female who presents for her yearly physical exam.     Patient is a pleasant 54 y/o white female who presents for routine annual physical exam.  Patient presents for management of hyperlipidemia, hypertension, diabetes, and family history of breast cancer.  Patient is up-to-date with mammogram, vaccinations, dentist and ophthalmologist.  Patient's blood pressure is stable and well-controlled.  She is not currently on any diabetes medications.  Compliant on simvastatin.  Denies any concerns or complaints today.  Due for colonoscopy and needs referral to podiatry.  Denies any skin lesions/moles.         Review of Systems   Constitutional: Negative for chills, diaphoresis, fatigue and fever.   HENT: Negative for congestion, ear pain, hearing loss, postnasal drip, rhinorrhea and sore throat.    Eyes: Negative for blurred vision and pain.   Respiratory: Negative for cough, shortness of breath and wheezing.    Cardiovascular: Negative for chest pain and leg swelling.   Gastrointestinal: Negative for abdominal pain, blood in stool, constipation, diarrhea, nausea, vomiting and indigestion.   Endocrine: Negative for polyuria.   Genitourinary: Negative for dysuria, flank pain and hematuria.   Musculoskeletal: Negative for arthralgias, gait problem and myalgias.   Skin: Negative for rash and skin lesions.   Neurological: Negative for dizziness and headache.   Psychiatric/Behavioral: Negative for self-injury, sleep disturbance, suicidal ideas, depressed mood and stress. The patient is not nervous/anxious.         History  Past Medical History:   Diagnosis Date   • Allergic rhinitis    • BV (bacterial vaginosis)    • Cataracts, bilateral    • Class 1 obesity due to excess calories without serious comorbidity with body mass index (BMI) of 32.0 to 32.9 in adult  12/22/2017   • Diverticulitis large intestine w/o perforation or abscess w/o bleeding 04/2017   • Fibrocystic breast disease    • Glaucoma    • Hyperlipidemia    • Hypertension    • IBS (irritable bowel syndrome)    • Insomnia    • Obesity (BMI 30.0-34.9)    • Stress bladder incontinence, female    • Type 2 diabetes mellitus without complication, without long-term current use of insulin (CMS/[facility]) 8/15/2018   • Varicose veins of legs       Past Surgical History:   Procedure Laterality Date   • BLADDER SUSPENSION  10/27/2007   • HYSTERECTOMY  12/17/2013    [facility]      Allergies   Allergen Reactions   • Ace Inhibitors Cough   • Morphine And Related Rash      Family History   Problem Relation Age of Onset   • Diabetes Mother    • Dementia Mother    • Hypothyroidism Mother    • Hyperlipidemia Mother    • ALS Father 70   • Diabetes Sister    • Hyperlipidemia Sister    • Hypothyroidism Sister    • Hypertension Brother       Social History     Socioeconomic History   • Marital status: Single     Spouse name: Not on file   • Number of children: Not on file   • Years of education: Not on file   • Highest education level: Not on file   Tobacco Use   • Smoking status: Never Smoker   • Smokeless tobacco: Never Used   Substance and Sexual Activity   • Alcohol use: Yes     Alcohol/week: 0.6 oz     Types: 1 Cans of beer per week     Comment: occasional   • Drug use: No   • Sexual activity: Yes     Partners: Male      Current Outpatient Medications on File Prior to Visit   Medication Sig Dispense Refill   • Blood Glucose Monitoring Suppl (ACCU-CHEK VIKASH PLUS) w/Device kit      • Cholecalciferol (VITAMIN D3) 5000 UNITS capsule capsule Take 5,000 Units by mouth Daily.     • clindamycin (CLEOCIN) 2 % vaginal cream   0   • cyclobenzaprine (FLEXERIL) 10 MG tablet Take 1 tablet by mouth 2 (Two) Times a Day As Needed for Muscle Spasms. 90 tablet 1   • ESTRACE VAGINAL 0.1 MG/GM vaginal cream insert 1/2 gram vaginally three times a week  0   •  estradiol (MINIVELLE, VIVELLE-DOT) 0.075 MG/24HR patch   0   • fluticasone (FLONASE) 50 MCG/ACT nasal spray 2 sprays into each nostril Daily. 1 bottle 5   • folic acid (FOLVITE) 1 MG tablet Take 1 tablet by mouth Daily. 90 tablet 2   • glucose blood test strip Check blood sugar once or twice daily. 100 each 12   • glucose monitor monitoring kit 1 each 2 (Two) Times a Day. 1 each 0   • LANCETS ULTRA THIN misc 1 each 2 (Two) Times a Day. 100 each 11   • latanoprost (XALATAN) 0.005 % ophthalmic solution   1   • linaclotide (LINZESS) 290 MCG capsule capsule Take 1 capsule by mouth As Needed (prn). 90 capsule 3   • Loratadine (CLARITIN) 10 MG capsule Take 1 tablet by mouth.     • losartan-hydrochlorothiazide (HYZAAR) 50-12.5 MG per tablet Take 1 tablet by mouth Daily. 90 tablet 1   • simvastatin (ZOCOR) 20 MG tablet Take 1 tablet by mouth Every Night. 90 tablet 3   • [DISCONTINUED] methylPREDNISolone (MEDROL, KARIME,) 4 MG tablet Take as directed on package instructions. 21 tablet 0     No current facility-administered medications on file prior to visit.        Results for orders placed or performed in visit on 07/29/19   POC Glycosylated Hemoglobin (Hb A1C)   Result Value Ref Range    Hemoglobin A1C 6.1 %       Health Maintenance   Topic Date Due   • URINE MICROALBUMIN  1966   • ZOSTER VACCINE (1 of 2) 12/19/2016   • DIABETIC FOOT EXAM  07/18/2017   • PNEUMOCOCCAL VACCINE (19-64 MEDIUM RISK) (1 of 1 - PPSV23) 10/21/2017   • COLONOSCOPY  06/03/2018   • LIPID PANEL  08/02/2019   • HEMOGLOBIN A1C  01/29/2020   • DIABETIC EYE EXAM  10/15/2020   • ANNUAL PHYSICAL  11/06/2020   • PAP SMEAR  06/01/2021   • MAMMOGRAM  10/03/2021   • TDAP/TD VACCINES (4 - Td) 11/15/2027   • INFLUENZA VACCINE  Completed       Immunizations  Td/Tdap(Booster Q 10 yrs):  Completed  Flu (Yearly):  Completed  Pneumovax (1 yr after Prevnar):  Completed  Fipbsjl70 (1 yr after Pneumo):  Completed  Hep B:  Completed  Shringrix:  N/A}   Immunization  "History   Administered Date(s) Administered   • Flu Vaccine Intradermal Quad 18-64YR 10/03/2019   • Flu Vaccine Quad PF >18YRS 2018   • Hepatitis A 2018, 2019   • Influenza Whole 2016   • Influenza, Unspecified 2017   • Pneumococcal Conjugate 13-Valent (PCV13) 2017   • Td 11/15/2017   • Tdap 2012         Diabetes:  Yes   Eye Exam: Complete   Foot Exam:  referral placed  Obesity Counseling:  Complete  Hemoglobin A1C   Date Value Ref Range Status   2019 6.1 % Final       Patient's Body mass index is 23.6 kg/m². BMI is within normal parameters. No follow-up required..      Colorectal Screening:  Ordered Today  Pap:  N/A  Mammogram:  Complete  PSA(Over age 50):  N/A  US Aorta (For male smokers, age 65):  N/A  CT for Smoker (Age 55-75, 30pk yr):  N/A  Bone Density/DEXA:  Complete  Hep C ( 2107-6148):  N/A      Results for orders placed or performed in visit on 19   POC Glycosylated Hemoglobin (Hb A1C)   Result Value Ref Range    Hemoglobin A1C 6.1 %            Vitals:    19 0833   BP: 116/78   Pulse: 86   SpO2: 98%   Weight: 68.5 kg (151 lb 1.6 oz)   Height: 170.4 cm (67.1\")       Body mass index is 23.6 kg/m².    Physical Exam   Constitutional: She is oriented to person, place, and time. Vital signs are normal. She appears well-developed and well-nourished. She is active and cooperative. She does not appear ill. No distress. She is not overweight.  HENT:   Head: Normocephalic and atraumatic.   Right Ear: Hearing, tympanic membrane, external ear and ear canal normal.   Left Ear: Hearing, tympanic membrane, external ear and ear canal normal.   Nose: Nose normal. Right sinus exhibits no maxillary sinus tenderness and no frontal sinus tenderness. Left sinus exhibits no maxillary sinus tenderness and no frontal sinus tenderness.   Mouth/Throat: Uvula is midline, oropharynx is clear and moist and mucous membranes are normal.   Eyes: Conjunctivae, EOM and lids are " normal.   Neck: Trachea normal, normal range of motion and phonation normal. No thyroid mass and no thyromegaly present.   Cardiovascular: Normal rate, regular rhythm and normal heart sounds.   Pulmonary/Chest: Effort normal and breath sounds normal.   Abdominal: Soft. Normal appearance and bowel sounds are normal. She exhibits no distension. There is no tenderness. There is no rigidity, no guarding and no CVA tenderness.   Musculoskeletal: Normal range of motion. She exhibits no edema.   Lymphadenopathy:     She has no cervical adenopathy.        Right cervical: No superficial cervical adenopathy present.       Left cervical: No superficial cervical adenopathy present.   Neurological: She is alert and oriented to person, place, and time. She has normal strength and normal reflexes. Coordination and gait normal.   CN grossly intact   Skin: Skin is warm and intact. No rash noted. She is not diaphoretic. No cyanosis or erythema. Nails show no clubbing.   Psychiatric: She has a normal mood and affect. Her speech is normal and behavior is normal. Judgment and thought content normal. She is not actively hallucinating. Cognition and memory are normal. She is attentive.   Vitals reviewed.          Counseling provided on diet and nutrition, exercise, supplements, hypertension and diabetes.    Nelli was seen today for annual exam.    Diagnoses and all orders for this visit:    Physical exam, annual  -     CBC Auto Differential; Future  -     Comprehensive Metabolic Panel; Future  -     TSH Rfx On Abnormal To Free T4; Future  -     Lipid Panel; Future  -     Hemoglobin A1c; Future  -     Microalbumin / Creatinine Urine Ratio - Urine, Clean Catch; Future  PE is unremarkable  Preventative labs ordered  Colonoscopy, order placed  Mammogram - completed  Dentist and Ophthalmologist completed  Dermatologist -no concerning skin lesions  Flu vaccination and all vaccinations up-to-date  Podiatry - referral placed    Type 2 diabetes  mellitus without complication, without long-term current use of insulin (CMS/[facility])  -     Hemoglobin A1c; Future  -     Microalbumin / Creatinine Urine Ratio - Urine, Clean Catch; Future  -     Ambulatory Referral to Podiatry  Currently not on any medication.  Will order hemoglobin AIC.    Essential hypertension  Stable, well-controlled.  Compliant on medication.    Mixed hyperlipidemia  Simvastatin    Vitamin D deficiency  -     Vitamin D 25 Hydroxy; Future    Screen for colon cancer  -     Ambulatory Referral For Screening Colonoscopy    Family history of breast cancer in mother  Up-to-date on mammogram.     Krystal Hart PA-C   11/5/2019   4:39 PM

## 2019-11-05 NOTE — PATIENT INSTRUCTIONS
"Recommend Align and Florastor probiotics  Soak feet in epsom salt/water every night for 20 minutes and using pumice stone    General Health Maintenance:  -Yearly dermatology exam  -Yearly eye exam if you are diabetic, otherwise every 2 years for patients without diabetes  -Dental exams/cleanings at least twice a year  -Staying current on your required colonoscopy, starts at age 50 unless there are other indications prior  -Regular pap smears (at least every 1-3 years until age 65)  -Yearly pelvic and breast exams  -Yearly mammograms starting at age 40    Vaccines:  -Talk to pharmacist about shingrix shot  -Obtain flu shot when available  -Health department is recommending Hepatitis A vaccine    The largest impact you can have on your own health is getting the proper nutrition, exercise and maintaining an overall healthy body weight.  Proper nutrition involves eating lots of vegetables, fruit, minimal lean meat and avoiding processed foods and limiting refined sugars, carbohydrates and starches (\"the white stuff\").  Drinking at least 8 cups of water daily.  Walking at least 30 minutes a day and if possible, increasing this to a more cardiovascular aerobic exercise.  Maintaining a healthy body weight.      If you smoke or use tobacco products, quitting is extremely important and I am happy to discuss options with you regarding how to do this and what's available to assist you.    If you consume alcohol, limit your alcohol intake to 2 drinks a day for men and 1 drink a day for woman.    It is also important to make sure to keep regular appointments and have all general health maintenance items completed in a timely manner.      Thank you for entrusting me with your care.  It is a pleasure and honor to participate in your health.    "

## 2019-11-07 ENCOUNTER — TELEPHONE (OUTPATIENT)
Dept: FAMILY MEDICINE CLINIC | Facility: CLINIC | Age: 54
End: 2019-11-07

## 2019-11-07 DIAGNOSIS — E11.9 TYPE 2 DIABETES MELLITUS WITHOUT COMPLICATION, WITHOUT LONG-TERM CURRENT USE OF INSULIN (HCC): Primary | ICD-10-CM

## 2019-11-07 DIAGNOSIS — E11.65 TYPE 2 DIABETES MELLITUS WITH HYPERGLYCEMIA, WITHOUT LONG-TERM CURRENT USE OF INSULIN (HCC): ICD-10-CM

## 2019-11-07 RX ORDER — METFORMIN HYDROCHLORIDE 500 MG/1
500 TABLET, EXTENDED RELEASE ORAL 2 TIMES DAILY WITH MEALS
Qty: 60 TABLET | Refills: 2 | Status: SHIPPED | OUTPATIENT
Start: 2019-11-07 | End: 2020-01-28 | Stop reason: SDUPTHER

## 2019-11-07 NOTE — TELEPHONE ENCOUNTER
Her A1c is in the range needing to be treated. BUT, have her be on a strict diet, and see me in 3 months, If still elevated we will HAVE to start meds then.

## 2019-11-07 NOTE — TELEPHONE ENCOUNTER
Spoke with patient regarding Flaquito BECERRIL recommendations. The patient decided she will try the metformin anyways. She was scheduled for a 3 month follow up to recheck her A1c.

## 2019-11-07 NOTE — TELEPHONE ENCOUNTER
Patient called back still wanting to know if she should start taking the metformin medication that was prescribed to her today. She states she really doesn't want to take any medications if it's not absolutely necessary. Please advice.

## 2019-11-07 NOTE — TELEPHONE ENCOUNTER
Pt returned a call stating she doesn't want to start medication. Pt stated her A1C was high because of all the Halloween candy she had eaten.

## 2019-11-07 NOTE — TELEPHONE ENCOUNTER
Result Notes for Microalbumin / Creatinine Urine Ratio - Urine, Clean Catch     Notes recorded by Anita Couch MA on 11/7/2019 at 9:27 AM EST  Left Message for Pt to return our call about Labs.  ------    Notes recorded by Krystal Hart PA-C on 11/7/2019 at 9:07 AM EST  Please call patient and let her know that her hemoglobin AIC is elevated at 6.8%.  I recommend that we start her on medication for this.  I will send in a prescription for metformin 500 mg twice a day with meals.  Start by taking 1 tablet at night with dinner for 7 days, then increase this to 1 tablet with breakfast and 1 tablet with dinner.

## 2019-11-08 ENCOUNTER — TELEPHONE (OUTPATIENT)
Dept: FAMILY MEDICINE CLINIC | Facility: CLINIC | Age: 54
End: 2019-11-08

## 2019-11-08 NOTE — TELEPHONE ENCOUNTER
Patient would like someone to call her with results of labs, she said someone called with results of A1C, but didn't tell her about the rest of lab work. Thank you

## 2019-11-08 NOTE — TELEPHONE ENCOUNTER
Contacted patient and alerted her of her other lab values. She verbalized understanding and agreed.

## 2019-11-22 ENCOUNTER — TELEPHONE (OUTPATIENT)
Dept: FAMILY MEDICINE CLINIC | Facility: CLINIC | Age: 54
End: 2019-11-22

## 2019-11-22 NOTE — TELEPHONE ENCOUNTER
Letter has already been mailed out. If patient returns to the clinic. We can just print off letter. Otherwise these were mailed out on 11/7/19

## 2019-11-25 DIAGNOSIS — D64.9 ANEMIA, UNSPECIFIED TYPE: ICD-10-CM

## 2019-11-25 RX ORDER — FOLIC ACID 1 MG/1
1 TABLET ORAL DAILY
Qty: 90 TABLET | Refills: 2 | Status: SHIPPED | OUTPATIENT
Start: 2019-11-25 | End: 2020-08-18

## 2019-12-10 ENCOUNTER — TELEPHONE (OUTPATIENT)
Dept: FAMILY MEDICINE CLINIC | Facility: CLINIC | Age: 54
End: 2019-12-10

## 2019-12-10 NOTE — TELEPHONE ENCOUNTER
Patient has only been taking her metformin ER 500mg once daily when she should have been taking it twice daily. Should would like a call back to discuss how/when she needs to be taking it. Please call back at [phone]

## 2019-12-10 NOTE — TELEPHONE ENCOUNTER
Unable to contact, Left detailed message stating her metformin should be taken twice with meals. If any further questions call our office.

## 2019-12-23 ENCOUNTER — TELEPHONE (OUTPATIENT)
Dept: FAMILY MEDICINE CLINIC | Facility: CLINIC | Age: 54
End: 2019-12-23

## 2019-12-23 RX ORDER — SODIUM, POTASSIUM,MAG SULFATES 17.5-3.13G
2 SOLUTION, RECONSTITUTED, ORAL ORAL TAKE AS DIRECTED
Qty: 354 ML | Refills: 0 | Status: SHIPPED | OUTPATIENT
Start: 2019-12-23 | End: 2020-01-13

## 2019-12-31 ENCOUNTER — LAB REQUISITION (OUTPATIENT)
Dept: LAB | Facility: HOSPITAL | Age: 54
End: 2019-12-31

## 2019-12-31 ENCOUNTER — OUTSIDE FACILITY SERVICE (OUTPATIENT)
Dept: GASTROENTEROLOGY | Facility: CLINIC | Age: 54
End: 2019-12-31

## 2019-12-31 DIAGNOSIS — Z12.11 ENCOUNTER FOR SCREENING FOR MALIGNANT NEOPLASM OF COLON: ICD-10-CM

## 2019-12-31 DIAGNOSIS — Z83.71 FAMILY HISTORY OF COLONIC POLYPS: ICD-10-CM

## 2019-12-31 PROCEDURE — 88305 TISSUE EXAM BY PATHOLOGIST: CPT | Performed by: INTERNAL MEDICINE

## 2019-12-31 PROCEDURE — 45385 COLONOSCOPY W/LESION REMOVAL: CPT | Performed by: INTERNAL MEDICINE

## 2020-01-02 LAB
CYTO UR: NORMAL
LAB AP CASE REPORT: NORMAL
LAB AP CLINICAL INFORMATION: NORMAL
PATH REPORT.FINAL DX SPEC: NORMAL
PATH REPORT.GROSS SPEC: NORMAL

## 2020-01-13 ENCOUNTER — OFFICE VISIT (OUTPATIENT)
Dept: FAMILY MEDICINE CLINIC | Facility: CLINIC | Age: 55
End: 2020-01-13

## 2020-01-13 VITALS
TEMPERATURE: 97.8 F | DIASTOLIC BLOOD PRESSURE: 60 MMHG | HEIGHT: 67 IN | OXYGEN SATURATION: 99 % | WEIGHT: 149 LBS | SYSTOLIC BLOOD PRESSURE: 120 MMHG | BODY MASS INDEX: 23.39 KG/M2 | HEART RATE: 83 BPM

## 2020-01-13 DIAGNOSIS — E11.9 TYPE 2 DIABETES MELLITUS WITHOUT COMPLICATION, WITHOUT LONG-TERM CURRENT USE OF INSULIN (HCC): ICD-10-CM

## 2020-01-13 DIAGNOSIS — J06.9 ACUTE UPPER RESPIRATORY INFECTION: Primary | ICD-10-CM

## 2020-01-13 PROCEDURE — 99213 OFFICE O/P EST LOW 20 MIN: CPT | Performed by: NURSE PRACTITIONER

## 2020-01-13 PROCEDURE — 87804 INFLUENZA ASSAY W/OPTIC: CPT | Performed by: NURSE PRACTITIONER

## 2020-01-13 RX ORDER — BROMPHENIRAMINE MALEATE, PSEUDOEPHEDRINE HYDROCHLORIDE, AND DEXTROMETHORPHAN HYDROBROMIDE 2; 30; 10 MG/5ML; MG/5ML; MG/5ML
5 SYRUP ORAL 4 TIMES DAILY PRN
Qty: 120 ML | Refills: 1 | Status: SHIPPED | OUTPATIENT
Start: 2020-01-13 | End: 2020-09-02

## 2020-01-13 RX ORDER — AZITHROMYCIN 250 MG/1
TABLET, FILM COATED ORAL
Qty: 6 TABLET | Refills: 0 | Status: SHIPPED | OUTPATIENT
Start: 2020-01-13 | End: 2020-02-17

## 2020-01-13 NOTE — PROGRESS NOTES
Chief Complaint   Patient presents with   • Sinusitis     She has been feeling unwell since Friday, She woke up Saturday with a cough, sinus pressure, sneezing, producing green mucus. She is concerned she has the flu and has been taking OTC Mucinex to help with sx       Subjective   Nelli Morales is a 54 y.o. female    History of Present Illness  Patient in for complaints of upper respiratory symptoms that started this last Friday.  States she has had sinus pressure sneezing and coughing with green mucus.  She did feel feverish Saturday.  She did take some over-the-counter Mucinex Saturday and Sunday which did help some.  She was exposed to a neighbor with similar symptoms and has been visiting her sister in the hospital.    She has been taking care of her mom and sister quite frequently and has not been checking her own blood sugar.  She takes metformin thousand milligrams in the evening.    She did have a recent colonoscopy which showed a polyp and she is to have this repeated again in 5 years.    Allergies   Allergen Reactions   • Ace Inhibitors Cough   • Morphine And Related Rash     Past Medical History:   Diagnosis Date   • Allergic rhinitis    • BV (bacterial vaginosis)    • Cataracts, bilateral    • Class 1 obesity due to excess calories without serious comorbidity with body mass index (BMI) of 32.0 to 32.9 in adult 12/22/2017   • Diverticulitis large intestine w/o perforation or abscess w/o bleeding 04/2017   • Fibrocystic breast disease    • Glaucoma    • Hyperlipidemia    • Hypertension    • IBS (irritable bowel syndrome)    • Insomnia    • Obesity (BMI 30.0-34.9)    • Stress bladder incontinence, female    • Type 2 diabetes mellitus without complication, without long-term current use of insulin (CMS/[facility]) 8/15/2018   • Varicose veins of legs       Past Surgical History:   Procedure Laterality Date   • BLADDER SUSPENSION  10/27/2007   • HYSTERECTOMY  12/17/2013    [facility]     Social History     Socioeconomic  History   • Marital status: Single     Spouse name: Not on file   • Number of children: Not on file   • Years of education: Not on file   • Highest education level: Not on file   Tobacco Use   • Smoking status: Never Smoker   • Smokeless tobacco: Never Used   Substance and Sexual Activity   • Alcohol use: Yes     Alcohol/week: 1.0 standard drinks     Types: 1 Cans of beer per week     Comment: occasional   • Drug use: No   • Sexual activity: Yes     Partners: Male        Current Outpatient Medications:   •  Blood Glucose Monitoring Suppl (ACCU-CHEK VIKASH PLUS) w/Device kit, , Disp: , Rfl:   •  Cholecalciferol (VITAMIN D3) 5000 UNITS capsule capsule, Take 5,000 Units by mouth Daily., Disp: , Rfl:   •  clindamycin (CLEOCIN) 2 % vaginal cream, , Disp: , Rfl: 0  •  cyclobenzaprine (FLEXERIL) 10 MG tablet, Take 1 tablet by mouth 2 (Two) Times a Day As Needed for Muscle Spasms., Disp: 90 tablet, Rfl: 1  •  ESTRACE VAGINAL 0.1 MG/GM vaginal cream, insert 1/2 gram vaginally three times a week, Disp: , Rfl: 0  •  estradiol (MINIVELLE, VIVELLE-DOT) 0.075 MG/24HR patch, , Disp: , Rfl: 0  •  fluticasone (FLONASE) 50 MCG/ACT nasal spray, 2 sprays into each nostril Daily., Disp: 1 bottle, Rfl: 5  •  folic acid (FOLVITE) 1 MG tablet, Take 1 tablet by mouth Daily., Disp: 90 tablet, Rfl: 2  •  glucose blood test strip, Check blood sugar once or twice daily., Disp: 100 each, Rfl: 12  •  glucose monitor monitoring kit, 1 each 2 (Two) Times a Day., Disp: 1 each, Rfl: 0  •  LANCETS ULTRA THIN misc, 1 each 2 (Two) Times a Day., Disp: 100 each, Rfl: 11  •  latanoprost (XALATAN) 0.005 % ophthalmic solution, , Disp: , Rfl: 1  •  Loratadine (CLARITIN) 10 MG capsule, Take 1 tablet by mouth., Disp: , Rfl:   •  losartan-hydrochlorothiazide (HYZAAR) 50-12.5 MG per tablet, Take 1 tablet by mouth Daily., Disp: 90 tablet, Rfl: 1  •  metFORMIN ER (GLUCOPHAGE-XR) 500 MG 24 hr tablet, Take 1 tablet by mouth 2 (Two) Times a Day With Meals. (Patient  taking differently: Take 500 mg by mouth Every Night.), Disp: 60 tablet, Rfl: 2  •  simvastatin (ZOCOR) 20 MG tablet, Take 1 tablet by mouth Every Night., Disp: 90 tablet, Rfl: 3  •  azithromycin (ZITHROMAX) 250 MG tablet, Take 2 tablets the first day, then 1 tablet daily for 4 days., Disp: 6 tablet, Rfl: 0  •  brompheniramine-pseudoephedrine-DM 30-2-10 MG/5ML syrup, Take 5 mL by mouth 4 (Four) Times a Day As Needed for Cough., Disp: 120 mL, Rfl: 1     Review of Systems   Constitutional: Negative for chills, fatigue and fever.   HENT: Positive for ear pain, postnasal drip, rhinorrhea, sinus pressure and sore throat (Friday PM). Negative for congestion.    Eyes: Negative for pain, discharge and itching.   Respiratory: Positive for cough. Negative for shortness of breath and wheezing.    Gastrointestinal: Positive for constipation, diarrhea, nausea and vomiting.   Genitourinary: Negative.    Musculoskeletal: Negative for myalgias.   Neurological: Positive for light-headedness. Negative for dizziness and headaches.   Hematological: Negative for adenopathy.   Psychiatric/Behavioral: Positive for sleep disturbance (some).       Objective     Vitals:    01/13/20 1034   BP: 120/60   Pulse: 83   Temp: 97.8 °F (36.6 °C)   SpO2: 99%       Results for orders placed or performed in visit on 01/13/20   POC Influenza A / B   Result Value Ref Range    Rapid Influenza A Ag Negative Negative    Rapid Influenza B Ag Negative Negative    Internal Control Passed Passed    Lot Number [id number]     Expiration Date 12/11/2021        Physical Exam   Constitutional: She appears well-developed and well-nourished. No distress.   HENT:   Head: Normocephalic and atraumatic.   Right Ear: Hearing and external ear normal. A middle ear effusion is present.   Left Ear: Hearing and external ear normal. A middle ear effusion is present.   Nose: Mucosal edema and rhinorrhea present. Right sinus exhibits no maxillary sinus tenderness and no frontal  sinus tenderness. Left sinus exhibits no maxillary sinus tenderness and no frontal sinus tenderness.   Mouth/Throat: Posterior oropharyngeal erythema present.   Eyes: Pupils are equal, round, and reactive to light.   Neck: Neck supple. No JVD present.   Cardiovascular: Normal rate, regular rhythm and normal heart sounds.   No murmur heard.  Pulmonary/Chest: Effort normal. No stridor. No respiratory distress. She has no wheezes.   Musculoskeletal: She exhibits no edema.   Lymphadenopathy:     She has no cervical adenopathy.   Skin: Skin is warm and dry. She is not diaphoretic.   Psychiatric: She has a normal mood and affect. Her behavior is normal.   Nursing note and vitals reviewed.      Assessment/Plan   Problem List Items Addressed This Visit        Endocrine    Type 2 diabetes mellitus without complication, without long-term current use of insulin (CMS/[facility])      Other Visit Diagnoses     Acute upper respiratory infection    -  Primary    Relevant Medications    brompheniramine-pseudoephedrine-DM 30-2-10 MG/5ML syrup    azithromycin (ZITHROMAX) 250 MG tablet    Other Relevant Orders    POC Influenza A / B (Completed)      Discussed diabetes diet, Watch carb intake and checking blood sugar as instructed. Record and bring to next visit. Reminded about need for yearly eye exam and foot care. Discussed need for exercise to improve glucose control and overall health.  Patient advised to eat a heart healthy diet and get regular aerobic exercise.    Patient to take medications as directed. Make sure to take all of them. Return if no improvement or worsens in 5-7 days. If concerned after hours, may go to [facility] or ER for evaluation/Treatment.Patient was encouraged to keep me informed of any acute changes, lack of improvement, or any new concerning symptoms.  If patient becomes concerned, or has any worsening symptoms, they are to report either here, urgent treatment, or emergency room. Plan of care discussed with pt. They  verbalized understanding and agreement.     RV as scheduled    Counseling provided on URI, Diabetes.    Flaquito Wallace, APRN   1/13/2020   11:00 AM

## 2020-01-13 NOTE — PATIENT INSTRUCTIONS
Upper Respiratory Infection, Adult  An upper respiratory infection (URI) is a common viral infection of the nose, throat, and upper air passages that lead to the lungs. The most common type of URI is the common cold. URIs usually get better on their own, without medical treatment.  What are the causes?  A URI is caused by a virus. You may catch a virus by:  · Breathing in droplets from an infected person's cough or sneeze.  · Touching something that has been exposed to the virus (contaminated) and then touching your mouth, nose, or eyes.  What increases the risk?  You are more likely to get a URI if:  · You are very young or very old.  · It is sangeetha or winter.  · You have close contact with others, such as at a , school, or health care facility.  · You smoke.  · You have long-term (chronic) heart or lung disease.  · You have a weakened disease-fighting (immune) system.  · You have nasal allergies or asthma.  · You are experiencing a lot of stress.  · You work in an area that has poor air circulation.  · You have poor nutrition.  What are the signs or symptoms?  A URI usually involves some of the following symptoms:  · Runny or stuffy (congested) nose.  · Sneezing.  · Cough.  · Sore throat.  · Headache.  · Fatigue.  · Fever.  · Loss of appetite.  · Pain in your forehead, behind your eyes, and over your cheekbones (sinus pain).  · Muscle aches.  · Redness or irritation of the eyes.  · Pressure in the ears or face.  How is this diagnosed?  This condition may be diagnosed based on your medical history and symptoms, and a physical exam. Your health care provider may use a cotton swab to take a mucus sample from your nose (nasal swab). This sample can be tested to determine what virus is causing the illness.  How is this treated?  URIs usually get better on their own within 7-10 days. You can take steps at home to relieve your symptoms. Medicines cannot cure URIs, but your health care provider may recommend  certain medicines to help relieve symptoms, such as:  · Over-the-counter cold medicines.  · Cough suppressants. Coughing is a type of defense against infection that helps to clear the respiratory system, so take these medicines only as recommended by your health care provider.  · Fever-reducing medicines.  Follow these instructions at home:  Activity  · Rest as needed.  · If you have a fever, stay home from work or school until your fever is gone or until your health care provider says you are no longer contagious. Your health care provider may have you wear a face mask to prevent your infection from spreading.  Relieving symptoms  · Gargle with a salt-water mixture 3-4 times a day or as needed. To make a salt-water mixture, completely dissolve ½-1 tsp of salt in 1 cup of warm water.  · Use a cool-mist humidifier to add moisture to the air. This can help you breathe more easily.  Eating and drinking    · Drink enough fluid to keep your urine pale yellow.  · Eat soups and other clear broths.  General instructions    · Take over-the-counter and prescription medicines only as told by your health care provider. These include cold medicines, fever reducers, and cough suppressants.  · Do not use any products that contain nicotine or tobacco, such as cigarettes and e-cigarettes. If you need help quitting, ask your health care provider.  · Stay away from secondhand smoke.  · Stay up to date on all immunizations, including the yearly (annual) flu vaccine.  · Keep all follow-up visits as told by your health care provider. This is important.  How to prevent the spread of infection to others    · URIs can be passed from person to person (are contagious). To prevent the infection from spreading:  ? Wash your hands often with soap and water. If soap and water are not available, use hand .  ? Avoid touching your mouth, face, eyes, or nose.  ? Cough or sneeze into a tissue or your sleeve or elbow instead of into your hand  or into the air.  Contact a health care provider if:  · You are getting worse instead of better.  · You have a fever or chills.  · Your mucus is brown or red.  · You have yellow or brown discharge coming from your nose.  · You have pain in your face, especially when you bend forward.  · You have swollen neck glands.  · You have pain while swallowing.  · You have white areas in the back of your throat.  Get help right away if:  · You have shortness of breath that gets worse.  · You have severe or persistent:  ? Headache.  ? Ear pain.  ? Sinus pain.  ? Chest pain.  · You have chronic lung disease along with any of the following:  ? Wheezing.  ? Prolonged cough.  ? Coughing up blood.  ? A change in your usual mucus.  · You have a stiff neck.  · You have changes in your:  ? Vision.  ? Hearing.  ? Thinking.  ? Mood.  Summary  · An upper respiratory infection (URI) is a common infection of the nose, throat, and upper air passages that lead to the lungs.  · A URI is caused by a virus.  · URIs usually get better on their own within 7-10 days.  · Medicines cannot cure URIs, but your health care provider may recommend certain medicines to help relieve symptoms.  This information is not intended to replace advice given to you by your health care provider. Make sure you discuss any questions you have with your health care provider.  Document Released: 06/13/2002 Document Revised: 08/03/2018 Document Reviewed: 08/03/2018  [employer] Interactive Patient Education © 2019 [employer]  Blood Glucose Monitoring, Adult  Monitoring your blood sugar (glucose) is an important part of managing your diabetes (diabetes mellitus). Blood glucose monitoring involves checking your blood glucose as often as directed and keeping a record (log) of your results over time.  Checking your blood glucose regularly and keeping a blood glucose log can:  · Help you and your health care provider adjust your diabetes management plan as needed, including  your medicines or insulin.  · Help you understand how food, exercise, illnesses, and medicines affect your blood glucose.  · Let you know what your blood glucose is at any time. You can quickly find out if you have low blood glucose (hypoglycemia) or high blood glucose (hyperglycemia).  Your health care provider will set individualized treatment goals for you. Your goals will be based on your age, other medical conditions you have, and how you respond to diabetes treatment. Generally, the goal of treatment is to maintain the following blood glucose levels:  · Before meals (preprandial):  mg/dL (4.4-7.2 mmol/L).  · After meals (postprandial): below 180 mg/dL (10 mmol/L).  · A1c level: less than 7%.  Supplies needed:  · Blood glucose meter.  · Test strips for your meter. Each meter has its own strips. You must use the strips that came with your meter.  · A needle to prick your finger (lancet). Do not use a lancet more than one time.  · A device that holds the lancet (lancing device).  · A journal or log book to write down your results.  How to check your blood glucose    1. Wash your hands with soap and water.  2. Prick the side of your finger (not the tip) with the lancet. Use a different finger each time.  3. Gently rub the finger until a small drop of blood appears.  4. Follow instructions that come with your meter for inserting the test strip, applying blood to the strip, and using your blood glucose meter.  5. Write down your result and any notes.  Some meters allow you to use areas of your body other than your finger (alternative sites) to test your blood. The most common alternative sites are:  · Forearm.  · Thigh.  · Palm of the hand.  If you think you may have hypoglycemia, or if you have a history of not knowing when your blood glucose is getting low (hypoglycemia unawareness), do not use alternative sites. Use your finger instead. Alternative sites may not be as accurate as the fingers, because blood  flow is slower in these areas. This means that the result you get may be delayed, and it may be different from the result that you would get from your finger.  Follow these instructions at home:  Blood glucose log    · Every time you check your blood glucose, write down your result. Also write down any notes about things that may be affecting your blood glucose, such as your diet and exercise for the day. This information can help you and your health care provider:  ? Look for patterns in your blood glucose over time.  ? Adjust your diabetes management plan as needed.  · Check if your meter allows you to download your records to a computer. Most glucose meters store a record of glucose readings in the meter.  If you have type 1 diabetes:  · Check your blood glucose 2 or more times a day.  · Also check your blood glucose:  ? Before every insulin injection.  ? Before and after exercise.  ? Before meals.  ? 2 hours after a meal.  ? Occasionally between 2:00 a.m. and 3:00 a.m., as directed.  ? Before potentially dangerous tasks, like driving or using heavy machinery.  ? At bedtime.  · You may need to check your blood glucose more often, up to 6-10 times a day, if you:  ? Use an insulin pump.  ? Need multiple daily injections (MDI).  ? Have diabetes that is not well-controlled.  ? Are ill.  ? Have a history of severe hypoglycemia.  ? Have hypoglycemia unawareness.  If you have type 2 diabetes:  · If you take insulin or other diabetes medicines, check your blood glucose 2 or more times a day.  · If you are on intensive insulin therapy, check your blood glucose 4 or more times a day. Occasionally, you may also need to check between 2:00 a.m. and 3:00 a.m., as directed.  · Also check your blood glucose:  ? Before and after exercise.  ? Before potentially dangerous tasks, like driving or using heavy machinery.  · You may need to check your blood glucose more often if:  ? Your medicine is being adjusted.  ? Your diabetes is  "not well-controlled.  ? You are ill.  General tips  · Always keep your supplies with you.  · If you have questions or need help, all blood glucose meters have a 24-hour \"hotline\" phone number that you can call. You may also contact your health care provider.  · After you use a few boxes of test strips, adjust (calibrate) your blood glucose meter by following instructions that came with your meter.  Contact a health care provider if:  · Your blood glucose is at or above 240 mg/dL (13.3 mmol/L) for 2 days in a row.  · You have been sick or have had a fever for 2 days or longer, and you are not getting better.  · You have any of the following problems for more than 6 hours:  ? You cannot eat or drink.  ? You have nausea or vomiting.  ? You have diarrhea.  Get help right away if:  · Your blood glucose is lower than 54 mg/dL (3 mmol/L).  · You become confused or you have trouble thinking clearly.  · You have difficulty breathing.  · You have moderate or large ketone levels in your urine.  Summary  · Monitoring your blood sugar (glucose) is an important part of managing your diabetes (diabetes mellitus).  · Blood glucose monitoring involves checking your blood glucose as often as directed and keeping a record (log) of your results over time.  · Your health care provider will set individualized treatment goals for you. Your goals will be based on your age, other medical conditions you have, and how you respond to diabetes treatment.  · Every time you check your blood glucose, write down your result. Also write down any notes about things that may be affecting your blood glucose, such as your diet and exercise for the day.  This information is not intended to replace advice given to you by your health care provider. Make sure you discuss any questions you have with your health care provider.  Document Released: 12/20/2004 Document Revised: 10/11/2019 Document Reviewed: 05/29/2017  [employer] Interactive Patient Education © " 2019 [employer]

## 2020-01-28 ENCOUNTER — TELEPHONE (OUTPATIENT)
Dept: FAMILY MEDICINE CLINIC | Facility: CLINIC | Age: 55
End: 2020-01-28

## 2020-01-28 DIAGNOSIS — E11.65 TYPE 2 DIABETES MELLITUS WITH HYPERGLYCEMIA, WITHOUT LONG-TERM CURRENT USE OF INSULIN (HCC): ICD-10-CM

## 2020-01-28 RX ORDER — METFORMIN HYDROCHLORIDE 500 MG/1
500 TABLET, EXTENDED RELEASE ORAL 2 TIMES DAILY WITH MEALS
Qty: 60 TABLET | Refills: 2 | Status: SHIPPED | OUTPATIENT
Start: 2020-01-28 | End: 2020-05-29

## 2020-01-28 NOTE — TELEPHONE ENCOUNTER
Pt called and requested a refill on metFORMIN ER (GLUCOPHAGE-XR) 500 MG 24 hr tablet. Please send to [address]

## 2020-02-17 ENCOUNTER — OFFICE VISIT (OUTPATIENT)
Dept: FAMILY MEDICINE CLINIC | Facility: CLINIC | Age: 55
End: 2020-02-17

## 2020-02-17 ENCOUNTER — APPOINTMENT (OUTPATIENT)
Dept: LAB | Facility: HOSPITAL | Age: 55
End: 2020-02-17

## 2020-02-17 VITALS
TEMPERATURE: 96.2 F | WEIGHT: 149.2 LBS | HEART RATE: 62 BPM | BODY MASS INDEX: 23.42 KG/M2 | OXYGEN SATURATION: 99 % | SYSTOLIC BLOOD PRESSURE: 124 MMHG | HEIGHT: 67 IN | DIASTOLIC BLOOD PRESSURE: 88 MMHG

## 2020-02-17 DIAGNOSIS — E11.9 TYPE 2 DIABETES MELLITUS WITHOUT COMPLICATION, WITHOUT LONG-TERM CURRENT USE OF INSULIN (HCC): Primary | ICD-10-CM

## 2020-02-17 DIAGNOSIS — R53.83 FATIGUE, UNSPECIFIED TYPE: ICD-10-CM

## 2020-02-17 LAB
GLUCOSE BLDC GLUCOMTR-MCNC: 111 MG/DL (ref 70–130)
HBA1C MFR BLD: 6.7 %
VIT B12 BLD-MCNC: >2000 PG/ML (ref 211–946)

## 2020-02-17 PROCEDURE — 82607 VITAMIN B-12: CPT | Performed by: NURSE PRACTITIONER

## 2020-02-17 PROCEDURE — 82947 ASSAY GLUCOSE BLOOD QUANT: CPT | Performed by: NURSE PRACTITIONER

## 2020-02-17 PROCEDURE — 99213 OFFICE O/P EST LOW 20 MIN: CPT | Performed by: NURSE PRACTITIONER

## 2020-02-17 PROCEDURE — 83036 HEMOGLOBIN GLYCOSYLATED A1C: CPT | Performed by: NURSE PRACTITIONER

## 2020-02-17 NOTE — PROGRESS NOTES
Chief Complaint   Patient presents with   • Diabetes     a1c and FGS        Subjective   Nelli Morales is a 54 y.o. female    History of Present Illness  Patient in for follow-up on diabetes.  She reports her blood sugar mostly is in the 100s.  Does take metformin 500 mg twice a day.  She also takes simvastatin 20 mg daily and is on losartan/hydrochlorthiazide 50/12 and a half daily.  She reports she did see ophthalmology about a month ago.  Her weight is unchanged from last visit.  She also does have some fatigue.  She did have some labs drawn in November.  Her CBC was normal CMP was normal except look like a low sign of dehydration thyroid was TSH 1.94 triglycerides were slightly elevated at 174 vitamin D level was normal.    Allergies   Allergen Reactions   • Ace Inhibitors Cough   • Morphine And Related Rash     Past Medical History:   Diagnosis Date   • Allergic rhinitis    • BV (bacterial vaginosis)    • Cataracts, bilateral    • Class 1 obesity due to excess calories without serious comorbidity with body mass index (BMI) of 32.0 to 32.9 in adult 12/22/2017   • Diverticulitis large intestine w/o perforation or abscess w/o bleeding 04/2017   • Fibrocystic breast disease    • Glaucoma    • Hyperlipidemia    • Hypertension    • IBS (irritable bowel syndrome)    • Insomnia    • Obesity (BMI 30.0-34.9)    • Stress bladder incontinence, female    • Type 2 diabetes mellitus without complication, without long-term current use of insulin (CMS/[facility]) 8/15/2018   • Varicose veins of legs       Past Surgical History:   Procedure Laterality Date   • BLADDER SUSPENSION  10/27/2007   • HYSTERECTOMY  12/17/2013    [facility]     Social History     Socioeconomic History   • Marital status: Single     Spouse name: Not on file   • Number of children: Not on file   • Years of education: Not on file   • Highest education level: Not on file   Tobacco Use   • Smoking status: Never Smoker   • Smokeless tobacco: Never Used   Substance and  Sexual Activity   • Alcohol use: Yes     Alcohol/week: 1.0 standard drinks     Types: 1 Cans of beer per week     Comment: occasional   • Drug use: No   • Sexual activity: Yes     Partners: Male        Current Outpatient Medications:   •  Blood Glucose Monitoring Suppl (ACCU-CHEK VIKASH PLUS) w/Device kit, , Disp: , Rfl:   •  Cholecalciferol (VITAMIN D3) 5000 UNITS capsule capsule, Take 5,000 Units by mouth Daily., Disp: , Rfl:   •  clindamycin (CLEOCIN) 2 % vaginal cream, , Disp: , Rfl: 0  •  cyclobenzaprine (FLEXERIL) 10 MG tablet, Take 1 tablet by mouth 2 (Two) Times a Day As Needed for Muscle Spasms., Disp: 90 tablet, Rfl: 1  •  ESTRACE VAGINAL 0.1 MG/GM vaginal cream, insert 1/2 gram vaginally three times a week, Disp: , Rfl: 0  •  estradiol (MINIVELLE, VIVELLE-DOT) 0.075 MG/24HR patch, , Disp: , Rfl: 0  •  fluticasone (FLONASE) 50 MCG/ACT nasal spray, 2 sprays into each nostril Daily., Disp: 1 bottle, Rfl: 5  •  folic acid (FOLVITE) 1 MG tablet, Take 1 tablet by mouth Daily., Disp: 90 tablet, Rfl: 2  •  glucose blood test strip, Check blood sugar once or twice daily., Disp: 100 each, Rfl: 12  •  glucose monitor monitoring kit, 1 each 2 (Two) Times a Day., Disp: 1 each, Rfl: 0  •  LANCETS ULTRA THIN misc, 1 each 2 (Two) Times a Day., Disp: 100 each, Rfl: 11  •  latanoprost (XALATAN) 0.005 % ophthalmic solution, , Disp: , Rfl: 1  •  Loratadine (CLARITIN) 10 MG capsule, Take 1 tablet by mouth., Disp: , Rfl:   •  losartan-hydrochlorothiazide (HYZAAR) 50-12.5 MG per tablet, Take 1 tablet by mouth Daily., Disp: 90 tablet, Rfl: 1  •  metFORMIN ER (GLUCOPHAGE-XR) 500 MG 24 hr tablet, Take 1 tablet by mouth 2 (Two) Times a Day With Meals., Disp: 60 tablet, Rfl: 2  •  simvastatin (ZOCOR) 20 MG tablet, Take 1 tablet by mouth Every Night., Disp: 90 tablet, Rfl: 3  •  brompheniramine-pseudoephedrine-DM 30-2-10 MG/5ML syrup, Take 5 mL by mouth 4 (Four) Times a Day As Needed for Cough., Disp: 120 mL, Rfl: 1  •  Dapagliflozin  Propanediol 10 MG tablet, Take 10 mg by mouth Daily., Disp: 30 tablet, Rfl: 5     Review of Systems   Constitutional: Positive for fatigue. Negative for appetite change, diaphoresis and unexpected weight change.   Eyes: Negative for visual disturbance.   Respiratory: Negative for cough, chest tightness, shortness of breath and wheezing.    Cardiovascular: Negative for chest pain, palpitations and leg swelling.   Gastrointestinal: Negative for constipation, diarrhea, nausea and vomiting.   Endocrine: Positive for polydipsia and polyuria. Negative for polyphagia.   Genitourinary: Negative for difficulty urinating and dysuria.   Skin: Negative for color change.   Neurological: Negative for dizziness, weakness, light-headedness and numbness.   Psychiatric/Behavioral: Negative for sleep disturbance.       Objective     Vitals:    02/17/20 1056   BP: 124/88   Pulse: 62   Temp: 96.2 °F (35.7 °C)   SpO2: 99%       Results for orders placed or performed in visit on 02/17/20   POC Glucose   Result Value Ref Range    Glucose 111 70 - 130 mg/dL   POC Glycosylated Hemoglobin (Hb A1C)   Result Value Ref Range    Hemoglobin A1C 6.7 %       Physical Exam   Constitutional: She is oriented to person, place, and time. She appears well-developed and well-nourished.   HENT:   Head: Normocephalic and atraumatic.   Right Ear: Hearing normal. A middle ear effusion is present.   Left Ear: Hearing normal. A middle ear effusion is present.   Nose: Right sinus exhibits no maxillary sinus tenderness and no frontal sinus tenderness. Left sinus exhibits no maxillary sinus tenderness and no frontal sinus tenderness.   Mouth/Throat: Posterior oropharyngeal erythema present.   Cardiovascular: Normal rate, regular rhythm and normal heart sounds.   Pulmonary/Chest: Effort normal and breath sounds normal.   Musculoskeletal: She exhibits no edema.   Neurological: She is alert and oriented to person, place, and time.   Skin: Skin is warm and dry.    Psychiatric: She has a normal mood and affect. Her behavior is normal.   Vitals reviewed.      Assessment/Plan   Problem List Items Addressed This Visit        Endocrine    Type 2 diabetes mellitus without complication, without long-term current use of insulin (CMS/[facility]) - Primary    Relevant Medications    Dapagliflozin Propanediol 10 MG tablet    Other Relevant Orders    POC Glucose (Completed)    POC Glycosylated Hemoglobin (Hb A1C) (Completed)    Vitamin B12      Other Visit Diagnoses     Fatigue, unspecified type        Relevant Orders    Vitamin B12      We will add Farxiga 10 mg daily to her current medicines to see if we get better blood pressure control.  She has been unable to take increased dose of metformin due to diarrhea with this. Discussed diabetes diet, Watch carb intake and checking blood sugar as instructed. Record and bring to next visit. Reminded about need for yearly eye exam and foot care. Discussed need for exercise to improve glucose control and overall health.  Patient advised to eat a heart healthy diet and get regular aerobic exercise.    For the fatigue we will go check a B12 level.  Her other recent labs were all good.    rv- 3 MO    Counseling provided on diabetes and Farxiga.    Flaquito Wallace, JERRICA   2/17/2020   11:30 AM

## 2020-02-17 NOTE — PATIENT INSTRUCTIONS
Dapagliflozin tablets  What is this medicine?  DAPAGLIFLOZIN (DAP a gli FLOE zin) helps to treat type 2 diabetes. It helps to control blood sugar. Treatment is combined with diet and exercise. This drug may also be used to reduce the risk of going to the hospital for heart failure if you have type 2 diabetes and risk factors for heart disease.  This medicine may be used for other purposes; ask your health care provider or pharmacist if you have questions.  COMMON BRAND NAME(S): Farxiga  What should I tell my health care provider before I take this medicine?  They need to know if you have any of these conditions:  -bladder cancer  -dehydration  -diabetic ketoacidosis  -diet low in salt  -eating less due to illness, surgery, dieting, or any other reason  -having surgery  -high cholesterol  -history of pancreatitis or pancreas problems  -history of yeast infection of the penis or vagina  -if you often drink alcohol  -infections in the bladder, kidneys, or urinary tract  -kidney disease  -low blood pressure  -on hemodialysis  -problems urinating  -type 1 diabetes  -uncircumcised male  -an unusual or allergic reaction to dapagliflozin, other medicines, foods, dyes, or preservatives  -pregnant or trying to get pregnant  -breast-feeding  How should I use this medicine?  Take this medicine by mouth with a glass of water. Follow the directions on the prescription label. You can take it with or without food. If it upsets your stomach, take it with food. Take this medicine in the morning. Take your dose at the same time each day. Do not take more often than directed. Do not stop taking except on your doctor's advice.  A special MedGuide will be given to you by the pharmacist with each prescription and refill. Be sure to read this information carefully each time.  Talk to your pediatrician regarding the use of this medicine in children. Special care may be needed.  Overdosage: If you think you have taken too much of this  medicine contact a poison control center or emergency room at once.  NOTE: This medicine is only for you. Do not share this medicine with others.  What if I miss a dose?  If you miss a dose, take it as soon as you can. If it is almost time for your next dose, take only that dose. Do not take double or extra doses.  What may interact with this medicine?  Do not take this medicine with any of the following medications:  -gatifloxacin  This medicine may also interact with the following medications:  -alcohol  -certain medicines for blood pressure, heart disease  -diuretics  -insulin  -nateglinide  -pioglitazone  -quinolone antibiotics like ciprofloxacin, levofloxacin, ofloxacin  -repaglinide  -some herbal dietary supplements  -steroid medicines like prednisone or cortisone  -sulfonylureas like glimepiride, glipizide, glyburide  -thyroid medicine  This list may not describe all possible interactions. Give your health care provider a list of all the medicines, herbs, non-prescription drugs, or dietary supplements you use. Also tell them if you smoke, drink alcohol, or use illegal drugs. Some items may interact with your medicine.  What should I watch for while using this medicine?  Visit your doctor or health care professional for regular checks on your progress.  This medicine can cause a serious condition in which there is too much acid in the blood. If you develop nausea, vomiting, stomach pain, unusual tiredness, or breathing problems, stop taking this medicine and call your doctor right away. If possible, use a ketone dipstick to check for ketones in your urine.  A test called the HbA1C (A1C) will be monitored. This is a simple blood test. It measures your blood sugar control over the last 2 to 3 months. You will receive this test every 3 to 6 months.  Learn how to check your blood sugar. Learn the symptoms of low and high blood sugar and how to manage them.  Always carry a quick-source of sugar with you in case you  have symptoms of low blood sugar. Examples include hard sugar candy or glucose tablets. Make sure others know that you can choke if you eat or drink when you develop serious symptoms of low blood sugar, such as seizures or unconsciousness. They must get medical help at once.  Tell your doctor or health care professional if you have high blood sugar. You might need to change the dose of your medicine. If you are sick or exercising more than usual, you might need to change the dose of your medicine.  Do not skip meals. Ask your doctor or health care professional if you should avoid alcohol. Many nonprescription cough and cold products contain sugar or alcohol. These can affect blood sugar.  Wear a medical ID bracelet or chain, and carry a card that describes your disease and details of your medicine and dosage times.  What side effects may I notice from receiving this medicine?  Side effects that you should report to your doctor or health care professional as soon as possible:  -allergic reactions like skin rash, itching or hives, swelling of the face, lips, or tongue  -breathing problems  -dizziness  -feeling faint or lightheaded, falls  -muscle weakness  -nausea, vomiting, unusual stomach upset or pain  -new pain or tenderness, change in skin color, sores or ulcers, or infection in legs or feet  -penile discharge, itching, or pain in men  -signs and symptoms of a genital infection, such as fever; tenderness, redness, or swelling in the genitals or area from the genitals to the back of the rectum  -signs and symptoms of low blood sugar such as feeling anxious, confusion, dizziness, increased hunger, unusually weak or tired, sweating, shakiness, cold, irritable, headache, blurred vision, fast heartbeat, loss of consciousness  -signs and symptoms of a urinary tract infection, such as fever, chills, a burning feeling when urinating, blood in the urine, back pain  -trouble passing urine or change in the amount of urine,  including an urgent need to urinate more often, in larger amounts, or at night  -unusual tiredness  -vaginal discharge, itching, or odor in women  Side effects that usually do not require medical attention (report to your doctor or health care professional if they continue or are bothersome):  -mild increase in urination  -thirsty  This list may not describe all possible side effects. Call your doctor for medical advice about side effects. You may report side effects to FDA at 9-617-JED-3766.  Where should I keep my medicine?  Keep out of the reach of children.  Store at room temperature between 15 and 30 degrees C (59 and 86 degrees F). Throw away any unused medicine after the expiration date.  NOTE: This sheet is a summary. It may not cover all possible information. If you have questions about this medicine, talk to your doctor, pharmacist, or health care provider.  © 2019 [employer] (2019-10-22 09:24:46)

## 2020-02-20 ENCOUNTER — TELEPHONE (OUTPATIENT)
Dept: FAMILY MEDICINE CLINIC | Facility: CLINIC | Age: 55
End: 2020-02-20

## 2020-02-20 NOTE — TELEPHONE ENCOUNTER
Called patient, no answer. No option to leave voice mail.    Letter created yesterday   The test results show that your B 12 is actually quite high. Try cutting back on your supplementation. We will repeat in 3-6 months.

## 2020-02-20 NOTE — TELEPHONE ENCOUNTER
PATIENT CALLED AND WAS WANTING RESULTS FOR HER VITAMIN B12 LABS. PLEASE CALL PATIENT ONCE THE MESSAGE HAS BEEN ADVISED.     PATIENT CALL BACK [phone]

## 2020-02-20 NOTE — TELEPHONE ENCOUNTER
Patient called back. Advised of lab results. She stated that she had been taking a vit b12 chewable. Then she switched to a vit b12 complex QD.   How do you want the patient to cut back on the b12?   Once weekly? Once monthly? Or none at all?     Please advise

## 2020-02-22 DIAGNOSIS — I10 ESSENTIAL HYPERTENSION: ICD-10-CM

## 2020-02-24 RX ORDER — LOSARTAN POTASSIUM AND HYDROCHLOROTHIAZIDE 12.5; 5 MG/1; MG/1
TABLET ORAL
Qty: 90 TABLET | Refills: 1 | Status: SHIPPED | OUTPATIENT
Start: 2020-02-24 | End: 2020-06-01

## 2020-04-17 ENCOUNTER — TELEPHONE (OUTPATIENT)
Dept: FAMILY MEDICINE CLINIC | Facility: CLINIC | Age: 55
End: 2020-04-17

## 2020-04-17 RX ORDER — CETIRIZINE HYDROCHLORIDE 10 MG/1
10 TABLET ORAL DAILY
Qty: 30 TABLET | Refills: 2 | Status: SHIPPED | OUTPATIENT
Start: 2020-04-17 | End: 2020-07-20 | Stop reason: SDUPTHER

## 2020-04-17 NOTE — TELEPHONE ENCOUNTER
PT IS REQUESTING A PRESCRIPTION  FOR CETIRIZINE 10MG.  SHE STATED SHE GOT A PRESCRIPTION FOR THIS AT THE [facility] CL[facility] AND IT IS WORKING FOR HER ALLERGIES. SHE HAS 4 LEFT. SHE SAYS IT IS DOING BETTER FOR HER THAN THE OTC SHE HAS BEEN USING.    PT CALL BACK [phone]    [address]

## 2020-05-19 ENCOUNTER — TELEPHONE (OUTPATIENT)
Dept: FAMILY MEDICINE CLINIC | Facility: CLINIC | Age: 55
End: 2020-05-19

## 2020-05-19 NOTE — TELEPHONE ENCOUNTER
Contacted patient she stated that her sister had been tested for Covid the end of March and has been hospitalized all month of April after having been diagnosed with coronavirus.     Her sister has been discharged 5/3 and has is scheduled to be retested  tommorow. I advised the patient that since she has been exposed to someone who tested positive she would not be permitted into the facility.     She declined a virtual visit and after alerting her of the 2 week quarantine protocol. She stated she would call back to reschedule in 2 weeks

## 2020-05-19 NOTE — TELEPHONE ENCOUNTER
PT CALLED AND STATED SHE IS HAVING TO TAKE HER SISTER TO [facility]PT[facility] WHO TESTED POSITIVE FOR COVID     PT HAS AN APPT TOMORROW 5/20  PT STATED SHE HAS NO SYMPTOMS    PLEASE ADVISE PT [phone]

## 2020-05-29 ENCOUNTER — OFFICE VISIT (OUTPATIENT)
Dept: FAMILY MEDICINE CLINIC | Facility: CLINIC | Age: 55
End: 2020-05-29

## 2020-05-29 VITALS
OXYGEN SATURATION: 99 % | HEART RATE: 84 BPM | HEIGHT: 67 IN | TEMPERATURE: 97.5 F | SYSTOLIC BLOOD PRESSURE: 126 MMHG | BODY MASS INDEX: 22.13 KG/M2 | DIASTOLIC BLOOD PRESSURE: 80 MMHG | WEIGHT: 141 LBS

## 2020-05-29 DIAGNOSIS — L23.7 ALLERGIC CONTACT DERMATITIS DUE TO PLANTS, EXCEPT FOOD: ICD-10-CM

## 2020-05-29 DIAGNOSIS — E11.65 TYPE 2 DIABETES MELLITUS WITH HYPERGLYCEMIA, WITHOUT LONG-TERM CURRENT USE OF INSULIN (HCC): ICD-10-CM

## 2020-05-29 DIAGNOSIS — E11.9 TYPE 2 DIABETES MELLITUS WITHOUT COMPLICATION, WITHOUT LONG-TERM CURRENT USE OF INSULIN (HCC): Primary | ICD-10-CM

## 2020-05-29 DIAGNOSIS — D17.1 LIPOMA OF ANTERIOR CHEST WALL: ICD-10-CM

## 2020-05-29 DIAGNOSIS — R35.0 URINARY FREQUENCY: ICD-10-CM

## 2020-05-29 LAB
BILIRUB BLD-MCNC: NEGATIVE MG/DL
CLARITY, POC: CLEAR
COLOR UR: YELLOW
GLUCOSE UR STRIP-MCNC: ABNORMAL MG/DL
HBA1C MFR BLD: 6 %
KETONES UR QL: NEGATIVE
LEUKOCYTE EST, POC: NEGATIVE
NITRITE UR-MCNC: NEGATIVE MG/ML
PH UR: 5.5 [PH] (ref 5–8)
PROT UR STRIP-MCNC: NEGATIVE MG/DL
RBC # UR STRIP: NEGATIVE /UL
SP GR UR: 1.01 (ref 1–1.03)
UROBILINOGEN UR QL: NORMAL

## 2020-05-29 PROCEDURE — 83036 HEMOGLOBIN GLYCOSYLATED A1C: CPT | Performed by: NURSE PRACTITIONER

## 2020-05-29 PROCEDURE — 99214 OFFICE O/P EST MOD 30 MIN: CPT | Performed by: NURSE PRACTITIONER

## 2020-05-29 PROCEDURE — 81003 URINALYSIS AUTO W/O SCOPE: CPT | Performed by: NURSE PRACTITIONER

## 2020-05-29 RX ORDER — PREDNISONE 10 MG/1
TABLET ORAL
Qty: 45 TABLET | Refills: 0 | Status: SHIPPED | OUTPATIENT
Start: 2020-05-29 | End: 2020-09-12 | Stop reason: SDUPTHER

## 2020-05-29 RX ORDER — METFORMIN HYDROCHLORIDE 500 MG/1
TABLET, EXTENDED RELEASE ORAL
Qty: 60 TABLET | Refills: 2 | Status: SHIPPED | OUTPATIENT
Start: 2020-05-29 | End: 2020-08-31 | Stop reason: SDUPTHER

## 2020-05-29 NOTE — PROGRESS NOTES
Chief Complaint   Patient presents with   • Diabetes     3 month follow up    • Poison Ivy     She was working in her yard on Monday and has noticed a rash on her arm, she reports that it is spreading to her face and would like steroids       Subjective   Nelli Morales is a 54 y.o. female    History of Present Illness  DM-patient today for follow-up on diabetes.  States her blood sugar at home is been running anywhere between the 120s to the 220s.  She is on Farxiga 10 mg daily and metformin 500 mg 2 tablets at bedtime.  She does take simvastatin 20 mg daily as well as losartan/hydrochlorthiazide 50/12 and a half daily. Does have urinary frequency.  Her weight is down about 8 pounds from the last visit.    Rash- Was working in her yard, Monday, developed rash on arm, spreading now up on neck. Itchy, she has tried Shama clear which has not been helping very much.    Allergies   Allergen Reactions   • Ace Inhibitors Cough   • Morphine And Related Rash     Past Medical History:   Diagnosis Date   • Allergic rhinitis    • BV (bacterial vaginosis)    • Cataracts, bilateral    • Class 1 obesity due to excess calories without serious comorbidity with body mass index (BMI) of 32.0 to 32.9 in adult 12/22/2017   • Diverticulitis large intestine w/o perforation or abscess w/o bleeding 04/2017   • Fibrocystic breast disease    • Glaucoma    • Hyperlipidemia    • Hypertension    • IBS (irritable bowel syndrome)    • Insomnia    • Obesity (BMI 30.0-34.9)    • Stress bladder incontinence, female    • Type 2 diabetes mellitus without complication, without long-term current use of insulin (CMS/[facility]) 8/15/2018   • Varicose veins of legs       Past Surgical History:   Procedure Laterality Date   • BLADDER SUSPENSION  10/27/2007   • HYSTERECTOMY  12/17/2013    [facility]     Social History     Socioeconomic History   • Marital status: Single     Spouse name: Not on file   • Number of children: Not on file   • Years of education: Not on file   •  Highest education level: Not on file   Tobacco Use   • Smoking status: Never Smoker   • Smokeless tobacco: Never Used   Substance and Sexual Activity   • Alcohol use: Yes     Alcohol/week: 1.0 standard drinks     Types: 1 Cans of beer per week     Comment: occasional   • Drug use: No   • Sexual activity: Yes     Partners: Male        Current Outpatient Medications:   •  Blood Glucose Monitoring Suppl (ACCU-CHEK VIKASH PLUS) w/Device kit, , Disp: , Rfl:   •  brompheniramine-pseudoephedrine-DM 30-2-10 MG/5ML syrup, Take 5 mL by mouth 4 (Four) Times a Day As Needed for Cough., Disp: 120 mL, Rfl: 1  •  cetirizine (zyrTEC) 10 MG tablet, Take 1 tablet by mouth Daily., Disp: 30 tablet, Rfl: 2  •  Cholecalciferol (VITAMIN D3) 5000 UNITS capsule capsule, Take 5,000 Units by mouth Daily., Disp: , Rfl:   •  clindamycin (CLEOCIN) 2 % vaginal cream, , Disp: , Rfl: 0  •  cyclobenzaprine (FLEXERIL) 10 MG tablet, Take 1 tablet by mouth 2 (Two) Times a Day As Needed for Muscle Spasms., Disp: 90 tablet, Rfl: 1  •  Dapagliflozin Propanediol 10 MG tablet, Take 10 mg by mouth Daily., Disp: 30 tablet, Rfl: 5  •  ESTRACE VAGINAL 0.1 MG/GM vaginal cream, insert 1/2 gram vaginally three times a week, Disp: , Rfl: 0  •  estradiol (MINIVELLE, VIVELLE-DOT) 0.075 MG/24HR patch, , Disp: , Rfl: 0  •  fluticasone (FLONASE) 50 MCG/ACT nasal spray, 2 sprays into each nostril Daily., Disp: 1 bottle, Rfl: 5  •  folic acid (FOLVITE) 1 MG tablet, Take 1 tablet by mouth Daily., Disp: 90 tablet, Rfl: 2  •  glucose blood test strip, Check blood sugar once or twice daily., Disp: 100 each, Rfl: 12  •  glucose monitor monitoring kit, 1 each 2 (Two) Times a Day., Disp: 1 each, Rfl: 0  •  LANCETS ULTRA THIN misc, 1 each 2 (Two) Times a Day., Disp: 100 each, Rfl: 11  •  latanoprost (XALATAN) 0.005 % ophthalmic solution, , Disp: , Rfl: 1  •  Loratadine (CLARITIN) 10 MG capsule, Take 1 tablet by mouth., Disp: , Rfl:   •  losartan-hydrochlorothiazide (HYZAAR)  50-12.5 MG per tablet, TAKE 1 TABLET BY MOUTH EVERY DAY, Disp: 90 tablet, Rfl: 1  •  metFORMIN ER (GLUCOPHAGE-XR) 500 MG 24 hr tablet, Take 1 tablet by mouth 2 (Two) Times a Day With Meals., Disp: 60 tablet, Rfl: 2  •  simvastatin (ZOCOR) 20 MG tablet, Take 1 tablet by mouth Every Night., Disp: 90 tablet, Rfl: 3  •  predniSONE (DELTASONE) 10 MG tablet, 5 tabs QD x 3 days, 4 QD X3, 3 tabs qd X3, 2 tabs qd X3, 1 tab qd X3., Disp: 45 tablet, Rfl: 0  •  triamcinolone (KENALOG) 0.1 % ointment, Apply  topically to the appropriate area as directed 2 (Two) Times a Day., Disp: 30 g, Rfl: 1     Review of Systems   Constitutional: Negative for appetite change, diaphoresis, fatigue and unexpected weight change.   Eyes: Negative for visual disturbance.   Respiratory: Negative for cough, chest tightness, shortness of breath and wheezing.    Cardiovascular: Negative for chest pain, palpitations and leg swelling.   Gastrointestinal: Negative for constipation, diarrhea, nausea and vomiting.   Endocrine: Positive for polyuria. Negative for polydipsia and polyphagia.   Genitourinary: Negative for difficulty urinating and dysuria.   Skin: Positive for rash. Negative for color change.   Neurological: Negative for dizziness, weakness, light-headedness and numbness.   Psychiatric/Behavioral: Negative for sleep disturbance.       Objective     Vitals:    05/29/20 0945   BP: 126/80   Pulse: 84   Temp: 97.5 °F (36.4 °C)   SpO2: 99%       Results for orders placed or performed in visit on 05/29/20   POC Glycosylated Hemoglobin (Hb A1C)   Result Value Ref Range    Hemoglobin A1C 6.0 %   POC Urinalysis Dipstick, Automated   Result Value Ref Range    Color Yellow Yellow, Straw, Dark Yellow, Janis    Clarity, UA Clear Clear    Specific Gravity  1.015 1.005 - 1.030    pH, Urine 5.5 5.0 - 8.0    Leukocytes Negative Negative    Nitrite, UA Negative Negative    Protein, POC Negative Negative mg/dL    Glucose, UA 3+ (A) Negative, 1000 mg/dL (3+) mg/dL     Ketones, UA Negative Negative    Urobilinogen, UA Normal Normal    Bilirubin Negative Negative    Blood, UA Negative Negative       Physical Exam   Constitutional: She is oriented to person, place, and time. She appears well-developed and well-nourished.   HENT:   Head: Normocephalic and atraumatic.   Cardiovascular: Normal rate, regular rhythm and normal heart sounds.   Pulmonary/Chest: Effort normal and breath sounds normal.   Musculoskeletal: She exhibits no edema.   Neurological: She is alert and oriented to person, place, and time.   Skin: Skin is warm and dry.   Psychiatric: She has a normal mood and affect. Her behavior is normal.   Vitals reviewed.      Assessment/Plan   Problem List Items Addressed This Visit        Endocrine    Type 2 diabetes mellitus without complication, without long-term current use of insulin (CMS/[facility]) - Primary    Relevant Orders    POC Glycosylated Hemoglobin (Hb A1C) (Completed)       Other    Lipoma of anterior chest wall      Other Visit Diagnoses     Allergic contact dermatitis due to plants, except food        Relevant Medications    predniSONE (DELTASONE) 10 MG tablet    triamcinolone (KENALOG) 0.1 % ointment    Urinary frequency        Relevant Orders    POC Urinalysis Dipstick, Automated (Completed)      Continue current medications for diabetes.  Discussed diabetes diet, Watch carb intake and checking blood sugar as instructed. Record and bring to next visit. Reminded about need for yearly eye exam and foot care. Discussed need for exercise to improve glucose control and overall health.  Patient advised to eat a heart healthy diet and get regular aerobic exercise.  We are giving her prednisone for her contact dermatitis and I have discussed her to really watch her blood glucose while taking this medication.    She does have a lipoma on her right anterior chest up around the clavicular area.  She states it does make it difficult to turn her head sometimes with driving but  at this time would not proceed with any kind of surgical intervention.  I have informed her to let us know when she is ready to eat or needs to have further intervention.    We will give her a weaning dose of prednisone for her allergic contact dermatitis and were also will give her some triamcinolone cream. Patient was encouraged to keep me informed of any acute changes, lack of improvement, or any new concerning symptoms.  If patient becomes concerned, or has any worsening symptoms, they are to report either here, urgent treatment, or emergency room. Plan of care discussed with pt. They verbalized understanding and agreement.      Urinalysis does not show any signs of any type of infection.  She does have 3+ glucose which could be making her have urinary frequency.  I have discussed trying to get better carbohydrate intake control.    RV- 3 mo    Counseling provided on diabetes and Prednisone.    Flaquito Wallace, APRN   5/29/2020   10:23

## 2020-05-29 NOTE — PATIENT INSTRUCTIONS
"Carbohydrate Counting for Diabetes Mellitus, Adult    Carbohydrate counting is a method of keeping track of how many carbohydrates you eat. Eating carbohydrates naturally increases the amount of sugar (glucose) in the blood. Counting how many carbohydrates you eat helps keep your blood glucose within normal limits, which helps you manage your diabetes (diabetes mellitus).  It is important to know how many carbohydrates you can safely have in each meal. This is different for every person. A diet and nutrition specialist (registered dietitian) can help you make a meal plan and calculate how many carbohydrates you should have at each meal and snack.  Carbohydrates are found in the following foods:  · Grains, such as breads and cereals.  · Dried beans and soy products.  · Starchy vegetables, such as potatoes, peas, and corn.  · Fruit and fruit juices.  · Milk and yogurt.  · Sweets and snack foods, such as cake, cookies, candy, chips, and soft drinks.  How do I count carbohydrates?  There are two ways to count carbohydrates in food. You can use either of the methods or a combination of both.  Reading \"Nutrition Facts\" on packaged food  The \"Nutrition Facts\" list is included on the labels of almost all packaged foods and beverages in the [place] It includes:  · The serving size.  · Information about nutrients in each serving, including the grams (g) of carbohydrate per serving.  To use the “Nutrition Facts\":  · Decide how many servings you will have.  · Multiply the number of servings by the number of carbohydrates per serving.  · The resulting number is the total amount of carbohydrates that you will be having.  Learning standard serving sizes of other foods  When you eat carbohydrate foods that are not packaged or do not include \"Nutrition Facts\" on the label, you need to measure the servings in order to count the amount of carbohydrates:  · Measure the foods that you will eat with a food scale or measuring cup, if " needed.  · Decide how many standard-size servings you will eat.  · Multiply the number of servings by 15. Most carbohydrate-rich foods have about 15 g of carbohydrates per serving.  ? For example, if you eat 8 oz (170 g) of strawberries, you will have eaten 2 servings and 30 g of carbohydrates (2 servings x 15 g = 30 g).  · For foods that have more than one food mixed, such as soups and casseroles, you must count the carbohydrates in each food that is included.  The following list contains standard serving sizes of common carbohydrate-rich foods. Each of these servings has about 15 g of carbohydrates:  · ½ hamburger bun or ½ English muffin.  · ½ oz (15 mL) syrup.  · ½ oz (14 g) jelly.  · 1 slice of bread.  · 1 six-inch tortilla.  · 3 oz (85 g) cooked rice or pasta.  · 4 oz (113 g) cooked dried beans.  · 4 oz (113 g) starchy vegetable, such as peas, corn, or potatoes.  · 4 oz (113 g) hot cereal.  · 4 oz (113 g) mashed potatoes or ¼ of a large baked potato.  · 4 oz (113 g) canned or frozen fruit.  · 4 oz (120 mL) fruit juice.  · 4-6 crackers.  · 6 chicken nuggets.  · 6 oz (170 g) unsweetened dry cereal.  · 6 oz (170 g) plain fat-free yogurt or yogurt sweetened with artificial sweeteners.  · 8 oz (240 mL) milk.  · 8 oz (170 g) fresh fruit or one small piece of fruit.  · 24 oz (680 g) popped popcorn.  Example of carbohydrate counting  Sample meal  · 3 oz (85 g) chicken breast.  · 6 oz (170 g) brown rice.  · 4 oz (113 g) corn.  · 8 oz (240 mL) milk.  · 8 oz (170 g) strawberries with sugar-free whipped topping.  Carbohydrate calculation  1. Identify the foods that contain carbohydrates:  ? Rice.  ? Corn.  ? Milk.  ? Strawberries.  2. Calculate how many servings you have of each food:  ? 2 servings rice.  ? 1 serving corn.  ? 1 serving milk.  ? 1 serving strawberries.  3. Multiply each number of servings by 15 g:  ? 2 servings rice x 15 g = 30 g.  ? 1 serving corn x 15 g = 15 g.  ? 1 serving milk x 15 g = 15 g.  ? 1  serving strawberries x 15 g = 15 g.  4. Add together all of the amounts to find the total grams of carbohydrates eaten:  ? 30 g + 15 g + 15 g + 15 g = 75 g of carbohydrates total.  Summary  · Carbohydrate counting is a method of keeping track of how many carbohydrates you eat.  · Eating carbohydrates naturally increases the amount of sugar (glucose) in the blood.  · Counting how many carbohydrates you eat helps keep your blood glucose within normal limits, which helps you manage your diabetes.  · A diet and nutrition specialist (registered dietitian) can help you make a meal plan and calculate how many carbohydrates you should have at each meal and snack.  This information is not intended to replace advice given to you by your health care provider. Make sure you discuss any questions you have with your health care provider.  Document Released: 12/18/2006 Document Revised: 07/12/2018 Document Reviewed: 05/31/2017  "[employer]" Patient Education © 2020 "[employer] Inc.    Prednisone tablets  What is this medicine?  PREDNISONE (PRED ni sone) is a corticosteroid. It is commonly used to treat inflammation of the skin, joints, lungs, and other organs. Common conditions treated include asthma, allergies, and arthritis. It is also used for other conditions, such as blood disorders and diseases of the adrenal glands.  This medicine may be used for other purposes; ask your health care provider or pharmacist if you have questions.  COMMON BRAND NAME(S): Deltasone, Predone, Sterapred, Sterapred DS  What should I tell my health care provider before I take this medicine?  They need to know if you have any of these conditions:  · Cushing's syndrome  · diabetes  · glaucoma  · heart disease  · high blood pressure  · infection (especially a virus infection such as chickenpox, cold sores, or herpes)  · kidney disease  · liver disease  · mental illness  · myasthenia gravis  · osteoporosis  · seizures  · stomach or intestine  problems  · thyroid disease  · an unusual or allergic reaction to lactose, prednisone, other medicines, foods, dyes, or preservatives  · pregnant or trying to get pregnant  · breast-feeding  How should I use this medicine?  Take this medicine by mouth with a glass of water. Follow the directions on the prescription label. Take this medicine with food. If you are taking this medicine once a day, take it in the morning. Do not take more medicine than you are told to take. Do not suddenly stop taking your medicine because you may develop a severe reaction. Your doctor will tell you how much medicine to take. If your doctor wants you to stop the medicine, the dose may be slowly lowered over time to avoid any side effects.  Talk to your pediatrician regarding the use of this medicine in children. Special care may be needed.  Overdosage: If you think you have taken too much of this medicine contact a poison control center or emergency room at once.  NOTE: This medicine is only for you. Do not share this medicine with others.  What if I miss a dose?  If you miss a dose, take it as soon as you can. If it is almost time for your next dose, talk to your doctor or health care professional. You may need to miss a dose or take an extra dose. Do not take double or extra doses without advice.  What may interact with this medicine?  Do not take this medicine with any of the following medications:  · metyrapone  · mifepristone  This medicine may also interact with the following medications:  · aminoglutethimide  · amphotericin B  · aspirin and aspirin-like medicines  · barbiturates  · certain medicines for diabetes, like glipizide or glyburide  · cholestyramine  · cholinesterase inhibitors  · cyclosporine  · digoxin  · diuretics  · ephedrine  · female hormones, like estrogens and birth control pills  · isoniazid  · ketoconazole  · NSAIDS, medicines for pain and inflammation, like ibuprofen or  naproxen  · phenytoin  · rifampin  · toxoids  · vaccines  · warfarin  This list may not describe all possible interactions. Give your health care provider a list of all the medicines, herbs, non-prescription drugs, or dietary supplements you use. Also tell them if you smoke, drink alcohol, or use illegal drugs. Some items may interact with your medicine.  What should I watch for while using this medicine?  Visit your doctor or health care professional for regular checks on your progress. If you are taking this medicine over a prolonged period, carry an identification card with your name and address, the type and dose of your medicine, and your doctor's name and address.  This medicine may increase your risk of getting an infection. Tell your doctor or health care professional if you are around anyone with measles or chickenpox, or if you develop sores or blisters that do not heal properly.  If you are going to have surgery, tell your doctor or health care professional that you have taken this medicine within the last twelve months.  Ask your doctor or health care professional about your diet. You may need to lower the amount of salt you eat.  This medicine may increase blood sugar. Ask your healthcare provider if changes in diet or medicines are needed if you have diabetes.  What side effects may I notice from receiving this medicine?  Side effects that you should report to your doctor or health care professional as soon as possible:  · allergic reactions like skin rash, itching or hives, swelling of the face, lips, or tongue  · changes in emotions or moods  · changes in vision  · depressed mood  · eye pain  · fever or chills, cough, sore throat, pain or difficulty passing urine  · signs and symptoms of high blood sugar such as being more thirsty or hungry or having to urinate more than normal. You may also feel very tired or have blurry vision.  · swelling of ankles, feet  Side effects that usually do not require  medical attention (report to your doctor or health care professional if they continue or are bothersome):  · confusion, excitement, restlessness  · headache  · nausea, vomiting  · skin problems, acne, thin and shiny skin  · trouble sleeping  · weight gain  This list may not describe all possible side effects. Call your doctor for medical advice about side effects. You may report side effects to FDA at 3-915-NUS-6612.  Where should I keep my medicine?  Keep out of the reach of children.  Store at room temperature between 15 and 30 degrees C (59 and 86 degrees F). Protect from light. Keep container tightly closed. Throw away any unused medicine after the expiration date.  NOTE: This sheet is a summary. It may not cover all possible information. If you have questions about this medicine, talk to your doctor, pharmacist, or health care provider.  © 2020 [employer] (2019-09-17 10:54:22)

## 2020-05-30 DIAGNOSIS — I10 ESSENTIAL HYPERTENSION: ICD-10-CM

## 2020-06-01 RX ORDER — LOSARTAN POTASSIUM AND HYDROCHLOROTHIAZIDE 12.5; 5 MG/1; MG/1
TABLET ORAL
Qty: 90 TABLET | Refills: 1 | Status: SHIPPED | OUTPATIENT
Start: 2020-06-01 | End: 2020-12-07 | Stop reason: SDUPTHER

## 2020-07-20 RX ORDER — CETIRIZINE HYDROCHLORIDE 10 MG/1
10 TABLET ORAL DAILY
Qty: 30 TABLET | Refills: 2 | Status: SHIPPED | OUTPATIENT
Start: 2020-07-20 | End: 2020-12-29 | Stop reason: SDUPTHER

## 2020-07-20 NOTE — TELEPHONE ENCOUNTER
PT CALLED REQUESTING A REFILL FOR:  cetirizine (zyrTEC) 10 MG tablet    [id number] - [place], [place] - [address] AT [facility] [address] - [phone] [facility] [phone] FX

## 2020-08-05 DIAGNOSIS — E11.9 TYPE 2 DIABETES MELLITUS WITHOUT COMPLICATION, WITHOUT LONG-TERM CURRENT USE OF INSULIN (HCC): ICD-10-CM

## 2020-08-05 RX ORDER — DAPAGLIFLOZIN 10 MG/1
TABLET, FILM COATED ORAL
Qty: 30 TABLET | Refills: 5 | Status: SHIPPED | OUTPATIENT
Start: 2020-08-05 | End: 2020-12-07 | Stop reason: SDUPTHER

## 2020-08-06 ENCOUNTER — TELEPHONE (OUTPATIENT)
Dept: FAMILY MEDICINE CLINIC | Facility: CLINIC | Age: 55
End: 2020-08-06

## 2020-08-06 NOTE — TELEPHONE ENCOUNTER
Patient stated she has been having gas more than normal. She would like to know what she should do, if something could be called into the pharmacy.    Preferred pharmacy:   [facility][id number] - [place], [place] - [address] AT [facility] [address] - [phone] [facility] [phone] FX     Please call patient and advise @ [phone]

## 2020-08-07 RX ORDER — SIMETHICONE 80 MG
80 TABLET,CHEWABLE ORAL EVERY 6 HOURS PRN
Qty: 30 TABLET | Refills: 5 | Status: SHIPPED | OUTPATIENT
Start: 2020-08-07 | End: 2021-08-18

## 2020-08-09 DIAGNOSIS — E11.9 TYPE 2 DIABETES MELLITUS WITHOUT COMPLICATION, WITHOUT LONG-TERM CURRENT USE OF INSULIN (HCC): ICD-10-CM

## 2020-08-18 DIAGNOSIS — E78.2 MIXED HYPERLIPIDEMIA: ICD-10-CM

## 2020-08-18 DIAGNOSIS — D64.9 ANEMIA, UNSPECIFIED TYPE: ICD-10-CM

## 2020-08-18 RX ORDER — SIMVASTATIN 20 MG
TABLET ORAL
Qty: 90 TABLET | Refills: 1 | Status: SHIPPED | OUTPATIENT
Start: 2020-08-18 | End: 2020-12-07 | Stop reason: SDUPTHER

## 2020-08-18 RX ORDER — FOLIC ACID 1 MG/1
TABLET ORAL
Qty: 90 TABLET | Refills: 1 | Status: SHIPPED | OUTPATIENT
Start: 2020-08-18 | End: 2020-12-07 | Stop reason: SDUPTHER

## 2020-08-31 DIAGNOSIS — E11.65 TYPE 2 DIABETES MELLITUS WITH HYPERGLYCEMIA, WITHOUT LONG-TERM CURRENT USE OF INSULIN (HCC): ICD-10-CM

## 2020-08-31 RX ORDER — METFORMIN HYDROCHLORIDE 500 MG/1
500 TABLET, EXTENDED RELEASE ORAL 2 TIMES DAILY WITH MEALS
Qty: 60 TABLET | Refills: 2 | Status: SHIPPED | OUTPATIENT
Start: 2020-08-31 | End: 2020-11-25 | Stop reason: SDUPTHER

## 2020-09-02 ENCOUNTER — OFFICE VISIT (OUTPATIENT)
Dept: FAMILY MEDICINE CLINIC | Facility: CLINIC | Age: 55
End: 2020-09-02

## 2020-09-02 VITALS
TEMPERATURE: 97.1 F | DIASTOLIC BLOOD PRESSURE: 80 MMHG | BODY MASS INDEX: 21.82 KG/M2 | OXYGEN SATURATION: 98 % | HEIGHT: 67 IN | SYSTOLIC BLOOD PRESSURE: 120 MMHG | WEIGHT: 139 LBS | HEART RATE: 86 BPM

## 2020-09-02 DIAGNOSIS — E11.9 TYPE 2 DIABETES MELLITUS WITHOUT COMPLICATION, WITHOUT LONG-TERM CURRENT USE OF INSULIN (HCC): Primary | ICD-10-CM

## 2020-09-02 LAB — HBA1C MFR BLD: 6.8 %

## 2020-09-02 PROCEDURE — 99213 OFFICE O/P EST LOW 20 MIN: CPT | Performed by: NURSE PRACTITIONER

## 2020-09-02 PROCEDURE — 83036 HEMOGLOBIN GLYCOSYLATED A1C: CPT | Performed by: NURSE PRACTITIONER

## 2020-09-02 RX ORDER — CLOTRIMAZOLE AND BETAMETHASONE DIPROPIONATE 10; .64 MG/G; MG/G
CREAM TOPICAL
COMMUNITY
Start: 2020-08-12

## 2020-09-02 RX ORDER — TRIAMCINOLONE ACETONIDE 0.25 MG/G
OINTMENT TOPICAL
COMMUNITY
Start: 2020-07-06 | End: 2020-09-08

## 2020-09-02 RX ORDER — FLUCONAZOLE 150 MG/1
TABLET ORAL
COMMUNITY
Start: 2020-08-26 | End: 2020-09-08

## 2020-09-02 RX ORDER — AMOXICILLIN 500 MG/1
CAPSULE ORAL
COMMUNITY
Start: 2020-08-26 | End: 2020-09-12

## 2020-09-02 NOTE — PROGRESS NOTES
Chief Complaint   Patient presents with   • Diabetes     3 month follow up, she reports that she has been checking her glucose regulary and her glucose has remained very well controlled       Subjective   Nelli Morales is a 54 y.o. female    History of Present Illness  Patient today to follow-up on diabetes.  Her glucose is been generally around 160- into the 200s.  Level depends upon what she eats.  He is on metformin 500 mg twice a day, if she takes more metformin she has more bowel issues, Farxiga 10 mg daily, she also takes losartan/hydrochlorthiazide 50/12.5 mg daily, and on simvastatin 20 mg daily.    Allergies   Allergen Reactions   • Ace Inhibitors Cough   • Morphine And Related Rash     Past Medical History:   Diagnosis Date   • Allergic rhinitis    • BV (bacterial vaginosis)    • Cataracts, bilateral    • Class 1 obesity due to excess calories without serious comorbidity with body mass index (BMI) of 32.0 to 32.9 in adult 12/22/2017   • Diverticulitis large intestine w/o perforation or abscess w/o bleeding 04/2017   • Fibrocystic breast disease    • Glaucoma    • Hyperlipidemia    • Hypertension    • IBS (irritable bowel syndrome)    • Insomnia    • Obesity (BMI 30.0-34.9)    • Stress bladder incontinence, female    • Type 2 diabetes mellitus without complication, without long-term current use of insulin (CMS/[facility]) 8/15/2018   • Varicose veins of legs       Past Surgical History:   Procedure Laterality Date   • BLADDER SUSPENSION  10/27/2007   • HYSTERECTOMY  12/17/2013    [facility]     Social History     Socioeconomic History   • Marital status: Single     Spouse name: Not on file   • Number of children: Not on file   • Years of education: Not on file   • Highest education level: Not on file   Tobacco Use   • Smoking status: Never Smoker   • Smokeless tobacco: Never Used   Substance and Sexual Activity   • Alcohol use: Yes     Alcohol/week: 1.0 standard drinks     Types: 1 Cans of beer per week     Comment:  occasional   • Drug use: No   • Sexual activity: Yes     Partners: Male        Current Outpatient Medications:   •  amoxicillin (AMOXIL) 500 MG capsule, , Disp: , Rfl:   •  Blood Glucose Monitoring Suppl (ACCU-CHEK VIKASH PLUS) w/Device kit, , Disp: , Rfl:   •  cetirizine (zyrTEC) 10 MG tablet, Take 1 tablet by mouth Daily., Disp: 30 tablet, Rfl: 2  •  Cholecalciferol (VITAMIN D3) 5000 UNITS capsule capsule, Take 5,000 Units by mouth Daily., Disp: , Rfl:   •  clindamycin (CLEOCIN) 2 % vaginal cream, , Disp: , Rfl: 0  •  clotrimazole-betamethasone (LOTRISONE) 1-0.05 % cream, , Disp: , Rfl:   •  cyclobenzaprine (FLEXERIL) 10 MG tablet, Take 1 tablet by mouth 2 (Two) Times a Day As Needed for Muscle Spasms., Disp: 90 tablet, Rfl: 1  •  ESTRACE VAGINAL 0.1 MG/GM vaginal cream, insert 1/2 gram vaginally three times a week, Disp: , Rfl: 0  •  estradiol (MINIVELLE, VIVELLE-DOT) 0.075 MG/24HR patch, , Disp: , Rfl: 0  •  FARXIGA 10 MG tablet, TAKE 1 TABLET BY MOUTH DAILY, Disp: 30 tablet, Rfl: 5  •  fluconazole (DIFLUCAN) 150 MG tablet, , Disp: , Rfl:   •  fluticasone (FLONASE) 50 MCG/ACT nasal spray, 2 sprays into each nostril Daily., Disp: 1 bottle, Rfl: 5  •  folic acid (FOLVITE) 1 MG tablet, TAKE 1 TABLET BY MOUTH EVERY DAY, Disp: 90 tablet, Rfl: 1  •  glucose blood (Accu-Chek Vikash Plus) test strip, CHECK BLOOD SUGAR ONCE OR TWICE DAILY, Disp: 100 each, Rfl: 11  •  glucose monitor monitoring kit, 1 each 2 (Two) Times a Day., Disp: 1 each, Rfl: 0  •  LANCETS ULTRA THIN misc, 1 each 2 (Two) Times a Day., Disp: 100 each, Rfl: 11  •  latanoprost (XALATAN) 0.005 % ophthalmic solution, , Disp: , Rfl: 1  •  Loratadine (CLARITIN) 10 MG capsule, Take 1 tablet by mouth., Disp: , Rfl:   •  losartan-hydrochlorothiazide (HYZAAR) 50-12.5 MG per tablet, TAKE 1 TABLET BY MOUTH EVERY DAY, Disp: 90 tablet, Rfl: 1  •  metFORMIN ER (GLUCOPHAGE-XR) 500 MG 24 hr tablet, Take 1 tablet by mouth 2 (Two) Times a Day With Meals., Disp: 60 tablet,  Rfl: 2  •  predniSONE (DELTASONE) 10 MG tablet, 5 tabs QD x 3 days, 4 QD X3, 3 tabs qd X3, 2 tabs qd X3, 1 tab qd X3., Disp: 45 tablet, Rfl: 0  •  simethicone (Gas-X) 80 MG chewable tablet, Chew 1 tablet Every 6 (Six) Hours As Needed for Flatulence., Disp: 30 tablet, Rfl: 5  •  simvastatin (ZOCOR) 20 MG tablet, TAKE 1 TABLET BY MOUTH EVERY NIGHT AT BEDTIME, Disp: 90 tablet, Rfl: 1  •  triamcinolone (KENALOG) 0.025 % ointment, , Disp: , Rfl:      Review of Systems   Constitutional: Negative for appetite change, diaphoresis, fatigue and unexpected weight change.   Eyes: Negative for visual disturbance.        She is scheduled to see ophthalmology she reports next week.   Respiratory: Negative for cough, chest tightness, shortness of breath and wheezing.    Cardiovascular: Negative for chest pain, palpitations and leg swelling.   Gastrointestinal: Positive for constipation. Negative for diarrhea ( better with stable dose, does report a lot of gas), nausea and vomiting.   Endocrine: Positive for polydipsia. Negative for polyphagia and polyuria.   Genitourinary: Negative for difficulty urinating and dysuria.   Skin: Negative for color change.   Neurological: Negative for dizziness, weakness, light-headedness and numbness.   Psychiatric/Behavioral: Negative for sleep disturbance.       Objective     Vitals:    09/02/20 1102   BP: 120/80   Pulse: 86   Temp: 97.1 °F (36.2 °C)   SpO2: 98%       Results for orders placed or performed in visit on 09/02/20   POC Glycosylated Hemoglobin (Hb A1C)   Result Value Ref Range    Hemoglobin A1C 6.8 %       Physical Exam   Constitutional: She is oriented to person, place, and time. She appears well-developed and well-nourished.   HENT:   Head: Normocephalic and atraumatic.   Cardiovascular: Normal rate, regular rhythm and normal heart sounds.   Pulmonary/Chest: Effort normal and breath sounds normal.   Musculoskeletal: She exhibits no edema.   Neurological: She is alert and oriented to  person, place, and time.   Skin: Skin is warm and dry.   Psychiatric: She has a normal mood and affect. Her behavior is normal.   Vitals reviewed.      Assessment/Plan   Problem List Items Addressed This Visit        Endocrine    Type 2 diabetes mellitus without complication, without long-term current use of insulin (CMS/[facility]) - Primary    Relevant Orders    POC Glycosylated Hemoglobin (Hb A1C) (Completed)      Discussed diabetes diet, Watch carb intake and checking blood sugar as instructed. Record and bring to next visit. Reminded about need for yearly eye exam and foot care. Discussed need for exercise to improve glucose control and overall health.  Patient advised to eat a heart healthy diet and get regular aerobic exercise. Patient was encouraged to keep me informed of any acute changes, lack of improvement, or any new concerning symptoms.  If patient becomes concerned, or has any worsening symptoms, they are to report either here, urgent treatment, or emergency room. Plan of care discussed with pt. They verbalized understanding and agreement.     RV- 3 mo    Counseling provided on diabetes.    Flaquito Wallace, JERRICA   9/2/2020   11:24

## 2020-09-02 NOTE — PATIENT INSTRUCTIONS
Obesity, Adult  Obesity is the condition of having too much total body fat. Being overweight or obese means that your weight is greater than what is considered healthy for your body size. Obesity is determined by a measurement called BMI. BMI is an estimate of body fat and is calculated from height and weight. For adults, a BMI of 30 or higher is considered obese.  Obesity can lead to other health concerns and major illnesses, including:  · Stroke.  · Coronary artery disease (CAD).  · Type 2 diabetes.  · Some types of cancer, including cancers of the colon, breast, uterus, and gallbladder.  · Osteoarthritis.  · High blood pressure (hypertension).  · High cholesterol.  · Sleep apnea.  · Gallbladder stones.  · Infertility problems.  What are the causes?  Common causes of this condition include:  · Eating daily meals that are high in calories, sugar, and fat.  · Being born with genes that may make you more likely to become obese.  · Having a medical condition that causes obesity, including:  ? Hypothyroidism.  ? Polycystic ovarian syndrome (PCOS).  ? Binge-eating disorder.  ? Cushing syndrome.  · Taking certain medicines, such as steroids, antidepressants, and seizure medicines.  · Not being physically active (sedentary lifestyle).  · Not getting enough sleep.  · Drinking high amounts of sugar-sweetened beverages, such as soft drinks.  What increases the risk?  The following factors may make you more likely to develop this condition:  · Having a family history of obesity.  · Being a woman of  descent.  · Being a man of  descent.  · Living in an area with limited access to:  ? Stock, recreation centers, or sidewalks.  ? Healthy food choices, such as grocery stores and farmers' markets.  What are the signs or symptoms?  The main sign of this condition is having too much body fat.  How is this diagnosed?  This condition is diagnosed based on:  · Your BMI. If you are an adult with a BMI of 30 or  higher, you are considered obese.  · Your waist circumference. This measures the distance around your waistline.  · Your skinfold thickness. Your health care provider may gently pinch a fold of your skin and measure it.  You may have other tests to check for underlying conditions.  How is this treated?  Treatment for this condition often includes changing your lifestyle. Treatment may include some or all of the following:  · Dietary changes. This may include developing a healthy meal plan.  · Regular physical activity. This may include activity that causes your heart to beat faster (aerobic exercise) and strength training. Work with your health care provider to design an exercise program that works for you.  · Medicine to help you lose weight if you are unable to lose 1 pound a week after 6 weeks of healthy eating and more physical activity.  · Treating conditions that cause the obesity (underlying conditions).  · Surgery. Surgical options may include gastric banding and gastric bypass. Surgery may be done if:  ? Other treatments have not helped to improve your condition.  ? You have a BMI of 40 or higher.  ? You have life-threatening health problems related to obesity.  Follow these instructions at home:  Eating and drinking    · Follow recommendations from your health care provider about what you eat and drink. Your health care provider may advise you to:  ? Limit fast food, sweets, and processed snack foods.  ? Choose low-fat options, such as low-fat milk instead of whole milk.  ? Eat 5 or more servings of fruits or vegetables every day.  ? Eat at home more often. This gives you more control over what you eat.  ? Choose healthy foods when you eat out.  ? Learn to read food labels. This will help you understand how much food is considered 1 serving.  ? Learn what a healthy serving size is.  ? Keep low-fat snacks available.  ? Limit sugary drinks, such as soda, fruit juice, sweetened iced tea, and flavored  milk.  · Drink enough water to keep your urine pale yellow.  · Do not follow a fad diet. Fad diets can be unhealthy and even dangerous.  Physical activity  · Exercise regularly, as told by your health care provider.  ? Most adults should get up to 150 minutes of moderate-intensity exercise every week.  ? Ask your health care provider what types of exercise are safe for you and how often you should exercise.  · Warm up and stretch before being active.  · Cool down and stretch after being active.  · Rest between periods of activity.  Lifestyle  · Work with your health care provider and a dietitian to set a weight-loss goal that is healthy and reasonable for you.  · Limit your screen time.  · Find ways to reward yourself that do not involve food.  · Do not drink alcohol if:  ? Your health care provider tells you not to drink.  ? You are pregnant, may be pregnant, or are planning to become pregnant.  · If you drink alcohol:  ? Limit how much you use to:  § 0-1 drink a day for women.  § 0-2 drinks a day for men.  ? Be aware of how much alcohol is in your drink. In the [place] one drink equals one 12 oz bottle of beer (355 mL), one 5 oz glass of wine (148 mL), or one 1½ oz glass of hard liquor (44 mL).  General instructions  · Keep a weight-loss journal to keep track of the food you eat and how much exercise you get.  · Take over-the-counter and prescription medicines only as told by your health care provider.  · Take vitamins and supplements only as told by your health care provider.  · Consider joining a support group. Your health care provider may be able to recommend a support group.  · Keep all follow-up visits as told by your health care provider. This is important.  Contact a health care provider if:  · You are unable to meet your weight loss goal after 6 weeks of dietary and lifestyle changes.  Get help right away if you are having:  · Trouble breathing.  · Suicidal thoughts or behaviors.  Summary  · Obesity is the  "condition of having too much total body fat.  · Being overweight or obese means that your weight is greater than what is considered healthy for your body size.  · Work with your health care provider and a dietitian to set a weight-loss goal that is healthy and reasonable for you.  · Exercise regularly, as told by your health care provider. Ask your health care provider what types of exercise are safe for you and how often you should exercise.  This information is not intended to replace advice given to you by your health care provider. Make sure you discuss any questions you have with your health care provider.  Document Released: 01/25/2006 Document Revised: 08/22/2019 Document Reviewed: 08/22/2019  [employer] Patient Education © 2020 [employer]      Carbohydrate Counting for Diabetes Mellitus, Adult    Carbohydrate counting is a method of keeping track of how many carbohydrates you eat. Eating carbohydrates naturally increases the amount of sugar (glucose) in the blood. Counting how many carbohydrates you eat helps keep your blood glucose within normal limits, which helps you manage your diabetes (diabetes mellitus).  It is important to know how many carbohydrates you can safely have in each meal. This is different for every person. A diet and nutrition specialist (registered dietitian) can help you make a meal plan and calculate how many carbohydrates you should have at each meal and snack.  Carbohydrates are found in the following foods:  · Grains, such as breads and cereals.  · Dried beans and soy products.  · Starchy vegetables, such as potatoes, peas, and corn.  · Fruit and fruit juices.  · Milk and yogurt.  · Sweets and snack foods, such as cake, cookies, candy, chips, and soft drinks.  How do I count carbohydrates?  There are two ways to count carbohydrates in food. You can use either of the methods or a combination of both.  Reading \"Nutrition Facts\" on packaged food  The \"Nutrition Facts\" list is " "included on the labels of almost all packaged foods and beverages in the [place] It includes:  · The serving size.  · Information about nutrients in each serving, including the grams (g) of carbohydrate per serving.  To use the “Nutrition Facts\":  · Decide how many servings you will have.  · Multiply the number of servings by the number of carbohydrates per serving.  · The resulting number is the total amount of carbohydrates that you will be having.  Learning standard serving sizes of other foods  When you eat carbohydrate foods that are not packaged or do not include \"Nutrition Facts\" on the label, you need to measure the servings in order to count the amount of carbohydrates:  · Measure the foods that you will eat with a food scale or measuring cup, if needed.  · Decide how many standard-size servings you will eat.  · Multiply the number of servings by 15. Most carbohydrate-rich foods have about 15 g of carbohydrates per serving.  ? For example, if you eat 8 oz (170 g) of strawberries, you will have eaten 2 servings and 30 g of carbohydrates (2 servings x 15 g = 30 g).  · For foods that have more than one food mixed, such as soups and casseroles, you must count the carbohydrates in each food that is included.  The following list contains standard serving sizes of common carbohydrate-rich foods. Each of these servings has about 15 g of carbohydrates:  · ½ hamburger bun or ½ English muffin.  · ½ oz (15 mL) syrup.  · ½ oz (14 g) jelly.  · 1 slice of bread.  · 1 six-inch tortilla.  · 3 oz (85 g) cooked rice or pasta.  · 4 oz (113 g) cooked dried beans.  · 4 oz (113 g) starchy vegetable, such as peas, corn, or potatoes.  · 4 oz (113 g) hot cereal.  · 4 oz (113 g) mashed potatoes or ¼ of a large baked potato.  · 4 oz (113 g) canned or frozen fruit.  · 4 oz (120 mL) fruit juice.  · 4-6 crackers.  · 6 chicken nuggets.  · 6 oz (170 g) unsweetened dry cereal.  · 6 oz (170 g) plain fat-free yogurt or yogurt sweetened with " artificial sweeteners.  · 8 oz (240 mL) milk.  · 8 oz (170 g) fresh fruit or one small piece of fruit.  · 24 oz (680 g) popped popcorn.  Example of carbohydrate counting  Sample meal  · 3 oz (85 g) chicken breast.  · 6 oz (170 g) brown rice.  · 4 oz (113 g) corn.  · 8 oz (240 mL) milk.  · 8 oz (170 g) strawberries with sugar-free whipped topping.  Carbohydrate calculation  1. Identify the foods that contain carbohydrates:  ? Rice.  ? Corn.  ? Milk.  ? Strawberries.  2. Calculate how many servings you have of each food:  ? 2 servings rice.  ? 1 serving corn.  ? 1 serving milk.  ? 1 serving strawberries.  3. Multiply each number of servings by 15 g:  ? 2 servings rice x 15 g = 30 g.  ? 1 serving corn x 15 g = 15 g.  ? 1 serving milk x 15 g = 15 g.  ? 1 serving strawberries x 15 g = 15 g.  4. Add together all of the amounts to find the total grams of carbohydrates eaten:  ? 30 g + 15 g + 15 g + 15 g = 75 g of carbohydrates total.  Summary  · Carbohydrate counting is a method of keeping track of how many carbohydrates you eat.  · Eating carbohydrates naturally increases the amount of sugar (glucose) in the blood.  · Counting how many carbohydrates you eat helps keep your blood glucose within normal limits, which helps you manage your diabetes.  · A diet and nutrition specialist (registered dietitian) can help you make a meal plan and calculate how many carbohydrates you should have at each meal and snack.  This information is not intended to replace advice given to you by your health care provider. Make sure you discuss any questions you have with your health care provider.  Document Released: 12/18/2006 Document Revised: 07/12/2018 Document Reviewed: 05/31/2017  [employer] Patient Education © 2020 [employer]

## 2020-09-08 ENCOUNTER — OFFICE VISIT (OUTPATIENT)
Dept: FAMILY MEDICINE CLINIC | Facility: CLINIC | Age: 55
End: 2020-09-08

## 2020-09-08 ENCOUNTER — TELEPHONE (OUTPATIENT)
Dept: FAMILY MEDICINE CLINIC | Facility: CLINIC | Age: 55
End: 2020-09-08

## 2020-09-08 VITALS
WEIGHT: 136 LBS | DIASTOLIC BLOOD PRESSURE: 86 MMHG | HEIGHT: 58 IN | BODY MASS INDEX: 28.55 KG/M2 | SYSTOLIC BLOOD PRESSURE: 130 MMHG

## 2020-09-08 DIAGNOSIS — E11.43 TYPE 2 DIABETES MELLITUS WITH DIABETIC AUTONOMIC NEUROPATHY, WITHOUT LONG-TERM CURRENT USE OF INSULIN (HCC): ICD-10-CM

## 2020-09-08 DIAGNOSIS — L24.7 IRRITANT CONTACT DERMATITIS DUE TO PLANTS, EXCEPT FOOD: Primary | ICD-10-CM

## 2020-09-08 PROCEDURE — 99213 OFFICE O/P EST LOW 20 MIN: CPT | Performed by: INTERNAL MEDICINE

## 2020-09-08 RX ORDER — TRIAMCINOLONE ACETONIDE 5 MG/G
OINTMENT TOPICAL 2 TIMES DAILY
Qty: 30 G | Refills: 1 | Status: SHIPPED | OUTPATIENT
Start: 2020-09-08 | End: 2020-12-31 | Stop reason: SDUPTHER

## 2020-09-08 RX ORDER — HYDROXYZINE HYDROCHLORIDE 25 MG/1
25 TABLET, FILM COATED ORAL 3 TIMES DAILY PRN
Qty: 90 TABLET | Refills: 1 | Status: SHIPPED | OUTPATIENT
Start: 2020-09-08 | End: 2020-12-07

## 2020-09-08 NOTE — TELEPHONE ENCOUNTER
Patient is calling stating that she went to the [facility] the office and was diagnosed with contact dermatitis. Patient received a shot yesterday in her bottom and was given prednisone. Patient is not sure what the shot was that she was given but she woke up this morning and its a lot worse. Patient stated its all over her face around her eyes as if it is spreading. Patient wants to know if she should take the prednisone or if she needs to be seen again. Please advise and call back.    [phone]

## 2020-09-08 NOTE — PROGRESS NOTES
Nelli Morales  1965  [id number]  Patient Care Team:  Flaquito Wallace APRN as PCP - General (Family Medicine)    Nelli Morales is a 54 y.o. female here today for follow up.     This patient is accompanied by their self who contributes to the history of their care.    Chief Complaint:    Chief Complaint   Patient presents with   • Dermatitis      History of Present Illness:  I have reviewed and/or updated the patient's past medical, past surgical, family, social history, problem list and allergies as appropriate.   Poison Ivy (on arms and neck and face) .  She broke out yesterday.  She had been working out in her yard cutting tree limbs.  She was seen in urgent treatment center.  She was placed on prednisone and told not to take it for 2 days after receiving her Depo-Medrol shot 80 mg yesterday.  She has been taking Benadryl.  She does not bathe.  Uses Benadryl cream as well.  She denies any fevers or chills.  Has not been checking her sugars however she is aware that it could elevate her sugars.       Review of Systems:    Review of Systems   Constitutional: Negative for chills, fatigue, fever, unexpected weight gain and unexpected weight loss.   HENT: Negative for ear pain, postnasal drip, sinus pressure and sore throat.    Respiratory: Negative for cough, shortness of breath and wheezing.    Cardiovascular: Negative for chest pain, palpitations and leg swelling.   Gastrointestinal: Negative for abdominal pain, blood in stool, diarrhea, nausea and vomiting.   Endocrine: Negative for cold intolerance, heat intolerance, polydipsia, polyphagia and polyuria.   Genitourinary: Negative for dysuria, flank pain and hematuria.   Musculoskeletal: Negative for arthralgias and joint swelling.   Skin: Positive for color change and rash. Negative for dry skin.   Allergic/Immunologic: Positive for environmental allergies.   Neurological: Negative for weakness, numbness and headache.   Psychiatric/Behavioral: Positive  "for sleep disturbance.       Vitals:    09/08/20 1007   BP: 130/86   BP Location: Left arm   Patient Position: Sitting   Cuff Size: Adult   Weight: 61.7 kg (136 lb)   Height: 147.3 cm (58\")     Body mass index is 28.42 kg/m².      Current Outpatient Medications:   •  amoxicillin (AMOXIL) 500 MG capsule, , Disp: , Rfl:   •  Blood Glucose Monitoring Suppl (ACCU-CHEK VIKASH PLUS) w/Device kit, , Disp: , Rfl:   •  cetirizine (zyrTEC) 10 MG tablet, Take 1 tablet by mouth Daily., Disp: 30 tablet, Rfl: 2  •  Cholecalciferol (VITAMIN D3) 5000 UNITS capsule capsule, Take 5,000 Units by mouth Daily., Disp: , Rfl:   •  clindamycin (CLEOCIN) 2 % vaginal cream, , Disp: , Rfl: 0  •  clotrimazole-betamethasone (LOTRISONE) 1-0.05 % cream, , Disp: , Rfl:   •  cyclobenzaprine (FLEXERIL) 10 MG tablet, Take 1 tablet by mouth 2 (Two) Times a Day As Needed for Muscle Spasms., Disp: 90 tablet, Rfl: 1  •  ESTRACE VAGINAL 0.1 MG/GM vaginal cream, insert 1/2 gram vaginally three times a week, Disp: , Rfl: 0  •  estradiol (MINIVELLE, VIVELLE-DOT) 0.075 MG/24HR patch, , Disp: , Rfl: 0  •  FARXIGA 10 MG tablet, TAKE 1 TABLET BY MOUTH DAILY, Disp: 30 tablet, Rfl: 5  •  fluticasone (FLONASE) 50 MCG/ACT nasal spray, 2 sprays into each nostril Daily., Disp: 1 bottle, Rfl: 5  •  folic acid (FOLVITE) 1 MG tablet, TAKE 1 TABLET BY MOUTH EVERY DAY, Disp: 90 tablet, Rfl: 1  •  glucose blood (Accu-Chek Vikash Plus) test strip, CHECK BLOOD SUGAR ONCE OR TWICE DAILY, Disp: 100 each, Rfl: 11  •  glucose monitor monitoring kit, 1 each 2 (Two) Times a Day., Disp: 1 each, Rfl: 0  •  LANCETS ULTRA THIN misc, 1 each 2 (Two) Times a Day., Disp: 100 each, Rfl: 11  •  latanoprost (XALATAN) 0.005 % ophthalmic solution, , Disp: , Rfl: 1  •  Loratadine (CLARITIN) 10 MG capsule, Take 1 tablet by mouth., Disp: , Rfl:   •  losartan-hydrochlorothiazide (HYZAAR) 50-12.5 MG per tablet, TAKE 1 TABLET BY MOUTH EVERY DAY, Disp: 90 tablet, Rfl: 1  •  metFORMIN ER (GLUCOPHAGE-XR) " 500 MG 24 hr tablet, Take 1 tablet by mouth 2 (Two) Times a Day With Meals., Disp: 60 tablet, Rfl: 2  •  predniSONE (DELTASONE) 10 MG tablet, 5 tabs QD x 3 days, 4 QD X3, 3 tabs qd X3, 2 tabs qd X3, 1 tab qd X3., Disp: 45 tablet, Rfl: 0  •  simethicone (Gas-X) 80 MG chewable tablet, Chew 1 tablet Every 6 (Six) Hours As Needed for Flatulence., Disp: 30 tablet, Rfl: 5  •  simvastatin (ZOCOR) 20 MG tablet, TAKE 1 TABLET BY MOUTH EVERY NIGHT AT BEDTIME, Disp: 90 tablet, Rfl: 1  •  hydrOXYzine (ATARAX) 25 MG tablet, Take 1 tablet by mouth 3 (Three) Times a Day As Needed for Itching., Disp: 90 tablet, Rfl: 1  •  triamcinolone (KENALOG) 0.5 % ointment, Apply  topically to the appropriate area as directed 2 (Two) Times a Day., Disp: 30 g, Rfl: 1  No current facility-administered medications for this visit.     Physical Exam:    Physical Exam   Constitutional: She is oriented to person, place, and time. She appears well-developed and well-nourished. No distress.   HENT:   Head: Normocephalic and atraumatic.   Right Ear: External ear normal.   Left Ear: External ear normal.   Mouth/Throat: Oropharynx is clear and moist. No oropharyngeal exudate.   Eyes: Conjunctivae and EOM are normal. Right eye exhibits no discharge. No scleral icterus.   Neck: Normal range of motion. Neck supple. No JVD present. No tracheal deviation present. No thyromegaly present.   Cardiovascular: Normal rate, regular rhythm, normal heart sounds and intact distal pulses.   PMI nondisplaced   Pulmonary/Chest: Effort normal and breath sounds normal. She has no wheezes. She has no rales.   Abdominal: Soft. Bowel sounds are normal. There is no tenderness. There is no rebound and no guarding.   Musculoskeletal: She exhibits no edema or deformity.   Normal gait   Lymphadenopathy:     She has no cervical adenopathy.   Neurological: She is alert and oriented to person, place, and time. She exhibits normal muscle tone. Coordination normal.   Skin: Skin is warm  and dry. Capillary refill takes less than 2 seconds. No rash noted. She is not diaphoretic. No pallor.   This is moist erythematous patches on her left antecubital fossa stenting to her forearm arm.  There is small vesicular levels present.  It is moist.  No crepitus.  No warmth.  She has a similar type rash of the right anterior neck and clavicular region.   Psychiatric: She has a normal mood and affect. Judgment normal.   Nursing note and vitals reviewed.      Procedures    Results Review:    None    Assessment/Plan:    Problem List Items Addressed This Visit        Endocrine    Type 2 diabetes mellitus with diabetic autonomic neuropathy, without long-term current use of insulin (CMS/[facility])    Relevant Medications    glucose monitor monitoring kit    LANCETS ULTRA THIN misc    FARXIGA 10 MG tablet    glucose blood (Accu-Chek Joyce Plus) test strip    metFORMIN ER (GLUCOPHAGE-XR) 500 MG 24 hr tablet       Musculoskeletal and Integument    Irritant contact dermatitis due to plants, except food - Primary    Relevant Medications    clotrimazole-betamethasone (LOTRISONE) 1-0.05 % cream    triamcinolone (KENALOG) 0.5 % ointment      I have given her the permission to go ahead and start taking her prednisone.  She received Depo-Medrol 80 mg yesterday.  Also placed her on triamcinolone cream twice daily 0.5%.  Of prescribed hydroxyzine 25 mg p.o. 3 times daily as needed.  She should take cool oatmeal baths.  Avoidance.  Did reaffirm that her blood sugars may remain elevated while on steroids.  She voices understanding.    Plan of care reviewed with patient at the conclusion of today's visit. Education was provided regarding diagnosis and management.  Patient verbalizes understanding of and agreement with management plan.    No follow-ups on file.    Josh Uribe MD    Please note that portions of this note may have been completed with a voice recognition program. Efforts were made to edit the dictations, but  occasionally words are mistranscribed.

## 2020-09-11 ENCOUNTER — TELEPHONE (OUTPATIENT)
Dept: FAMILY MEDICINE CLINIC | Facility: CLINIC | Age: 55
End: 2020-09-11

## 2020-09-11 NOTE — TELEPHONE ENCOUNTER
PT STATES THAT SHE WAS DIAGNOSED WITH DERMATITIS AND IS REQUESTING AN ALTERNATE MEDICATION SENT TO  PHARMACY.    PT STATES THAT THE RASH IS SPREADING AND WOULD LIKE MORE MEDICATION TO HELP THE RASH FROM SPREADING.      PHARMACY CONFIRMED  PLEASE ADVISE  [phone]

## 2020-09-12 ENCOUNTER — OFFICE VISIT (OUTPATIENT)
Dept: FAMILY MEDICINE CLINIC | Facility: CLINIC | Age: 55
End: 2020-09-12

## 2020-09-12 VITALS
OXYGEN SATURATION: 99 % | BODY MASS INDEX: 28.97 KG/M2 | WEIGHT: 138 LBS | HEART RATE: 91 BPM | HEIGHT: 58 IN | SYSTOLIC BLOOD PRESSURE: 118 MMHG | DIASTOLIC BLOOD PRESSURE: 64 MMHG

## 2020-09-12 DIAGNOSIS — L23.7 ALLERGIC CONTACT DERMATITIS DUE TO PLANTS, EXCEPT FOOD: ICD-10-CM

## 2020-09-12 DIAGNOSIS — E11.43 TYPE 2 DIABETES MELLITUS WITH DIABETIC AUTONOMIC NEUROPATHY, WITHOUT LONG-TERM CURRENT USE OF INSULIN (HCC): Primary | ICD-10-CM

## 2020-09-12 PROCEDURE — 99213 OFFICE O/P EST LOW 20 MIN: CPT | Performed by: NURSE PRACTITIONER

## 2020-09-12 RX ORDER — PREDNISONE 10 MG/1
TABLET ORAL
Qty: 53 TABLET | Refills: 0 | Status: SHIPPED | OUTPATIENT
Start: 2020-09-12 | End: 2020-12-07

## 2020-09-12 NOTE — PATIENT INSTRUCTIONS
"Carbohydrate Counting for Diabetes Mellitus, Adult    Carbohydrate counting is a method of keeping track of how many carbohydrates you eat. Eating carbohydrates naturally increases the amount of sugar (glucose) in the blood. Counting how many carbohydrates you eat helps keep your blood glucose within normal limits, which helps you manage your diabetes (diabetes mellitus).  It is important to know how many carbohydrates you can safely have in each meal. This is different for every person. A diet and nutrition specialist (registered dietitian) can help you make a meal plan and calculate how many carbohydrates you should have at each meal and snack.  Carbohydrates are found in the following foods:  · Grains, such as breads and cereals.  · Dried beans and soy products.  · Starchy vegetables, such as potatoes, peas, and corn.  · Fruit and fruit juices.  · Milk and yogurt.  · Sweets and snack foods, such as cake, cookies, candy, chips, and soft drinks.  How do I count carbohydrates?  There are two ways to count carbohydrates in food. You can use either of the methods or a combination of both.  Reading \"Nutrition Facts\" on packaged food  The \"Nutrition Facts\" list is included on the labels of almost all packaged foods and beverages in the [place] It includes:  · The serving size.  · Information about nutrients in each serving, including the grams (g) of carbohydrate per serving.  To use the “Nutrition Facts\":  · Decide how many servings you will have.  · Multiply the number of servings by the number of carbohydrates per serving.  · The resulting number is the total amount of carbohydrates that you will be having.  Learning standard serving sizes of other foods  When you eat carbohydrate foods that are not packaged or do not include \"Nutrition Facts\" on the label, you need to measure the servings in order to count the amount of carbohydrates:  · Measure the foods that you will eat with a food scale or measuring cup, if " needed.  · Decide how many standard-size servings you will eat.  · Multiply the number of servings by 15. Most carbohydrate-rich foods have about 15 g of carbohydrates per serving.  ? For example, if you eat 8 oz (170 g) of strawberries, you will have eaten 2 servings and 30 g of carbohydrates (2 servings x 15 g = 30 g).  · For foods that have more than one food mixed, such as soups and casseroles, you must count the carbohydrates in each food that is included.  The following list contains standard serving sizes of common carbohydrate-rich foods. Each of these servings has about 15 g of carbohydrates:  · ½ hamburger bun or ½ English muffin.  · ½ oz (15 mL) syrup.  · ½ oz (14 g) jelly.  · 1 slice of bread.  · 1 six-inch tortilla.  · 3 oz (85 g) cooked rice or pasta.  · 4 oz (113 g) cooked dried beans.  · 4 oz (113 g) starchy vegetable, such as peas, corn, or potatoes.  · 4 oz (113 g) hot cereal.  · 4 oz (113 g) mashed potatoes or ¼ of a large baked potato.  · 4 oz (113 g) canned or frozen fruit.  · 4 oz (120 mL) fruit juice.  · 4-6 crackers.  · 6 chicken nuggets.  · 6 oz (170 g) unsweetened dry cereal.  · 6 oz (170 g) plain fat-free yogurt or yogurt sweetened with artificial sweeteners.  · 8 oz (240 mL) milk.  · 8 oz (170 g) fresh fruit or one small piece of fruit.  · 24 oz (680 g) popped popcorn.  Example of carbohydrate counting  Sample meal  · 3 oz (85 g) chicken breast.  · 6 oz (170 g) brown rice.  · 4 oz (113 g) corn.  · 8 oz (240 mL) milk.  · 8 oz (170 g) strawberries with sugar-free whipped topping.  Carbohydrate calculation  1. Identify the foods that contain carbohydrates:  ? Rice.  ? Corn.  ? Milk.  ? Strawberries.  2. Calculate how many servings you have of each food:  ? 2 servings rice.  ? 1 serving corn.  ? 1 serving milk.  ? 1 serving strawberries.  3. Multiply each number of servings by 15 g:  ? 2 servings rice x 15 g = 30 g.  ? 1 serving corn x 15 g = 15 g.  ? 1 serving milk x 15 g = 15 g.  ? 1  serving strawberries x 15 g = 15 g.  4. Add together all of the amounts to find the total grams of carbohydrates eaten:  ? 30 g + 15 g + 15 g + 15 g = 75 g of carbohydrates total.  Summary  · Carbohydrate counting is a method of keeping track of how many carbohydrates you eat.  · Eating carbohydrates naturally increases the amount of sugar (glucose) in the blood.  · Counting how many carbohydrates you eat helps keep your blood glucose within normal limits, which helps you manage your diabetes.  · A diet and nutrition specialist (registered dietitian) can help you make a meal plan and calculate how many carbohydrates you should have at each meal and snack.  This information is not intended to replace advice given to you by your health care provider. Make sure you discuss any questions you have with your health care provider.  Document Released: 12/18/2006 Document Revised: 07/12/2018 Document Reviewed: 05/31/2017  [employer] Patient Education © 2020 [employer]    Prednisone tablets  What is this medicine?  PREDNISONE (PRED ni sone) is a corticosteroid. It is commonly used to treat inflammation of the skin, joints, lungs, and other organs. Common conditions treated include asthma, allergies, and arthritis. It is also used for other conditions, such as blood disorders and diseases of the adrenal glands.  This medicine may be used for other purposes; ask your health care provider or pharmacist if you have questions.  COMMON BRAND NAME(S): Deltasone, Predone, Sterapred, Sterapred DS  What should I tell my health care provider before I take this medicine?  They need to know if you have any of these conditions:  · Cushing's syndrome  · diabetes  · glaucoma  · heart disease  · high blood pressure  · infection (especially a virus infection such as chickenpox, cold sores, or herpes)  · kidney disease  · liver disease  · mental illness  · myasthenia gravis  · osteoporosis  · seizures  · stomach or intestine  problems  · thyroid disease  · an unusual or allergic reaction to lactose, prednisone, other medicines, foods, dyes, or preservatives  · pregnant or trying to get pregnant  · breast-feeding  How should I use this medicine?  Take this medicine by mouth with a glass of water. Follow the directions on the prescription label. Take this medicine with food. If you are taking this medicine once a day, take it in the morning. Do not take more medicine than you are told to take. Do not suddenly stop taking your medicine because you may develop a severe reaction. Your doctor will tell you how much medicine to take. If your doctor wants you to stop the medicine, the dose may be slowly lowered over time to avoid any side effects.  Talk to your pediatrician regarding the use of this medicine in children. Special care may be needed.  Overdosage: If you think you have taken too much of this medicine contact a poison control center or emergency room at once.  NOTE: This medicine is only for you. Do not share this medicine with others.  What if I miss a dose?  If you miss a dose, take it as soon as you can. If it is almost time for your next dose, talk to your doctor or health care professional. You may need to miss a dose or take an extra dose. Do not take double or extra doses without advice.  What may interact with this medicine?  Do not take this medicine with any of the following medications:  · metyrapone  · mifepristone  This medicine may also interact with the following medications:  · aminoglutethimide  · amphotericin B  · aspirin and aspirin-like medicines  · barbiturates  · certain medicines for diabetes, like glipizide or glyburide  · cholestyramine  · cholinesterase inhibitors  · cyclosporine  · digoxin  · diuretics  · ephedrine  · female hormones, like estrogens and birth control pills  · isoniazid  · ketoconazole  · NSAIDS, medicines for pain and inflammation, like ibuprofen or  naproxen  · phenytoin  · rifampin  · toxoids  · vaccines  · warfarin  This list may not describe all possible interactions. Give your health care provider a list of all the medicines, herbs, non-prescription drugs, or dietary supplements you use. Also tell them if you smoke, drink alcohol, or use illegal drugs. Some items may interact with your medicine.  What should I watch for while using this medicine?  Visit your doctor or health care professional for regular checks on your progress. If you are taking this medicine over a prolonged period, carry an identification card with your name and address, the type and dose of your medicine, and your doctor's name and address.  This medicine may increase your risk of getting an infection. Tell your doctor or health care professional if you are around anyone with measles or chickenpox, or if you develop sores or blisters that do not heal properly.  If you are going to have surgery, tell your doctor or health care professional that you have taken this medicine within the last twelve months.  Ask your doctor or health care professional about your diet. You may need to lower the amount of salt you eat.  This medicine may increase blood sugar. Ask your healthcare provider if changes in diet or medicines are needed if you have diabetes.  What side effects may I notice from receiving this medicine?  Side effects that you should report to your doctor or health care professional as soon as possible:  · allergic reactions like skin rash, itching or hives, swelling of the face, lips, or tongue  · changes in emotions or moods  · changes in vision  · depressed mood  · eye pain  · fever or chills, cough, sore throat, pain or difficulty passing urine  · signs and symptoms of high blood sugar such as being more thirsty or hungry or having to urinate more than normal. You may also feel very tired or have blurry vision.  · swelling of ankles, feet  Side effects that usually do not require  medical attention (report to your doctor or health care professional if they continue or are bothersome):  · confusion, excitement, restlessness  · headache  · nausea, vomiting  · skin problems, acne, thin and shiny skin  · trouble sleeping  · weight gain  This list may not describe all possible side effects. Call your doctor for medical advice about side effects. You may report side effects to FDA at 0-015-JOL-0492.  Where should I keep my medicine?  Keep out of the reach of children.  Store at room temperature between 15 and 30 degrees C (59 and 86 degrees F). Protect from light. Keep container tightly closed. Throw away any unused medicine after the expiration date.  NOTE: This sheet is a summary. It may not cover all possible information. If you have questions about this medicine, talk to your doctor, pharmacist, or health care provider.  © 2020 [employer] (2019-09-17 10:54:22)

## 2020-09-12 NOTE — PROGRESS NOTES
Chief Complaint   Patient presents with   • Rash     sen Dr. saeed 9/7/20 Irritant contact dermatitis was perscribed lotrisone and kenalog pt states medication is not working and the rash is spreading         Subjective   Nelli Morales is a 54 y.o. female    History of Present Illness  Patient today for a week long episode of contact dermatitis.  She was seen at urgent treatment first given a steroid shot and in some prednisone and then was seen again on 9/7/2020 by my partner.  At that time she was given some Lotrisone and Kenalog.  She states the rash is constantly spreading.  She has been out cleaning up trees.  The rash is very itchy.    Allergies   Allergen Reactions   • Ace Inhibitors Cough   • Morphine And Related Rash     Past Medical History:   Diagnosis Date   • Allergic rhinitis    • BV (bacterial vaginosis)    • Cataracts, bilateral    • Class 1 obesity due to excess calories without serious comorbidity with body mass index (BMI) of 32.0 to 32.9 in adult 12/22/2017   • Diverticulitis large intestine w/o perforation or abscess w/o bleeding 04/2017   • Fibrocystic breast disease    • Glaucoma    • Hyperlipidemia    • Hypertension    • IBS (irritable bowel syndrome)    • Insomnia    • Obesity (BMI 30.0-34.9)    • Stress bladder incontinence, female    • Type 2 diabetes mellitus without complication, without long-term current use of insulin (CMS/[facility]) 8/15/2018   • Varicose veins of legs       Past Surgical History:   Procedure Laterality Date   • BLADDER SUSPENSION  10/27/2007   • HYSTERECTOMY  12/17/2013    [facility]     Social History     Socioeconomic History   • Marital status: Single     Spouse name: Not on file   • Number of children: Not on file   • Years of education: Not on file   • Highest education level: Not on file   Tobacco Use   • Smoking status: Never Smoker   • Smokeless tobacco: Never Used   Substance and Sexual Activity   • Alcohol use: Yes     Alcohol/week: 1.0 standard drinks     Types: 1 Cans  of beer per week     Comment: occasional   • Drug use: No   • Sexual activity: Yes     Partners: Male        Current Outpatient Medications:   •  Blood Glucose Monitoring Suppl (ACCU-CHEK VIKASH PLUS) w/Device kit, , Disp: , Rfl:   •  cetirizine (zyrTEC) 10 MG tablet, Take 1 tablet by mouth Daily., Disp: 30 tablet, Rfl: 2  •  Cholecalciferol (VITAMIN D3) 5000 UNITS capsule capsule, Take 5,000 Units by mouth Daily., Disp: , Rfl:   •  clindamycin (CLEOCIN) 2 % vaginal cream, , Disp: , Rfl: 0  •  clotrimazole-betamethasone (LOTRISONE) 1-0.05 % cream, , Disp: , Rfl:   •  cyclobenzaprine (FLEXERIL) 10 MG tablet, Take 1 tablet by mouth 2 (Two) Times a Day As Needed for Muscle Spasms., Disp: 90 tablet, Rfl: 1  •  ESTRACE VAGINAL 0.1 MG/GM vaginal cream, insert 1/2 gram vaginally three times a week, Disp: , Rfl: 0  •  estradiol (MINIVELLE, VIVELLE-DOT) 0.075 MG/24HR patch, , Disp: , Rfl: 0  •  FARXIGA 10 MG tablet, TAKE 1 TABLET BY MOUTH DAILY, Disp: 30 tablet, Rfl: 5  •  fluticasone (FLONASE) 50 MCG/ACT nasal spray, 2 sprays into each nostril Daily., Disp: 1 bottle, Rfl: 5  •  folic acid (FOLVITE) 1 MG tablet, TAKE 1 TABLET BY MOUTH EVERY DAY, Disp: 90 tablet, Rfl: 1  •  glucose blood (Accu-Chek Vikash Plus) test strip, CHECK BLOOD SUGAR ONCE OR TWICE DAILY, Disp: 100 each, Rfl: 11  •  glucose monitor monitoring kit, 1 each 2 (Two) Times a Day., Disp: 1 each, Rfl: 0  •  hydrOXYzine (ATARAX) 25 MG tablet, Take 1 tablet by mouth 3 (Three) Times a Day As Needed for Itching., Disp: 90 tablet, Rfl: 1  •  LANCETS ULTRA THIN misc, 1 each 2 (Two) Times a Day., Disp: 100 each, Rfl: 11  •  latanoprost (XALATAN) 0.005 % ophthalmic solution, , Disp: , Rfl: 1  •  Loratadine (CLARITIN) 10 MG capsule, Take 1 tablet by mouth., Disp: , Rfl:   •  losartan-hydrochlorothiazide (HYZAAR) 50-12.5 MG per tablet, TAKE 1 TABLET BY MOUTH EVERY DAY, Disp: 90 tablet, Rfl: 1  •  metFORMIN ER (GLUCOPHAGE-XR) 500 MG 24 hr tablet, Take 1 tablet by mouth 2  (Two) Times a Day With Meals., Disp: 60 tablet, Rfl: 2  •  predniSONE (DELTASONE) 10 MG tablet, 6 tabs qd for 3 days, 5 tabs QD x 3 days, 4 QD X3, 3 tabs qd X3, 2 tabs qd X3, 1 tab qd X3., Disp: 53 tablet, Rfl: 0  •  simethicone (Gas-X) 80 MG chewable tablet, Chew 1 tablet Every 6 (Six) Hours As Needed for Flatulence., Disp: 30 tablet, Rfl: 5  •  simvastatin (ZOCOR) 20 MG tablet, TAKE 1 TABLET BY MOUTH EVERY NIGHT AT BEDTIME, Disp: 90 tablet, Rfl: 1  •  triamcinolone (KENALOG) 0.5 % ointment, Apply  topically to the appropriate area as directed 2 (Two) Times a Day., Disp: 30 g, Rfl: 1     Review of Systems   Constitutional: Negative for chills, fatigue and unexpected weight change.   Respiratory: Negative for cough, chest tightness, shortness of breath and wheezing.    Cardiovascular: Negative for chest pain, palpitations and leg swelling.   Gastrointestinal: Negative for constipation, diarrhea, nausea and vomiting.   Genitourinary: Negative for difficulty urinating and dysuria.   Skin: Positive for rash. Negative for color change.   Neurological: Negative for dizziness, syncope, weakness and headaches.   Psychiatric/Behavioral: Negative for sleep disturbance.       Objective     Vitals:    09/12/20 1228   BP: 118/64   Pulse: 91   SpO2: 99%       Results for orders placed or performed in visit on 09/02/20   POC Glycosylated Hemoglobin (Hb A1C)    Specimen: Blood   Result Value Ref Range    Hemoglobin A1C 6.8 %       Physical Exam  Constitutional:       Appearance: Normal appearance.   Cardiovascular:      Rate and Rhythm: Normal rate and regular rhythm.   Pulmonary:      Effort: Pulmonary effort is normal.      Breath sounds: Normal breath sounds.   Musculoskeletal: Normal range of motion.   Skin:     General: Skin is warm and dry.      Findings: Rash ( Red rash with whelps on arms legs trunk and neck and face.  Appears like contact dermatitis.) present.   Neurological:      Mental Status: She is alert and oriented  to person, place, and time.   Psychiatric:         Mood and Affect: Mood normal.         Behavior: Behavior normal.         Assessment/Plan   Problem List Items Addressed This Visit        Endocrine    Type 2 diabetes mellitus with diabetic autonomic neuropathy, without long-term current use of insulin (CMS/[facility]) - Primary      Other Visit Diagnoses     Allergic contact dermatitis due to plants, except food        Relevant Medications    predniSONE (DELTASONE) 10 MG tablet      Were given her prednisone taper from 60 mg down to 0 every 3 days.  I have instructed her while taking this to have careful monitoring of her diabetes.  If her blood sugar elevates significantly she can increase her Metformin from 1 tablet twice a day to 2 tabs the morning 1 at night and go up to 2 tablets twice a day if necessary.  If it continues to get higher she is to let me know.  I also want her to take a antihistamine like Claritin or Zyrtec every morning and a Benadryl every night before she goes to bed.  If this is not significantly improved by Monday I will see her back in order to refer her to dermatology.    RV- 1 wk    Counseling provided on diabetes and Prednisone.    Flaquito Wallace, APRN   9/12/2020   12:55 EDT

## 2020-10-07 ENCOUNTER — TELEPHONE (OUTPATIENT)
Dept: FAMILY MEDICINE CLINIC | Facility: CLINIC | Age: 55
End: 2020-10-07

## 2020-10-07 ENCOUNTER — OFFICE VISIT (OUTPATIENT)
Dept: OBSTETRICS AND GYNECOLOGY | Facility: CLINIC | Age: 55
End: 2020-10-07

## 2020-10-07 VITALS — BODY MASS INDEX: 27.9 KG/M2 | WEIGHT: 138.4 LBS | HEIGHT: 59 IN

## 2020-10-07 DIAGNOSIS — B37.9 CANDIDIASIS: Primary | ICD-10-CM

## 2020-10-07 DIAGNOSIS — N94.9 VAGINAL BURNING: ICD-10-CM

## 2020-10-07 LAB
BILIRUB BLD-MCNC: NEGATIVE MG/DL
CLARITY, POC: CLEAR
COLOR UR: YELLOW
GLUCOSE UR STRIP-MCNC: ABNORMAL MG/DL
KETONES UR QL: NEGATIVE
KOH PREP NAIL: ABNORMAL
LEUKOCYTE EST, POC: NEGATIVE
NITRITE UR-MCNC: NEGATIVE MG/ML
PH UR: 5.5 [PH] (ref 5–8)
PROT UR STRIP-MCNC: NEGATIVE MG/DL
RBC # UR STRIP: NEGATIVE /UL
SP GR UR: 1.01 (ref 1–1.03)
UROBILINOGEN UR QL: NORMAL
WET PREP GENITAL: NORMAL

## 2020-10-07 PROCEDURE — 99213 OFFICE O/P EST LOW 20 MIN: CPT | Performed by: NURSE PRACTITIONER

## 2020-10-07 PROCEDURE — 81002 URINALYSIS NONAUTO W/O SCOPE: CPT | Performed by: NURSE PRACTITIONER

## 2020-10-07 PROCEDURE — 87210 SMEAR WET MOUNT SALINE/INK: CPT | Performed by: NURSE PRACTITIONER

## 2020-10-07 PROCEDURE — 87220 TISSUE EXAM FOR FUNGI: CPT | Performed by: NURSE PRACTITIONER

## 2020-10-07 RX ORDER — NYSTATIN 100000 U/G
CREAM TOPICAL 2 TIMES DAILY PRN
Qty: 30 G | Refills: 1 | Status: SHIPPED | OUTPATIENT
Start: 2020-10-07 | End: 2020-10-14

## 2020-10-07 RX ORDER — FLUCONAZOLE 150 MG/1
TABLET ORAL
Qty: 19 TABLET | Refills: 0 | Status: SHIPPED | OUTPATIENT
Start: 2020-10-07 | End: 2020-12-07

## 2020-10-07 NOTE — TELEPHONE ENCOUNTER
Patient is calling and asking about what sugar level they are supposed to be at.  Patient has diabetes and would like to know where they are and they are not sure what level is a good level  Please advise      Nelli Morales call back [phone]

## 2020-10-07 NOTE — PROGRESS NOTES
"Chief Complaint   Patient presents with   • Vaginitis         Subjective   HPI  Nelli Morales is a 54 y.o. female, , who presents with evaluation of dysuria, vulvar itching and vulvar burning that is recurrent.      She states she has experienced this problem for 1 week.  She describes the severity as moderate.  Patient notes aggravating factors include bowel movements and alleviating factors are none.   The patient has previously been evaluated for vaginitis. She had positive yeast and GBS on culture. She completed all but one pill of the amoxicillin for GBS.  She is diabetic and reports she has been having cheat meals often recently.     Her last LMP was No LMP recorded. Patient has had a hysterectomy..  Periods are absent, lasting 0 days.  Partner Status: Marital Status: single.  New Partners since last visit: no.  Desires STD Screening: no.    Additional OB/GYN History   Last Pap : 2020  Last Completed Pap Smear       Status Date      PAP SMEAR Done 2018      Patient has more history with this topic...        History of abnormal Pap smear: no  OB History        0    Para   0    Term   0       0    AB   0    Living   0       SAB   0    TAB   0    Ectopic   0    Molar   0    Multiple   0    Live Births   0                The additional following portions of the patient's history were reviewed and updated as appropriate: allergies, current medications, past family history, past medical history, past social history, past surgical history and problem list.    Review of Systems   Constitutional: Negative.    Gastrointestinal: Negative.    Genitourinary: Positive for dysuria. Negative for genital sores and vaginal discharge.        Vaginal and vulvar irritation/burning     All other systems reviewed and are negative.     I have reviewed and agree with the HPI, ROS, and historical information as entered above. Juanita Lei, APRN    Objective   Ht 148.6 cm (58.5\")   Wt 62.8 kg (138 lb 6.4 " oz)   BMI 28.43 kg/m²     Physical Exam  Exam conducted with a chaperone present.   Constitutional:       Appearance: Normal appearance.   Abdominal:      Palpations: Abdomen is soft.      Tenderness: There is no abdominal tenderness.   Genitourinary:     Labia:         Right: Rash present. No tenderness.         Left: Rash present. No tenderness.       Vagina: Vaginal discharge present.      Uterus: Absent.       Comments: External irritation present. White discharge present  Neurological:      Mental Status: She is alert.         Wet mount performed? Yes. Pertinent positives include: Yeast Buds    Assessment/Plan     Assessment and Plan    Problem List Items Addressed This Visit     None      Visit Diagnoses     Candidiasis    -  Primary    Relevant Medications    fluconazole (DIFLUCAN) 150 MG tablet    Vaginal burning        Relevant Orders    POC Urinalysis Dipstick (Completed)    POC Wet Prep (Completed)    POC KOH Prep (Completed)          1. Will treat with diflucan x 3 and monthly prophylaxis x 4 months, external nystatin given for use on vulva. Treat x 1 week and if symptoms persist we can retreat for GBS but discussed this will exacerbate yeast and that some people are colonized with GBS. Discussed the importance of good glycemin control as elevated BG will cause persistent yeast. Also discussed decreasing moisture.         Juanita Lei, APRN  10/07/2020

## 2020-10-08 NOTE — TELEPHONE ENCOUNTER
Patient notified of range for blood sugar. She verbalized understanding and did not have any further questions at this time.

## 2020-11-25 DIAGNOSIS — E11.65 TYPE 2 DIABETES MELLITUS WITH HYPERGLYCEMIA, WITHOUT LONG-TERM CURRENT USE OF INSULIN (HCC): ICD-10-CM

## 2020-11-25 RX ORDER — METFORMIN HYDROCHLORIDE 500 MG/1
500 TABLET, EXTENDED RELEASE ORAL 2 TIMES DAILY WITH MEALS
Qty: 60 TABLET | Refills: 5 | Status: SHIPPED | OUTPATIENT
Start: 2020-11-25 | End: 2020-11-30

## 2020-11-25 NOTE — TELEPHONE ENCOUNTER
Caller: Nelli Morales    Relationship: Self    Best call back number: [phone]    Medication needed:   Requested Prescriptions     Pending Prescriptions Disp Refills   • metFORMIN ER (GLUCOPHAGE-XR) 500 MG 24 hr tablet 60 tablet 2     Sig: Take 1 tablet by mouth 2 (Two) Times a Day With Meals.       What details did the patient provide when requesting the medication: TWO DAYS WORTH OF MEDICATION    Does the patient have less than a 3 day supply:  [x] Yes  [] No    What is the patient's preferred pharmacy: [id number] - [place], [place] - [address] AT [facility] [address] - [phone] [facility] [phone] FX

## 2020-11-30 RX ORDER — METFORMIN HYDROCHLORIDE 500 MG/1
TABLET, EXTENDED RELEASE ORAL
Qty: 180 TABLET | Refills: 1 | Status: SHIPPED | OUTPATIENT
Start: 2020-11-30 | End: 2020-12-07 | Stop reason: SDUPTHER

## 2020-12-07 ENCOUNTER — OFFICE VISIT (OUTPATIENT)
Dept: FAMILY MEDICINE CLINIC | Facility: CLINIC | Age: 55
End: 2020-12-07

## 2020-12-07 ENCOUNTER — LAB (OUTPATIENT)
Dept: LAB | Facility: HOSPITAL | Age: 55
End: 2020-12-07

## 2020-12-07 VITALS
TEMPERATURE: 97.1 F | HEIGHT: 59 IN | BODY MASS INDEX: 27.21 KG/M2 | SYSTOLIC BLOOD PRESSURE: 112 MMHG | OXYGEN SATURATION: 98 % | HEART RATE: 78 BPM | WEIGHT: 135 LBS | DIASTOLIC BLOOD PRESSURE: 60 MMHG

## 2020-12-07 DIAGNOSIS — E55.9 VITAMIN D DEFICIENCY: ICD-10-CM

## 2020-12-07 DIAGNOSIS — Z00.00 WELLNESS EXAMINATION: Primary | ICD-10-CM

## 2020-12-07 DIAGNOSIS — E11.43 TYPE 2 DIABETES MELLITUS WITH DIABETIC AUTONOMIC NEUROPATHY, WITHOUT LONG-TERM CURRENT USE OF INSULIN (HCC): ICD-10-CM

## 2020-12-07 DIAGNOSIS — E11.9 TYPE 2 DIABETES MELLITUS WITHOUT COMPLICATION, WITHOUT LONG-TERM CURRENT USE OF INSULIN (HCC): ICD-10-CM

## 2020-12-07 DIAGNOSIS — I10 ESSENTIAL HYPERTENSION: ICD-10-CM

## 2020-12-07 DIAGNOSIS — R14.3 FLATULENCE: ICD-10-CM

## 2020-12-07 DIAGNOSIS — E78.2 MIXED HYPERLIPIDEMIA: ICD-10-CM

## 2020-12-07 DIAGNOSIS — E11.65 TYPE 2 DIABETES MELLITUS WITH HYPERGLYCEMIA, WITHOUT LONG-TERM CURRENT USE OF INSULIN (HCC): ICD-10-CM

## 2020-12-07 DIAGNOSIS — D64.9 ANEMIA, UNSPECIFIED TYPE: ICD-10-CM

## 2020-12-07 DIAGNOSIS — Z00.00 WELLNESS EXAMINATION: ICD-10-CM

## 2020-12-07 LAB
BASOPHILS # BLD AUTO: 0.04 10*3/MM3 (ref 0–0.2)
BASOPHILS NFR BLD AUTO: 0.6 % (ref 0–1.5)
DEPRECATED RDW RBC AUTO: 39.4 FL (ref 37–54)
EOSINOPHIL # BLD AUTO: 0.11 10*3/MM3 (ref 0–0.4)
EOSINOPHIL NFR BLD AUTO: 1.7 % (ref 0.3–6.2)
ERYTHROCYTE [DISTWIDTH] IN BLOOD BY AUTOMATED COUNT: 12.6 % (ref 12.3–15.4)
HBA1C MFR BLD: 5.9 %
HCT VFR BLD AUTO: 48.1 % (ref 34–46.6)
HGB BLD-MCNC: 16.6 G/DL (ref 12–15.9)
IMM GRANULOCYTES # BLD AUTO: 0.02 10*3/MM3 (ref 0–0.05)
IMM GRANULOCYTES NFR BLD AUTO: 0.3 % (ref 0–0.5)
LYMPHOCYTES # BLD AUTO: 1.62 10*3/MM3 (ref 0.7–3.1)
LYMPHOCYTES NFR BLD AUTO: 25.3 % (ref 19.6–45.3)
MCH RBC QN AUTO: 30 PG (ref 26.6–33)
MCHC RBC AUTO-ENTMCNC: 34.5 G/DL (ref 31.5–35.7)
MCV RBC AUTO: 87 FL (ref 79–97)
MONOCYTES # BLD AUTO: 0.31 10*3/MM3 (ref 0.1–0.9)
MONOCYTES NFR BLD AUTO: 4.8 % (ref 5–12)
NEUTROPHILS NFR BLD AUTO: 4.31 10*3/MM3 (ref 1.7–7)
NEUTROPHILS NFR BLD AUTO: 67.3 % (ref 42.7–76)
NRBC BLD AUTO-RTO: 0.2 /100 WBC (ref 0–0.2)
PLATELET # BLD AUTO: 256 10*3/MM3 (ref 140–450)
PMV BLD AUTO: 11.1 FL (ref 6–12)
RBC # BLD AUTO: 5.53 10*6/MM3 (ref 3.77–5.28)
WBC # BLD AUTO: 6.41 10*3/MM3 (ref 3.4–10.8)

## 2020-12-07 PROCEDURE — 85025 COMPLETE CBC W/AUTO DIFF WBC: CPT

## 2020-12-07 PROCEDURE — 99396 PREV VISIT EST AGE 40-64: CPT | Performed by: NURSE PRACTITIONER

## 2020-12-07 PROCEDURE — 82306 VITAMIN D 25 HYDROXY: CPT

## 2020-12-07 PROCEDURE — 83036 HEMOGLOBIN GLYCOSYLATED A1C: CPT | Performed by: NURSE PRACTITIONER

## 2020-12-07 RX ORDER — LOSARTAN POTASSIUM AND HYDROCHLOROTHIAZIDE 12.5; 5 MG/1; MG/1
1 TABLET ORAL DAILY
Qty: 90 TABLET | Refills: 3 | Status: SHIPPED | OUTPATIENT
Start: 2020-12-07 | End: 2021-12-20 | Stop reason: SDUPTHER

## 2020-12-07 RX ORDER — FOLIC ACID 1 MG/1
1000 TABLET ORAL DAILY
Qty: 90 TABLET | Refills: 3 | Status: SHIPPED | OUTPATIENT
Start: 2020-12-07 | End: 2021-12-20 | Stop reason: SDUPTHER

## 2020-12-07 RX ORDER — SACCHAROMYCES BOULARDII 250 MG
250 CAPSULE ORAL DAILY
Start: 2020-12-07 | End: 2021-08-18

## 2020-12-07 RX ORDER — METFORMIN HYDROCHLORIDE 500 MG/1
500 TABLET, EXTENDED RELEASE ORAL 2 TIMES DAILY WITH MEALS
Qty: 180 TABLET | Refills: 1 | Status: SHIPPED | OUTPATIENT
Start: 2020-12-07 | End: 2021-05-20 | Stop reason: SDUPTHER

## 2020-12-07 RX ORDER — DAPAGLIFLOZIN 10 MG/1
1 TABLET, FILM COATED ORAL DAILY
Qty: 90 TABLET | Refills: 3 | Status: SHIPPED | OUTPATIENT
Start: 2020-12-07 | End: 2021-01-06 | Stop reason: SINTOL

## 2020-12-07 RX ORDER — SIMVASTATIN 20 MG
20 TABLET ORAL
Qty: 90 TABLET | Refills: 3 | Status: SHIPPED | OUTPATIENT
Start: 2020-12-07 | End: 2021-12-20 | Stop reason: SDUPTHER

## 2020-12-07 NOTE — PROGRESS NOTES
Patient Care Team:  Flaquito Wallace APRN as PCP - General (Family Medicine)     Chief complaint: Patient is in today for a physical     Patient is a 54 y.o. female who presents for her yearly physical exam.     HPI patient today for a yearly physical exam.  Patient does have a history of diabetes reports her blood glucose is anywhere from the 100s to the 200s.  Is higher depending upon what she eats.  She is currently on Farxiga 10 mg daily, Metformin 500 mg 2 tablets at bedtime.  When she tries to take more she has diarrhea.  She is also currently on losartan/hydrochlorthiazide 50/12.5 daily and simvastatin 20 mg daily.  For the past the past month she has not been taking simvastatin due to losing the medication.  She does currently see podiatry and is scheduled to see him this month.  She has noted occasional vaginal yeast infections over the past few months.  She had episode in August and September.  She currently does not check her blood pressure at home.  Her weight is down 3 pounds from the last visit.  She does report frequent gas with eating anything.  She has tried simethicone it does not seem to help.    Review of Systems   Constitutional: Positive for fatigue. Negative for appetite change, chills and fever.   HENT: Negative for congestion, ear pain, hearing loss, rhinorrhea, sinus pressure and sore throat.    Eyes: Negative for itching and visual disturbance (glasses).        Last ophthalmology appointment was this past August.   Respiratory: Negative for cough, shortness of breath and wheezing.    Cardiovascular: Negative for chest pain, palpitations and leg swelling.   Gastrointestinal: Positive for constipation ( Better with fiber cereals). Negative for abdominal pain, blood in stool, diarrhea, nausea and vomiting.   Endocrine: Positive for polydipsia and polyuria. Negative for cold intolerance, heat intolerance and polyphagia.   Genitourinary: Negative for difficulty urinating, dysuria and  hematuria.   Musculoskeletal: Positive for back pain ( From lifting her mother). Negative for arthralgias, joint swelling, myalgias and neck pain.   Skin: Negative for rash and wound.   Allergic/Immunologic: Positive for environmental allergies. Negative for food allergies.   Neurological: Negative for dizziness, light-headedness, numbness and headaches.   Hematological: Negative for adenopathy. Does not bruise/bleed easily.   Psychiatric/Behavioral: Negative for dysphoric mood and sleep disturbance. The patient is not nervous/anxious.       History  Past Medical History:   Diagnosis Date   • Allergic rhinitis    • BV (bacterial vaginosis)    • Cataracts, bilateral    • Class 1 obesity due to excess calories without serious comorbidity with body mass index (BMI) of 32.0 to 32.9 in adult 12/22/2017   • Condition not found     FREQUENCY;NOT SPECIFIED   • Diabetes mellitus, type 2 (CMS/HCC)    • Diverticulitis large intestine w/o perforation or abscess w/o bleeding 04/2017   • Endometriosis     STAGE 4 WITH LARGE LEFT OVARIAN ENDOMETRIOMA;EXTENSVIE DENSE LEFT ADNEXAL ADHESIONS TO SIDEWALL AND SIGMOID COLON. dEEP IMPLANTS LEFT ANTERIOR CULDESAC.   • Female cystocele    • Fibrocystic breast disease    • Glaucoma    • Hormone replacement therapy    • Hyperlipidemia    • Hypertension    • IBS (irritable bowel syndrome)    • Incontinence    • Insomnia    • Obesity (BMI 30.0-34.9)    • Rectocele     GRADE 2   • Screening breast examination     SELF;ADMITS   • Stress bladder incontinence, female    • Type 2 diabetes mellitus without complication, without long-term current use of insulin (CMS/HCC) 8/15/2018   • Urinary incontinence     H/O PRIOR BRANCH WITH GOOD SUPPORT;SEEING UROLOGY   • Vaginitis     UNKNOWN ETIOLOGY   • Varicose veins of legs       Past Surgical History:   Procedure Laterality Date   • BLADDER SUSPENSION  10/27/2007   • CYSTOCELE REPAIR     • CYSTOTOMY     • HYSTEROSCOPY     • OTHER SURGICAL HISTORY       RECTOCELE REPAIR   • TOTAL ABDOMINAL HYSTERECTOMY WITH SALPINGO OOPHORECTOMY  12/2013    LYSIS OF ADHESIONS FOR STAGE 4 ENDOMETRIOSIS      Allergies   Allergen Reactions   • Ace Inhibitors Cough   • Morphine And Related Rash      Family History   Problem Relation Age of Onset   • Diabetes Mother    • Dementia Mother    • Hypothyroidism Mother    • Hyperlipidemia Mother    • Breast cancer Mother    • Thyroid disease Mother    • ALS Father 70   • Diabetes Sister    • Hyperlipidemia Sister    • Hypothyroidism Sister    • Hypertension Brother    • Hypertension Sister    • Thyroid disease Sister      Social History     Socioeconomic History   • Marital status: Single     Spouse name: Not on file   • Number of children: Not on file   • Years of education: Not on file   • Highest education level: Not on file   Occupational History   • Occupation:    Tobacco Use   • Smoking status: Never Smoker   • Smokeless tobacco: Never Used   Substance and Sexual Activity   • Alcohol use: Yes     Alcohol/week: 1.0 standard drinks     Types: 1 Cans of beer per week     Comment: occasional   • Drug use: No   • Sexual activity: Yes     Partners: Male     Birth control/protection: Post-menopausal        Current Outpatient Medications:   •  Blood Glucose Monitoring Suppl (ACCU-CHEK VIKASH PLUS) w/Device kit, , Disp: , Rfl:   •  cetirizine (zyrTEC) 10 MG tablet, Take 1 tablet by mouth Daily., Disp: 30 tablet, Rfl: 2  •  Cholecalciferol (VITAMIN D3) 5000 UNITS capsule capsule, Take 5,000 Units by mouth Daily., Disp: , Rfl:   •  clindamycin (CLEOCIN) 2 % vaginal cream, , Disp: , Rfl: 0  •  clotrimazole-betamethasone (LOTRISONE) 1-0.05 % cream, , Disp: , Rfl:   •  cyclobenzaprine (FLEXERIL) 10 MG tablet, Take 1 tablet by mouth 2 (Two) Times a Day As Needed for Muscle Spasms., Disp: 90 tablet, Rfl: 1  •  Dapagliflozin Propanediol (Farxiga) 10 MG tablet, Take 10 mg by mouth Daily., Disp: 90 tablet, Rfl: 3  •  ESTRACE VAGINAL 0.1 MG/GM  vaginal cream, insert 1/2 gram vaginally three times a week, Disp: , Rfl: 0  •  estradiol (MINIVELLE, VIVELLE-DOT) 0.075 MG/24HR patch, , Disp: , Rfl: 0  •  folic acid (FOLVITE) 1 MG tablet, Take 1 tablet by mouth Daily., Disp: 90 tablet, Rfl: 3  •  glucose blood (Accu-Chek Joyce Plus) test strip, CHECK BLOOD SUGAR ONCE OR TWICE DAILY, Disp: 100 each, Rfl: 11  •  glucose monitor monitoring kit, 1 each 2 (Two) Times a Day., Disp: 1 each, Rfl: 0  •  LANCETS ULTRA THIN misc, 1 each 2 (Two) Times a Day., Disp: 100 each, Rfl: 11  •  latanoprost (XALATAN) 0.005 % ophthalmic solution, , Disp: , Rfl: 1  •  Loratadine (CLARITIN) 10 MG capsule, Take 1 tablet by mouth., Disp: , Rfl:   •  losartan-hydrochlorothiazide (HYZAAR) 50-12.5 MG per tablet, Take 1 tablet by mouth Daily., Disp: 90 tablet, Rfl: 3  •  metFORMIN ER (GLUCOPHAGE-XR) 500 MG 24 hr tablet, Take 1 tablet by mouth 2 (Two) Times a Day With Meals., Disp: 180 tablet, Rfl: 1  •  simethicone (Gas-X) 80 MG chewable tablet, Chew 1 tablet Every 6 (Six) Hours As Needed for Flatulence., Disp: 30 tablet, Rfl: 5  •  triamcinolone (KENALOG) 0.5 % ointment, Apply  topically to the appropriate area as directed 2 (Two) Times a Day., Disp: 30 g, Rfl: 1  •  fluticasone (FLONASE) 50 MCG/ACT nasal spray, 2 sprays into each nostril Daily., Disp: 1 bottle, Rfl: 5  •  saccharomyces boulardii (Florastor) 250 MG capsule, Take 1 capsule by mouth Daily., Disp: , Rfl:   •  simvastatin (ZOCOR) 20 MG tablet, Take 1 tablet by mouth every night at bedtime., Disp: 90 tablet, Rfl: 3    Immunizations   N/A   Prescribed/Refused   Date     Td/Tdap  (Booster Q 10 yrs)   [x]           Prescribed    []     Refused        []           Flu  (Yearly)   [x]        Prescribed    []     Refused        []           Pneumovax  (1 yr after Prevnar)   []        Prescribed    [x]     Refused        []           Prevnar 13  (1 yr after Pneumo)   [x]        Prescribed    []     Refused        []           Hep B      [x]        Prescribed    []     Refused        []           Shingles  (Age 50 and older)   []        Prescribed    [x]     Refused        []           Immunization History   Administered Date(s) Administered   • Flu Vaccine Intradermal Quad 18-64YR 10/03/2019   • Flu Vaccine Quad PF >18YRS 09/22/2018   • Flu Vaccine Quad PF >36MO 08/26/2017, 09/15/2019   • Flulaval/Fluarix/Fluzone Quad 09/06/2020   • Hepatitis A 09/22/2018, 03/23/2019   • Influenza Quad Vaccine (Inpatient) 09/11/2016   • Influenza Whole 08/01/2016   • Influenza, Unspecified 08/26/2017   • Pneumococcal Conjugate 13-Valent (PCV13) 08/26/2017   • Td 06/03/2002, 11/15/2017   • Tdap 01/19/2012   • flucelvax quad pfs =>4 YRS 10/03/2019     Health Maintenance   Topic Date Due   • Hepatitis B (1 of 3 - Risk 3-dose series) 12/19/1985   • ZOSTER VACCINE (1 of 2) 12/19/2016   • DIABETIC FOOT EXAM  07/18/2017   • Pneumococcal Vaccine 0-64 (1 of 1 - PPSV23) 10/21/2017   • DIABETIC EYE EXAM  10/15/2020   • LIPID PANEL  11/05/2020   • URINE MICROALBUMIN  11/05/2020   • ANNUAL PHYSICAL  11/06/2020   • HEMOGLOBIN A1C  03/02/2021   • PAP SMEAR  06/01/2021   • MAMMOGRAM  10/16/2022   • COLONOSCOPY  01/06/2025   • TDAP/TD VACCINES (4 - Td) 11/15/2027   • HEPATITIS C SCREENING  Completed   • INFLUENZA VACCINE  Completed        Diabetes  [x] Yes  [] No   N/A      Date     Eye Exam     []             [x]   Complete     []   Recommended Date:  Where:       Foot Exam     []         [x]   Complete          Obesity Counseling     []       [x]   Complete       Hemoglobin A1C   Date Value Ref Range Status   12/07/2020 5.9 % Final   11/05/2019 6.80 (H) 4.80 - 5.60 % Final     Microalbumin, Urine   Date Value Ref Range Status   11/05/2019 <1.2 mg/dL Final       Additional Testing      Date     Colorectal Screening       []   N/A   [x]   Complete    []   Ordered     Date:    Where:       Pap      []   N/A   []   Complete   [x]   OB/GYN Date:    Where:       Mammogram        []    "N/A   [x]   Complete   []   Ordered Date:    Where:     PSA  (Over age 50)    [x]   N/A   []   Complete   []   Ordered Date:    Where:     US Aorta  (For male smokers, age 65)     [x]   N/A   []   Complete   []   Ordered Date:    Where:     CT for Smoker  (Age 55-75, 30 pk yr)    [x]   N/A   []   Complete   []   Ordered Date:    Where:     Bone Density/DEXA      [x]   N/A   []   Complete   []   Ordered Date:    Follow-up:     Hep. C  ( 1158-1481)      []   N/A   [x]   Complete   []   Ordered Date:    Where:     Results for orders placed or performed in visit on 20   POC Glycosylated Hemoglobin (Hb A1C)    Specimen: Blood   Result Value Ref Range    Hemoglobin A1C 5.9 %            /60   Pulse 78   Temp 97.1 °F (36.2 °C)   Ht 148.6 cm (58.5\")   Wt 61.2 kg (135 lb)   SpO2 98%   BMI 27.73 kg/m²       Physical Exam  Vitals signs reviewed.   Constitutional:       Appearance: She is well-developed.   HENT:      Head: Normocephalic and atraumatic.      Right Ear: External ear normal.      Left Ear: External ear normal.   Eyes:      Pupils: Pupils are equal, round, and reactive to light.   Neck:      Musculoskeletal: Normal range of motion and neck supple.      Thyroid: No thyromegaly.      Vascular: No JVD.   Cardiovascular:      Rate and Rhythm: Normal rate and regular rhythm.      Heart sounds: Normal heart sounds.   Pulmonary:      Effort: Pulmonary effort is normal. No retractions.      Breath sounds: Normal breath sounds.   Chest:      Chest wall: No mass, lacerations, deformity, swelling, tenderness, crepitus or edema.      Breasts: Breasts are symmetrical.     Abdominal:      General: Bowel sounds are normal. There is no distension.      Palpations: Abdomen is soft.      Tenderness: There is no abdominal tenderness. There is no guarding.   Genitourinary:     Comments: deferred  Musculoskeletal: Normal range of motion.   Lymphadenopathy:      Cervical: No cervical adenopathy.   Skin:     General: " Skin is warm and dry.      Findings: No erythema or rash.   Neurological:      Mental Status: She is alert and oriented to person, place, and time.   Psychiatric:         Behavior: Behavior normal.             Counseling provided on weight management, hypertension and diabetes.    Diagnoses and all orders for this visit:    Wellness examination  -     Comprehensive Metabolic Panel; Future  -     Lipid Panel; Future  -     Vitamin D 25 Hydroxy; Future  -     CBC & Differential; Future    Type 2 diabetes mellitus with hyperglycemia, without long-term current use of insulin (CMS/[facility])  -     metFORMIN ER (GLUCOPHAGE-XR) 500 MG 24 hr tablet; Take 1 tablet by mouth 2 (Two) Times a Day With Meals.  -     POC Glycosylated Hemoglobin (Hb A1C)    Essential hypertension  -     Comprehensive Metabolic Panel; Future  -     losartan-hydrochlorothiazide (HYZAAR) 50-12.5 MG per tablet; Take 1 tablet by mouth Daily.    Mixed hyperlipidemia  -     Lipid Panel; Future  -     simvastatin (ZOCOR) 20 MG tablet; Take 1 tablet by mouth every night at bedtime.    Type 2 diabetes mellitus with diabetic autonomic neuropathy, without long-term current use of insulin (CMS/[facility])  -     POC Glycosylated Hemoglobin (Hb A1C)  -     Comprehensive Metabolic Panel; Future    Anemia, unspecified type  -     CBC & Differential; Future  -     folic acid (FOLVITE) 1 MG tablet; Take 1 tablet by mouth Daily.    Vitamin D deficiency  -     Vitamin D 25 Hydroxy; Future    Type 2 diabetes mellitus without complication, without long-term current use of insulin (CMS/[facility])  -     Dapagliflozin Propanediol (Farxiga) 10 MG tablet; Take 10 mg by mouth Daily.    Flatulence  -     saccharomyces boulardii (Florastor) 250 MG capsule; Take 1 capsule by mouth Daily.    Other orders  -     Cancel: Pneumococcal Polysaccharide Vaccine 23-Valent Greater Than or Equal To 1yo Subcutaneous / IM    The wellness exam has been reviewed in detail.  The patient has been fully counseled  on preventative guidelines for vaccines, cancer screenings, and other health maintenance needs.  Functional testing has been performed to assess capacity for independent living and need for other medical interventions.   The patient was counseled on maintaining a lifestyle to promote good health and to minimize chronic diseases.  The patient has been assisted with scheduling healthcare procedures for the coming year and given a written document outlining these recommendations.     A1c today is 5.9.  Discussed diabetes diet, Watch carb intake and checking blood sugar as instructed. Record and bring to next visit. Reminded about need for yearly eye exam and foot care. Discussed need for exercise to improve glucose control and overall health.  Patient advised to eat a heart healthy diet and get regular aerobic exercise.    Patient instructed to check blood pressure daily, record, and bring to next visit. If the readings run 140/90 or greater, the patient is to call my office or return sooner for evaluation. Pt advised to eat a heart healthy diet and get regular aerobic exercise.    Discussed need for weight management. Discussed weight loss with diet, recommend Weight Watchers or Mediterranean Diet, but stated that whatever method works for this patient is best. Reviewed need for regular exercise.  The patient agrees with all recommendations at this time. I have discussed a weight loss plan to be determined by this patient.     Discussed with patient need for pneumonia vaccination she will get at her next visit.    RV- 3 mo    JERRICA Eckert   12/7/2020   09:42 EST

## 2020-12-07 NOTE — PATIENT INSTRUCTIONS
"Carbohydrate Counting for Diabetes Mellitus, Adult    Carbohydrate counting is a method of keeping track of how many carbohydrates you eat. Eating carbohydrates naturally increases the amount of sugar (glucose) in the blood. Counting how many carbohydrates you eat helps keep your blood glucose within normal limits, which helps you manage your diabetes (diabetes mellitus).  It is important to know how many carbohydrates you can safely have in each meal. This is different for every person. A diet and nutrition specialist (registered dietitian) can help you make a meal plan and calculate how many carbohydrates you should have at each meal and snack.  Carbohydrates are found in the following foods:  · Grains, such as breads and cereals.  · Dried beans and soy products.  · Starchy vegetables, such as potatoes, peas, and corn.  · Fruit and fruit juices.  · Milk and yogurt.  · Sweets and snack foods, such as cake, cookies, candy, chips, and soft drinks.  How do I count carbohydrates?  There are two ways to count carbohydrates in food. You can use either of the methods or a combination of both.  Reading \"Nutrition Facts\" on packaged food  The \"Nutrition Facts\" list is included on the labels of almost all packaged foods and beverages in the [place] It includes:  · The serving size.  · Information about nutrients in each serving, including the grams (g) of carbohydrate per serving.  To use the “Nutrition Facts\":  · Decide how many servings you will have.  · Multiply the number of servings by the number of carbohydrates per serving.  · The resulting number is the total amount of carbohydrates that you will be having.  Learning standard serving sizes of other foods  When you eat carbohydrate foods that are not packaged or do not include \"Nutrition Facts\" on the label, you need to measure the servings in order to count the amount of carbohydrates:  · Measure the foods that you will eat with a food scale or measuring cup, if " needed.  · Decide how many standard-size servings you will eat.  · Multiply the number of servings by 15. Most carbohydrate-rich foods have about 15 g of carbohydrates per serving.  ? For example, if you eat 8 oz (170 g) of strawberries, you will have eaten 2 servings and 30 g of carbohydrates (2 servings x 15 g = 30 g).  · For foods that have more than one food mixed, such as soups and casseroles, you must count the carbohydrates in each food that is included.  The following list contains standard serving sizes of common carbohydrate-rich foods. Each of these servings has about 15 g of carbohydrates:  · ½ hamburger bun or ½ English muffin.  · ½ oz (15 mL) syrup.  · ½ oz (14 g) jelly.  · 1 slice of bread.  · 1 six-inch tortilla.  · 3 oz (85 g) cooked rice or pasta.  · 4 oz (113 g) cooked dried beans.  · 4 oz (113 g) starchy vegetable, such as peas, corn, or potatoes.  · 4 oz (113 g) hot cereal.  · 4 oz (113 g) mashed potatoes or ¼ of a large baked potato.  · 4 oz (113 g) canned or frozen fruit.  · 4 oz (120 mL) fruit juice.  · 4-6 crackers.  · 6 chicken nuggets.  · 6 oz (170 g) unsweetened dry cereal.  · 6 oz (170 g) plain fat-free yogurt or yogurt sweetened with artificial sweeteners.  · 8 oz (240 mL) milk.  · 8 oz (170 g) fresh fruit or one small piece of fruit.  · 24 oz (680 g) popped popcorn.  Example of carbohydrate counting  Sample meal  · 3 oz (85 g) chicken breast.  · 6 oz (170 g) brown rice.  · 4 oz (113 g) corn.  · 8 oz (240 mL) milk.  · 8 oz (170 g) strawberries with sugar-free whipped topping.  Carbohydrate calculation  1. Identify the foods that contain carbohydrates:  ? Rice.  ? Corn.  ? Milk.  ? Strawberries.  2. Calculate how many servings you have of each food:  ? 2 servings rice.  ? 1 serving corn.  ? 1 serving milk.  ? 1 serving strawberries.  3. Multiply each number of servings by 15 g:  ? 2 servings rice x 15 g = 30 g.  ? 1 serving corn x 15 g = 15 g.  ? 1 serving milk x 15 g = 15 g.  ? 1  serving strawberries x 15 g = 15 g.  4. Add together all of the amounts to find the total grams of carbohydrates eaten:  ? 30 g + 15 g + 15 g + 15 g = 75 g of carbohydrates total.  Summary  · Carbohydrate counting is a method of keeping track of how many carbohydrates you eat.  · Eating carbohydrates naturally increases the amount of sugar (glucose) in the blood.  · Counting how many carbohydrates you eat helps keep your blood glucose within normal limits, which helps you manage your diabetes.  · A diet and nutrition specialist (registered dietitian) can help you make a meal plan and calculate how many carbohydrates you should have at each meal and snack.  This information is not intended to replace advice given to you by your health care provider. Make sure you discuss any questions you have with your health care provider.  Document Revised: 07/12/2018 Document Reviewed: 05/31/2017  [employer] Patient Education © 2020 [employer]      Obesity, Adult  Obesity is the condition of having too much total body fat. Being overweight or obese means that your weight is greater than what is considered healthy for your body size. Obesity is determined by a measurement called BMI. BMI is an estimate of body fat and is calculated from height and weight. For adults, a BMI of 30 or higher is considered obese.  Obesity can lead to other health concerns and major illnesses, including:  · Stroke.  · Coronary artery disease (CAD).  · Type 2 diabetes.  · Some types of cancer, including cancers of the colon, breast, uterus, and gallbladder.  · Osteoarthritis.  · High blood pressure (hypertension).  · High cholesterol.  · Sleep apnea.  · Gallbladder stones.  · Infertility problems.  What are the causes?  Common causes of this condition include:  · Eating daily meals that are high in calories, sugar, and fat.  · Being born with genes that may make you more likely to become obese.  · Having a medical condition that causes obesity,  including:  ? Hypothyroidism.  ? Polycystic ovarian syndrome (PCOS).  ? Binge-eating disorder.  ? Cushing syndrome.  · Taking certain medicines, such as steroids, antidepressants, and seizure medicines.  · Not being physically active (sedentary lifestyle).  · Not getting enough sleep.  · Drinking high amounts of sugar-sweetened beverages, such as soft drinks.  What increases the risk?  The following factors may make you more likely to develop this condition:  · Having a family history of obesity.  · Being a woman of  descent.  · Being a man of  descent.  · Living in an area with limited access to:  ? Stock, recreation centers, or sidewalks.  ? Healthy food choices, such as grocery stores and [employer] markets.  What are the signs or symptoms?  The main sign of this condition is having too much body fat.  How is this diagnosed?  This condition is diagnosed based on:  · Your BMI. If you are an adult with a BMI of 30 or higher, you are considered obese.  · Your waist circumference. This measures the distance around your waistline.  · Your skinfold thickness. Your health care provider may gently pinch a fold of your skin and measure it.  You may have other tests to check for underlying conditions.  How is this treated?  Treatment for this condition often includes changing your lifestyle. Treatment may include some or all of the following:  · Dietary changes. This may include developing a healthy meal plan.  · Regular physical activity. This may include activity that causes your heart to beat faster (aerobic exercise) and strength training. Work with your health care provider to design an exercise program that works for you.  · Medicine to help you lose weight if you are unable to lose 1 pound a week after 6 weeks of healthy eating and more physical activity.  · Treating conditions that cause the obesity (underlying conditions).  · Surgery. Surgical options may include gastric banding and gastric  bypass. Surgery may be done if:  ? Other treatments have not helped to improve your condition.  ? You have a BMI of 40 or higher.  ? You have life-threatening health problems related to obesity.  Follow these instructions at home:  Eating and drinking    · Follow recommendations from your health care provider about what you eat and drink. Your health care provider may advise you to:  ? Limit fast food, sweets, and processed snack foods.  ? Choose low-fat options, such as low-fat milk instead of whole milk.  ? Eat 5 or more servings of fruits or vegetables every day.  ? Eat at home more often. This gives you more control over what you eat.  ? Choose healthy foods when you eat out.  ? Learn to read food labels. This will help you understand how much food is considered 1 serving.  ? Learn what a healthy serving size is.  ? Keep low-fat snacks available.  ? Limit sugary drinks, such as soda, fruit juice, sweetened iced tea, and flavored milk.  · Drink enough water to keep your urine pale yellow.  · Do not follow a fad diet. Fad diets can be unhealthy and even dangerous.  Physical activity  · Exercise regularly, as told by your health care provider.  ? Most adults should get up to 150 minutes of moderate-intensity exercise every week.  ? Ask your health care provider what types of exercise are safe for you and how often you should exercise.  · Warm up and stretch before being active.  · Cool down and stretch after being active.  · Rest between periods of activity.  Lifestyle  · Work with your health care provider and a dietitian to set a weight-loss goal that is healthy and reasonable for you.  · Limit your screen time.  · Find ways to reward yourself that do not involve food.  · Do not drink alcohol if:  ? Your health care provider tells you not to drink.  ? You are pregnant, may be pregnant, or are planning to become pregnant.  · If you drink alcohol:  ? Limit how much you use to:  § 0-1 drink a day for women.  § 0-2  drinks a day for men.  ? Be aware of how much alcohol is in your drink. In the [place] one drink equals one 12 oz bottle of beer (355 mL), one 5 oz glass of wine (148 mL), or one 1½ oz glass of hard liquor (44 mL).  General instructions  · Keep a weight-loss journal to keep track of the food you eat and how much exercise you get.  · Take over-the-counter and prescription medicines only as told by your health care provider.  · Take vitamins and supplements only as told by your health care provider.  · Consider joining a support group. Your health care provider may be able to recommend a support group.  · Keep all follow-up visits as told by your health care provider. This is important.  Contact a health care provider if:  · You are unable to meet your weight loss goal after 6 weeks of dietary and lifestyle changes.  Get help right away if you are having:  · Trouble breathing.  · Suicidal thoughts or behaviors.  Summary  · Obesity is the condition of having too much total body fat.  · Being overweight or obese means that your weight is greater than what is considered healthy for your body size.  · Work with your health care provider and a dietitian to set a weight-loss goal that is healthy and reasonable for you.  · Exercise regularly, as told by your health care provider. Ask your health care provider what types of exercise are safe for you and how often you should exercise.  This information is not intended to replace advice given to you by your health care provider. Make sure you discuss any questions you have with your health care provider.  Document Revised: 08/22/2019 Document Reviewed: 08/22/2019  [employer] Patient Education © 2020 [employer]      Managing Your Hypertension  Hypertension is commonly called high blood pressure. This is when the force of your blood pressing against the walls of your arteries is too strong. Arteries are blood vessels that carry blood from your heart throughout your body.  "Hypertension forces the heart to work harder to pump blood, and may cause the arteries to become narrow or stiff. Having untreated or uncontrolled hypertension can cause heart attack, stroke, kidney disease, and other problems.  What are blood pressure readings?  A blood pressure reading consists of a higher number over a lower number. Ideally, your blood pressure should be below 120/80. The first (\"top\") number is called the systolic pressure. It is a measure of the pressure in your arteries as your heart beats. The second (\"bottom\") number is called the diastolic pressure. It is a measure of the pressure in your arteries as the heart relaxes.  What does my blood pressure reading mean?  Blood pressure is classified into four stages. Based on your blood pressure reading, your health care provider may use the following stages to determine what type of treatment you need, if any. Systolic pressure and diastolic pressure are measured in a unit called mm Hg.  Normal  · Systolic pressure: below 120.  · Diastolic pressure: below 80.  Elevated  · Systolic pressure: 120-129.  · Diastolic pressure: below 80.  Hypertension stage 1  · Systolic pressure: 130-139.  · Diastolic pressure: 80-89.  Hypertension stage 2  · Systolic pressure: 140 or above.  · Diastolic pressure: 90 or above.  What health risks are associated with hypertension?  Managing your hypertension is an important responsibility. Uncontrolled hypertension can lead to:  · A heart attack.  · A stroke.  · A weakened blood vessel (aneurysm).  · Heart failure.  · Kidney damage.  · Eye damage.  · Metabolic syndrome.  · Memory and concentration problems.  What changes can I make to manage my hypertension?  Hypertension can be managed by making lifestyle changes and possibly by taking medicines. Your health care provider will help you make a plan to bring your blood pressure within a normal range.  Eating and drinking    · Eat a diet that is high in fiber and potassium, " and low in salt (sodium), added sugar, and fat. An example eating plan is called the DASH (Dietary Approaches to Stop Hypertension) diet. To eat this way:  ? Eat plenty of fresh fruits and vegetables. Try to fill half of your plate at each meal with fruits and vegetables.  ? Eat whole grains, such as whole wheat pasta, brown rice, or whole grain bread. Fill about one quarter of your plate with whole grains.  ? Eat low-fat diary products.  ? Avoid fatty cuts of meat, processed or cured meats, and poultry with skin. Fill about one quarter of your plate with lean proteins such as fish, chicken without skin, beans, eggs, and tofu.  ? Avoid premade and processed foods. These tend to be higher in sodium, added sugar, and fat.  · Reduce your daily sodium intake. Most people with hypertension should eat less than 1,500 mg of sodium a day.  · Limit alcohol intake to no more than 1 drink a day for nonpregnant women and 2 drinks a day for men. One drink equals 12 oz of beer, 5 oz of wine, or 1½ oz of hard liquor.  Lifestyle  · Work with your health care provider to maintain a healthy body weight, or to lose weight. Ask what an ideal weight is for you.  · Get at least 30 minutes of exercise that causes your heart to beat faster (aerobic exercise) most days of the week. Activities may include walking, swimming, or biking.  · Include exercise to strengthen your muscles (resistance exercise), such as weight lifting, as part of your weekly exercise routine. Try to do these types of exercises for 30 minutes at least 3 days a week.  · Do not use any products that contain nicotine or tobacco, such as cigarettes and e-cigarettes. If you need help quitting, ask your health care provider.  · Control any long-term (chronic) conditions you have, such as high cholesterol or diabetes.  Monitoring  · Monitor your blood pressure at home as told by your health care provider. Your personal target blood pressure may vary depending on your medical  conditions, your age, and other factors.  · Have your blood pressure checked regularly, as often as told by your health care provider.  Working with your health care provider  · Review all the medicines you take with your health care provider because there may be side effects or interactions.  · Talk with your health care provider about your diet, exercise habits, and other lifestyle factors that may be contributing to hypertension.  · Visit your health care provider regularly. Your health care provider can help you create and adjust your plan for managing hypertension.  Will I need medicine to control my blood pressure?  Your health care provider may prescribe medicine if lifestyle changes are not enough to get your blood pressure under control, and if:  · Your systolic blood pressure is 130 or higher.  · Your diastolic blood pressure is 80 or higher.  Take medicines only as told by your health care provider. Follow the directions carefully. Blood pressure medicines must be taken as prescribed. The medicine does not work as well when you skip doses. Skipping doses also puts you at risk for problems.  Contact a health care provider if:  · You think you are having a reaction to medicines you have taken.  · You have repeated (recurrent) headaches.  · You feel dizzy.  · You have swelling in your ankles.  · You have trouble with your vision.  Get help right away if:  · You develop a severe headache or confusion.  · You have unusual weakness or numbness, or you feel faint.  · You have severe pain in your chest or abdomen.  · You vomit repeatedly.  · You have trouble breathing.  Summary  · Hypertension is when the force of blood pumping through your arteries is too strong. If this condition is not controlled, it may put you at risk for serious complications.  · Your personal target blood pressure may vary depending on your medical conditions, your age, and other factors. For most people, a normal blood pressure is less  "than 120/80.  · Hypertension is managed by lifestyle changes, medicines, or both. Lifestyle changes include weight loss, eating a healthy, low-sodium diet, exercising more, and limiting alcohol.  This information is not intended to replace advice given to you by your health care provider. Make sure you discuss any questions you have with your health care provider.  Document Revised: 04/10/2020 Document Reviewed: 11/15/2017  [employer] Patient Education © 2020 [employer]      DASH Eating Plan  DASH stands for \"Dietary Approaches to Stop Hypertension.\" The DASH eating plan is a healthy eating plan that has been shown to reduce high blood pressure (hypertension). It may also reduce your risk for type 2 diabetes, heart disease, and stroke. The DASH eating plan may also help with weight loss.  What are tips for following this plan?    General guidelines  · Avoid eating more than 2,300 mg (milligrams) of salt (sodium) a day. If you have hypertension, you may need to reduce your sodium intake to 1,500 mg a day.  · Limit alcohol intake to no more than 1 drink a day for nonpregnant women and 2 drinks a day for men. One drink equals 12 oz of beer, 5 oz of wine, or 1½ oz of hard liquor.  · Work with your health care provider to maintain a healthy body weight or to lose weight. Ask what an ideal weight is for you.  · Get at least 30 minutes of exercise that causes your heart to beat faster (aerobic exercise) most days of the week. Activities may include walking, swimming, or biking.  · Work with your health care provider or diet and nutrition specialist (dietitian) to adjust your eating plan to your individual calorie needs.  Reading food labels    · Check food labels for the amount of sodium per serving. Choose foods with less than 5 percent of the Daily Value of sodium. Generally, foods with less than 300 mg of sodium per serving fit into this eating plan.  · To find whole grains, look for the word \"whole\" as the first word " "in the ingredient list.  Shopping  · Buy products labeled as \"low-sodium\" or \"no salt added.\"  · Buy fresh foods. Avoid canned foods and premade or frozen meals.  Cooking  · Avoid adding salt when cooking. Use salt-free seasonings or herbs instead of table salt or sea salt. Check with your health care provider or pharmacist before using salt substitutes.  · Do not redman foods. Cook foods using healthy methods such as baking, boiling, grilling, and broiling instead.  · Cook with heart-healthy oils, such as olive, canola, soybean, or sunflower oil.  Meal planning  · Eat a balanced diet that includes:  ? 5 or more servings of fruits and vegetables each day. At each meal, try to fill half of your plate with fruits and vegetables.  ? Up to 6-8 servings of whole grains each day.  ? Less than 6 oz of lean meat, poultry, or fish each day. A 3-oz serving of meat is about the same size as a deck of cards. One egg equals 1 oz.  ? 2 servings of low-fat dairy each day.  ? A serving of nuts, seeds, or beans 5 times each week.  ? Heart-healthy fats. Healthy fats called Omega-3 fatty acids are found in foods such as flaxseeds and coldwater fish, like sardines, salmon, and mackerel.  · Limit how much you eat of the following:  ? Canned or prepackaged foods.  ? Food that is high in trans fat, such as fried foods.  ? Food that is high in saturated fat, such as fatty meat.  ? Sweets, desserts, sugary drinks, and other foods with added sugar.  ? Full-fat dairy products.  · Do not salt foods before eating.  · Try to eat at least 2 vegetarian meals each week.  · Eat more home-cooked food and less restaurant, buffet, and fast food.  · When eating at a restaurant, ask that your food be prepared with less salt or no salt, if possible.  What foods are recommended?  The items listed may not be a complete list. Talk with your dietitian about what dietary choices are best for you.  Grains  Whole-grain or whole-wheat bread. Whole-grain or " whole-wheat pasta. Brown rice. Oatmeal. Quinoa. Bulgur. Whole-grain and low-sodium cereals. Valorie bread. Low-fat, low-sodium crackers. Whole-wheat flour tortillas.  Vegetables  Fresh or frozen vegetables (raw, steamed, roasted, or grilled). Low-sodium or reduced-sodium tomato and vegetable juice. Low-sodium or reduced-sodium tomato sauce and tomato paste. Low-sodium or reduced-sodium canned vegetables.  Fruits  All fresh, dried, or frozen fruit. Canned fruit in natural juice (without added sugar).  Meat and other protein foods  Skinless chicken or turkey. Ground chicken or turkey. Pork with fat trimmed off. Fish and seafood. Egg whites. Dried beans, peas, or lentils. Unsalted nuts, nut butters, and seeds. Unsalted canned beans. Lean cuts of beef with fat trimmed off. Low-sodium, lean deli meat.  Dairy  Low-fat (1%) or fat-free (skim) milk. Fat-free, low-fat, or reduced-fat cheeses. Nonfat, low-sodium ricotta or cottage cheese. Low-fat or nonfat yogurt. Low-fat, low-sodium cheese.  Fats and oils  Soft margarine without trans fats. Vegetable oil. Low-fat, reduced-fat, or light mayonnaise and salad dressings (reduced-sodium). Canola, safflower, olive, soybean, and sunflower oils. Avocado.  Seasoning and other foods  Herbs. Spices. Seasoning mixes without salt. Unsalted popcorn and pretzels. Fat-free sweets.  What foods are not recommended?  The items listed may not be a complete list. Talk with your dietitian about what dietary choices are best for you.  Grains  Baked goods made with fat, such as croissants, muffins, or some breads. Dry pasta or rice meal packs.  Vegetables  Creamed or fried vegetables. Vegetables in a cheese sauce. Regular canned vegetables (not low-sodium or reduced-sodium). Regular canned tomato sauce and paste (not low-sodium or reduced-sodium). Regular tomato and vegetable juice (not low-sodium or reduced-sodium). Pickles. Olives.  Fruits  Canned fruit in a light or heavy syrup. Fried fruit. Fruit  in cream or butter sauce.  Meat and other protein foods  Fatty cuts of meat. Ribs. Fried meat. Miller. Sausage. Bologna and other processed lunch meats. Salami. Fatback. Hotdogs. Bratwurst. Salted nuts and seeds. Canned beans with added salt. Canned or smoked fish. Whole eggs or egg yolks. Chicken or turkey with skin.  Dairy  Whole or 2% milk, cream, and half-and-half. Whole or full-fat cream cheese. Whole-fat or sweetened yogurt. Full-fat cheese. Nondairy creamers. Whipped toppings. Processed cheese and cheese spreads.  Fats and oils  Butter. Stick margarine. Lard. Shortening. Ghee. Miller fat. Tropical oils, such as coconut, palm kernel, or palm oil.  Seasoning and other foods  Salted popcorn and pretzels. Onion salt, garlic salt, seasoned salt, table salt, and sea salt. Worcestershire sauce. Tartar sauce. Barbecue sauce. Teriyaki sauce. Soy sauce, including reduced-sodium. Steak sauce. Canned and packaged gravies. Fish sauce. Oyster sauce. Cocktail sauce. Horseradish that you find on the shelf. Ketchup. Mustard. Meat flavorings and tenderizers. Bouillon cubes. Hot sauce and Tabasco sauce. Premade or packaged marinades. Premade or packaged taco seasonings. Relishes. Regular salad dressings.  Where to find more information:  · National Heart, Lung, and Blood [place]mer: www.nhlbi.nih.gov  · American Heart Association: www.heart.org  Summary  · The DASH eating plan is a healthy eating plan that has been shown to reduce high blood pressure (hypertension). It may also reduce your risk for type 2 diabetes, heart disease, and stroke.  · With the DASH eating plan, you should limit salt (sodium) intake to 2,300 mg a day. If you have hypertension, you may need to reduce your sodium intake to 1,500 mg a day.  · When on the DASH eating plan, aim to eat more fresh fruits and vegetables, whole grains, lean proteins, low-fat dairy, and heart-healthy fats.  · Work with your health care provider or diet and nutrition specialist  (dietitian) to adjust your eating plan to your individual calorie needs.  This information is not intended to replace advice given to you by your health care provider. Make sure you discuss any questions you have with your health care provider.  Document Revised: 11/30/2018 Document Reviewed: 12/11/2017  Elsevier Patient Education © 2020 Elsevier Inc.

## 2020-12-08 LAB — 25(OH)D3 SERPL-MCNC: 36.9 NG/ML (ref 30–100)

## 2020-12-09 ENCOUNTER — TELEPHONE (OUTPATIENT)
Dept: FAMILY MEDICINE CLINIC | Facility: CLINIC | Age: 55
End: 2020-12-09

## 2020-12-09 LAB
ALBUMIN/CREAT UR: <11 MG/G CREAT (ref 0–29)
CREAT UR-MCNC: 27.3 MG/DL
MICROALBUMIN UR-MCNC: <3 UG/ML

## 2020-12-29 NOTE — TELEPHONE ENCOUNTER
Caller: [facility][id number] - [place], [place] - [address] [facility] [address] - [phone]  - [phone] FX    Relationship: Pharmacy - FABIO    Best call back number: [phone]    Medication needed:   Requested Prescriptions     Pending Prescriptions Disp Refills   • cetirizine (zyrTEC) 10 MG tablet 30 tablet 2     Sig: Take 1 tablet by mouth Daily.       When do you need the refill by: ASAP    What details did the patient provide when requesting the medication: OUT    Does the patient have less than a 3 day supply:  [x] Yes  [] No    What is the patient's preferred pharmacy: [facility] #[id number] - [place], [place] - [address] [facility]  [address] [place] - [phone]  - [phone] FX

## 2020-12-29 NOTE — TELEPHONE ENCOUNTER
Caller: Nelli Morales    Relationship: Self    Best call back number: [phone]    Medication needed:   Requested Prescriptions     Pending Prescriptions Disp Refills   • cetirizine (zyrTEC) 10 MG tablet 30 tablet 2     Sig: Take 1 tablet by mouth Daily.       When do you need the refill by: 12/29/2020    What details did the patient provide when requesting the medication: Patient is out of medication    Does the patient have less than a 3 day supply:  [x] Yes  [] No    What is the patient's preferred pharmacy: [facility] #[id number] - [place], [place] - [address] AT [facility] [address] [place] - [phone] [facility] [phone] FX

## 2020-12-30 RX ORDER — CETIRIZINE HYDROCHLORIDE 10 MG/1
10 TABLET ORAL DAILY
Qty: 30 TABLET | Refills: 11 | Status: SHIPPED | OUTPATIENT
Start: 2020-12-30 | End: 2020-12-31 | Stop reason: SDUPTHER

## 2020-12-31 ENCOUNTER — OFFICE VISIT (OUTPATIENT)
Dept: FAMILY MEDICINE CLINIC | Facility: CLINIC | Age: 55
End: 2020-12-31

## 2020-12-31 VITALS
SYSTOLIC BLOOD PRESSURE: 104 MMHG | HEART RATE: 84 BPM | BODY MASS INDEX: 27.46 KG/M2 | HEIGHT: 59 IN | OXYGEN SATURATION: 98 % | DIASTOLIC BLOOD PRESSURE: 74 MMHG | WEIGHT: 136.2 LBS

## 2020-12-31 DIAGNOSIS — E11.65 TYPE 2 DIABETES MELLITUS WITH HYPERGLYCEMIA, WITHOUT LONG-TERM CURRENT USE OF INSULIN (HCC): ICD-10-CM

## 2020-12-31 DIAGNOSIS — H69.83 DYSFUNCTION OF BOTH EUSTACHIAN TUBES: Primary | ICD-10-CM

## 2020-12-31 DIAGNOSIS — L24.7 IRRITANT CONTACT DERMATITIS DUE TO PLANTS, EXCEPT FOOD: ICD-10-CM

## 2020-12-31 DIAGNOSIS — B37.31 VAGINAL YEAST INFECTION: ICD-10-CM

## 2020-12-31 PROCEDURE — 99214 OFFICE O/P EST MOD 30 MIN: CPT | Performed by: NURSE PRACTITIONER

## 2020-12-31 RX ORDER — FLUTICASONE PROPIONATE 50 MCG
2 SPRAY, SUSPENSION (ML) NASAL DAILY
Qty: 1 BOTTLE | Refills: 5 | Status: SHIPPED | OUTPATIENT
Start: 2020-12-31 | End: 2022-05-31 | Stop reason: SDUPTHER

## 2020-12-31 RX ORDER — CETIRIZINE HYDROCHLORIDE 10 MG/1
10 TABLET ORAL DAILY
Qty: 30 TABLET | Refills: 11 | Status: SHIPPED | OUTPATIENT
Start: 2020-12-31 | End: 2021-12-20 | Stop reason: SDUPTHER

## 2020-12-31 RX ORDER — TRIAMCINOLONE ACETONIDE 5 MG/G
OINTMENT TOPICAL 2 TIMES DAILY
Qty: 30 G | Refills: 2 | Status: SHIPPED | OUTPATIENT
Start: 2020-12-31 | End: 2022-04-07 | Stop reason: SDUPTHER

## 2020-12-31 RX ORDER — FLUCONAZOLE 150 MG/1
150 TABLET ORAL ONCE
Qty: 1 TABLET | Refills: 0 | Status: SHIPPED | OUTPATIENT
Start: 2020-12-31 | End: 2020-12-31

## 2020-12-31 NOTE — PROGRESS NOTES
Chief Complaint   Patient presents with   • Earache     pt states that she has had a earache x 3 days        Subjective   Nelli Morales is a 55 y.o. female    History of Present Illness  Patient today for some fullness and feeling like she has stuff in her ears bilaterally.  Is been going on around 3 days.  She has been out of Zyrtec.   She reports she has had a couple yeast infections since being on Farxiga.  We will give her chance for 1 more if she has 1 more yeast infection with the switch to another medication.    Allergies   Allergen Reactions   • Ace Inhibitors Cough   • Morphine And Related Rash     Past Medical History:   Diagnosis Date   • Allergic rhinitis    • BV (bacterial vaginosis)    • Cataracts, bilateral    • Class 1 obesity due to excess calories without serious comorbidity with body mass index (BMI) of 32.0 to 32.9 in adult 12/22/2017   • Condition not found     FREQUENCY;NOT SPECIFIED   • Diabetes mellitus, type 2 (CMS/[facility])    • Diverticulitis large intestine w/o perforation or abscess w/o bleeding 04/2017   • Endometriosis     STAGE 4 WITH LARGE LEFT OVARIAN ENDOMETRIOMA;EXTENSVIE DENSE LEFT ADNEXAL ADHESIONS TO SIDEWALL AND SIGMOID COLON. dEEP IMPLANTS LEFT ANTERIOR CULDESAC.   • Female cystocele    • Fibrocystic breast disease    • Glaucoma    • Hormone replacement therapy    • Hyperlipidemia    • Hypertension    • IBS (irritable bowel syndrome)    • Incontinence    • Insomnia    • Obesity (BMI 30.0-34.9)    • Rectocele     GRADE 2   • Screening breast examination     SELF;ADMITS   • Stress bladder incontinence, female    • Type 2 diabetes mellitus without complication, without long-term current use of insulin (CMS/[facility]) 8/15/2018   • Urinary incontinence     H/O PRIOR BRANCH WITH GOOD SUPPORT;SEEING UROLOGY   • Vaginitis     UNKNOWN ETIOLOGY   • Varicose veins of legs       Past Surgical History:   Procedure Laterality Date   • BLADDER SUSPENSION  10/27/2007   • CYSTOCELE REPAIR     •  CYSTOTOMY     • HYSTEROSCOPY     • OTHER SURGICAL HISTORY      RECTOCELE REPAIR   • TOTAL ABDOMINAL HYSTERECTOMY WITH SALPINGO OOPHORECTOMY  12/2013    LYSIS OF ADHESIONS FOR STAGE 4 ENDOMETRIOSIS     Social History     Socioeconomic History   • Marital status: Single     Spouse name: Not on file   • Number of children: Not on file   • Years of education: Not on file   • Highest education level: Not on file   Occupational History   • Occupation:    Tobacco Use   • Smoking status: Never Smoker   • Smokeless tobacco: Never Used   Substance and Sexual Activity   • Alcohol use: Yes     Alcohol/week: 1.0 standard drinks     Types: 1 Cans of beer per week     Comment: occasional   • Drug use: No   • Sexual activity: Yes     Partners: Male     Birth control/protection: Post-menopausal        Current Outpatient Medications:   •  Blood Glucose Monitoring Suppl (ACCU-CHEK VIKASH PLUS) w/Device kit, , Disp: , Rfl:   •  cetirizine (zyrTEC) 10 MG tablet, Take 1 tablet by mouth Daily., Disp: 30 tablet, Rfl: 11  •  Cholecalciferol (VITAMIN D3) 5000 UNITS capsule capsule, Take 5,000 Units by mouth Daily., Disp: , Rfl:   •  clindamycin (CLEOCIN) 2 % vaginal cream, , Disp: , Rfl: 0  •  clotrimazole-betamethasone (LOTRISONE) 1-0.05 % cream, , Disp: , Rfl:   •  cyclobenzaprine (FLEXERIL) 10 MG tablet, Take 1 tablet by mouth 2 (Two) Times a Day As Needed for Muscle Spasms., Disp: 90 tablet, Rfl: 1  •  Dapagliflozin Propanediol (Farxiga) 10 MG tablet, Take 10 mg by mouth Daily., Disp: 90 tablet, Rfl: 3  •  ESTRACE VAGINAL 0.1 MG/GM vaginal cream, insert 1/2 gram vaginally three times a week, Disp: , Rfl: 0  •  estradiol (MINIVELLE, VIVELLE-DOT) 0.075 MG/24HR patch, , Disp: , Rfl: 0  •  fluticasone (Flonase) 50 MCG/ACT nasal spray, 2 sprays into the nostril(s) as directed by provider Daily., Disp: 1 bottle, Rfl: 5  •  folic acid (FOLVITE) 1 MG tablet, Take 1 tablet by mouth Daily., Disp: 90 tablet, Rfl: 3  •  glucose blood  (Accu-Chek Joyce Plus) test strip, CHECK BLOOD SUGAR ONCE OR TWICE DAILY, Disp: 100 each, Rfl: 11  •  glucose monitor monitoring kit, 1 each 2 (Two) Times a Day., Disp: 1 each, Rfl: 0  •  LANCETS ULTRA THIN misc, 1 each 2 (Two) Times a Day., Disp: 100 each, Rfl: 11  •  latanoprost (XALATAN) 0.005 % ophthalmic solution, , Disp: , Rfl: 1  •  losartan-hydrochlorothiazide (HYZAAR) 50-12.5 MG per tablet, Take 1 tablet by mouth Daily., Disp: 90 tablet, Rfl: 3  •  metFORMIN ER (GLUCOPHAGE-XR) 500 MG 24 hr tablet, Take 1 tablet by mouth 2 (Two) Times a Day With Meals., Disp: 180 tablet, Rfl: 1  •  saccharomyces boulardii (Florastor) 250 MG capsule, Take 1 capsule by mouth Daily., Disp: , Rfl:   •  simethicone (Gas-X) 80 MG chewable tablet, Chew 1 tablet Every 6 (Six) Hours As Needed for Flatulence., Disp: 30 tablet, Rfl: 5  •  simvastatin (ZOCOR) 20 MG tablet, Take 1 tablet by mouth every night at bedtime., Disp: 90 tablet, Rfl: 3  •  triamcinolone (KENALOG) 0.5 % ointment, Apply  topically to the appropriate area as directed 2 (Two) Times a Day., Disp: 30 g, Rfl: 2  •  fluconazole (Diflucan) 150 MG tablet, Take 1 tablet by mouth 1 (One) Time for 1 dose., Disp: 1 tablet, Rfl: 0     Review of Systems   Constitutional: Negative for chills, fatigue, fever and unexpected weight change.   HENT: Positive for ear pain (fullness).    Respiratory: Negative for cough, chest tightness, shortness of breath and wheezing.    Cardiovascular: Negative for chest pain, palpitations and leg swelling.   Gastrointestinal: Negative for constipation, diarrhea, nausea and vomiting.   Genitourinary: Negative for difficulty urinating and dysuria.   Skin: Negative for color change and rash.   Allergic/Immunologic: Positive for environmental allergies.   Neurological: Negative for dizziness, syncope, weakness and headaches.   Psychiatric/Behavioral: Negative for sleep disturbance.       Objective     Vitals:    12/31/20 1340   BP: 104/74   Pulse: 84    SpO2: 98%         12/31/20  1340   Weight: 61.8 kg (136 lb 3.2 oz)     Body mass index is 27.98 kg/m².  Results for orders placed or performed in visit on 12/07/20   Microalbumin / Creatinine Urine Ratio -   Result Value Ref Range    Creatinine, Urine 27.3 Not Estab. mg/dL    Microalbumin, Urine <3.0 Not Estab. ug/mL    Microalbumin/Creatinine Ratio <11 0 - 29 mg/g creat       Physical Exam  Vitals signs reviewed.   Constitutional:       Appearance: She is well-developed.   HENT:      Head: Normocephalic and atraumatic.      Right Ear: A middle ear effusion is present.      Left Ear: A middle ear effusion is present.   Cardiovascular:      Rate and Rhythm: Normal rate and regular rhythm.      Heart sounds: Normal heart sounds.   Pulmonary:      Effort: Pulmonary effort is normal.      Breath sounds: Normal breath sounds.   Genitourinary:     Comments: deferred  Skin:     General: Skin is warm and dry.   Neurological:      Mental Status: She is alert and oriented to person, place, and time.   Psychiatric:         Behavior: Behavior normal.         Assessment/Plan   Problems Addressed this Visit        Skin    Irritant contact dermatitis due to plants, except food    Relevant Medications    triamcinolone (KENALOG) 0.5 % ointment      Other Visit Diagnoses     Dysfunction of both eustachian tubes    -  Primary    Relevant Medications    cetirizine (zyrTEC) 10 MG tablet    fluticasone (Flonase) 50 MCG/ACT nasal spray    Type 2 diabetes mellitus with hyperglycemia, without long-term current use of insulin (CMS/[facility])        Vaginal yeast infection        Relevant Medications    fluconazole (Diflucan) 150 MG tablet      Diagnoses       Codes Comments    Dysfunction of both eustachian tubes    -  Primary ICD-10-CM: H69.83  ICD-9-CM: 381.81     Irritant contact dermatitis due to plants, except food     ICD-10-CM: L24.7  ICD-9-CM: 692.6     Type 2 diabetes mellitus with hyperglycemia, without long-term current use of insulin  (CMS/[facility])     ICD-10-CM: E11.65  ICD-9-CM: 250.00, 790.29     Vaginal yeast infection     ICD-10-CM: B37.3  ICD-9-CM: 112.1       We will give her a refill on triamcinolone ointment for her dermatitis.    For eustachian tube dysfunction I recommend she take Zyrtec 10 mg daily as well as Flonase nasal spray 1 to 2 sprays each nostril daily. Patient was encouraged to keep me informed of any acute changes, lack of improvement, or any new concerning symptoms.  If patient becomes concerned, or has any worsening symptoms, they are to report either here, urgent treatment, or emergency room. Plan of care discussed with pt. They verbalized understanding and agreement.     RV- as directed    Counseling provided on Eustachian tube dysfunction.    Flaquito Wallace, JERRICA   12/31/2020   14:10 EST

## 2021-01-06 ENCOUNTER — OFFICE VISIT (OUTPATIENT)
Dept: OBSTETRICS AND GYNECOLOGY | Facility: CLINIC | Age: 56
End: 2021-01-06

## 2021-01-06 ENCOUNTER — TELEPHONE (OUTPATIENT)
Dept: FAMILY MEDICINE CLINIC | Facility: CLINIC | Age: 56
End: 2021-01-06

## 2021-01-06 VITALS — BODY MASS INDEX: 28.02 KG/M2 | SYSTOLIC BLOOD PRESSURE: 118 MMHG | DIASTOLIC BLOOD PRESSURE: 78 MMHG | WEIGHT: 136.4 LBS

## 2021-01-06 DIAGNOSIS — R35.0 URINARY FREQUENCY: Primary | ICD-10-CM

## 2021-01-06 DIAGNOSIS — E11.9 TYPE 2 DIABETES MELLITUS WITHOUT COMPLICATION, WITHOUT LONG-TERM CURRENT USE OF INSULIN (HCC): ICD-10-CM

## 2021-01-06 DIAGNOSIS — N89.8 VAGINAL ITCHING: ICD-10-CM

## 2021-01-06 PROCEDURE — 87220 TISSUE EXAM FOR FUNGI: CPT | Performed by: NURSE PRACTITIONER

## 2021-01-06 PROCEDURE — 87210 SMEAR WET MOUNT SALINE/INK: CPT | Performed by: NURSE PRACTITIONER

## 2021-01-06 PROCEDURE — 81002 URINALYSIS NONAUTO W/O SCOPE: CPT | Performed by: NURSE PRACTITIONER

## 2021-01-06 PROCEDURE — 99213 OFFICE O/P EST LOW 20 MIN: CPT | Performed by: NURSE PRACTITIONER

## 2021-01-06 RX ORDER — DULAGLUTIDE 0.75 MG/.5ML
1 INJECTION, SOLUTION SUBCUTANEOUS WEEKLY
Qty: 4 PEN | Refills: 2 | Status: SHIPPED | OUTPATIENT
Start: 2021-01-06 | End: 2021-01-11

## 2021-01-06 RX ORDER — FLUCONAZOLE 150 MG/1
150 TABLET ORAL DAILY
Qty: 2 TABLET | Refills: 0 | Status: SHIPPED | OUTPATIENT
Start: 2021-01-06 | End: 2021-05-20

## 2021-01-06 NOTE — TELEPHONE ENCOUNTER
PT CALLED STATED THAT SHE HAS ANOTHER YEAST INFECTION AND REQUEST TO BE TAKEN OFF OF RX  Dapagliflozin Propanediol (Farxiga) 10 MG tablet.    PLEASE ADVISE.  CALL BACK:[phone]

## 2021-01-06 NOTE — TELEPHONE ENCOUNTER
From her [employer] message I sent to her today.      Nelli,     I will switch you to Trulicity.  This is an injectable long, that is on pen.  Give self 1 shot 1 time a week he can use the front of your thighs, different sides of the abdomen, or outer portions of your upper arms.  Make sure you switch it around and not in the same spot all the time.  I will see back in a month to see how you are doing on this medication.  I have sent it to your pharmacy.     Flaquito

## 2021-01-06 NOTE — PROGRESS NOTES
Chief Complaint   Patient presents with   • Vaginitis         Subjective   HPI  Nelli Morales is a 55 y.o. female, , who presents with evaluation of vaginal burning that is recurrent.      She reports that she has been having vaginal burning for about two weeks. She says that she seen her PCP On 2020 and was given diflucan. She says that she took the diflucan, and had no relief.     She reports increases urinary frequency and urgency,low back pain and vaginal odor.  She says that she has a pessary, but has not been using while she has been at home for long periods of time.     She denies dysuria, lower abdominal pain or fever.    Her last LMP was No LMP recorded (lmp unknown). Patient has had a hysterectomy..  Periods are absent, lasting 0 days.  Partner Status: Marital Status: single.  New Partners since last visit: no.  Desires STD Screening: no.      The additional following portions of the patient's history were reviewed and updated as appropriate: allergies, current medications, past family history, past medical history, past social history, past surgical history and problem list.    Review of Systems   Constitutional: Negative.    Gastrointestinal: Negative.    Genitourinary: Positive for frequency and vaginal discharge.        Vaginal irritation     All other systems reviewed and are negative.     I have reviewed and agree with the HPI, ROS, and historical information as entered above.     Objective   /78   Wt 61.9 kg (136 lb 6.4 oz)   LMP  (LMP Unknown)   Breastfeeding No   BMI 28.02 kg/m²     Physical Exam  Vitals signs and nursing note reviewed. Exam conducted with a chaperone present.   Constitutional:       Appearance: Normal appearance.   Pulmonary:      Effort: Pulmonary effort is normal.   Genitourinary:     Labia:         Right: No rash, tenderness or lesion.         Left: No rash, tenderness or lesion.       Vagina: Vaginal discharge present. No lesions.      Uterus: Absent.        Rectum: No external hemorrhoid.      Comments: White discharge and cream noted on examChaperone Present  Neurological:      Mental Status: She is alert.         Wet mount performed? Yes. Pertinent positives include: Yeast Buds    Assessment/Plan     Assessment and Plan    Problem List Items Addressed This Visit     None      Visit Diagnoses     Urinary frequency    -  Primary    Relevant Orders    POC Urinalysis Dipstick (Completed)    Vaginal itching        Relevant Orders    Candida panel, PCR - Swab, Vagina    Bacterial Vaginosis, MARYAM - Swab, Vagina    POC Wet Prep (Completed)    POC KOH Prep (Completed)        SHe is taking estradiol po and vaginally. Last use was last pm  CCUA with glucose only    1. NuSwab ordered  2. Medication(s) ordered  3. Counseling on Vaginitis provided   4. Treat with diflucan x 2. Nuswab sent to confirm. Her PCP is considering changing her farxiga as this may be causing the irritation/recurrent vaginitis.         01/06/2021

## 2021-01-07 NOTE — TELEPHONE ENCOUNTER
Caller: Nelli Morales    Relationship: Self    Best call back number: [phone]    What medications are you currently taking:   Current Outpatient Medications on File Prior to Visit   Medication Sig Dispense Refill   • Blood Glucose Monitoring Suppl (ACCU-CHEK VIKASH PLUS) w/Device kit      • cetirizine (zyrTEC) 10 MG tablet Take 1 tablet by mouth Daily. 30 tablet 11   • Cholecalciferol (VITAMIN D3) 5000 UNITS capsule capsule Take 5,000 Units by mouth Daily.     • clindamycin (CLEOCIN) 2 % vaginal cream   0   • clotrimazole-betamethasone (LOTRISONE) 1-0.05 % cream      • cyclobenzaprine (FLEXERIL) 10 MG tablet Take 1 tablet by mouth 2 (Two) Times a Day As Needed for Muscle Spasms. 90 tablet 1   • ESTRACE VAGINAL 0.1 MG/GM vaginal cream insert 1/2 gram vaginally three times a week  0   • estradiol (MINIVELLE, VIVELLE-DOT) 0.075 MG/24HR patch   0   • fluconazole (DIFLUCAN) 150 MG tablet Take 1 tablet by mouth Daily. Take one tablet now and repeat in 3 days 2 tablet 0   • fluticasone (Flonase) 50 MCG/ACT nasal spray 2 sprays into the nostril(s) as directed by provider Daily. 1 bottle 5   • folic acid (FOLVITE) 1 MG tablet Take 1 tablet by mouth Daily. 90 tablet 3   • glucose blood (Accu-Chek Vikash Plus) test strip CHECK BLOOD SUGAR ONCE OR TWICE DAILY 100 each 11   • glucose monitor monitoring kit 1 each 2 (Two) Times a Day. 1 each 0   • LANCETS ULTRA THIN misc 1 each 2 (Two) Times a Day. 100 each 11   • latanoprost (XALATAN) 0.005 % ophthalmic solution   1   • losartan-hydrochlorothiazide (HYZAAR) 50-12.5 MG per tablet Take 1 tablet by mouth Daily. 90 tablet 3   • metFORMIN ER (GLUCOPHAGE-XR) 500 MG 24 hr tablet Take 1 tablet by mouth 2 (Two) Times a Day With Meals. 180 tablet 1   • saccharomyces boulardii (Florastor) 250 MG capsule Take 1 capsule by mouth Daily.     • simethicone (Gas-X) 80 MG chewable tablet Chew 1 tablet Every 6 (Six) Hours As Needed for Flatulence. 30 tablet 5   •  simvastatin (ZOCOR) 20 MG tablet Take 1 tablet by mouth every night at bedtime. 90 tablet 3   • triamcinolone (KENALOG) 0.5 % ointment Apply  topically to the appropriate area as directed 2 (Two) Times a Day. 30 g 2     No current facility-administered medications on file prior to visit.         When did you start taking these medications:     Which medication are you concerned about: TRULICITY    Who prescribed you this medication: DR ELLIOTT    What are your concerns: PATIENT IS AFRAID OF NEEDLES AND WOULD PREFER A PILL INSTEAD. PATIENT DOESN'T WANT INSULIN SHE JUST WANTS THE PILLS.    How long have you been taking these medications:     How long have you had these concerns:         ”

## 2021-01-10 LAB
A VAGINAE DNA VAG QL NAA+PROBE: NORMAL SCORE
BVAB2 DNA VAG QL NAA+PROBE: NORMAL SCORE
C ALBICANS DNA VAG QL NAA+PROBE: NEGATIVE
C GLABRATA DNA VAG QL NAA+PROBE: NEGATIVE
C KRUSEI DNA VAG QL NAA+PROBE: NEGATIVE
C LUSITANIAE DNA VAG QL NAA+PROBE: NEGATIVE
CANDIDA DNA VAG QL NAA+PROBE: NEGATIVE
MEGA1 DNA VAG QL NAA+PROBE: NORMAL SCORE

## 2021-01-11 RX ORDER — ORAL SEMAGLUTIDE 3 MG/1
1 TABLET ORAL DAILY
Qty: 30 TABLET | Refills: 0 | Status: SHIPPED | OUTPATIENT
Start: 2021-01-11 | End: 2021-02-08 | Stop reason: SDUPTHER

## 2021-01-11 NOTE — TELEPHONE ENCOUNTER
I will send in Rybelsus.  She start at 3 mg a day for 1 month and we will see how her blood sugar is doing.

## 2021-01-29 ENCOUNTER — TELEPHONE (OUTPATIENT)
Dept: FAMILY MEDICINE CLINIC | Facility: CLINIC | Age: 56
End: 2021-01-29

## 2021-02-08 RX ORDER — ORAL SEMAGLUTIDE 3 MG/1
1 TABLET ORAL DAILY
Qty: 30 TABLET | Refills: 2 | Status: SHIPPED | OUTPATIENT
Start: 2021-02-08 | End: 2021-05-04 | Stop reason: SDUPTHER

## 2021-02-08 NOTE — TELEPHONE ENCOUNTER
Caller: Nelli Morales    Relationship: Self    Best call back number: [phone]     Medication needed:   Requested Prescriptions     Pending Prescriptions Disp Refills   • Semaglutide (Rybelsus) 3 MG tablet 30 tablet 0     Sig: Take 1 tablet by mouth Daily.       When do you need the refill by: 02/10/2021    What details did the patient provide when requesting the medication: Patient is running low on medication     Does the patient have less than a 3 day supply:  [x] Yes  [] No    What is the patient's preferred pharmacy: [facility] #[id number] - [place], [place] - [address] AT [facility] [address] [place] - [phone] [facility] [phone] FX

## 2021-02-09 RX ORDER — ORAL SEMAGLUTIDE 3 MG/1
1 TABLET ORAL DAILY
Qty: 30 TABLET | Refills: 0
Start: 2021-02-09 | End: 2021-05-05

## 2021-05-05 RX ORDER — ORAL SEMAGLUTIDE 3 MG/1
1 TABLET ORAL DAILY
Qty: 30 TABLET | Refills: 2 | Status: SHIPPED | OUTPATIENT
Start: 2021-05-05 | End: 2021-05-20

## 2021-05-07 ENCOUNTER — TELEPHONE (OUTPATIENT)
Dept: FAMILY MEDICINE CLINIC | Facility: CLINIC | Age: 56
End: 2021-05-07

## 2021-05-07 DIAGNOSIS — L23.7 ALLERGIC CONTACT DERMATITIS DUE TO PLANTS, EXCEPT FOOD: Primary | ICD-10-CM

## 2021-05-07 RX ORDER — PREDNISONE 10 MG/1
TABLET ORAL
Qty: 30 TABLET | Refills: 0 | Status: SHIPPED | OUTPATIENT
Start: 2021-05-07 | End: 2021-05-20

## 2021-05-07 NOTE — TELEPHONE ENCOUNTER
Caller: Nelli Morales    Relationship: Self    Best call back number: [phone]    What medication are you requesting: PREDNISONE FOR POISON IVY    What are your current symptoms: ITCHING    How long have you been experiencing symptoms: 1 DAY     If a prescription is needed, what is your preferred pharmacy and phone number:  [id number] - [place], [place] - [address] AT [facility] OF [address] - [phone] [facility] [phone]   [phone]

## 2021-05-07 NOTE — TELEPHONE ENCOUNTER
I have sent in a prednisone taper for her.  Make sure she watches her glucose as this can elevate her sugar.

## 2021-05-07 NOTE — TELEPHONE ENCOUNTER
Tried to call and no answer left her message that navarro had called in predisone for her to her pharmacy and that she needed to watch her glucose while on the steroids.

## 2021-05-20 ENCOUNTER — OFFICE VISIT (OUTPATIENT)
Dept: FAMILY MEDICINE CLINIC | Facility: CLINIC | Age: 56
End: 2021-05-20

## 2021-05-20 VITALS
SYSTOLIC BLOOD PRESSURE: 136 MMHG | WEIGHT: 131 LBS | HEIGHT: 59 IN | BODY MASS INDEX: 26.41 KG/M2 | TEMPERATURE: 96.9 F | OXYGEN SATURATION: 99 % | DIASTOLIC BLOOD PRESSURE: 80 MMHG | HEART RATE: 98 BPM

## 2021-05-20 DIAGNOSIS — E66.3 OVERWEIGHT (BMI 25.0-29.9): ICD-10-CM

## 2021-05-20 DIAGNOSIS — K59.04 CHRONIC IDIOPATHIC CONSTIPATION: ICD-10-CM

## 2021-05-20 DIAGNOSIS — E11.43 TYPE 2 DIABETES MELLITUS WITH DIABETIC AUTONOMIC NEUROPATHY, WITHOUT LONG-TERM CURRENT USE OF INSULIN (HCC): Primary | ICD-10-CM

## 2021-05-20 LAB — HBA1C MFR BLD: 5.5 %

## 2021-05-20 PROCEDURE — 99214 OFFICE O/P EST MOD 30 MIN: CPT | Performed by: NURSE PRACTITIONER

## 2021-05-20 PROCEDURE — 83036 HEMOGLOBIN GLYCOSYLATED A1C: CPT | Performed by: NURSE PRACTITIONER

## 2021-05-20 RX ORDER — PEN NEEDLE, DIABETIC 31 GX5/16"
1 NEEDLE, DISPOSABLE MISCELLANEOUS 2 TIMES DAILY
Qty: 100 EACH | Refills: 11 | Status: SHIPPED | OUTPATIENT
Start: 2021-05-20 | End: 2021-12-07

## 2021-05-20 RX ORDER — BLOOD SUGAR DIAGNOSTIC
STRIP MISCELLANEOUS
Qty: 100 EACH | Refills: 11 | Status: SHIPPED | OUTPATIENT
Start: 2021-05-20 | End: 2021-12-07

## 2021-05-20 RX ORDER — METFORMIN HYDROCHLORIDE 500 MG/1
500 TABLET, EXTENDED RELEASE ORAL 2 TIMES DAILY WITH MEALS
Qty: 180 TABLET | Refills: 1 | Status: SHIPPED | OUTPATIENT
Start: 2021-05-20 | End: 2021-08-18 | Stop reason: SDUPTHER

## 2021-05-20 RX ORDER — ORAL SEMAGLUTIDE 7 MG/1
1 TABLET ORAL
Qty: 30 TABLET | Refills: 5 | Status: SHIPPED | OUTPATIENT
Start: 2021-05-20 | End: 2021-08-18 | Stop reason: SDUPTHER

## 2021-05-20 NOTE — PROGRESS NOTES
Chief Complaint   Patient presents with   • Diabetes     3 month follow up, she reports that she has been checking her glucose regularly and it has been well controlled. She has not been following a diabetic diet and has some elevated reading occasionally        Subjective   Nelli Morales is a 55 y.o. female    History of Present Illness  Patient did follow-up on diabetes and high blood pressure.  She reports her glucose at home is been running anywhere from .  She is on Metformin 500 mg twice a day and Rybelsus 3 mg daily, first thing in the morning on empty stomach. She has been eating donuts and cakes.  For high blood pressure she is currently on losartan/hydrochlorthiazide 50/12.5 mg daily.  For cholesterol she is currently on simvastatin 20 mg daily.  She does have issues with her chronic idiopathic constipation and frequent gas.  She has an appointment to follow-up with GI in July.  She did try some Linzess in the past but due to her job could not take it daily.  She reports that when she would take it afterwards it stopped working for her.    Allergies   Allergen Reactions   • Ace Inhibitors Cough   • Morphine And Related Rash     Past Medical History:   Diagnosis Date   • Allergic rhinitis    • BV (bacterial vaginosis)    • Cataracts, bilateral    • Class 1 obesity due to excess calories without serious comorbidity with body mass index (BMI) of 32.0 to 32.9 in adult 12/22/2017   • Condition not found     FREQUENCY;NOT SPECIFIED   • Diabetes mellitus, type 2 (CMS/HCC)    • Diverticulitis large intestine w/o perforation or abscess w/o bleeding 04/2017   • Endometriosis     STAGE 4 WITH LARGE LEFT OVARIAN ENDOMETRIOMA;EXTENSVIE DENSE LEFT ADNEXAL ADHESIONS TO SIDEWALL AND SIGMOID COLON. dEEP IMPLANTS LEFT ANTERIOR CULDESAC.   • Female cystocele    • Fibrocystic breast disease    • Glaucoma    • Hormone replacement therapy    • Hyperlipidemia    • Hypertension    • IBS (irritable bowel syndrome)    •  Incontinence    • Insomnia    • Obesity (BMI 30.0-34.9)    • Rectocele     GRADE 2   • Screening breast examination     SELF;ADMITS   • Stress bladder incontinence, female    • Type 2 diabetes mellitus without complication, without long-term current use of insulin (CMS/[facility]) 8/15/2018   • Urinary incontinence     H/O PRIOR BRANCH WITH GOOD SUPPORT;SEEING UROLOGY   • Vaginitis     UNKNOWN ETIOLOGY   • Varicose veins of legs       Past Surgical History:   Procedure Laterality Date   • BLADDER SUSPENSION  10/27/2007   • CYSTOCELE REPAIR     • CYSTOTOMY     • HYSTEROSCOPY     • OTHER SURGICAL HISTORY      RECTOCELE REPAIR   • TOTAL ABDOMINAL HYSTERECTOMY WITH SALPINGO OOPHORECTOMY  12/2013    LYSIS OF ADHESIONS FOR STAGE 4 ENDOMETRIOSIS     Social History     Socioeconomic History   • Marital status: Single     Spouse name: Not on file   • Number of children: Not on file   • Years of education: Not on file   • Highest education level: Not on file   Tobacco Use   • Smoking status: Never Smoker   • Smokeless tobacco: Never Used   Substance and Sexual Activity   • Alcohol use: Yes     Alcohol/week: 1.0 standard drinks     Types: 1 Cans of beer per week     Comment: occasional   • Drug use: No   • Sexual activity: Yes     Partners: Male     Birth control/protection: Post-menopausal        Current Outpatient Medications:   •  Blood Glucose Monitoring Suppl (ACCU-CHEK VIKASH PLUS) w/Device kit, , Disp: , Rfl:   •  cetirizine (zyrTEC) 10 MG tablet, Take 1 tablet by mouth Daily., Disp: 30 tablet, Rfl: 11  •  Cholecalciferol (VITAMIN D3) 5000 UNITS capsule capsule, Take 5,000 Units by mouth Daily., Disp: , Rfl:   •  clindamycin (CLEOCIN) 2 % vaginal cream, , Disp: , Rfl: 0  •  clotrimazole-betamethasone (LOTRISONE) 1-0.05 % cream, , Disp: , Rfl:   •  cyclobenzaprine (FLEXERIL) 10 MG tablet, Take 1 tablet by mouth 2 (Two) Times a Day As Needed for Muscle Spasms., Disp: 90 tablet, Rfl: 1  •  ESTRACE VAGINAL 0.1 MG/GM vaginal cream,  insert 1/2 gram vaginally three times a week, Disp: , Rfl: 0  •  estradiol (MINIVELLE, VIVELLE-DOT) 0.075 MG/24HR patch, , Disp: , Rfl: 0  •  fluticasone (Flonase) 50 MCG/ACT nasal spray, 2 sprays into the nostril(s) as directed by provider Daily., Disp: 1 bottle, Rfl: 5  •  folic acid (FOLVITE) 1 MG tablet, Take 1 tablet by mouth Daily., Disp: 90 tablet, Rfl: 3  •  glucose blood (Accu-Chek Joyce Plus) test strip, CHECK BLOOD SUGAR ONCE OR TWICE DAILY, Disp: 100 each, Rfl: 11  •  glucose monitor monitoring kit, 1 each 2 (Two) Times a Day., Disp: 1 each, Rfl: 0  •  Lancets Ultra Thin misc, 1 each 2 (Two) Times a Day., Disp: 100 each, Rfl: 11  •  latanoprost (XALATAN) 0.005 % ophthalmic solution, , Disp: , Rfl: 1  •  losartan-hydrochlorothiazide (HYZAAR) 50-12.5 MG per tablet, Take 1 tablet by mouth Daily., Disp: 90 tablet, Rfl: 3  •  metFORMIN ER (GLUCOPHAGE-XR) 500 MG 24 hr tablet, Take 1 tablet by mouth 2 (Two) Times a Day With Meals., Disp: 180 tablet, Rfl: 1  •  saccharomyces boulardii (Florastor) 250 MG capsule, Take 1 capsule by mouth Daily., Disp: , Rfl:   •  simethicone (Gas-X) 80 MG chewable tablet, Chew 1 tablet Every 6 (Six) Hours As Needed for Flatulence., Disp: 30 tablet, Rfl: 5  •  simvastatin (ZOCOR) 20 MG tablet, Take 1 tablet by mouth every night at bedtime., Disp: 90 tablet, Rfl: 3  •  triamcinolone (KENALOG) 0.5 % ointment, Apply  topically to the appropriate area as directed 2 (Two) Times a Day., Disp: 30 g, Rfl: 2  •  Semaglutide (Rybelsus) 7 MG tablet, Take 1 tablet by mouth Every Morning Before Breakfast., Disp: 30 tablet, Rfl: 5     Review of Systems   Constitutional: Negative for appetite change, diaphoresis, fatigue and unexpected weight change.   Eyes: Positive for visual disturbance ( Is to see ophthalmology in July).   Respiratory: Negative for cough, chest tightness, shortness of breath and wheezing.    Cardiovascular: Negative for chest pain, palpitations and leg swelling.    Gastrointestinal: Positive for constipation (1-2 times per week or 2). Negative for diarrhea, nausea and vomiting.   Endocrine: Positive for polydipsia (at times) and polyuria (at times). Negative for polyphagia.   Genitourinary: Negative for difficulty urinating and dysuria.   Skin: Negative for color change.   Neurological: Negative for dizziness, weakness, light-headedness and numbness.   Psychiatric/Behavioral: Negative for sleep disturbance.       Objective     Vitals:    05/20/21 0926   BP: 136/80   Pulse: 98   Temp: 96.9 °F (36.1 °C)   SpO2: 99%         05/20/21  0926   Weight: 59.4 kg (131 lb)     Body mass index is 26.91 kg/m².  Results for orders placed or performed in visit on 05/20/21   POC Glycosylated Hemoglobin (Hb A1C)    Specimen: Blood   Result Value Ref Range    Hemoglobin A1C 5.5 %       Physical Exam  Vitals reviewed.   Constitutional:       Appearance: She is well-developed.   HENT:      Head: Normocephalic and atraumatic.   Neck:      Thyroid: No thyroid mass, thyromegaly or thyroid tenderness.   Cardiovascular:      Rate and Rhythm: Normal rate and regular rhythm.      Heart sounds: Normal heart sounds.   Pulmonary:      Effort: Pulmonary effort is normal.      Breath sounds: Normal breath sounds.   Genitourinary:     Comments: deferred  Skin:     General: Skin is warm and dry.   Neurological:      Mental Status: She is alert and oriented to person, place, and time.   Psychiatric:         Behavior: Behavior normal.         Assessment/Plan   Problems Addressed this Visit        Endocrine and Metabolic    Type 2 diabetes mellitus with diabetic autonomic neuropathy, without long-term current use of insulin (CMS/[facility]) - Primary    Relevant Medications    glucose blood (Accu-Chek Joyce Plus) test strip    Lancets Ultra Thin misc    metFORMIN ER (GLUCOPHAGE-XR) 500 MG 24 hr tablet    Semaglutide (Rybelsus) 7 MG tablet    Other Relevant Orders    POC Glycosylated Hemoglobin (Hb A1C) (Completed)     Overweight (BMI 25.0-29.9)       Gastrointestinal Abdominal     Chronic idiopathic constipation      Diagnoses       Codes Comments    Type 2 diabetes mellitus with diabetic autonomic neuropathy, without long-term current use of insulin (CMS/[facility])    -  Primary ICD-10-CM: E11.43  ICD-9-CM: 250.60, 337.1     Chronic idiopathic constipation     ICD-10-CM: K59.04  ICD-9-CM: 564.00     Overweight (BMI 25.0-29.9)     ICD-10-CM: E66.3  ICD-9-CM: 278.02       For diabetes were going to increase the Rybelsus to 7 mg daily.  She is can continue Metformin as is. Discussed diabetes diet, Watch carb intake and checking blood sugar as instructed. Record and bring to next visit. Reminded about need for yearly eye exam and foot care. Discussed need for exercise to improve glucose control and overall health.  Patient advised to eat a heart healthy diet and get regular aerobic exercise.    For her chronic idiopathic constipation I recommend after school stops to go back on the Linzess until she sees GI.    Discussed need for weight management. Discussed weight loss with diet, recommend Weight Watchers or Mediterranean Diet, but stated that whatever method works for this patient is best. Reviewed need for regular exercise.  The patient agrees with all recommendations at this time. I have discussed a weight loss plan to be determined by this patient.     Time spent on visit, including counseling, education, reviewing the chart, and any recent test results, was   16     Minutes    Return visit in 3 mo    Counseling provided on weight management, diabetes and Constipation.    Flaquito Wallace, APRN   5/20/2021   13:08 EDT     Please note that portions of this document were completed with a voice recognition program.

## 2021-05-20 NOTE — PATIENT INSTRUCTIONS
Carbohydrate Counting for Diabetes Mellitus, Adult  Carbohydrate counting is a method of keeping track of how many carbohydrates you eat. Eating carbohydrates naturally increases the amount of sugar (glucose) in the blood. Counting how many carbohydrates you eat improves your blood glucose control, which helps you manage your diabetes.  It is important to know how many carbohydrates you can safely have in each meal. This is different for every person. A dietitian can help you make a meal plan and calculate how many carbohydrates you should have at each meal and snack.  What foods contain carbohydrates?  Carbohydrates are found in the following foods:  · Grains, such as breads and cereals.  · Dried beans and soy products.  · Starchy vegetables, such as potatoes, peas, and corn.  · Fruit and fruit juices.  · Milk and yogurt.  · Sweets and snack foods, such as cake, cookies, candy, chips, and soft drinks.  How do I count carbohydrates in foods?  There are two ways to count carbohydrates in food. You can read food labels or learn standard serving sizes of foods. You can use either of the methods or a combination of both.  Using the Nutrition Facts label  The Nutrition Facts list is included on the labels of almost all packaged foods and beverages in the [place] It includes:  · The serving size.  · Information about nutrients in each serving, including the grams (g) of carbohydrate per serving.  To use the Nutrition Facts:  · Decide how many servings you will have.  · Multiply the number of servings by the number of carbohydrates per serving.  · The resulting number is the total amount of carbohydrates that you will be having.  Learning the standard serving sizes of foods  When you eat carbohydrate foods that are not packaged or do not include Nutrition Facts on the label, you need to measure the servings in order to count the amount of carbohydrates.  · Measure the foods that you will eat with a food scale or measuring  cup, if needed.  · Decide how many standard-size servings you will eat.  · Multiply the number of servings by 15. For foods that contain carbohydrates, one serving equals 15 g of carbohydrates.  ? For example, if you eat 2 cups or 10 oz (300 g) of strawberries, you will have eaten 2 servings and 30 g of carbohydrates (2 servings x 15 g = 30 g).  · For foods that have more than one food mixed, such as soups and casseroles, you must count the carbohydrates in each food that is included.  The following list contains standard serving sizes of common carbohydrate-rich foods. Each of these servings has about 15 g of carbohydrates:  · 1 slice of bread.  · 1 six-inch (15 cm) tortilla.  · ? cup or 2 oz (53 g) cooked rice or pasta.  · ½ cup or 3 oz (85 g) cooked or canned, drained and rinsed beans or lentils.  · ½ cup or 3 oz (85 g) starchy vegetable, such as peas, corn, or squash.  · ½ cup or 4 oz (120 g) hot cereal.  · ½ cup or 3 oz (85 g) boiled or mashed potatoes, or ¼ or 3 oz (85 g) of a large baked potato.  · ½ cup or 4 fl oz (118 mL) fruit juice.  · 1 cup or 8 fl oz (237 mL) milk.  · 1 small or 4 oz (106 g) apple.  · ½ or 2 oz (63 g) of a medium banana.  · 1 cup or 5 oz (150 g) strawberries.  · 3 cups or 1 oz (24 g) popped popcorn.  What is an example of carbohydrate counting?  To calculate the number of carbohydrates in this sample meal, follow the steps shown below.  Sample meal  · 3 oz (85 g) chicken breast.  · ? cup or 4 oz (106 g) brown rice.  · ½ cup or 3 oz (85 g) corn.  · 1 cup or 8 fl oz (237 mL) milk.  · 1 cup or 5 oz (150 g) strawberries with sugar-free whipped topping.  Carbohydrate calculation  1. Identify the foods that contain carbohydrates:  ? Rice.  ? Corn.  ? Milk.  ? Strawberries.  2. Calculate how many servings you have of each food:  ? 2 servings rice.  ? 1 serving corn.  ? 1 serving milk.  ? 1 serving strawberries.  3. Multiply each number of servings by 15 g:  ? 2 servings rice x 15 g = 30  g.  ? 1 serving corn x 15 g = 15 g.  ? 1 serving milk x 15 g = 15 g.  ? 1 serving strawberries x 15 g = 15 g.  4. Add together all of the amounts to find the total grams of carbohydrates eaten:  ? 30 g + 15 g + 15 g + 15 g = 75 g of carbohydrates total.  What are tips for following this plan?  Shopping  · Develop a meal plan and then make a shopping list.  · Buy fresh and frozen vegetables, fresh and frozen fruit, dairy, eggs, beans, lentils, and whole grains.  · Look at food labels. Choose foods that have more fiber and less sugar.  · Avoid processed foods and foods with added sugars.  Meal planning  · Aim to have the same amount of carbohydrates at each meal and for each snack time.  · Plan to have regular, balanced meals and snacks.  Where to find more information  · American Diabetes Association: www.diabetes.org  · Centers for Disease Control and Prevention: www.cdc.gov  Summary  · Carbohydrate counting is a method of keeping track of how many carbohydrates you eat.  · Eating carbohydrates naturally increases the amount of sugar (glucose) in the blood.  · Counting how many carbohydrates you eat improves your blood glucose control, which helps you manage your diabetes.  · A dietitian can help you make a meal plan and calculate how many carbohydrates you should have at each meal and snack.  This information is not intended to replace advice given to you by your health care provider. Make sure you discuss any questions you have with your health care provider.  Document Revised: 12/18/2020 Document Reviewed: 12/18/2020  [employer] Patient Education © 2021 [employer]      Managing Your Hypertension  Hypertension, also called high blood pressure, is when the force of the blood pressing against the walls of the arteries is too strong. Arteries are blood vessels that carry blood from your heart throughout your body. Hypertension forces the heart to work harder to pump blood and may cause the arteries to become narrow  or stiff.  Understanding blood pressure readings  Your personal target blood pressure may vary depending on your medical conditions, your age, and other factors. A blood pressure reading includes a higher number over a lower number. Ideally, your blood pressure should be below 120/80. You should know that:  · The first, or top, number is called the systolic pressure. It is a measure of the pressure in your arteries as your heart beats.  · The second, or bottom number, is called the diastolic pressure. It is a measure of the pressure in your arteries as the heart relaxes.  Blood pressure is classified into four stages. Based on your blood pressure reading, your health care provider may use the following stages to determine what type of treatment you need, if any. Systolic pressure and diastolic pressure are measured in a unit called mmHg.  Normal  · Systolic pressure: below 120.  · Diastolic pressure: below 80.  Elevated  · Systolic pressure: 120-129.  · Diastolic pressure: below 80.  Hypertension stage 1  · Systolic pressure: 130-139.  · Diastolic pressure: 80-89.  Hypertension stage 2  · Systolic pressure: 140 or above.  · Diastolic pressure: 90 or above.  How can this condition affect me?  Managing your hypertension is an important responsibility. Over time, hypertension can damage the arteries and decrease blood flow to important parts of the body, including the brain, heart, and kidneys. Having untreated or uncontrolled hypertension can lead to:  · A heart attack.  · A stroke.  · A weakened blood vessel (aneurysm).  · Heart failure.  · Kidney damage.  · Eye damage.  · Metabolic syndrome.  · Memory and concentration problems.  · Vascular dementia.  What actions can I take to manage this condition?  Hypertension can be managed by making lifestyle changes and possibly by taking medicines. Your health care provider will help you make a plan to bring your blood pressure within a normal range.  Nutrition    · Eat a  diet that is high in fiber and potassium, and low in salt (sodium), added sugar, and fat. An example eating plan is called the Dietary Approaches to Stop Hypertension (DASH) diet. To eat this way:  ? Eat plenty of fresh fruits and vegetables. Try to fill one-half of your plate at each meal with fruits and vegetables.  ? Eat whole grains, such as whole-wheat pasta, brown rice, or whole-grain bread. Fill about one-fourth of your plate with whole grains.  ? Eat low-fat dairy products.  ? Avoid fatty cuts of meat, processed or cured meats, and poultry with skin. Fill about one-fourth of your plate with lean proteins such as fish, chicken without skin, beans, eggs, and tofu.  ? Avoid pre-made and processed foods. These tend to be higher in sodium, added sugar, and fat.  · Reduce your daily sodium intake. Most people with hypertension should eat less than 1,500 mg of sodium a day.  Lifestyle    · Work with your health care provider to maintain a healthy body weight or to lose weight. Ask what an ideal weight is for you.  · Get at least 30 minutes of exercise that causes your heart to beat faster (aerobic exercise) most days of the week. Activities may include walking, swimming, or biking.  · Include exercise to strengthen your muscles (resistance exercise), such as weight lifting, as part of your weekly exercise routine. Try to do these types of exercises for 30 minutes at least 3 days a week.  · Do not use any products that contain nicotine or tobacco, such as cigarettes, e-cigarettes, and chewing tobacco. If you need help quitting, ask your health care provider.  · Control any long-term (chronic) conditions you have, such as high cholesterol or diabetes.  · Identify your sources of stress and find ways to manage stress. This may include meditation, deep breathing, or making time for fun activities.  Alcohol use  · Do not drink alcohol if:  ? Your health care provider tells you not to drink.  ? You are pregnant, may be  pregnant, or are planning to become pregnant.  · If you drink alcohol:  ? Limit how much you use to:  § 0-1 drink a day for women.  § 0-2 drinks a day for men.  ? Be aware of how much alcohol is in your drink. In the [place] one drink equals one 12 oz bottle of beer (355 mL), one 5 oz glass of wine (148 mL), or one 1½ oz glass of hard liquor (44 mL).  Medicines  Your health care provider may prescribe medicine if lifestyle changes are not enough to get your blood pressure under control and if:  · Your systolic blood pressure is 130 or higher.  · Your diastolic blood pressure is 80 or higher.  Take medicines only as told by your health care provider. Follow the directions carefully. Blood pressure medicines must be taken as told by your health care provider. The medicine does not work as well when you skip doses. Skipping doses also puts you at risk for problems.  Monitoring  Before you monitor your blood pressure:  · Do not smoke, drink caffeinated beverages, or exercise within 30 minutes before taking a measurement.  · Use the bathroom and empty your bladder (urinate).  · Sit quietly for at least 5 minutes before taking measurements.  Monitor your blood pressure at home as told by your health care provider. To do this:  · Sit with your back straight and supported.  · Place your feet flat on the floor. Do not cross your legs.  · Support your arm on a flat surface, such as a table. Make sure your upper arm is at heart level.  · Each time you measure, take two or three readings one minute apart and record the results.  You may also need to have your blood pressure checked regularly by your health care provider.  General information  · Talk with your health care provider about your diet, exercise habits, and other lifestyle factors that may be contributing to hypertension.  · Review all the medicines you take with your health care provider because there may be side effects or interactions.  · Keep all visits as told by  your health care provider. Your health care provider can help you create and adjust your plan for managing your high blood pressure.  Where to find more information  · National Heart, Lung, and Blood [place]: www.nhlbi.nih.gov  · American Heart Association: www.heart.org  Contact a health care provider if:  · You think you are having a reaction to medicines you have taken.  · You have repeated (recurrent) headaches.  · You feel dizzy.  · You have swelling in your ankles.  · You have trouble with your vision.  Get help right away if:  · You develop a severe headache or confusion.  · You have unusual weakness or numbness, or you feel faint.  · You have severe pain in your chest or abdomen.  · You vomit repeatedly.  · You have trouble breathing.  These symptoms may represent a serious problem that is an emergency. Do not wait to see if the symptoms will go away. Get medical help right away. Call your local emergency services (911 in the [place] Do not drive yourself to the hospital.  Summary  · Hypertension is when the force of blood pumping through your arteries is too strong. If this condition is not controlled, it may put you at risk for serious complications.  · Your personal target blood pressure may vary depending on your medical conditions, your age, and other factors. For most people, a normal blood pressure is less than 120/80.  · Hypertension is managed by lifestyle changes, medicines, or both.  · Lifestyle changes to help manage hypertension include losing weight, eating a healthy, low-sodium diet, exercising more, stopping smoking, and limiting alcohol.  This information is not intended to replace advice given to you by your health care provider. Make sure you discuss any questions you have with your health care provider.  Document Revised: 01/22/2021 Document Reviewed: 11/17/2020  [employer] Patient Education © 2021 [employer]      Obesity, Adult  Obesity is the condition of having too much total body  fat. Being overweight or obese means that your weight is greater than what is considered healthy for your body size. Obesity is determined by a measurement called BMI. BMI is an estimate of body fat and is calculated from height and weight. For adults, a BMI of 30 or higher is considered obese.  Obesity can lead to other health concerns and major illnesses, including:  · Stroke.  · Coronary artery disease (CAD).  · Type 2 diabetes.  · Some types of cancer, including cancers of the colon, breast, uterus, and gallbladder.  · Osteoarthritis.  · High blood pressure (hypertension).  · High cholesterol.  · Sleep apnea.  · Gallbladder stones.  · Infertility problems.  What are the causes?  Common causes of this condition include:  · Eating daily meals that are high in calories, sugar, and fat.  · Being born with genes that may make you more likely to become obese.  · Having a medical condition that causes obesity, including:  ? Hypothyroidism.  ? Polycystic ovarian syndrome (PCOS).  ? Binge-eating disorder.  ? Cushing syndrome.  · Taking certain medicines, such as steroids, antidepressants, and seizure medicines.  · Not being physically active (sedentary lifestyle).  · Not getting enough sleep.  · Drinking high amounts of sugar-sweetened beverages, such as soft drinks.  What increases the risk?  The following factors may make you more likely to develop this condition:  · Having a family history of obesity.  · Being a woman of  descent.  · Being a man of  descent.  · Living in an area with limited access to:  ? Stock, recreation centers, or sidewalks.  ? Healthy food choices, such as grocery stores and farmers' markets.  What are the signs or symptoms?  The main sign of this condition is having too much body fat.  How is this diagnosed?  This condition is diagnosed based on:  · Your BMI. If you are an adult with a BMI of 30 or higher, you are considered obese.  · Your waist circumference. This  measures the distance around your waistline.  · Your skinfold thickness. Your health care provider may gently pinch a fold of your skin and measure it.  You may have other tests to check for underlying conditions.  How is this treated?  Treatment for this condition often includes changing your lifestyle. Treatment may include some or all of the following:  · Dietary changes. This may include developing a healthy meal plan.  · Regular physical activity. This may include activity that causes your heart to beat faster (aerobic exercise) and strength training. Work with your health care provider to design an exercise program that works for you.  · Medicine to help you lose weight if you are unable to lose 1 pound a week after 6 weeks of healthy eating and more physical activity.  · Treating conditions that cause the obesity (underlying conditions).  · Surgery. Surgical options may include gastric banding and gastric bypass. Surgery may be done if:  ? Other treatments have not helped to improve your condition.  ? You have a BMI of 40 or higher.  ? You have life-threatening health problems related to obesity.  Follow these instructions at home:  Eating and drinking    · Follow recommendations from your health care provider about what you eat and drink. Your health care provider may advise you to:  ? Limit fast food, sweets, and processed snack foods.  ? Choose low-fat options, such as low-fat milk instead of whole milk.  ? Eat 5 or more servings of fruits or vegetables every day.  ? Eat at home more often. This gives you more control over what you eat.  ? Choose healthy foods when you eat out.  ? Learn to read food labels. This will help you understand how much food is considered 1 serving.  ? Learn what a healthy serving size is.  ? Keep low-fat snacks available.  ? Limit sugary drinks, such as soda, fruit juice, sweetened iced tea, and flavored milk.  · Drink enough water to keep your urine pale yellow.  · Do not follow  a fad diet. Fad diets can be unhealthy and even dangerous.  Physical activity  · Exercise regularly, as told by your health care provider.  ? Most adults should get up to 150 minutes of moderate-intensity exercise every week.  ? Ask your health care provider what types of exercise are safe for you and how often you should exercise.  · Warm up and stretch before being active.  · Cool down and stretch after being active.  · Rest between periods of activity.  Lifestyle  · Work with your health care provider and a dietitian to set a weight-loss goal that is healthy and reasonable for you.  · Limit your screen time.  · Find ways to reward yourself that do not involve food.  · Do not drink alcohol if:  ? Your health care provider tells you not to drink.  ? You are pregnant, may be pregnant, or are planning to become pregnant.  · If you drink alcohol:  ? Limit how much you use to:  § 0-1 drink a day for women.  § 0-2 drinks a day for men.  ? Be aware of how much alcohol is in your drink. In the [place] one drink equals one 12 oz bottle of beer (355 mL), one 5 oz glass of wine (148 mL), or one 1½ oz glass of hard liquor (44 mL).  General instructions  · Keep a weight-loss journal to keep track of the food you eat and how much exercise you get.  · Take over-the-counter and prescription medicines only as told by your health care provider.  · Take vitamins and supplements only as told by your health care provider.  · Consider joining a support group. Your health care provider may be able to recommend a support group.  · Keep all follow-up visits as told by your health care provider. This is important.  Contact a health care provider if:  · You are unable to meet your weight loss goal after 6 weeks of dietary and lifestyle changes.  Get help right away if you are having:  · Trouble breathing.  · Suicidal thoughts or behaviors.  Summary  · Obesity is the condition of having too much total body fat.  · Being overweight or obese  means that your weight is greater than what is considered healthy for your body size.  · Work with your health care provider and a dietitian to set a weight-loss goal that is healthy and reasonable for you.  · Exercise regularly, as told by your health care provider. Ask your health care provider what types of exercise are safe for you and how often you should exercise.  This information is not intended to replace advice given to you by your health care provider. Make sure you discuss any questions you have with your health care provider.  Document Revised: 08/22/2019 Document Reviewed: 08/22/2019  [employer] Patient Education © 2021 [employer]      Constipation, Adult  Constipation is when a person has trouble pooping (having a bowel movement). When you have this condition, you may poop fewer than 3 times a week. Your poop (stool) may also be dry, hard, or bigger than normal.  Follow these instructions at home:  Eating and drinking    · Eat foods that have a lot of fiber, such as:  ? Fresh fruits and vegetables.  ? Whole grains.  ? Beans.  · Eat less of foods that are low in fiber and high in fat and sugar, such as:  ? French fries.  ? Hamburgers.  ? Cookies.  ? Candy.  ? Soda.  · Drink enough fluid to keep your pee (urine) pale yellow.  General instructions  · Exercise regularly or as told by your doctor. Try to do 150 minutes of exercise each week.  · Go to the restroom when you feel like you need to poop. Do not hold it in.  · Take over-the-counter and prescription medicines only as told by your doctor. These include any fiber supplements.  · When you poop:  ? Do deep breathing while relaxing your lower belly (abdomen).  ? Relax your pelvic floor. The pelvic floor is a group of muscles that support the rectum, bladder, and intestines (as well as the uterus in women).  · Watch your condition for any changes. Tell your doctor if you notice any.  · Keep all follow-up visits as told by your doctor. This is  important.  Contact a doctor if:  · You have pain that gets worse.  · You have a fever.  · You have not pooped for 4 days.  · You vomit.  · You are not hungry.  · You lose weight.  · You are bleeding from the opening of the butt (anus).  · You have thin, pencil-like poop.  Get help right away if:  · You have a fever, and your symptoms suddenly get worse.  · You leak poop or have blood in your poop.  · Your belly feels hard or bigger than normal (bloated).  · You have very bad belly pain.  · You feel dizzy or you faint.  Summary  · Constipation is when a person poops fewer than 3 times a week, has trouble pooping, or has poop that is dry, hard, or bigger than normal.  · Eat foods that have a lot of fiber.  · Drink enough fluid to keep your pee (urine) pale yellow.  · Take over-the-counter and prescription medicines only as told by your doctor. These include any fiber supplements.  This information is not intended to replace advice given to you by your health care provider. Make sure you discuss any questions you have with your health care provider.  Document Revised: 11/04/2020 Document Reviewed: 11/04/2020  [employer] Patient Education © 2021 [employer]

## 2021-08-06 ENCOUNTER — TELEPHONE (OUTPATIENT)
Dept: FAMILY MEDICINE CLINIC | Facility: CLINIC | Age: 56
End: 2021-08-06

## 2021-08-06 DIAGNOSIS — E11.43 TYPE 2 DIABETES MELLITUS WITH DIABETIC AUTONOMIC NEUROPATHY, WITHOUT LONG-TERM CURRENT USE OF INSULIN (HCC): ICD-10-CM

## 2021-08-06 RX ORDER — ORAL SEMAGLUTIDE 7 MG/1
1 TABLET ORAL
Qty: 30 TABLET | Refills: 5 | Status: CANCELLED | OUTPATIENT
Start: 2021-08-06

## 2021-08-18 ENCOUNTER — OFFICE VISIT (OUTPATIENT)
Dept: FAMILY MEDICINE CLINIC | Facility: CLINIC | Age: 56
End: 2021-08-18

## 2021-08-18 VITALS
TEMPERATURE: 97.1 F | BODY MASS INDEX: 25.8 KG/M2 | HEIGHT: 59 IN | WEIGHT: 128 LBS | SYSTOLIC BLOOD PRESSURE: 120 MMHG | OXYGEN SATURATION: 98 % | DIASTOLIC BLOOD PRESSURE: 80 MMHG | HEART RATE: 78 BPM

## 2021-08-18 DIAGNOSIS — K59.04 CHRONIC IDIOPATHIC CONSTIPATION: ICD-10-CM

## 2021-08-18 DIAGNOSIS — E11.43 TYPE 2 DIABETES MELLITUS WITH DIABETIC AUTONOMIC NEUROPATHY, WITHOUT LONG-TERM CURRENT USE OF INSULIN (HCC): Primary | ICD-10-CM

## 2021-08-18 LAB — HBA1C MFR BLD: 5.3 %

## 2021-08-18 PROCEDURE — 99214 OFFICE O/P EST MOD 30 MIN: CPT | Performed by: NURSE PRACTITIONER

## 2021-08-18 RX ORDER — PLECANATIDE 3 MG/1
1 TABLET ORAL DAILY
COMMUNITY
Start: 2021-08-09

## 2021-08-18 RX ORDER — DOCUSATE SODIUM 100 MG
CAPSULE ORAL
COMMUNITY
Start: 2021-07-02

## 2021-08-18 RX ORDER — METFORMIN HYDROCHLORIDE 500 MG/1
500 TABLET, EXTENDED RELEASE ORAL 2 TIMES DAILY WITH MEALS
Qty: 180 TABLET | Refills: 1 | Status: SHIPPED | OUTPATIENT
Start: 2021-08-18 | End: 2021-12-20 | Stop reason: SDUPTHER

## 2021-08-18 RX ORDER — ORAL SEMAGLUTIDE 7 MG/1
1 TABLET ORAL
Qty: 90 TABLET | Refills: 1 | Status: SHIPPED | OUTPATIENT
Start: 2021-08-18 | End: 2021-12-20 | Stop reason: SDUPTHER

## 2021-08-18 NOTE — PROGRESS NOTES
Chief Complaint   Patient presents with   • Diabetes     3 month follow up   • Constipation     She has been following up with GI, and has been prescribed Trulance to help with her sx. She has not experienced any further issues.        Subjective   Nelli Morales is a 55 y.o. female    History of Present Illness  Patient today to follow-up on diabetes and constipation/diarrhea.  She reports her glucose has been anywhere from 86 up to 214 but has been predominantly in the high 90s to low 100s.  She is on Rybelsus 7 mg daily and Metformin at 1000 mg in the evening only.  She is also on simvastatin 20 mg daily for cholesterol, and losartan/hydrochlorthiazide 50/12.5 mg daily for blood pressure.  Her weight is down 8 pounds since January.  A1c today is 5.3.    Allergies   Allergen Reactions   • Ace Inhibitors Cough   • Morphine And Related Rash     Past Medical History:   Diagnosis Date   • Allergic rhinitis    • BV (bacterial vaginosis)    • Cataracts, bilateral    • Class 1 obesity due to excess calories without serious comorbidity with body mass index (BMI) of 32.0 to 32.9 in adult 12/22/2017   • Condition not found     FREQUENCY;NOT SPECIFIED   • Diabetes mellitus, type 2 (CMS/[facility])    • Diverticulitis large intestine w/o perforation or abscess w/o bleeding 04/2017   • Endometriosis     STAGE 4 WITH LARGE LEFT OVARIAN ENDOMETRIOMA;EXTENSVIE DENSE LEFT ADNEXAL ADHESIONS TO SIDEWALL AND SIGMOID COLON. dEEP IMPLANTS LEFT ANTERIOR CULDESAC.   • Female cystocele    • Fibrocystic breast disease    • Glaucoma    • Hormone replacement therapy    • Hyperlipidemia    • Hypertension    • IBS (irritable bowel syndrome)    • Incontinence    • Insomnia    • Obesity (BMI 30.0-34.9)    • Rectocele     GRADE 2   • Screening breast examination     SELF;ADMITS   • Stress bladder incontinence, female    • Type 2 diabetes mellitus without complication, without long-term current use of insulin (CMS/HCC) 8/15/2018   • Urinary incontinence      H/O PRIOR BRANCH WITH GOOD SUPPORT;SEEING UROLOGY   • Vaginitis     UNKNOWN ETIOLOGY   • Varicose veins of legs       Past Surgical History:   Procedure Laterality Date   • BLADDER SUSPENSION  10/27/2007   • CYSTOCELE REPAIR     • CYSTOTOMY     • HYSTEROSCOPY     • OTHER SURGICAL HISTORY      RECTOCELE REPAIR   • TOTAL ABDOMINAL HYSTERECTOMY WITH SALPINGO OOPHORECTOMY  12/2013    LYSIS OF ADHESIONS FOR STAGE 4 ENDOMETRIOSIS     Social History     Socioeconomic History   • Marital status: Single     Spouse name: Not on file   • Number of children: Not on file   • Years of education: Not on file   • Highest education level: Not on file   Tobacco Use   • Smoking status: Never Smoker   • Smokeless tobacco: Never Used   Substance and Sexual Activity   • Alcohol use: Yes     Alcohol/week: 1.0 standard drinks     Types: 1 Cans of beer per week     Comment: occasional   • Drug use: No   • Sexual activity: Yes     Partners: Male     Birth control/protection: Post-menopausal        Current Outpatient Medications:   •  Blood Glucose Monitoring Suppl (ACCU-CHEK VIKASH PLUS) w/Device kit, , Disp: , Rfl:   •  cetirizine (zyrTEC) 10 MG tablet, Take 1 tablet by mouth Daily., Disp: 30 tablet, Rfl: 11  •  Cholecalciferol (VITAMIN D3) 5000 UNITS capsule capsule, Take 5,000 Units by mouth Daily., Disp: , Rfl:   •  clindamycin (CLEOCIN) 2 % vaginal cream, , Disp: , Rfl: 0  •  clotrimazole-betamethasone (LOTRISONE) 1-0.05 % cream, , Disp: , Rfl:   •  cyclobenzaprine (FLEXERIL) 10 MG tablet, Take 1 tablet by mouth 2 (Two) Times a Day As Needed for Muscle Spasms., Disp: 90 tablet, Rfl: 1  •  ESTRACE VAGINAL 0.1 MG/GM vaginal cream, insert 1/2 gram vaginally three times a week, Disp: , Rfl: 0  •  estradiol (MINIVELLE, VIVELLE-DOT) 0.075 MG/24HR patch, , Disp: , Rfl: 0  •  fluticasone (Flonase) 50 MCG/ACT nasal spray, 2 sprays into the nostril(s) as directed by provider Daily., Disp: 1 bottle, Rfl: 5  •  folic acid (FOLVITE) 1 MG tablet,  Take 1 tablet by mouth Daily., Disp: 90 tablet, Rfl: 3  •  glucose blood (Accu-Chek Joyce Plus) test strip, CHECK BLOOD SUGAR ONCE OR TWICE DAILY, Disp: 100 each, Rfl: 11  •  glucose monitor monitoring kit, 1 each 2 (Two) Times a Day., Disp: 1 each, Rfl: 0  •  Lancets Ultra Thin misc, 1 each 2 (Two) Times a Day., Disp: 100 each, Rfl: 11  •  latanoprost (XALATAN) 0.005 % ophthalmic solution, , Disp: , Rfl: 1  •  losartan-hydrochlorothiazide (HYZAAR) 50-12.5 MG per tablet, Take 1 tablet by mouth Daily., Disp: 90 tablet, Rfl: 3  •  metFORMIN ER (GLUCOPHAGE-XR) 500 MG 24 hr tablet, Take 1 tablet by mouth 2 (Two) Times a Day With Meals., Disp: 180 tablet, Rfl: 1  •  Semaglutide (Rybelsus) 7 MG tablet, Take 7 mg by mouth Every Morning Before Breakfast., Disp: 90 tablet, Rfl: 1  •  simvastatin (ZOCOR) 20 MG tablet, Take 1 tablet by mouth every night at bedtime., Disp: 90 tablet, Rfl: 3  •  Stool Softener 100 MG capsule, TAKE 1 CAPSULE BY MOUTH TWICE DAILY AS DIRECTED, Disp: , Rfl:   •  triamcinolone (KENALOG) 0.5 % ointment, Apply  topically to the appropriate area as directed 2 (Two) Times a Day., Disp: 30 g, Rfl: 2  •  Trulance 3 MG tablet, Take 1 tablet by mouth Daily., Disp: , Rfl:      Review of Systems   Constitutional: Negative for appetite change, diaphoresis, fatigue and unexpected weight change.   Eyes: Negative for visual disturbance.   Respiratory: Negative for cough, chest tightness, shortness of breath and wheezing.    Cardiovascular: Negative for chest pain, palpitations and leg swelling.   Gastrointestinal: Negative for constipation, diarrhea, nausea and vomiting.   Endocrine: Negative for polydipsia, polyphagia and polyuria.   Genitourinary: Negative for difficulty urinating and dysuria.   Skin: Negative for color change.   Neurological: Negative for dizziness, weakness, light-headedness and numbness.   Psychiatric/Behavioral: Negative for sleep disturbance.       Objective     Vitals:    08/18/21 1033    BP: 120/80   Pulse: 78   Temp: 97.1 °F (36.2 °C)   SpO2: 98%         08/18/21  1033   Weight: 58.1 kg (128 lb)     Body mass index is 26.3 kg/m².  Results for orders placed or performed in visit on 08/18/21   POC Glycosylated Hemoglobin (Hb A1C)    Specimen: Blood   Result Value Ref Range    Hemoglobin A1C 5.3 %       Physical Exam  Vitals reviewed.   Constitutional:       Appearance: She is well-developed.   HENT:      Head: Normocephalic and atraumatic.   Cardiovascular:      Rate and Rhythm: Normal rate and regular rhythm.      Heart sounds: Normal heart sounds.   Pulmonary:      Effort: Pulmonary effort is normal.      Breath sounds: Normal breath sounds.   Genitourinary:     Comments: deferred  Skin:     General: Skin is warm and dry.   Neurological:      Mental Status: She is alert and oriented to person, place, and time.   Psychiatric:         Behavior: Behavior normal.         Assessment/Plan   Problems Addressed this Visit        Endocrine and Metabolic    Type 2 diabetes mellitus with diabetic autonomic neuropathy, without long-term current use of insulin (CMS/[facility]) - Primary    Relevant Medications    metFORMIN ER (GLUCOPHAGE-XR) 500 MG 24 hr tablet    Semaglutide (Rybelsus) 7 MG tablet    Other Relevant Orders    POC Glycosylated Hemoglobin (Hb A1C) (Completed)       Gastrointestinal Abdominal     Chronic idiopathic constipation      Diagnoses       Codes Comments    Type 2 diabetes mellitus with diabetic autonomic neuropathy, without long-term current use of insulin (CMS/[facility])    -  Primary ICD-10-CM: E11.43  ICD-9-CM: 250.60, 337.1     Chronic idiopathic constipation     ICD-10-CM: K59.04  ICD-9-CM: 564.00       Patient is to continue Rybelsus and Metformin for her diabetes.  Discussed diabetes diet, Watch carb intake and checking blood sugar as instructed. Record and bring to next visit. Reminded about need for yearly eye exam and foot care. Discussed need for exercise to improve glucose control and  overall health.  Patient advised to eat a heart healthy diet and get regular aerobic exercise.    For chronic idiopathic constipation I recommend she continue current medications as is.    Time spent on visit, including counseling, education, reviewing the chart, and any recent test results, was        Minutes    Return visit in 4 months for Physical    Counseling provided on diabetes.    Flaquito Wallace, APRN   8/18/2021   11:01 EDT     Please note that portions of this document were completed with a voice recognition program.

## 2021-08-18 NOTE — PATIENT INSTRUCTIONS
"https://www.diabeteseducator.org/docs/default-source/living-with-diabetes/conquering-the-grocery-store-v1.pdf?sfvrsn=4\">   Carbohydrate Counting for Diabetes Mellitus, Adult  Carbohydrate counting is a method of keeping track of how many carbohydrates you eat. Eating carbohydrates naturally increases the amount of sugar (glucose) in the blood. Counting how many carbohydrates you eat improves your blood glucose control, which helps you manage your diabetes.  It is important to know how many carbohydrates you can safely have in each meal. This is different for every person. A dietitian can help you make a meal plan and calculate how many carbohydrates you should have at each meal and snack.  What foods contain carbohydrates?  Carbohydrates are found in the following foods:  · Grains, such as breads and cereals.  · Dried beans and soy products.  · Starchy vegetables, such as potatoes, peas, and corn.  · Fruit and fruit juices.  · Milk and yogurt.  · Sweets and snack foods, such as cake, cookies, candy, chips, and soft drinks.  How do I count carbohydrates in foods?  There are two ways to count carbohydrates in food. You can read food labels or learn standard serving sizes of foods. You can use either of the methods or a combination of both.  Using the Nutrition Facts label  The Nutrition Facts list is included on the labels of almost all packaged foods and beverages in the [place] It includes:  · The serving size.  · Information about nutrients in each serving, including the grams (g) of carbohydrate per serving.  To use the Nutrition Facts:  · Decide how many servings you will have.  · Multiply the number of servings by the number of carbohydrates per serving.  · The resulting number is the total amount of carbohydrates that you will be having.  Learning the standard serving sizes of foods  When you eat carbohydrate foods that are not packaged or do not include Nutrition Facts on the label, you need to measure the " servings in order to count the amount of carbohydrates.  · Measure the foods that you will eat with a food scale or measuring cup, if needed.  · Decide how many standard-size servings you will eat.  · Multiply the number of servings by 15. For foods that contain carbohydrates, one serving equals 15 g of carbohydrates.  ? For example, if you eat 2 cups or 10 oz (300 g) of strawberries, you will have eaten 2 servings and 30 g of carbohydrates (2 servings x 15 g = 30 g).  · For foods that have more than one food mixed, such as soups and casseroles, you must count the carbohydrates in each food that is included.  The following list contains standard serving sizes of common carbohydrate-rich foods. Each of these servings has about 15 g of carbohydrates:  · 1 slice of bread.  · 1 six-inch (15 cm) tortilla.  · ? cup or 2 oz (53 g) cooked rice or pasta.  · ½ cup or 3 oz (85 g) cooked or canned, drained and rinsed beans or lentils.  · ½ cup or 3 oz (85 g) starchy vegetable, such as peas, corn, or squash.  · ½ cup or 4 oz (120 g) hot cereal.  · ½ cup or 3 oz (85 g) boiled or mashed potatoes, or ¼ or 3 oz (85 g) of a large baked potato.  · ½ cup or 4 fl oz (118 mL) fruit juice.  · 1 cup or 8 fl oz (237 mL) milk.  · 1 small or 4 oz (106 g) apple.  · ½ or 2 oz (63 g) of a medium banana.  · 1 cup or 5 oz (150 g) strawberries.  · 3 cups or 1 oz (24 g) popped popcorn.  What is an example of carbohydrate counting?  To calculate the number of carbohydrates in this sample meal, follow the steps shown below.  Sample meal  · 3 oz (85 g) chicken breast.  · ? cup or 4 oz (106 g) brown rice.  · ½ cup or 3 oz (85 g) corn.  · 1 cup or 8 fl oz (237 mL) milk.  · 1 cup or 5 oz (150 g) strawberries with sugar-free whipped topping.  Carbohydrate calculation  1. Identify the foods that contain carbohydrates:  ? Rice.  ? Corn.  ? Milk.  ? Strawberries.  2. Calculate how many servings you have of each food:  ? 2 servings rice.  ? 1 serving  corn.  ? 1 serving milk.  ? 1 serving strawberries.  3. Multiply each number of servings by 15 g:  ? 2 servings rice x 15 g = 30 g.  ? 1 serving corn x 15 g = 15 g.  ? 1 serving milk x 15 g = 15 g.  ? 1 serving strawberries x 15 g = 15 g.  4. Add together all of the amounts to find the total grams of carbohydrates eaten:  ? 30 g + 15 g + 15 g + 15 g = 75 g of carbohydrates total.  What are tips for following this plan?  Shopping  · Develop a meal plan and then make a shopping list.  · Buy fresh and frozen vegetables, fresh and frozen fruit, dairy, eggs, beans, lentils, and whole grains.  · Look at food labels. Choose foods that have more fiber and less sugar.  · Avoid processed foods and foods with added sugars.  Meal planning  · Aim to have the same amount of carbohydrates at each meal and for each snack time.  · Plan to have regular, balanced meals and snacks.  Where to find more information  · American Diabetes Association: www.diabetes.org  · Centers for Disease Control and Prevention: www.cdc.gov  Summary  · Carbohydrate counting is a method of keeping track of how many carbohydrates you eat.  · Eating carbohydrates naturally increases the amount of sugar (glucose) in the blood.  · Counting how many carbohydrates you eat improves your blood glucose control, which helps you manage your diabetes.  · A dietitian can help you make a meal plan and calculate how many carbohydrates you should have at each meal and snack.  This information is not intended to replace advice given to you by your health care provider. Make sure you discuss any questions you have with your health care provider.  Document Revised: 12/17/2020 Document Reviewed: 12/18/2020  [employer] Patient Education © 2021 [employer]      Obesity, Adult  Obesity is the condition of having too much total body fat. Being overweight or obese means that your weight is greater than what is considered healthy for your body size. Obesity is determined by a  measurement called BMI. BMI is an estimate of body fat and is calculated from height and weight. For adults, a BMI of 30 or higher is considered obese.  Obesity can lead to other health concerns and major illnesses, including:  · Stroke.  · Coronary artery disease (CAD).  · Type 2 diabetes.  · Some types of cancer, including cancers of the colon, breast, uterus, and gallbladder.  · Osteoarthritis.  · High blood pressure (hypertension).  · High cholesterol.  · Sleep apnea.  · Gallbladder stones.  · Infertility problems.  What are the causes?  Common causes of this condition include:  · Eating daily meals that are high in calories, sugar, and fat.  · Being born with genes that may make you more likely to become obese.  · Having a medical condition that causes obesity, including:  ? Hypothyroidism.  ? Polycystic ovarian syndrome (PCOS).  ? Binge-eating disorder.  ? Cushing syndrome.  · Taking certain medicines, such as steroids, antidepressants, and seizure medicines.  · Not being physically active (sedentary lifestyle).  · Not getting enough sleep.  · Drinking high amounts of sugar-sweetened beverages, such as soft drinks.  What increases the risk?  The following factors may make you more likely to develop this condition:  · Having a family history of obesity.  · Being a woman of  descent.  · Being a man of  descent.  · Living in an area with limited access to:  ? Stock, recreation centers, or sidewalks.  ? Healthy food choices, such as grocery stores and [place] markets.  What are the signs or symptoms?  The main sign of this condition is having too much body fat.  How is this diagnosed?  This condition is diagnosed based on:  · Your BMI. If you are an adult with a BMI of 30 or higher, you are considered obese.  · Your waist circumference. This measures the distance around your waistline.  · Your skinfold thickness. Your health care provider may gently pinch a fold of your skin and measure  it.  You may have other tests to check for underlying conditions.  How is this treated?  Treatment for this condition often includes changing your lifestyle. Treatment may include some or all of the following:  · Dietary changes. This may include developing a healthy meal plan.  · Regular physical activity. This may include activity that causes your heart to beat faster (aerobic exercise) and strength training. Work with your health care provider to design an exercise program that works for you.  · Medicine to help you lose weight if you are unable to lose 1 pound a week after 6 weeks of healthy eating and more physical activity.  · Treating conditions that cause the obesity (underlying conditions).  · Surgery. Surgical options may include gastric banding and gastric bypass. Surgery may be done if:  ? Other treatments have not helped to improve your condition.  ? You have a BMI of 40 or higher.  ? You have life-threatening health problems related to obesity.  Follow these instructions at home:  Eating and drinking    · Follow recommendations from your health care provider about what you eat and drink. Your health care provider may advise you to:  ? Limit fast food, sweets, and processed snack foods.  ? Choose low-fat options, such as low-fat milk instead of whole milk.  ? Eat 5 or more servings of fruits or vegetables every day.  ? Eat at home more often. This gives you more control over what you eat.  ? Choose healthy foods when you eat out.  ? Learn to read food labels. This will help you understand how much food is considered 1 serving.  ? Learn what a healthy serving size is.  ? Keep low-fat snacks available.  ? Limit sugary drinks, such as soda, fruit juice, sweetened iced tea, and flavored milk.  · Drink enough water to keep your urine pale yellow.  · Do not follow a fad diet. Fad diets can be unhealthy and even dangerous.  Physical activity  · Exercise regularly, as told by your health care provider.  ? Most  adults should get up to 150 minutes of moderate-intensity exercise every week.  ? Ask your health care provider what types of exercise are safe for you and how often you should exercise.  · Warm up and stretch before being active.  · Cool down and stretch after being active.  · Rest between periods of activity.  Lifestyle  · Work with your health care provider and a dietitian to set a weight-loss goal that is healthy and reasonable for you.  · Limit your screen time.  · Find ways to reward yourself that do not involve food.  · Do not drink alcohol if:  ? Your health care provider tells you not to drink.  ? You are pregnant, may be pregnant, or are planning to become pregnant.  · If you drink alcohol:  ? Limit how much you use to:  § 0-1 drink a day for women.  § 0-2 drinks a day for men.  ? Be aware of how much alcohol is in your drink. In the [place] one drink equals one 12 oz bottle of beer (355 mL), one 5 oz glass of wine (148 mL), or one 1½ oz glass of hard liquor (44 mL).  General instructions  · Keep a weight-loss journal to keep track of the food you eat and how much exercise you get.  · Take over-the-counter and prescription medicines only as told by your health care provider.  · Take vitamins and supplements only as told by your health care provider.  · Consider joining a support group. Your health care provider may be able to recommend a support group.  · Keep all follow-up visits as told by your health care provider. This is important.  Contact a health care provider if:  · You are unable to meet your weight loss goal after 6 weeks of dietary and lifestyle changes.  Get help right away if you are having:  · Trouble breathing.  · Suicidal thoughts or behaviors.  Summary  · Obesity is the condition of having too much total body fat.  · Being overweight or obese means that your weight is greater than what is considered healthy for your body size.  · Work with your health care provider and a dietitian to set a  weight-loss goal that is healthy and reasonable for you.  · Exercise regularly, as told by your health care provider. Ask your health care provider what types of exercise are safe for you and how often you should exercise.  This information is not intended to replace advice given to you by your health care provider. Make sure you discuss any questions you have with your health care provider.  Document Revised: 08/22/2019 Document Reviewed: 08/22/2019  [employer] Patient Education © 2021 [employer]      Managing Your Hypertension  Hypertension, also called high blood pressure, is when the force of the blood pressing against the walls of the arteries is too strong. Arteries are blood vessels that carry blood from your heart throughout your body. Hypertension forces the heart to work harder to pump blood and may cause the arteries to become narrow or stiff.  Understanding blood pressure readings  Your personal target blood pressure may vary depending on your medical conditions, your age, and other factors. A blood pressure reading includes a higher number over a lower number. Ideally, your blood pressure should be below 120/80. You should know that:  · The first, or top, number is called the systolic pressure. It is a measure of the pressure in your arteries as your heart beats.  · The second, or bottom number, is called the diastolic pressure. It is a measure of the pressure in your arteries as the heart relaxes.  Blood pressure is classified into four stages. Based on your blood pressure reading, your health care provider may use the following stages to determine what type of treatment you need, if any. Systolic pressure and diastolic pressure are measured in a unit called mmHg.  Normal  · Systolic pressure: below 120.  · Diastolic pressure: below 80.  Elevated  · Systolic pressure: 120-129.  · Diastolic pressure: below 80.  Hypertension stage 1  · Systolic pressure: 130-139.  · Diastolic pressure:  80-89.  Hypertension stage 2  · Systolic pressure: 140 or above.  · Diastolic pressure: 90 or above.  How can this condition affect me?  Managing your hypertension is an important responsibility. Over time, hypertension can damage the arteries and decrease blood flow to important parts of the body, including the brain, heart, and kidneys. Having untreated or uncontrolled hypertension can lead to:  · A heart attack.  · A stroke.  · A weakened blood vessel (aneurysm).  · Heart failure.  · Kidney damage.  · Eye damage.  · Metabolic syndrome.  · Memory and concentration problems.  · Vascular dementia.  What actions can I take to manage this condition?  Hypertension can be managed by making lifestyle changes and possibly by taking medicines. Your health care provider will help you make a plan to bring your blood pressure within a normal range.  Nutrition    · Eat a diet that is high in fiber and potassium, and low in salt (sodium), added sugar, and fat. An example eating plan is called the Dietary Approaches to Stop Hypertension (DASH) diet. To eat this way:  ? Eat plenty of fresh fruits and vegetables. Try to fill one-half of your plate at each meal with fruits and vegetables.  ? Eat whole grains, such as whole-wheat pasta, brown rice, or whole-grain bread. Fill about one-fourth of your plate with whole grains.  ? Eat low-fat dairy products.  ? Avoid fatty cuts of meat, processed or cured meats, and poultry with skin. Fill about one-fourth of your plate with lean proteins such as fish, chicken without skin, beans, eggs, and tofu.  ? Avoid pre-made and processed foods. These tend to be higher in sodium, added sugar, and fat.  · Reduce your daily sodium intake. Most people with hypertension should eat less than 1,500 mg of sodium a day.  Lifestyle    · Work with your health care provider to maintain a healthy body weight or to lose weight. Ask what an ideal weight is for you.  · Get at least 30 minutes of exercise that  causes your heart to beat faster (aerobic exercise) most days of the week. Activities may include walking, swimming, or biking.  · Include exercise to strengthen your muscles (resistance exercise), such as weight lifting, as part of your weekly exercise routine. Try to do these types of exercises for 30 minutes at least 3 days a week.  · Do not use any products that contain nicotine or tobacco, such as cigarettes, e-cigarettes, and chewing tobacco. If you need help quitting, ask your health care provider.  · Control any long-term (chronic) conditions you have, such as high cholesterol or diabetes.  · Identify your sources of stress and find ways to manage stress. This may include meditation, deep breathing, or making time for fun activities.  Alcohol use  · Do not drink alcohol if:  ? Your health care provider tells you not to drink.  ? You are pregnant, may be pregnant, or are planning to become pregnant.  · If you drink alcohol:  ? Limit how much you use to:  § 0-1 drink a day for women.  § 0-2 drinks a day for men.  ? Be aware of how much alcohol is in your drink. In the [place] one drink equals one 12 oz bottle of beer (355 mL), one 5 oz glass of wine (148 mL), or one 1½ oz glass of hard liquor (44 mL).  Medicines  Your health care provider may prescribe medicine if lifestyle changes are not enough to get your blood pressure under control and if:  · Your systolic blood pressure is 130 or higher.  · Your diastolic blood pressure is 80 or higher.  Take medicines only as told by your health care provider. Follow the directions carefully. Blood pressure medicines must be taken as told by your health care provider. The medicine does not work as well when you skip doses. Skipping doses also puts you at risk for problems.  Monitoring  Before you monitor your blood pressure:  · Do not smoke, drink caffeinated beverages, or exercise within 30 minutes before taking a measurement.  · Use the bathroom and empty your bladder  (urinate).  · Sit quietly for at least 5 minutes before taking measurements.  Monitor your blood pressure at home as told by your health care provider. To do this:  · Sit with your back straight and supported.  · Place your feet flat on the floor. Do not cross your legs.  · Support your arm on a flat surface, such as a table. Make sure your upper arm is at heart level.  · Each time you measure, take two or three readings one minute apart and record the results.  You may also need to have your blood pressure checked regularly by your health care provider.  General information  · Talk with your health care provider about your diet, exercise habits, and other lifestyle factors that may be contributing to hypertension.  · Review all the medicines you take with your health care provider because there may be side effects or interactions.  · Keep all visits as told by your health care provider. Your health care provider can help you create and adjust your plan for managing your high blood pressure.  Where to find more information  · National Heart, Lung, and Blood [place]: www.nhlbi.nih.gov  · American Heart Association: www.heart.org  Contact a health care provider if:  · You think you are having a reaction to medicines you have taken.  · You have repeated (recurrent) headaches.  · You feel dizzy.  · You have swelling in your ankles.  · You have trouble with your vision.  Get help right away if:  · You develop a severe headache or confusion.  · You have unusual weakness or numbness, or you feel faint.  · You have severe pain in your chest or abdomen.  · You vomit repeatedly.  · You have trouble breathing.  These symptoms may represent a serious problem that is an emergency. Do not wait to see if the symptoms will go away. Get medical help right away. Call your local emergency services (911 in the [place] Do not drive yourself to the hospital.  Summary  · Hypertension is when the force of blood pumping through your  "arteries is too strong. If this condition is not controlled, it may put you at risk for serious complications.  · Your personal target blood pressure may vary depending on your medical conditions, your age, and other factors. For most people, a normal blood pressure is less than 120/80.  · Hypertension is managed by lifestyle changes, medicines, or both.  · Lifestyle changes to help manage hypertension include losing weight, eating a healthy, low-sodium diet, exercising more, stopping smoking, and limiting alcohol.  This information is not intended to replace advice given to you by your health care provider. Make sure you discuss any questions you have with your health care provider.  Document Revised: 01/22/2021 Document Reviewed: 11/17/2020  [employer] Patient Education © 2021 [employer]      https://www.nhlbi.nih.gov/files/docs/public/heart/dash_brief.pdf\">   DASH Eating Plan  DASH stands for Dietary Approaches to Stop Hypertension. The DASH eating plan is a healthy eating plan that has been shown to:  · Reduce high blood pressure (hypertension).  · Reduce your risk for type 2 diabetes, heart disease, and stroke.  · Help with weight loss.  What are tips for following this plan?  Reading food labels  · Check food labels for the amount of salt (sodium) per serving. Choose foods with less than 5 percent of the Daily Value of sodium. Generally, foods with less than 300 milligrams (mg) of sodium per serving fit into this eating plan.  · To find whole grains, look for the word \"whole\" as the first word in the ingredient list.  Shopping  · Buy products labeled as \"low-sodium\" or \"no salt added.\"  · Buy fresh foods. Avoid canned foods and pre-made or frozen meals.  Cooking  · Avoid adding salt when cooking. Use salt-free seasonings or herbs instead of table salt or sea salt. Check with your health care provider or pharmacist before using salt substitutes.  · Do not redman foods. Cook foods using healthy methods such as " baking, boiling, grilling, roasting, and broiling instead.  · Cook with heart-healthy oils, such as olive, canola, avocado, soybean, or sunflower oil.  Meal planning    · Eat a balanced diet that includes:  ? 4 or more servings of fruits and 4 or more servings of vegetables each day. Try to fill one-half of your plate with fruits and vegetables.  ? 6-8 servings of whole grains each day.  ? Less than 6 oz (170 g) of lean meat, poultry, or fish each day. A 3-oz (85-g) serving of meat is about the same size as a deck of cards. One egg equals 1 oz (28 g).  ? 2-3 servings of low-fat dairy each day. One serving is 1 cup (237 mL).  ? 1 serving of nuts, seeds, or beans 5 times each week.  ? 2-3 servings of heart-healthy fats. Healthy fats called omega-3 fatty acids are found in foods such as walnuts, flaxseeds, fortified milks, and eggs. These fats are also found in cold-water fish, such as sardines, salmon, and mackerel.  · Limit how much you eat of:  ? Canned or prepackaged foods.  ? Food that is high in trans fat, such as some fried foods.  ? Food that is high in saturated fat, such as fatty meat.  ? Desserts and other sweets, sugary drinks, and other foods with added sugar.  ? Full-fat dairy products.  · Do not salt foods before eating.  · Do not eat more than 4 egg yolks a week.  · Try to eat at least 2 vegetarian meals a week.  · Eat more home-cooked food and less restaurant, buffet, and fast food.  Lifestyle  · When eating at a restaurant, ask that your food be prepared with less salt or no salt, if possible.  · If you drink alcohol:  ? Limit how much you use to:  § 0-1 drink a day for women who are not pregnant.  § 0-2 drinks a day for men.  ? Be aware of how much alcohol is in your drink. In the [place] one drink equals one 12 oz bottle of beer (355 mL), one 5 oz glass of wine (148 mL), or one 1½ oz glass of hard liquor (44 mL).  General information  · Avoid eating more than 2,300 mg of salt a day. If you have  hypertension, you may need to reduce your sodium intake to 1,500 mg a day.  · Work with your health care provider to maintain a healthy body weight or to lose weight. Ask what an ideal weight is for you.  · Get at least 30 minutes of exercise that causes your heart to beat faster (aerobic exercise) most days of the week. Activities may include walking, swimming, or biking.  · Work with your health care provider or dietitian to adjust your eating plan to your individual calorie needs.  What foods should I eat?  Fruits  All fresh, dried, or frozen fruit. Canned fruit in natural juice (without added sugar).  Vegetables  Fresh or frozen vegetables (raw, steamed, roasted, or grilled). Low-sodium or reduced-sodium tomato and vegetable juice. Low-sodium or reduced-sodium tomato sauce and tomato paste. Low-sodium or reduced-sodium canned vegetables.  Grains  Whole-grain or whole-wheat bread. Whole-grain or whole-wheat pasta. Brown rice. Oatmeal. Quinoa. Bulgur. Whole-grain and low-sodium cereals. Valorie bread. Low-fat, low-sodium crackers. Whole-wheat flour tortillas.  Meats and other proteins  Skinless chicken or turkey. Ground chicken or turkey. Pork with fat trimmed off. Fish and seafood. Egg whites. Dried beans, peas, or lentils. Unsalted nuts, nut butters, and seeds. Unsalted canned beans. Lean cuts of beef with fat trimmed off. Low-sodium, lean precooked or cured meat, such as sausages or meat loaves.  Dairy  Low-fat (1%) or fat-free (skim) milk. Reduced-fat, low-fat, or fat-free cheeses. Nonfat, low-sodium ricotta or cottage cheese. Low-fat or nonfat yogurt. Low-fat, low-sodium cheese.  Fats and oils  Soft margarine without trans fats. Vegetable oil. Reduced-fat, low-fat, or light mayonnaise and salad dressings (reduced-sodium). Canola, safflower, olive, avocado, soybean, and sunflower oils. Avocado.  Seasonings and condiments  Herbs. Spices. Seasoning mixes without salt.  Other foods  Unsalted popcorn and pretzels.  Fat-free sweets.  The items listed above may not be a complete list of foods and beverages you can eat. Contact a dietitian for more information.  What foods should I avoid?  Fruits  Canned fruit in a light or heavy syrup. Fried fruit. Fruit in cream or butter sauce.  Vegetables  Creamed or fried vegetables. Vegetables in a cheese sauce. Regular canned vegetables (not low-sodium or reduced-sodium). Regular canned tomato sauce and paste (not low-sodium or reduced-sodium). Regular tomato and vegetable juice (not low-sodium or reduced-sodium). Pickles. Olives.  Grains  Baked goods made with fat, such as croissants, muffins, or some breads. Dry pasta or rice meal packs.  Meats and other proteins  Fatty cuts of meat. Ribs. Fried meat. Miller. Bologna, salami, and other precooked or cured meats, such as sausages or meat loaves. Fat from the back of a pig (fatback). Bratwurst. Salted nuts and seeds. Canned beans with added salt. Canned or smoked fish. Whole eggs or egg yolks. Chicken or turkey with skin.  Dairy  Whole or 2% milk, cream, and half-and-half. Whole or full-fat cream cheese. Whole-fat or sweetened yogurt. Full-fat cheese. Nondairy creamers. Whipped toppings. Processed cheese and cheese spreads.  Fats and oils  Butter. Stick margarine. Lard. Shortening. Ghee. Miller fat. Tropical oils, such as coconut, palm kernel, or palm oil.  Seasonings and condiments  Onion salt, garlic salt, seasoned salt, table salt, and sea salt. [facility] sauce. Tartar sauce. Barbecue sauce. Teriyaki sauce. Soy sauce, including reduced-sodium. Steak sauce. Canned and packaged gravies. Fish sauce. Oyster sauce. Cocktail sauce. Store-bought horseradish. Ketchup. Mustard. Meat flavorings and tenderizers. Bouillon cubes. Hot sauces. Pre-made or packaged marinades. Pre-made or packaged taco seasonings. Relishes. Regular salad dressings.  Other foods  Salted popcorn and pretzels.  The items listed above may not be a complete list of foods  and beverages you should avoid. Contact a dietitian for more information.  Where to find more information  · National Heart, Lung, and Blood [place]: www.nhlbi.nih.gov  · American Heart Association: www.heart.org  · Academy of Nutrition and Dietetics: www.eatright.org  · National Kidney Foundation: www.kidney.org  Summary  · The DASH eating plan is a healthy eating plan that has been shown to reduce high blood pressure (hypertension). It may also reduce your risk for type 2 diabetes, heart disease, and stroke.  · When on the DASH eating plan, aim to eat more fresh fruits and vegetables, whole grains, lean proteins, low-fat dairy, and heart-healthy fats.  · With the DASH eating plan, you should limit salt (sodium) intake to 2,300 mg a day. If you have hypertension, you may need to reduce your sodium intake to 1,500 mg a day.  · Work with your health care provider or dietitian to adjust your eating plan to your individual calorie needs.  This information is not intended to replace advice given to you by your health care provider. Make sure you discuss any questions you have with your health care provider.  Document Revised: 11/20/2020 Document Reviewed: 11/20/2020  [employer] Patient Education © 2021 [employer]

## 2021-09-08 ENCOUNTER — OFFICE VISIT (OUTPATIENT)
Dept: OBSTETRICS AND GYNECOLOGY | Facility: CLINIC | Age: 56
End: 2021-09-08

## 2021-09-08 VITALS
SYSTOLIC BLOOD PRESSURE: 122 MMHG | DIASTOLIC BLOOD PRESSURE: 66 MMHG | BODY MASS INDEX: 25.8 KG/M2 | WEIGHT: 128 LBS | HEIGHT: 59 IN

## 2021-09-08 DIAGNOSIS — E11.43 TYPE 2 DIABETES MELLITUS WITH DIABETIC AUTONOMIC NEUROPATHY, WITHOUT LONG-TERM CURRENT USE OF INSULIN (HCC): ICD-10-CM

## 2021-09-08 DIAGNOSIS — N63.0 BREAST LUMP: Primary | ICD-10-CM

## 2021-09-08 DIAGNOSIS — Z90.79 HISTORY OF TOTAL HYSTERECTOMY WITH BILATERAL SALPINGO-OOPHORECTOMY (BSO): ICD-10-CM

## 2021-09-08 DIAGNOSIS — Z12.11 SCREENING FOR COLON CANCER: ICD-10-CM

## 2021-09-08 DIAGNOSIS — Z01.419 ENCOUNTER FOR ANNUAL ROUTINE GYNECOLOGICAL EXAMINATION: ICD-10-CM

## 2021-09-08 DIAGNOSIS — Z12.31 ENCOUNTER FOR SCREENING MAMMOGRAM FOR MALIGNANT NEOPLASM OF BREAST: ICD-10-CM

## 2021-09-08 DIAGNOSIS — Z79.890 HORMONE REPLACEMENT THERAPY: ICD-10-CM

## 2021-09-08 DIAGNOSIS — Z90.710 HISTORY OF TOTAL HYSTERECTOMY WITH BILATERAL SALPINGO-OOPHORECTOMY (BSO): ICD-10-CM

## 2021-09-08 DIAGNOSIS — N81.6 RECTOCELE: ICD-10-CM

## 2021-09-08 DIAGNOSIS — N76.0 ACUTE VAGINITIS: ICD-10-CM

## 2021-09-08 DIAGNOSIS — E66.3 OVERWEIGHT (BMI 25.0-29.9): ICD-10-CM

## 2021-09-08 DIAGNOSIS — N95.2 ATROPHIC VAGINITIS: ICD-10-CM

## 2021-09-08 DIAGNOSIS — Z12.4 SCREENING FOR CERVICAL CANCER: ICD-10-CM

## 2021-09-08 DIAGNOSIS — Z90.722 HISTORY OF TOTAL HYSTERECTOMY WITH BILATERAL SALPINGO-OOPHORECTOMY (BSO): ICD-10-CM

## 2021-09-08 DIAGNOSIS — N39.3 STRESS BLADDER INCONTINENCE, FEMALE: ICD-10-CM

## 2021-09-08 PROCEDURE — 99396 PREV VISIT EST AGE 40-64: CPT | Performed by: OBSTETRICS & GYNECOLOGY

## 2021-09-08 RX ORDER — ESTRADIOL 0.1 MG/G
0.5 CREAM VAGINAL 2 TIMES WEEKLY
Qty: 1 EACH | Refills: 4 | Status: SHIPPED | OUTPATIENT
Start: 2021-09-09 | End: 2021-11-21

## 2021-09-08 RX ORDER — ESTRADIOL 0.07 MG/D
1 FILM, EXTENDED RELEASE TRANSDERMAL 2 TIMES WEEKLY
Qty: 24 PATCH | Refills: 3 | Status: SHIPPED | OUTPATIENT
Start: 2021-09-09 | End: 2022-09-12

## 2021-09-08 NOTE — PROGRESS NOTES
GYN Annual Exam     CC - Here for annual exam.        HPI  Nelli Morales is a 55 y.o. female, , who presents for annual well woman exam.  She is post menopause.  Patient reports problems with: none. There were no changes to her medical or surgical history since her last visit.. Partner Status: Marital Status: single.  New Partners since last visit: no.      Additional OB/GYN History   Current contraception: contraceptive methods: Post menopausal status  Desires to: do not start contraception  On HRT? No  Last Pap : 2020  Last Completed Pap Smear          PAP SMEAR (Every 3 Years) Next due on 2020  Done - vaginal cuff neg    2018  Done    2013  Patient-Reported (Performed Externally) - [facility]              History of abnormal Pap smear: no  Family history of uterine, colon, breast, or ovarian cancer: yes - mother breast cancer   Performs monthly Self-Breast Exam: no  Last mammogram: 10/16/2020  Last Completed Mammogram          Ordered - MAMMOGRAM (Every 2 Years) Ordered on 2021    10/16/2020  SCANNED - MAMMO    10/03/2019  SCANNED - MAMMO    06/15/2018  SCANNED - MAMMO    2017  Done    2015  Patient-Reported (Performed Externally) - approx date              Last colonoscopy: per patient 2019  Last Completed Colonoscopy     This patient has no relevant Health Maintenance data.        Last DEXA: never performed   Exercises Regularly: no since pandemic   Feelings of Anxiety or Depression: no      Tobacco Usage?: No   OB History        0    Para   0    Term   0       0    AB   0    Living   0       SAB   0    TAB   0    Ectopic   0    Molar   0    Multiple   0    Live Births   0                Health Maintenance   Topic Date Due   • Hepatitis B (1 of 3 - Risk 3-dose series) Never done   • ZOSTER VACCINE (1 of 2) Never done   • DIABETIC FOOT EXAM  Never done   • Pneumococcal Vaccine 0-64 (1 of 2 - PPSV23) 10/21/2017   • Annual Gynecologic  "Pelvic and Breast Exam  08/02/2020   • DIABETIC EYE EXAM  10/15/2020   • LIPID PANEL  11/05/2020   • INFLUENZA VACCINE  10/01/2021   • URINE MICROALBUMIN  12/07/2021   • ANNUAL PHYSICAL  12/08/2021   • HEMOGLOBIN A1C  02/18/2022   • MAMMOGRAM  10/16/2022   • PAP SMEAR  08/12/2023   • COLORECTAL CANCER SCREENING  01/06/2025   • TDAP/TD VACCINES (5 - Td or Tdap) 11/15/2027   • HEPATITIS C SCREENING  Completed   • COVID-19 Vaccine  Completed       The additional following portions of the patient's history were reviewed and updated as appropriate: allergies, current medications, past family history, past medical history, past social history, past surgical history and problem list.    Review of Systems   Constitutional: Negative.    HENT: Negative.    Eyes: Negative.    Respiratory: Negative.    Cardiovascular: Negative.    Gastrointestinal: Negative.    Endocrine: Negative.    Genitourinary: Negative.    Musculoskeletal: Negative.    Allergic/Immunologic: Negative.    Neurological: Negative.    Hematological: Negative.    Psychiatric/Behavioral: Negative.        I have reviewed and agree with the HPI, ROS, and historical information as entered above. Hiro Oliver MD    Objective   /66   Ht 148.6 cm (58.5\")   Wt 58.1 kg (128 lb)   LMP  (LMP Unknown)   Breastfeeding No   BMI 26.30 kg/m²     Physical Exam  Vitals and nursing note reviewed. Exam conducted with a chaperone present.   Constitutional:       Appearance: She is well-developed.   HENT:      Head: Normocephalic and atraumatic.   Neck:      Thyroid: No thyroid mass or thyromegaly.   Cardiovascular:      Rate and Rhythm: Normal rate and regular rhythm.      Heart sounds: No murmur heard.     Pulmonary:      Effort: Pulmonary effort is normal. No retractions.      Breath sounds: Normal breath sounds. No wheezing, rhonchi or rales.   Chest:      Chest wall: No mass or tenderness.      Breasts:         Right: Normal. No mass, nipple discharge, skin " change or tenderness.         Left: Normal. No mass, nipple discharge, skin change or tenderness.   Abdominal:      General: Bowel sounds are normal.      Palpations: Abdomen is soft. Abdomen is not rigid. There is no mass.      Tenderness: There is no abdominal tenderness. There is no guarding.      Hernia: No hernia is present. There is no hernia in the left inguinal area or right inguinal area.   Genitourinary:     General: Normal vulva.      Exam position: Lithotomy position.      Pubic Area: No rash.       Labia:         Right: No rash, tenderness or lesion.         Left: No rash, tenderness or lesion.       Urethra: No urethral pain or urethral swelling.      Vagina: Normal. No vaginal discharge or lesions.      Uterus: Absent.       Adnexa:         Right: No mass, tenderness or fullness.          Left: No mass, tenderness or fullness.        Rectum: No external hemorrhoid.      Comments: Cervix surgically absent.  Vaginal cuff well supported and nontender.  Good anterior support.  No adnexal masses or tenderness.  Musculoskeletal:      Cervical back: Normal range of motion. No muscular tenderness.   Neurological:      Mental Status: She is alert and oriented to person, place, and time.   Psychiatric:         Behavior: Behavior normal.            Assessment and Plan    Problem List Items Addressed This Visit     Type 2 diabetes mellitus with diabetic autonomic neuropathy, without long-term current use of insulin (CMS/[facility])    Relevant Medications    glucose monitor monitoring kit    glucose blood (Accu-Chek Joyce Plus) test strip    Lancets Ultra Thin misc    metFORMIN ER (GLUCOPHAGE-XR) 500 MG 24 hr tablet    Semaglutide (Rybelsus) 7 MG tablet    Overweight (BMI 25.0-29.9)    Hormone replacement therapy    Overview     On Vivelle dot 0.075 mg patch         History of total hysterectomy with bilateral salpingo-oophorectomy (BSO)    Overview     December 2013 NU/BSO lysis of adhesions for stage IV  endometriosis.  Dense adhesions left ovary and sigmoid colon.         Rectocele    Overview     Grade 2.  Doing well with #2 Knob ring pessary with support.          Stress bladder incontinence, female    Overview     History of prior Alas procedure with good support.  Seeing urology.         Encounter for screening mammogram for malignant neoplasm of breast    Relevant Orders    Mammo Diagnostic Digital Tomosynthesis Bilateral With CAD    Screening for colon cancer    Overview     12/2019 [facility]; negative; 10 yr repeat.          Atrophic vaginitis    Overview     On Estrace vaginal cream.         Vaginitis    Overview     History of group B strep vaginitis  Culture positive for yeast and Group B strep + 8/12/2020.  Negative for BV.         Screening for cervical cancer    Overview     Pap smear 8/12/2020 was negative.  History of prior hysterectomy for endometriosis.         Breast lump    Overview     Right upper outer quad. Needs bilateral diagnostic mamm.            Other Visit Diagnoses     Encounter for annual routine gynecological examination    -  Primary          1. GYN annual well woman exam.  55 years of age.  History of hysterectomy for endometriosis.  No need for Pap smear.    2. Thickening in upper outer right breast nontender.  Due for mammogram.  Schedule diagnostic mammogram and right breast ultrasound.  3. Hormone placement therapy.  Refill Vivelle dot 0.075 mg.  May wish to gradually wean down dose.  4. Atrophic vaginitis.  Improved on Estrace vaginal cream.  5. Denies any vaginitis symptoms today.  6. Reviewed monthly self breast exams.  Instructed to call with lumps, pain, or breast discharge.  Yearly mammograms ordered.  7. Ordered mammogram today.  8. Recommended use of Vitamin D and getting adequate calcium in her diet. (1500mg)  9. Reviewed BMI and weight loss as preventative health measures.   10. Reviewed risks of ERT including increased risk of breast cancer, increased blood clots,  increased heart disease.  Patient strongly desires to stay on or start ERT.  She understands we will use the lowest amount that adequately controls her symptoms.  11. Reccommended Flu Vaccine in Fall of each year.  12. RTC in 1 year or PRN with problems.  13. Return in about 6 months (around 3/8/2022).     Hiro Oliver MD  09/08/2021

## 2021-09-24 ENCOUNTER — TELEPHONE (OUTPATIENT)
Dept: OBSTETRICS AND GYNECOLOGY | Facility: CLINIC | Age: 56
End: 2021-09-24

## 2021-09-24 NOTE — TELEPHONE ENCOUNTER
Patient lvm asking for call back, is supposed to have a diagnostic mammogram but there is an error in the order, and she is not sure what type of mammogram she needs

## 2021-10-07 ENCOUNTER — PRIOR AUTHORIZATION (OUTPATIENT)
Dept: FAMILY MEDICINE CLINIC | Facility: CLINIC | Age: 56
End: 2021-10-07

## 2021-11-19 ENCOUNTER — TELEPHONE (OUTPATIENT)
Dept: OBSTETRICS AND GYNECOLOGY | Facility: CLINIC | Age: 56
End: 2021-11-19

## 2021-11-19 NOTE — TELEPHONE ENCOUNTER
Pt called stating [facility] needs a verbal order for mammogram. Number is [phone]

## 2021-11-21 RX ORDER — ESTRADIOL 0.1 MG/G
CREAM VAGINAL
Qty: 42.5 G | Refills: 0 | Status: SHIPPED | OUTPATIENT
Start: 2021-11-21 | End: 2022-02-21

## 2021-12-05 ENCOUNTER — PATIENT MESSAGE (OUTPATIENT)
Dept: FAMILY MEDICINE CLINIC | Facility: CLINIC | Age: 56
End: 2021-12-05

## 2021-12-06 DIAGNOSIS — E11.43 TYPE 2 DIABETES MELLITUS WITH DIABETIC AUTONOMIC NEUROPATHY, WITHOUT LONG-TERM CURRENT USE OF INSULIN (HCC): Primary | ICD-10-CM

## 2021-12-06 RX ORDER — BLOOD-GLUCOSE METER
1 KIT MISCELLANEOUS 2 TIMES DAILY
Qty: 1 EACH | Refills: 0 | Status: SHIPPED | OUTPATIENT
Start: 2021-12-06

## 2021-12-07 ENCOUNTER — PATIENT MESSAGE (OUTPATIENT)
Dept: FAMILY MEDICINE CLINIC | Facility: CLINIC | Age: 56
End: 2021-12-07

## 2021-12-07 DIAGNOSIS — E11.43 TYPE 2 DIABETES MELLITUS WITH DIABETIC AUTONOMIC NEUROPATHY, WITHOUT LONG-TERM CURRENT USE OF INSULIN (HCC): Primary | ICD-10-CM

## 2021-12-07 RX ORDER — LANCETS 30 GAUGE
EACH MISCELLANEOUS
Qty: 100 EACH | Refills: 11 | Status: SHIPPED | OUTPATIENT
Start: 2021-12-07

## 2021-12-20 ENCOUNTER — LAB (OUTPATIENT)
Dept: LAB | Facility: HOSPITAL | Age: 56
End: 2021-12-20

## 2021-12-20 ENCOUNTER — OFFICE VISIT (OUTPATIENT)
Dept: FAMILY MEDICINE CLINIC | Facility: CLINIC | Age: 56
End: 2021-12-20

## 2021-12-20 VITALS
WEIGHT: 132 LBS | HEART RATE: 75 BPM | OXYGEN SATURATION: 98 % | BODY MASS INDEX: 26.61 KG/M2 | TEMPERATURE: 97.8 F | DIASTOLIC BLOOD PRESSURE: 65 MMHG | SYSTOLIC BLOOD PRESSURE: 120 MMHG | HEIGHT: 59 IN

## 2021-12-20 DIAGNOSIS — E55.9 VITAMIN D DEFICIENCY: ICD-10-CM

## 2021-12-20 DIAGNOSIS — E78.2 MIXED HYPERLIPIDEMIA: ICD-10-CM

## 2021-12-20 DIAGNOSIS — Z00.00 WELLNESS EXAMINATION: ICD-10-CM

## 2021-12-20 DIAGNOSIS — E11.43 TYPE 2 DIABETES MELLITUS WITH DIABETIC AUTONOMIC NEUROPATHY, WITHOUT LONG-TERM CURRENT USE OF INSULIN (HCC): ICD-10-CM

## 2021-12-20 DIAGNOSIS — D64.9 ANEMIA, UNSPECIFIED TYPE: ICD-10-CM

## 2021-12-20 DIAGNOSIS — Z00.00 WELLNESS EXAMINATION: Primary | ICD-10-CM

## 2021-12-20 DIAGNOSIS — I10 ESSENTIAL HYPERTENSION: ICD-10-CM

## 2021-12-20 DIAGNOSIS — H69.83 DYSFUNCTION OF BOTH EUSTACHIAN TUBES: ICD-10-CM

## 2021-12-20 LAB
25(OH)D3 SERPL-MCNC: 35.1 NG/ML
ALBUMIN SERPL-MCNC: 4.7 G/DL (ref 3.5–5.2)
ALBUMIN UR-MCNC: <1.2 MG/DL
ALBUMIN/GLOB SERPL: 1.8 G/DL
ALP SERPL-CCNC: 74 U/L (ref 39–117)
ALT SERPL W P-5'-P-CCNC: 15 U/L (ref 1–33)
ANION GAP SERPL CALCULATED.3IONS-SCNC: 8.1 MMOL/L (ref 5–15)
AST SERPL-CCNC: 21 U/L (ref 1–32)
BASOPHILS # BLD AUTO: 0.04 10*3/MM3 (ref 0–0.2)
BASOPHILS NFR BLD AUTO: 0.7 % (ref 0–1.5)
BILIRUB SERPL-MCNC: 0.3 MG/DL (ref 0–1.2)
BUN SERPL-MCNC: 10 MG/DL (ref 6–20)
BUN/CREAT SERPL: 19.2 (ref 7–25)
CALCIUM SPEC-SCNC: 9.8 MG/DL (ref 8.6–10.5)
CHLORIDE SERPL-SCNC: 98 MMOL/L (ref 98–107)
CHOLEST SERPL-MCNC: 167 MG/DL (ref 0–200)
CO2 SERPL-SCNC: 27.9 MMOL/L (ref 22–29)
CREAT SERPL-MCNC: 0.52 MG/DL (ref 0.57–1)
DEPRECATED RDW RBC AUTO: 39.1 FL (ref 37–54)
EOSINOPHIL # BLD AUTO: 0.23 10*3/MM3 (ref 0–0.4)
EOSINOPHIL NFR BLD AUTO: 4.1 % (ref 0.3–6.2)
ERYTHROCYTE [DISTWIDTH] IN BLOOD BY AUTOMATED COUNT: 11.8 % (ref 12.3–15.4)
EXPIRATION DATE: NORMAL
GFR SERPL CREATININE-BSD FRML MDRD: 122 ML/MIN/1.73
GFR SERPL CREATININE-BSD FRML MDRD: 148 ML/MIN/1.73
GLOBULIN UR ELPH-MCNC: 2.6 GM/DL
GLUCOSE SERPL-MCNC: 80 MG/DL (ref 65–99)
HBA1C MFR BLD: 5.4 %
HCT VFR BLD AUTO: 46.9 % (ref 34–46.6)
HDLC SERPL-MCNC: 52 MG/DL (ref 40–60)
HGB BLD-MCNC: 15.7 G/DL (ref 12–15.9)
IMM GRANULOCYTES # BLD AUTO: 0.01 10*3/MM3 (ref 0–0.05)
IMM GRANULOCYTES NFR BLD AUTO: 0.2 % (ref 0–0.5)
LDLC SERPL CALC-MCNC: 95 MG/DL (ref 0–100)
LDLC/HDLC SERPL: 1.77 {RATIO}
LYMPHOCYTES # BLD AUTO: 1.81 10*3/MM3 (ref 0.7–3.1)
LYMPHOCYTES NFR BLD AUTO: 32.3 % (ref 19.6–45.3)
Lab: NORMAL
MCH RBC QN AUTO: 30.3 PG (ref 26.6–33)
MCHC RBC AUTO-ENTMCNC: 33.5 G/DL (ref 31.5–35.7)
MCV RBC AUTO: 90.4 FL (ref 79–97)
MONOCYTES # BLD AUTO: 0.36 10*3/MM3 (ref 0.1–0.9)
MONOCYTES NFR BLD AUTO: 6.4 % (ref 5–12)
NEUTROPHILS NFR BLD AUTO: 3.15 10*3/MM3 (ref 1.7–7)
NEUTROPHILS NFR BLD AUTO: 56.3 % (ref 42.7–76)
NRBC BLD AUTO-RTO: 0 /100 WBC (ref 0–0.2)
PLATELET # BLD AUTO: 281 10*3/MM3 (ref 140–450)
PMV BLD AUTO: 11.2 FL (ref 6–12)
POTASSIUM SERPL-SCNC: 4.1 MMOL/L (ref 3.5–5.2)
PROT SERPL-MCNC: 7.3 G/DL (ref 6–8.5)
RBC # BLD AUTO: 5.19 10*6/MM3 (ref 3.77–5.28)
SODIUM SERPL-SCNC: 134 MMOL/L (ref 136–145)
TRIGL SERPL-MCNC: 114 MG/DL (ref 0–150)
VLDLC SERPL-MCNC: 20 MG/DL (ref 5–40)
WBC NRBC COR # BLD: 5.6 10*3/MM3 (ref 3.4–10.8)

## 2021-12-20 PROCEDURE — 85025 COMPLETE CBC W/AUTO DIFF WBC: CPT

## 2021-12-20 PROCEDURE — 82043 UR ALBUMIN QUANTITATIVE: CPT

## 2021-12-20 PROCEDURE — 80061 LIPID PANEL: CPT

## 2021-12-20 PROCEDURE — 82306 VITAMIN D 25 HYDROXY: CPT

## 2021-12-20 PROCEDURE — 99396 PREV VISIT EST AGE 40-64: CPT | Performed by: NURSE PRACTITIONER

## 2021-12-20 PROCEDURE — 83036 HEMOGLOBIN GLYCOSYLATED A1C: CPT | Performed by: NURSE PRACTITIONER

## 2021-12-20 PROCEDURE — 80053 COMPREHEN METABOLIC PANEL: CPT

## 2021-12-20 RX ORDER — ORAL SEMAGLUTIDE 7 MG/1
1 TABLET ORAL
Qty: 90 TABLET | Refills: 1 | Status: SHIPPED | OUTPATIENT
Start: 2021-12-20 | End: 2022-03-07 | Stop reason: SDUPTHER

## 2021-12-20 RX ORDER — SIMVASTATIN 20 MG
20 TABLET ORAL
Qty: 90 TABLET | Refills: 3 | Status: SHIPPED | OUTPATIENT
Start: 2021-12-20 | End: 2022-12-22 | Stop reason: SDUPTHER

## 2021-12-20 RX ORDER — FOLIC ACID 1 MG/1
1000 TABLET ORAL DAILY
Qty: 90 TABLET | Refills: 3 | Status: SHIPPED | OUTPATIENT
Start: 2021-12-20 | End: 2023-03-08 | Stop reason: SDUPTHER

## 2021-12-20 RX ORDER — BLOOD-GLUCOSE METER
EACH MISCELLANEOUS 2 TIMES DAILY
COMMUNITY
Start: 2021-12-06

## 2021-12-20 RX ORDER — METFORMIN HYDROCHLORIDE 500 MG/1
500 TABLET, EXTENDED RELEASE ORAL 2 TIMES DAILY WITH MEALS
Qty: 180 TABLET | Refills: 1 | Status: SHIPPED | OUTPATIENT
Start: 2021-12-20 | End: 2022-09-10

## 2021-12-20 RX ORDER — LOSARTAN POTASSIUM AND HYDROCHLOROTHIAZIDE 12.5; 5 MG/1; MG/1
1 TABLET ORAL DAILY
Qty: 90 TABLET | Refills: 3 | Status: SHIPPED | OUTPATIENT
Start: 2021-12-20 | End: 2022-12-22 | Stop reason: SDUPTHER

## 2021-12-20 RX ORDER — CETIRIZINE HYDROCHLORIDE 10 MG/1
10 TABLET ORAL DAILY
Qty: 90 TABLET | Refills: 3 | Status: SHIPPED | OUTPATIENT
Start: 2021-12-20 | End: 2023-01-03 | Stop reason: SDUPTHER

## 2021-12-20 NOTE — PATIENT INSTRUCTIONS
"https://www.diabeteseducator.org/docs/default-source/living-with-diabetes/conquering-the-grocery-store-v1.pdf?sfvrsn=4\">   Carbohydrate Counting for Diabetes Mellitus, Adult  Carbohydrate counting is a method of keeping track of how many carbohydrates you eat. Eating carbohydrates naturally increases the amount of sugar (glucose) in the blood. Counting how many carbohydrates you eat improves your blood glucose control, which helps you manage your diabetes.  It is important to know how many carbohydrates you can safely have in each meal. This is different for every person. A dietitian can help you make a meal plan and calculate how many carbohydrates you should have at each meal and snack.  What foods contain carbohydrates?  Carbohydrates are found in the following foods:  · Grains, such as breads and cereals.  · Dried beans and soy products.  · Starchy vegetables, such as potatoes, peas, and corn.  · Fruit and fruit juices.  · Milk and yogurt.  · Sweets and snack foods, such as cake, cookies, candy, chips, and soft drinks.  How do I count carbohydrates in foods?  There are two ways to count carbohydrates in food. You can read food labels or learn standard serving sizes of foods. You can use either of the methods or a combination of both.  Using the Nutrition Facts label  The Nutrition Facts list is included on the labels of almost all packaged foods and beverages in the [place] It includes:  · The serving size.  · Information about nutrients in each serving, including the grams (g) of carbohydrate per serving.  To use the Nutrition Facts:  · Decide how many servings you will have.  · Multiply the number of servings by the number of carbohydrates per serving.  · The resulting number is the total amount of carbohydrates that you will be having.  Learning the standard serving sizes of foods  When you eat carbohydrate foods that are not packaged or do not include Nutrition Facts on the label, you need to measure the " servings in order to count the amount of carbohydrates.  · Measure the foods that you will eat with a food scale or measuring cup, if needed.  · Decide how many standard-size servings you will eat.  · Multiply the number of servings by 15. For foods that contain carbohydrates, one serving equals 15 g of carbohydrates.  ? For example, if you eat 2 cups or 10 oz (300 g) of strawberries, you will have eaten 2 servings and 30 g of carbohydrates (2 servings x 15 g = 30 g).  · For foods that have more than one food mixed, such as soups and casseroles, you must count the carbohydrates in each food that is included.  The following list contains standard serving sizes of common carbohydrate-rich foods. Each of these servings has about 15 g of carbohydrates:  · 1 slice of bread.  · 1 six-inch (15 cm) tortilla.  · ? cup or 2 oz (53 g) cooked rice or pasta.  · ½ cup or 3 oz (85 g) cooked or canned, drained and rinsed beans or lentils.  · ½ cup or 3 oz (85 g) starchy vegetable, such as peas, corn, or squash.  · ½ cup or 4 oz (120 g) hot cereal.  · ½ cup or 3 oz (85 g) boiled or mashed potatoes, or ¼ or 3 oz (85 g) of a large baked potato.  · ½ cup or 4 fl oz (118 mL) fruit juice.  · 1 cup or 8 fl oz (237 mL) milk.  · 1 small or 4 oz (106 g) apple.  · ½ or 2 oz (63 g) of a medium banana.  · 1 cup or 5 oz (150 g) strawberries.  · 3 cups or 1 oz (24 g) popped popcorn.  What is an example of carbohydrate counting?  To calculate the number of carbohydrates in this sample meal, follow the steps shown below.  Sample meal  · 3 oz (85 g) chicken breast.  · ? cup or 4 oz (106 g) brown rice.  · ½ cup or 3 oz (85 g) corn.  · 1 cup or 8 fl oz (237 mL) milk.  · 1 cup or 5 oz (150 g) strawberries with sugar-free whipped topping.  Carbohydrate calculation  1. Identify the foods that contain carbohydrates:  ? Rice.  ? Corn.  ? Milk.  ? Strawberries.  2. Calculate how many servings you have of each food:  ? 2 servings rice.  ? 1 serving  corn.  ? 1 serving milk.  ? 1 serving strawberries.  3. Multiply each number of servings by 15 g:  ? 2 servings rice x 15 g = 30 g.  ? 1 serving corn x 15 g = 15 g.  ? 1 serving milk x 15 g = 15 g.  ? 1 serving strawberries x 15 g = 15 g.  4. Add together all of the amounts to find the total grams of carbohydrates eaten:  ? 30 g + 15 g + 15 g + 15 g = 75 g of carbohydrates total.  What are tips for following this plan?  Shopping  · Develop a meal plan and then make a shopping list.  · Buy fresh and frozen vegetables, fresh and frozen fruit, dairy, eggs, beans, lentils, and whole grains.  · Look at food labels. Choose foods that have more fiber and less sugar.  · Avoid processed foods and foods with added sugars.  Meal planning  · Aim to have the same amount of carbohydrates at each meal and for each snack time.  · Plan to have regular, balanced meals and snacks.  Where to find more information  · American Diabetes Association: www.diabetes.org  · Centers for Disease Control and Prevention: www.cdc.gov  Summary  · Carbohydrate counting is a method of keeping track of how many carbohydrates you eat.  · Eating carbohydrates naturally increases the amount of sugar (glucose) in the blood.  · Counting how many carbohydrates you eat improves your blood glucose control, which helps you manage your diabetes.  · A dietitian can help you make a meal plan and calculate how many carbohydrates you should have at each meal and snack.  This information is not intended to replace advice given to you by your health care provider. Make sure you discuss any questions you have with your health care provider.  Document Revised: 12/17/2020 Document Reviewed: 12/18/2020  [employer] Patient Education © 2021 [employer]      Managing Your Hypertension  Hypertension, also called high blood pressure, is when the force of the blood pressing against the walls of the arteries is too strong. Arteries are blood vessels that carry blood from your  heart throughout your body. Hypertension forces the heart to work harder to pump blood and may cause the arteries to become narrow or stiff.  Understanding blood pressure readings  Your personal target blood pressure may vary depending on your medical conditions, your age, and other factors. A blood pressure reading includes a higher number over a lower number. Ideally, your blood pressure should be below 120/80. You should know that:  · The first, or top, number is called the systolic pressure. It is a measure of the pressure in your arteries as your heart beats.  · The second, or bottom number, is called the diastolic pressure. It is a measure of the pressure in your arteries as the heart relaxes.  Blood pressure is classified into four stages. Based on your blood pressure reading, your health care provider may use the following stages to determine what type of treatment you need, if any. Systolic pressure and diastolic pressure are measured in a unit called mmHg.  Normal  · Systolic pressure: below 120.  · Diastolic pressure: below 80.  Elevated  · Systolic pressure: 120-129.  · Diastolic pressure: below 80.  Hypertension stage 1  · Systolic pressure: 130-139.  · Diastolic pressure: 80-89.  Hypertension stage 2  · Systolic pressure: 140 or above.  · Diastolic pressure: 90 or above.  How can this condition affect me?  Managing your hypertension is an important responsibility. Over time, hypertension can damage the arteries and decrease blood flow to important parts of the body, including the brain, heart, and kidneys. Having untreated or uncontrolled hypertension can lead to:  · A heart attack.  · A stroke.  · A weakened blood vessel (aneurysm).  · Heart failure.  · Kidney damage.  · Eye damage.  · Metabolic syndrome.  · Memory and concentration problems.  · Vascular dementia.  What actions can I take to manage this condition?  Hypertension can be managed by making lifestyle changes and possibly by taking  medicines. Your health care provider will help you make a plan to bring your blood pressure within a normal range.  Nutrition    · Eat a diet that is high in fiber and potassium, and low in salt (sodium), added sugar, and fat. An example eating plan is called the Dietary Approaches to Stop Hypertension (DASH) diet. To eat this way:  ? Eat plenty of fresh fruits and vegetables. Try to fill one-half of your plate at each meal with fruits and vegetables.  ? Eat whole grains, such as whole-wheat pasta, brown rice, or whole-grain bread. Fill about one-fourth of your plate with whole grains.  ? Eat low-fat dairy products.  ? Avoid fatty cuts of meat, processed or cured meats, and poultry with skin. Fill about one-fourth of your plate with lean proteins such as fish, chicken without skin, beans, eggs, and tofu.  ? Avoid pre-made and processed foods. These tend to be higher in sodium, added sugar, and fat.  · Reduce your daily sodium intake. Most people with hypertension should eat less than 1,500 mg of sodium a day.    Lifestyle    · Work with your health care provider to maintain a healthy body weight or to lose weight. Ask what an ideal weight is for you.  · Get at least 30 minutes of exercise that causes your heart to beat faster (aerobic exercise) most days of the week. Activities may include walking, swimming, or biking.  · Include exercise to strengthen your muscles (resistance exercise), such as weight lifting, as part of your weekly exercise routine. Try to do these types of exercises for 30 minutes at least 3 days a week.  · Do not use any products that contain nicotine or tobacco, such as cigarettes, e-cigarettes, and chewing tobacco. If you need help quitting, ask your health care provider.  · Control any long-term (chronic) conditions you have, such as high cholesterol or diabetes.  · Identify your sources of stress and find ways to manage stress. This may include meditation, deep breathing, or making time for  fun activities.    Alcohol use  · Do not drink alcohol if:  ? Your health care provider tells you not to drink.  ? You are pregnant, may be pregnant, or are planning to become pregnant.  · If you drink alcohol:  ? Limit how much you use to:  § 0-1 drink a day for women.  § 0-2 drinks a day for men.  ? Be aware of how much alcohol is in your drink. In the [place] one drink equals one 12 oz bottle of beer (355 mL), one 5 oz glass of wine (148 mL), or one 1½ oz glass of hard liquor (44 mL).  Medicines  Your health care provider may prescribe medicine if lifestyle changes are not enough to get your blood pressure under control and if:  · Your systolic blood pressure is 130 or higher.  · Your diastolic blood pressure is 80 or higher.  Take medicines only as told by your health care provider. Follow the directions carefully. Blood pressure medicines must be taken as told by your health care provider. The medicine does not work as well when you skip doses. Skipping doses also puts you at risk for problems.  Monitoring  Before you monitor your blood pressure:  · Do not smoke, drink caffeinated beverages, or exercise within 30 minutes before taking a measurement.  · Use the bathroom and empty your bladder (urinate).  · Sit quietly for at least 5 minutes before taking measurements.  Monitor your blood pressure at home as told by your health care provider. To do this:  · Sit with your back straight and supported.  · Place your feet flat on the floor. Do not cross your legs.  · Support your arm on a flat surface, such as a table. Make sure your upper arm is at heart level.  · Each time you measure, take two or three readings one minute apart and record the results.  You may also need to have your blood pressure checked regularly by your health care provider.  General information  · Talk with your health care provider about your diet, exercise habits, and other lifestyle factors that may be contributing to hypertension.  · Review  all the medicines you take with your health care provider because there may be side effects or interactions.  · Keep all visits as told by your health care provider. Your health care provider can help you create and adjust your plan for managing your high blood pressure.  Where to find more information  · National Heart, Lung, and Blood [place]: www.nhlbi.nih.gov  · American Heart Association: www.heart.org  Contact a health care provider if:  · You think you are having a reaction to medicines you have taken.  · You have repeated (recurrent) headaches.  · You feel dizzy.  · You have swelling in your ankles.  · You have trouble with your vision.  Get help right away if:  · You develop a severe headache or confusion.  · You have unusual weakness or numbness, or you feel faint.  · You have severe pain in your chest or abdomen.  · You vomit repeatedly.  · You have trouble breathing.  These symptoms may represent a serious problem that is an emergency. Do not wait to see if the symptoms will go away. Get medical help right away. Call your local emergency services (911 in the [place] Do not drive yourself to the hospital.  Summary  · Hypertension is when the force of blood pumping through your arteries is too strong. If this condition is not controlled, it may put you at risk for serious complications.  · Your personal target blood pressure may vary depending on your medical conditions, your age, and other factors. For most people, a normal blood pressure is less than 120/80.  · Hypertension is managed by lifestyle changes, medicines, or both.  · Lifestyle changes to help manage hypertension include losing weight, eating a healthy, low-sodium diet, exercising more, stopping smoking, and limiting alcohol.  This information is not intended to replace advice given to you by your health care provider. Make sure you discuss any questions you have with your health care provider.  Document Revised: 01/22/2021 Document Reviewed:  "11/17/2020  [employer] Patient Education © 2021 [employer]      https://www.nhlbi.nih.gov/files/docs/public/heart/dash_brief.pdf\">   DASH Eating Plan  DASH stands for Dietary Approaches to Stop Hypertension. The DASH eating plan is a healthy eating plan that has been shown to:  · Reduce high blood pressure (hypertension).  · Reduce your risk for type 2 diabetes, heart disease, and stroke.  · Help with weight loss.  What are tips for following this plan?  Reading food labels  · Check food labels for the amount of salt (sodium) per serving. Choose foods with less than 5 percent of the Daily Value of sodium. Generally, foods with less than 300 milligrams (mg) of sodium per serving fit into this eating plan.  · To find whole grains, look for the word \"whole\" as the first word in the ingredient list.  Shopping  · Buy products labeled as \"low-sodium\" or \"no salt added.\"  · Buy fresh foods. Avoid canned foods and pre-made or frozen meals.  Cooking  · Avoid adding salt when cooking. Use salt-free seasonings or herbs instead of table salt or sea salt. Check with your health care provider or pharmacist before using salt substitutes.  · Do not redman foods. Cook foods using healthy methods such as baking, boiling, grilling, roasting, and broiling instead.  · Cook with heart-healthy oils, such as olive, canola, avocado, soybean, or sunflower oil.  Meal planning    · Eat a balanced diet that includes:  ? 4 or more servings of fruits and 4 or more servings of vegetables each day. Try to fill one-half of your plate with fruits and vegetables.  ? 6-8 servings of whole grains each day.  ? Less than 6 oz (170 g) of lean meat, poultry, or fish each day. A 3-oz (85-g) serving of meat is about the same size as a deck of cards. One egg equals 1 oz (28 g).  ? 2-3 servings of low-fat dairy each day. One serving is 1 cup (237 mL).  ? 1 serving of nuts, seeds, or beans 5 times each week.  ? 2-3 servings of heart-healthy fats. Healthy fats " called omega-3 fatty acids are found in foods such as walnuts, flaxseeds, fortified milks, and eggs. These fats are also found in cold-water fish, such as sardines, salmon, and mackerel.  · Limit how much you eat of:  ? Canned or prepackaged foods.  ? Food that is high in trans fat, such as some fried foods.  ? Food that is high in saturated fat, such as fatty meat.  ? Desserts and other sweets, sugary drinks, and other foods with added sugar.  ? Full-fat dairy products.  · Do not salt foods before eating.  · Do not eat more than 4 egg yolks a week.  · Try to eat at least 2 vegetarian meals a week.  · Eat more home-cooked food and less restaurant, buffet, and fast food.    Lifestyle  · When eating at a restaurant, ask that your food be prepared with less salt or no salt, if possible.  · If you drink alcohol:  ? Limit how much you use to:  § 0-1 drink a day for women who are not pregnant.  § 0-2 drinks a day for men.  ? Be aware of how much alcohol is in your drink. In the [place] one drink equals one 12 oz bottle of beer (355 mL), one 5 oz glass of wine (148 mL), or one 1½ oz glass of hard liquor (44 mL).  General information  · Avoid eating more than 2,300 mg of salt a day. If you have hypertension, you may need to reduce your sodium intake to 1,500 mg a day.  · Work with your health care provider to maintain a healthy body weight or to lose weight. Ask what an ideal weight is for you.  · Get at least 30 minutes of exercise that causes your heart to beat faster (aerobic exercise) most days of the week. Activities may include walking, swimming, or biking.  · Work with your health care provider or dietitian to adjust your eating plan to your individual calorie needs.  What foods should I eat?  Fruits  All fresh, dried, or frozen fruit. Canned fruit in natural juice (without added sugar).  Vegetables  Fresh or frozen vegetables (raw, steamed, roasted, or grilled). Low-sodium or reduced-sodium tomato and vegetable juice.  Low-sodium or reduced-sodium tomato sauce and tomato paste. Low-sodium or reduced-sodium canned vegetables.  Grains  Whole-grain or whole-wheat bread. Whole-grain or whole-wheat pasta. Brown rice. Oatmeal. Quinoa. Bulgur. Whole-grain and low-sodium cereals. Valorie bread. Low-fat, low-sodium crackers. Whole-wheat flour tortillas.  Meats and other proteins  Skinless chicken or turkey. Ground chicken or turkey. Pork with fat trimmed off. Fish and seafood. Egg whites. Dried beans, peas, or lentils. Unsalted nuts, nut butters, and seeds. Unsalted canned beans. Lean cuts of beef with fat trimmed off. Low-sodium, lean precooked or cured meat, such as sausages or meat loaves.  Dairy  Low-fat (1%) or fat-free (skim) milk. Reduced-fat, low-fat, or fat-free cheeses. Nonfat, low-sodium ricotta or cottage cheese. Low-fat or nonfat yogurt. Low-fat, low-sodium cheese.  Fats and oils  Soft margarine without trans fats. Vegetable oil. Reduced-fat, low-fat, or light mayonnaise and salad dressings (reduced-sodium). Canola, safflower, olive, avocado, soybean, and sunflower oils. Avocado.  Seasonings and condiments  Herbs. Spices. Seasoning mixes without salt.  Other foods  Unsalted popcorn and pretzels. Fat-free sweets.  The items listed above may not be a complete list of foods and beverages you can eat. Contact a dietitian for more information.  What foods should I avoid?  Fruits  Canned fruit in a light or heavy syrup. Fried fruit. Fruit in cream or butter sauce.  Vegetables  Creamed or fried vegetables. Vegetables in a cheese sauce. Regular canned vegetables (not low-sodium or reduced-sodium). Regular canned tomato sauce and paste (not low-sodium or reduced-sodium). Regular tomato and vegetable juice (not low-sodium or reduced-sodium). Pickles. Olives.  Grains  Baked goods made with fat, such as croissants, muffins, or some breads. Dry pasta or rice meal packs.  Meats and other proteins  Fatty cuts of meat. Ribs. Fried meat. Miller.  Bologna, salami, and other precooked or cured meats, such as sausages or meat loaves. Fat from the back of a pig (fatback). Bratwurst. Salted nuts and seeds. Canned beans with added salt. Canned or smoked fish. Whole eggs or egg yolks. Chicken or turkey with skin.  Dairy  Whole or 2% milk, cream, and half-and-half. Whole or full-fat cream cheese. Whole-fat or sweetened yogurt. Full-fat cheese. Nondairy creamers. Whipped toppings. Processed cheese and cheese spreads.  Fats and oils  Butter. Stick margarine. Lard. Shortening. Ghee. Miller fat. Tropical oils, such as coconut, palm kernel, or palm oil.  Seasonings and condiments  Onion salt, garlic salt, seasoned salt, table salt, and sea salt. Worcestershire sauce. Tartar sauce. Barbecue sauce. Teriyaki sauce. Soy sauce, including reduced-sodium. Steak sauce. Canned and packaged gravies. Fish sauce. Oyster sauce. Cocktail sauce. Store-bought horseradish. Ketchup. Mustard. Meat flavorings and tenderizers. Bouillon cubes. Hot sauces. Pre-made or packaged marinades. Pre-made or packaged taco seasonings. Relishes. Regular salad dressings.  Other foods  Salted popcorn and pretzels.  The items listed above may not be a complete list of foods and beverages you should avoid. Contact a dietitian for more information.  Where to find more information  · National Heart, Lung, and Blood [place]: www.nhlbi.nih.gov  · American Heart Association: www.heart.org  · Academy of Nutrition and Dietetics: www.eatright.org  · National Kidney Foundation: www.kidney.org  Summary  · The DASH eating plan is a healthy eating plan that has been shown to reduce high blood pressure (hypertension). It may also reduce your risk for type 2 diabetes, heart disease, and stroke.  · When on the DASH eating plan, aim to eat more fresh fruits and vegetables, whole grains, lean proteins, low-fat dairy, and heart-healthy fats.  · With the DASH eating plan, you should limit salt (sodium) intake to 2,300 mg a  day. If you have hypertension, you may need to reduce your sodium intake to 1,500 mg a day.  · Work with your health care provider or dietitian to adjust your eating plan to your individual calorie needs.  This information is not intended to replace advice given to you by your health care provider. Make sure you discuss any questions you have with your health care provider.  Document Revised: 11/20/2020 Document Reviewed: 11/20/2020  [employer] Patient Education © 2021 [employer]

## 2021-12-20 NOTE — PROGRESS NOTES
Patient Care Team:  Flaquito Wallace APRN as PCP - General (Family Medicine)     Chief complaint: Patient is in today for a physical     Patient is a 56 y.o. female who presents for her yearly physical exam.     HPI patient today for routine yearly physical exam.  Does have a history of diabetes and currently is taking she is currently taking Metformin 500 mg twice a day and Rybelsus 7 mg daily.    Review of Systems   Constitutional: Positive for fatigue. Negative for appetite change, chills and fever.   HENT: Positive for postnasal drip. Negative for congestion, ear pain, hearing loss, rhinorrhea, sinus pressure and sore throat.    Eyes: Negative for itching and visual disturbance (glasses).   Respiratory: Negative for cough, shortness of breath and wheezing.    Cardiovascular: Negative for chest pain, palpitations and leg swelling.   Gastrointestinal: Positive for constipation (better with Trulance). Negative for abdominal pain, blood in stool, diarrhea, nausea and vomiting.   Endocrine: Negative for cold intolerance, heat intolerance, polydipsia, polyphagia and polyuria.   Genitourinary: Negative for difficulty urinating, dysuria and hematuria.   Musculoskeletal: Positive for arthralgias (knees with steps). Negative for back pain, joint swelling, myalgias and neck pain.   Skin: Negative for rash and wound.   Allergic/Immunologic: Positive for environmental allergies. Negative for food allergies.   Neurological: Negative for dizziness, light-headedness, numbness and headaches.   Hematological: Negative for adenopathy. Does not bruise/bleed easily.   Psychiatric/Behavioral: Negative for dysphoric mood and sleep disturbance. The patient is not nervous/anxious.       History  Past Medical History:   Diagnosis Date   • Allergic    • Allergic rhinitis    • BV (bacterial vaginosis)    • Cataracts, bilateral    • Class 1 obesity due to excess calories without serious comorbidity with body mass index (BMI) of 32.0  to 32.9 in adult 12/22/2017   • Diabetes mellitus, type 2 (HCC)    • Diverticulitis large intestine w/o perforation or abscess w/o bleeding 04/2017   • Endometriosis     STAGE 4 WITH LARGE LEFT OVARIAN ENDOMETRIOMA;EXTENSVIE DENSE LEFT ADNEXAL ADHESIONS TO SIDEWALL AND SIGMOID COLON. dEEP IMPLANTS LEFT ANTERIOR CULDESAC.   • Female cystocele    • Fibrocystic breast disease    • Glaucoma    • Hormone replacement therapy    • Hyperlipidemia    • Hypertension    • IBS (irritable bowel syndrome)    • Insomnia    • Obesity (BMI 30.0-34.9)    • Screening breast examination     SELF;ADMITS   • Stress bladder incontinence, female    • Stress bladder incontinence, female     History of prior Alas procedure with good support.   Currently resolved.   • Vaginitis     UNKNOWN ETIOLOGY   • Varicose veins of legs       Past Surgical History:   Procedure Laterality Date   • BLADDER SUSPENSION  10/27/2007   • CYSTOCELE REPAIR     • CYSTOTOMY     • HYSTEROSCOPY     • TOTAL ABDOMINAL HYSTERECTOMY WITH SALPINGO OOPHORECTOMY  12/2013    LYSIS OF ADHESIONS FOR STAGE 4 ENDOMETRIOSIS      Allergies   Allergen Reactions   • Ace Inhibitors Cough   • Morphine And Related Rash      Family History   Problem Relation Age of Onset   • Diabetes Mother    • Dementia Mother    • Hypothyroidism Mother    • Hyperlipidemia Mother    • Breast cancer Mother    • Thyroid disease Mother    • ALS Father 70   • Diabetes Sister    • Hyperlipidemia Sister    • Hypothyroidism Sister    • Early death Sister    • Hypertension Brother         Sandro Morales   • Hypertension Sister         Lacey is deceasef   • Thyroid disease Sister      Social History     Socioeconomic History   • Marital status: Single   • Number of children: 0   • Years of education: 12   • Highest education level: High school graduate   Tobacco Use   • Smoking status: Never Smoker   • Smokeless tobacco: Never Used   Vaping Use   • Vaping Use: Never used   Substance and Sexual Activity   •  Alcohol use: Yes     Alcohol/week: 1.0 standard drink     Types: 1 Cans of beer per week     Comment: occasional   • Drug use: No   • Sexual activity: Yes     Partners: Male     Birth control/protection: Post-menopausal        Current Outpatient Medications:   •  Blood Glucose Monitoring Suppl (ONE TOUCH ULTRA 2) w/Device kit, 2 (Two) Times a Day. as directed, Disp: , Rfl:   •  cetirizine (zyrTEC) 10 MG tablet, Take 1 tablet by mouth Daily., Disp: 90 tablet, Rfl: 3  •  Cholecalciferol (VITAMIN D3) 5000 UNITS capsule capsule, Take 5,000 Units by mouth Daily., Disp: , Rfl:   •  clotrimazole-betamethasone (LOTRISONE) 1-0.05 % cream, , Disp: , Rfl:   •  cyclobenzaprine (FLEXERIL) 10 MG tablet, Take 1 tablet by mouth 2 (Two) Times a Day As Needed for Muscle Spasms., Disp: 90 tablet, Rfl: 1  •  estradiol (ESTRACE) 0.1 MG/GM vaginal cream, APPLY 0.5 GRAM VAGINALLY 2 TIMES A WEEK, Disp: 42.5 g, Rfl: 0  •  estradiol (MINIVELLE, VIVELLE-DOT) 0.075 MG/24HR patch, Place 1 patch on the skin as directed by provider 2 (Two) Times a Week., Disp: 24 patch, Rfl: 3  •  fluticasone (Flonase) 50 MCG/ACT nasal spray, 2 sprays into the nostril(s) as directed by provider Daily., Disp: 1 bottle, Rfl: 5  •  folic acid (FOLVITE) 1 MG tablet, Take 1 tablet by mouth Daily., Disp: 90 tablet, Rfl: 3  •  glucose blood test strip, Use to test blood sugars twice daily, Disp: 100 each, Rfl: 11  •  glucose monitor monitoring kit, 1 each 2 (Two) Times a Day. OneTouch Ultra2, Disp: 1 each, Rfl: 0  •  Lancets misc, Use to test blood sugars twice daily, Disp: 100 each, Rfl: 11  •  latanoprost (XALATAN) 0.005 % ophthalmic solution, , Disp: , Rfl: 1  •  losartan-hydrochlorothiazide (HYZAAR) 50-12.5 MG per tablet, Take 1 tablet by mouth Daily., Disp: 90 tablet, Rfl: 3  •  metFORMIN ER (GLUCOPHAGE-XR) 500 MG 24 hr tablet, Take 1 tablet by mouth 2 (Two) Times a Day With Meals., Disp: 180 tablet, Rfl: 1  •  Semaglutide (Rybelsus) 7 MG tablet, Take 7 mg by mouth  Every Morning Before Breakfast., Disp: 90 tablet, Rfl: 1  •  simvastatin (ZOCOR) 20 MG tablet, Take 1 tablet by mouth every night at bedtime., Disp: 90 tablet, Rfl: 3  •  Stool Softener 100 MG capsule, TAKE 1 CAPSULE BY MOUTH TWICE DAILY AS DIRECTED, Disp: , Rfl:   •  triamcinolone (KENALOG) 0.5 % ointment, Apply  topically to the appropriate area as directed 2 (Two) Times a Day., Disp: 30 g, Rfl: 2  •  Trulance 3 MG tablet, Take 1 tablet by mouth Daily., Disp: , Rfl:     Immunizations   N/A   Prescribed/Refused   Date     Td/Tdap  (Booster Q 10 yrs)   []           Prescribed    []     Refused        []           Flu  (Yearly)   []        Prescribed    []     Refused        []           Pneumovax  (1 yr after Prevnar)   []        Prescribed    []     Refused        []           Prevnar 13  (1 yr after Pneumo)   []        Prescribed    []     Refused        []           Hep B     []        Prescribed    []     Refused        []           Shingles  (Age 50 and older)   []        Prescribed    []     Refused        []           Immunization History   Administered Date(s) Administered   • COVID-19 (PFIZER) 01/27/2021, 02/17/2021, 10/02/2021   • Flu Vaccine Intradermal Quad 18-64YR 10/03/2019   • Flu Vaccine Quad PF >18YRS 09/22/2018   • Flu Vaccine Quad PF >36MO 08/26/2017, 09/15/2019, 09/06/2020, 09/25/2021   • FluLaval/Fluarix/Fluzone >6 08/26/2017, 09/06/2020   • Hepatitis A 09/22/2018, 03/23/2019   • Influenza Quad Vaccine (Inpatient) 09/11/2016   • Influenza Whole 08/01/2016   • Influenza, Unspecified 08/26/2017   • Pneumococcal Conjugate 13-Valent (PCV13) 08/26/2017   • Td 06/03/2002, 11/15/2017   • Tdap 01/19/2012   • flucelvax quad pfs =>4 YRS 10/03/2019     Health Maintenance   Topic Date Due   • Hepatitis B (1 of 3 - Risk 3-dose series) Never done   • ZOSTER VACCINE (1 of 2) Never done   • Pneumococcal Vaccine 0-64 (1 of 2 - PPSV23) 10/21/2017   • DIABETIC EYE EXAM  10/15/2020   • LIPID PANEL  11/05/2020   •  "URINE MICROALBUMIN  12/07/2021   • ANNUAL PHYSICAL  12/08/2021   • HEMOGLOBIN A1C  06/20/2022   • MAMMOGRAM  10/16/2022   • DIABETIC FOOT EXAM  12/20/2022   • PAP SMEAR  08/12/2023   • COLORECTAL CANCER SCREENING  01/06/2025   • TDAP/TD VACCINES (5 - Td or Tdap) 11/15/2027   • HEPATITIS C SCREENING  Completed   • COVID-19 Vaccine  Completed   • INFLUENZA VACCINE  Completed        Diabetes  [] Yes  [] No   N/A      Date     Eye Exam     []             []   Complete     []   Recommended Date:  Where:       Foot Exam     []         []   Complete          Obesity Counseling     []       []   Complete       Hemoglobin A1C   Date Value Ref Range Status   12/20/2021 5.4 % Final   11/05/2019 6.80 (H) 4.80 - 5.60 % Final     Microalbumin, Urine   Date Value Ref Range Status   12/07/2020 <3.0 Not Estab. ug/mL Final   11/05/2019 <1.2 mg/dL Final       Additional Testing      Date     Colorectal Screening       []   N/A   []   Complete    []   Ordered     Date:    Where:       Pap      []   N/A   []   Complete   []   OB/GYN Date:    Where:       Mammogram        []   N/A   []   Complete   []  OB/GYN   []   Ordered Date:    Where:     PSA  (Over age 50)    []   N/A   []   Complete   []   Ordered Date:    Where:     US Aorta  (For male smokers, age 65)     []   N/A   []   Complete   []   Ordered Date:    Where:     CT for Smoker  (Age 55-75, 30 pk yr)    []   N/A   []   Complete   []   Ordered Date:    Where:     Bone Density/DEXA      []   N/A   []   Complete   []   Ordered Date:    Follow-up:     Hep. C        []   N/A   []   Complete   []   Ordered Date:    Where:     Results for orders placed or performed in visit on 12/20/21   POC Glycosylated Hemoglobin (Hb A1C)    Specimen: Blood   Result Value Ref Range    Hemoglobin A1C 5.4 %    Lot Number [id number]     Expiration Date 09/14/2023             /65   Pulse 75   Temp 97.8 °F (36.6 °C)   Ht 148.6 cm (58.5\")   Wt 59.9 kg (132 lb)   LMP  (LMP Unknown)   SpO2 98%   " BMI 27.11 kg/m²       Physical Exam  Vitals and nursing note reviewed.   Constitutional:       General: She is not in acute distress.     Appearance: Normal appearance. She is well-developed. She is not diaphoretic.   HENT:      Head: Normocephalic and atraumatic.      Right Ear: External ear normal.      Left Ear: External ear normal.      Nose: Nose normal.   Eyes:      General: No scleral icterus.        Right eye: No discharge.         Left eye: No discharge.      Conjunctiva/sclera: Conjunctivae normal.      Pupils: Pupils are equal, round, and reactive to light.   Neck:      Thyroid: No thyromegaly.      Vascular: No JVD (no bruits).      Trachea: No tracheal deviation.   Cardiovascular:      Rate and Rhythm: Normal rate and regular rhythm.      Pulses:           Dorsalis pedis pulses are 2+ on the right side and 2+ on the left side.        Posterior tibial pulses are 2+ on the right side and 2+ on the left side.      Heart sounds: Normal heart sounds. No murmur heard.  No friction rub. No gallop.    Pulmonary:      Effort: Pulmonary effort is normal. No respiratory distress or retractions.      Breath sounds: Normal breath sounds. No wheezing or rales.   Chest:      Chest wall: No mass, lacerations, deformity, swelling, tenderness, crepitus or edema.   Breasts: Breasts are symmetrical.       Abdominal:      General: Bowel sounds are normal. There is no distension.      Palpations: Abdomen is soft. There is no mass.      Tenderness: There is no abdominal tenderness. There is no guarding or rebound.      Hernia: No hernia is present.   Genitourinary:     Comments: deferred  Musculoskeletal:         General: No tenderness or deformity. Normal range of motion.      Cervical back: Normal range of motion and neck supple.   Feet:      Right foot:      Protective Sensation: 7 sites tested. 7 sites sensed.      Skin integrity: Skin breakdown (seeing podiatry), callus and dry skin present. No ulcer, blister or warmth.       Toenail Condition: Right toenails are normal. ingrown     Left foot:      Protective Sensation: 7 sites tested. 7 sites sensed.      Skin integrity: Callus and dry skin present. No ulcer, blister or warmth.      Toenail Condition: Left toenails are normal. ingrown     Comments: [id number] DIABETIC FOOT EXAM  performed  Lymphadenopathy:      Cervical: No cervical adenopathy.   Skin:     General: Skin is warm and dry.      Coloration: Skin is not pale.      Findings: No erythema or rash.   Neurological:      Mental Status: She is alert and oriented to person, place, and time.      Motor: No abnormal muscle tone.      Deep Tendon Reflexes: Reflexes are normal and symmetric. Reflexes normal.   Psychiatric:         Behavior: Behavior normal.         Thought Content: Thought content normal.         Judgment: Judgment normal.             Counseling provided on weight management and diabetes.    Diagnoses and all orders for this visit:    Wellness examination  -     Comprehensive Metabolic Panel; Future  -     Lipid Panel; Future  -     Vitamin D 25 Hydroxy; Future  -     CBC & Differential; Future  -     MicroAlbumin, Urine, Random - Urine, Clean Catch; Future    Type 2 diabetes mellitus with diabetic autonomic neuropathy, without long-term current use of insulin ([facility])  -     Blood Glucose Monitoring Suppl (ONE TOUCH ULTRA 2) w/Device kit; 2 (Two) Times a Day. as directed  -     metFORMIN ER (GLUCOPHAGE-XR) 500 MG 24 hr tablet; Take 1 tablet by mouth 2 (Two) Times a Day With Meals.  -     POC Glycosylated Hemoglobin (Hb A1C)  -     Semaglutide (Rybelsus) 7 MG tablet; Take 7 mg by mouth Every Morning Before Breakfast.  -     Comprehensive Metabolic Panel; Future  -     MicroAlbumin, Urine, Random - Urine, Clean Catch; Future    Essential hypertension  -     losartan-hydrochlorothiazide (HYZAAR) 50-12.5 MG per tablet; Take 1 tablet by mouth Daily.  -     Comprehensive Metabolic Panel; Future    Mixed hyperlipidemia  -      simvastatin (ZOCOR) 20 MG tablet; Take 1 tablet by mouth every night at bedtime.  -     Lipid Panel; Future    Dysfunction of both eustachian tubes  -     cetirizine (zyrTEC) 10 MG tablet; Take 1 tablet by mouth Daily.    Anemia, unspecified type  -     folic acid (FOLVITE) 1 MG tablet; Take 1 tablet by mouth Daily.  -     CBC & Differential; Future    Vitamin D deficiency  -     Vitamin D 25 Hydroxy; Future    The preventative exam has been reviewed in detail.  The patient has been fully counseled on preventative guidelines for vaccines, cancer screenings, and other health maintenance needs. The patient was counseled on maintaining a lifestyle to promote good health and to minimize chronic diseases.  The patient has been assisted with scheduling healthcare procedures for the coming year and given a written document outlining these recommendations. Age-appropriate screening measures have been ordered for the patient today as indicated above.     Discussed diabetes diet, Watch carb intake and checking blood sugar as instructed. Record and bring to next visit. Reminded about need for yearly eye exam and foot care. Discussed need for exercise to improve glucose control and overall health.  Patient advised to eat a heart healthy diet and get regular aerobic exercise.    Patient instructed to check blood pressure daily, record, and bring to next visit. If the readings run 140/90 or greater, the patient is to call my office or return sooner for evaluation. Pt advised to eat a heart healthy diet and get regular aerobic exercise.    Will obtain routine labs today and make further recommendations pending results.     RV- 6 months    Flaquito Wallace, APRN   12/20/2021   09:27 EST          Please note that portions of this document were completed with a voice recognition program.

## 2022-02-04 ENCOUNTER — TELEPHONE (OUTPATIENT)
Dept: PEDIATRICS | Facility: OTHER | Age: 57
End: 2022-02-04

## 2022-02-04 ENCOUNTER — PRIOR AUTHORIZATION (OUTPATIENT)
Dept: FAMILY MEDICINE CLINIC | Facility: CLINIC | Age: 57
End: 2022-02-04

## 2022-02-04 DIAGNOSIS — E11.43 TYPE 2 DIABETES MELLITUS WITH DIABETIC AUTONOMIC NEUROPATHY, WITHOUT LONG-TERM CURRENT USE OF INSULIN: ICD-10-CM

## 2022-02-04 RX ORDER — ORAL SEMAGLUTIDE 7 MG/1
1 TABLET ORAL
Qty: 90 TABLET | Refills: 1 | Status: CANCELLED | OUTPATIENT
Start: 2022-02-04

## 2022-02-04 NOTE — TELEPHONE ENCOUNTER
Completed Prior Auth for Rybelsus 7 mg    KEY: [id number]  Awaiting determination from [facility]

## 2022-02-04 NOTE — TELEPHONE ENCOUNTER
Contacted patient to notify her PA has been completed, awaiting determination. She verbalized understanding and agreed

## 2022-02-04 NOTE — TELEPHONE ENCOUNTER
Caller: Nelli Morales    Relationship: Self    Best call back number: [phone]    Requested Prescriptions:   Requested Prescriptions      No prescriptions requested or ordered in this encounter    Semaglutide (Rybelsus) 7 MG tablet    Pharmacy where request should be sent: [facility]Y #[id number] - [address] [phone] [facility] [phone]      Additional details provided by patient: PATIENT STATES THIS MEDICATION NEEDS A PRIOR AUTHORIZATION TO GET A 90 DAY SUPPLY      PATIENT IS OUT OF THE MEDICATION     Does the patient have less than a 3 day supply:  [x] Yes  [] No

## 2022-02-21 RX ORDER — ESTRADIOL 0.1 MG/G
CREAM VAGINAL
Qty: 42.5 G | Refills: 0 | Status: SHIPPED | OUTPATIENT
Start: 2022-02-21 | End: 2022-10-05 | Stop reason: SDUPTHER

## 2022-03-07 DIAGNOSIS — E11.43 TYPE 2 DIABETES MELLITUS WITH DIABETIC AUTONOMIC NEUROPATHY, WITHOUT LONG-TERM CURRENT USE OF INSULIN: ICD-10-CM

## 2022-03-07 RX ORDER — ORAL SEMAGLUTIDE 7 MG/1
1 TABLET ORAL
Qty: 90 TABLET | Refills: 1 | Status: SHIPPED | OUTPATIENT
Start: 2022-03-07 | End: 2022-05-02 | Stop reason: SDUPTHER

## 2022-03-07 RX ORDER — ORAL SEMAGLUTIDE 7 MG/1
1 TABLET ORAL
Qty: 90 TABLET | Refills: 1 | Status: SHIPPED | OUTPATIENT
Start: 2022-03-07 | End: 2022-03-07

## 2022-03-07 NOTE — TELEPHONE ENCOUNTER
Rx Refill Note  Requested Prescriptions     Signed Prescriptions Disp Refills   • Semaglutide (Rybelsus) 7 MG tablet 90 tablet 1     Sig: Take 7 mg by mouth Every Morning Before Breakfast.     Authorizing Provider: ALEXI ELLIOTT     Ordering User: EMILY BABIN      Last office visit with prescribing clinician: 12/20/2021      Next office visit with prescribing clinician: 6/20/2022            Emily Babin MA  03/07/22, 10:20 EST

## 2022-04-07 ENCOUNTER — OFFICE VISIT (OUTPATIENT)
Dept: FAMILY MEDICINE CLINIC | Facility: CLINIC | Age: 57
End: 2022-04-07

## 2022-04-07 VITALS
HEART RATE: 82 BPM | WEIGHT: 135.8 LBS | OXYGEN SATURATION: 98 % | HEIGHT: 59 IN | BODY MASS INDEX: 27.38 KG/M2 | DIASTOLIC BLOOD PRESSURE: 72 MMHG | TEMPERATURE: 97.8 F | SYSTOLIC BLOOD PRESSURE: 138 MMHG

## 2022-04-07 DIAGNOSIS — L23.7 ALLERGIC DERMATITIS DUE TO POISON IVY: Primary | ICD-10-CM

## 2022-04-07 DIAGNOSIS — L24.7 IRRITANT CONTACT DERMATITIS DUE TO PLANTS, EXCEPT FOOD: ICD-10-CM

## 2022-04-07 PROCEDURE — 99213 OFFICE O/P EST LOW 20 MIN: CPT | Performed by: NURSE PRACTITIONER

## 2022-04-07 RX ORDER — PREDNISONE 20 MG/1
40 TABLET ORAL DAILY
Qty: 6 TABLET | Refills: 0 | Status: SHIPPED | OUTPATIENT
Start: 2022-04-07 | End: 2022-04-10

## 2022-04-07 RX ORDER — TRIAMCINOLONE ACETONIDE 5 MG/G
OINTMENT TOPICAL 2 TIMES DAILY
Qty: 30 G | Refills: 0 | Status: SHIPPED | OUTPATIENT
Start: 2022-04-07 | End: 2022-12-22 | Stop reason: SDUPTHER

## 2022-04-07 RX ORDER — CLINDAMYCIN PHOSPHATE 20 MG/G
CREAM VAGINAL
COMMUNITY
End: 2022-10-05

## 2022-04-07 NOTE — PROGRESS NOTES
"Chief Complaint  Rash (Pt states she has a rash on her face that she noticed Tuesday. She states it has been itching. She states she was moving a tree branch earlier in the week and thinks it may had poison ivy on the tree. She states she was taking an old Prednisone script she had to help with the itching.)    Subjective          Nelli Morales presents to [facility]  Pt got into poison ivy 5 days ago. Developed rash on left side of face, on arm, on back of left knee. Pt had some Prednisone at home from a previous prescription and has been taking that for symptoms. Pt states rash has improved since taking prednisone. However, blood glucose checks have been elevated from normal (120's-140's in the AM).     Rash  The current episode started in the past 7 days. The problem has been gradually worsening since onset. The affected locations include the face, left upper leg and right arm. The rash is characterized by itchiness. She was exposed to plant contact. Associated symptoms include facial edema. Pertinent negatives include no anorexia, congestion, cough, diarrhea, eye pain, fatigue, fever, joint pain, nail changes, rhinorrhea, shortness of breath, sore throat or vomiting.       Objective   Vital Signs:   /72   Pulse 82   Temp 97.8 °F (36.6 °C)   Ht 148.6 cm (58.5\")   Wt 61.6 kg (135 lb 12.8 oz)   SpO2 98%   BMI 27.90 kg/m²            Physical Exam  Constitutional:       Appearance: Normal appearance.   HENT:      Mouth/Throat:      Mouth: Mucous membranes are moist.   Cardiovascular:      Rate and Rhythm: Normal rate and regular rhythm.      Pulses: Normal pulses.      Heart sounds: Normal heart sounds.   Pulmonary:      Effort: Pulmonary effort is normal.      Breath sounds: Normal breath sounds.   Musculoskeletal:         General: Normal range of motion.   Skin:     General: Skin is warm.      Findings: Rash present.      Comments: Rash on left jaw line, left inner " forearm, right arm, and behind left knee.   Neurological:      Mental Status: She is alert and oriented to person, place, and time.   Psychiatric:         Mood and Affect: Mood normal.         Behavior: Behavior normal.        Result Review :                 Assessment and Plan    Diagnoses and all orders for this visit:    1. Allergic dermatitis due to poison ivy (Primary)  -     predniSONE (DELTASONE) 20 MG tablet; Take 2 tablets by mouth Daily for 3 days.  Dispense: 6 tablet; Refill: 0    2. Irritant contact dermatitis due to plants, except food  -     triamcinolone (KENALOG) 0.5 % ointment; Apply  topically to the appropriate area as directed 2 (Two) Times a Day.  Dispense: 30 g; Refill: 0        Follow Up   No follow-ups on file.  Patient was given instructions and counseling regarding her condition or for health maintenance advice. Please see specific information pulled into the AVS if appropriate.

## 2022-05-02 ENCOUNTER — TELEPHONE (OUTPATIENT)
Dept: FAMILY MEDICINE CLINIC | Facility: CLINIC | Age: 57
End: 2022-05-02

## 2022-05-02 DIAGNOSIS — E11.43 TYPE 2 DIABETES MELLITUS WITH DIABETIC AUTONOMIC NEUROPATHY, WITHOUT LONG-TERM CURRENT USE OF INSULIN: ICD-10-CM

## 2022-05-02 RX ORDER — ORAL SEMAGLUTIDE 7 MG/1
1 TABLET ORAL
Qty: 90 TABLET | Refills: 1 | Status: SHIPPED | OUTPATIENT
Start: 2022-05-02 | End: 2022-06-09 | Stop reason: SDUPTHER

## 2022-05-02 NOTE — TELEPHONE ENCOUNTER
Contacted benefit plan to discuss Rybelsus authorization. I was advised that Rybeslus is covered and insurance has already receive paid claim.     They provided me with pharmacy help desk contact info to provide to pharmacy     Spoke with pharmacy they have no issue filling this drug. I have sent refills     Rx Refill Note  Requested Prescriptions     Signed Prescriptions Disp Refills   • Semaglutide (Rybelsus) 7 MG tablet 90 tablet 1     Sig: Take 7 mg by mouth Every Morning Before Breakfast.     Authorizing Provider: ALEXI ELLIOTT     Ordering User: EMILY BABIN      Last office visit with prescribing clinician: 4/7/202  Next office visit with prescribing clinician: 6/10/22           Emily Babin MA  05/02/22, 11:29 EDT

## 2022-05-18 RX ORDER — PREDNISONE 10 MG/1
TABLET ORAL
Qty: 45 TABLET | Refills: 0 | OUTPATIENT
Start: 2022-05-18 | End: 2022-06-22

## 2022-05-19 ENCOUNTER — TELEPHONE (OUTPATIENT)
Dept: FAMILY MEDICINE CLINIC | Facility: CLINIC | Age: 57
End: 2022-05-19

## 2022-05-24 ENCOUNTER — OFFICE VISIT (OUTPATIENT)
Dept: FAMILY MEDICINE CLINIC | Facility: CLINIC | Age: 57
End: 2022-05-24

## 2022-05-24 VITALS
HEIGHT: 59 IN | HEART RATE: 81 BPM | SYSTOLIC BLOOD PRESSURE: 126 MMHG | DIASTOLIC BLOOD PRESSURE: 82 MMHG | OXYGEN SATURATION: 99 % | WEIGHT: 135 LBS | BODY MASS INDEX: 27.21 KG/M2

## 2022-05-24 DIAGNOSIS — L23.7 CONTACT DERMATITIS DUE TO POISON IVY: Primary | ICD-10-CM

## 2022-05-24 DIAGNOSIS — I10 ESSENTIAL HYPERTENSION: ICD-10-CM

## 2022-05-24 PROCEDURE — 99213 OFFICE O/P EST LOW 20 MIN: CPT | Performed by: STUDENT IN AN ORGANIZED HEALTH CARE EDUCATION/TRAINING PROGRAM

## 2022-05-24 RX ORDER — METHOCARBAMOL 500 MG/1
500 TABLET, FILM COATED ORAL DAILY
COMMUNITY
Start: 2022-05-17

## 2022-05-24 NOTE — PROGRESS NOTES
Established Office Visit      Patient Name: Nelli Morales  : 1965   MRN: [id number]   Care Team: Patient Care Team:  Flaquito Wallace APRN as PCP - General (Family Medicine)    Chief Complaint:    Chief Complaint   Patient presents with   • Hypertension       History of Present Illness: Nelli Morales is a 56 y.o. female who is here today to discuss elevated BP.    She was started on 15 day prednisone taper on 22 for contact dermatitis (poison ivy). She is not taking this as prescribed, only taking a few prednisone tablets as needed for itchiness. She has noticed her face has been flushing/feeling hot intermittently when taking this which prompted her to present to check her BP - worried it is high. She does not have BP cuff at home. Poinson ivy has only somewhat improved over the past several days. Started on her LLE and spread to RLE.    She takes losartan 50mg-HCTZ 12.5mg daily for her blood pressure. Reports this is typically well controlled with this.     Subjective      Review of Systems:   Review of Systems - See HPI    I have reviewed and the following portions of the patient's history were updated as appropriate: past family history, past medical history, past social history, past surgical history and problem list.    Medications:     Current Outpatient Medications:   •  Blood Glucose Monitoring Suppl (ONE TOUCH ULTRA 2) w/Device kit, 2 (Two) Times a Day. as directed, Disp: , Rfl:   •  cetirizine (zyrTEC) 10 MG tablet, Take 1 tablet by mouth Daily., Disp: 90 tablet, Rfl: 3  •  Cholecalciferol (VITAMIN D3) 5000 UNITS capsule capsule, Take 5,000 Units by mouth Daily., Disp: , Rfl:   •  clindamycin (CLEOCIN) 2 % vaginal cream, clindamycin 2 % vaginal cream  Insert 1 applicator for 3 nights prn, Disp: , Rfl:   •  estradiol (ESTRACE) 0.1 MG/GM vaginal cream, APPLY 0.5 GRAM VAGINALLY 2 TIMES A WEEK, Disp: 42.5 g, Rfl: 0  •  estradiol (MINIVELLE, VIVELLE-DOT) 0.075 MG/24HR patch, Place 1 patch  on the skin as directed by provider 2 (Two) Times a Week., Disp: 24 patch, Rfl: 3  •  folic acid (FOLVITE) 1 MG tablet, Take 1 tablet by mouth Daily., Disp: 90 tablet, Rfl: 3  •  glucose blood test strip, Use to test blood sugars twice daily, Disp: 100 each, Rfl: 11  •  glucose monitor monitoring kit, 1 each 2 (Two) Times a Day. OneTouch Ultra2, Disp: 1 each, Rfl: 0  •  Lancets misc, Use to test blood sugars twice daily, Disp: 100 each, Rfl: 11  •  latanoprost (XALATAN) 0.005 % ophthalmic solution, , Disp: , Rfl: 1  •  losartan-hydrochlorothiazide (HYZAAR) 50-12.5 MG per tablet, Take 1 tablet by mouth Daily., Disp: 90 tablet, Rfl: 3  •  metFORMIN ER (GLUCOPHAGE-XR) 500 MG 24 hr tablet, Take 1 tablet by mouth 2 (Two) Times a Day With Meals., Disp: 180 tablet, Rfl: 1  •  methocarbamol (ROBAXIN) 500 MG tablet, Take 500 mg by mouth Daily., Disp: , Rfl:   •  predniSONE (DELTASONE) 10 MG tablet, 5 tabs qd X 3 days, 4 tabs qd X 3, 3 tabs qd x 3, 2 tabs qd x 3, 1 tab qd x 3, Disp: 45 tablet, Rfl: 0  •  Semaglutide (Rybelsus) 7 MG tablet, Take 7 mg by mouth Every Morning Before Breakfast., Disp: 90 tablet, Rfl: 1  •  simvastatin (ZOCOR) 20 MG tablet, Take 1 tablet by mouth every night at bedtime., Disp: 90 tablet, Rfl: 3  •  Stool Softener 100 MG capsule, TAKE 1 CAPSULE BY MOUTH TWICE DAILY AS DIRECTED, Disp: , Rfl:   •  triamcinolone (KENALOG) 0.5 % ointment, Apply  topically to the appropriate area as directed 2 (Two) Times a Day., Disp: 30 g, Rfl: 0  •  Trulance 3 MG tablet, Take 1 tablet by mouth Daily., Disp: , Rfl:   •  clotrimazole-betamethasone (LOTRISONE) 1-0.05 % cream, , Disp: , Rfl:   •  fluticasone (Flonase) 50 MCG/ACT nasal spray, 2 sprays into the nostril(s) as directed by provider Daily., Disp: 16 g, Rfl: 3    Allergies:   Allergies   Allergen Reactions   • Ace Inhibitors Cough   • Morphine And Related Rash   • Polymyxin B-Trimethoprim Unknown (See Comments)       Objective     Physical Exam:  Vital Signs:  "  Vitals:    05/24/22 1122 05/24/22 1143   BP: 132/96 126/82   Pulse: 81    SpO2: 99%    Weight: 61.2 kg (135 lb)    Height: 148.6 cm (58.5\")      Body mass index is 27.73 kg/m².     Physical Exam  Vitals reviewed.   Constitutional:       Appearance: Normal appearance. She is not ill-appearing.   HENT:      Head:      Comments: No facial flushing present  Cardiovascular:      Rate and Rhythm: Normal rate and regular rhythm.      Pulses: Normal pulses.   Pulmonary:      Effort: Pulmonary effort is normal. No respiratory distress.   Skin:     General: Skin is warm and dry.      Comments: Scattered excoriated vesicles in linear distribution on bilateral LE. Various stages of healing.    Neurological:      Mental Status: She is alert.   Psychiatric:         Mood and Affect: Mood normal.         Behavior: Behavior normal.         Judgment: Judgment normal.         Assessment / Plan      Assessment/Plan:   Problems Addressed This Visit  Diagnoses and all orders for this visit:    1. Contact dermatitis due to poison ivy (Primary)  Encouraged her to take prednisone taper as prescribed. Counseled on potential of rebound dermatitis associated with medication noncompliance or taking short courses. She expressed understanding. We discussed side effects associated with prednisone - suspect facial flushing and feeling bloated likely side effect. She feels more comfortable knowing her BP is still at goal. She will take rx as prescribed and let us know if she experiences bothersome side effects or if dermatitis does not improve.     2. Essential hypertension    Well controlled with current Losartan-HCTZ      Plan of care reviewed with patient at the conclusion of today's visit. Education was provided regarding diagnosis and management.  Patient verbalizes understanding of and agreement with management plan.    Follow Up:   No follow-ups on file.        DO LUCA Renteria RD  [facility] " PRIMARY CARE  [address] [facility] [address]  Fax [phone]  Phone [phone]

## 2022-05-31 DIAGNOSIS — H69.83 DYSFUNCTION OF BOTH EUSTACHIAN TUBES: ICD-10-CM

## 2022-05-31 RX ORDER — FLUTICASONE PROPIONATE 50 MCG
2 SPRAY, SUSPENSION (ML) NASAL DAILY
Qty: 15.8 ML | Refills: 5 | Status: SHIPPED | OUTPATIENT
Start: 2022-05-31 | End: 2022-05-31 | Stop reason: SDUPTHER

## 2022-05-31 RX ORDER — FLUTICASONE PROPIONATE 50 MCG
2 SPRAY, SUSPENSION (ML) NASAL DAILY
Qty: 16 G | Refills: 3 | Status: SHIPPED | OUTPATIENT
Start: 2022-05-31

## 2022-05-31 NOTE — TELEPHONE ENCOUNTER
Rx Refill Note  Requested Prescriptions     Signed Prescriptions Disp Refills   • fluticasone (Flonase) 50 MCG/ACT nasal spray 15.8 mL 5     Si sprays into the nostril(s) as directed by provider Daily.     Authorizing Provider: ALEXI ELLIOTT     Ordering User: EMILY BABIN      Last office visit with prescribing clinician: 2021      Next office visit with prescribing clinician: 6/10/2022            Emily Babin MA  22, 11:06 EDT

## 2022-06-08 ENCOUNTER — PRIOR AUTHORIZATION (OUTPATIENT)
Dept: FAMILY MEDICINE CLINIC | Facility: CLINIC | Age: 57
End: 2022-06-08

## 2022-06-08 NOTE — TELEPHONE ENCOUNTER
Completed Prior Auth for Rybelsus 7 mg    KEY: [id number]  Awaiting determination from [facility]

## 2022-06-09 DIAGNOSIS — E11.43 TYPE 2 DIABETES MELLITUS WITH DIABETIC AUTONOMIC NEUROPATHY, WITHOUT LONG-TERM CURRENT USE OF INSULIN: ICD-10-CM

## 2022-06-09 RX ORDER — ORAL SEMAGLUTIDE 7 MG/1
1 TABLET ORAL
Qty: 90 TABLET | Refills: 1 | Status: SHIPPED | OUTPATIENT
Start: 2022-06-09 | End: 2022-12-22 | Stop reason: SDUPTHER

## 2022-06-09 NOTE — TELEPHONE ENCOUNTER
Rx Refill Note  Requested Prescriptions     Signed Prescriptions Disp Refills   • Semaglutide (Rybelsus) 7 MG tablet 90 tablet 1     Sig: Take 7 mg by mouth Every Morning Before Breakfast.     Authorizing Provider: ALEXI ELLIOTT     Ordering User: EMILY BABIN      Last office visit with prescribing clinician: 12/20/2021      Next office visit with prescribing clinician: 6/10/2022            Emily Babin MA  06/09/22, 09:02 EDT

## 2022-06-10 ENCOUNTER — OFFICE VISIT (OUTPATIENT)
Dept: FAMILY MEDICINE CLINIC | Facility: CLINIC | Age: 57
End: 2022-06-10

## 2022-06-10 ENCOUNTER — LAB (OUTPATIENT)
Dept: LAB | Facility: HOSPITAL | Age: 57
End: 2022-06-10

## 2022-06-10 ENCOUNTER — TELEPHONE (OUTPATIENT)
Dept: FAMILY MEDICINE CLINIC | Facility: CLINIC | Age: 57
End: 2022-06-10

## 2022-06-10 VITALS
HEIGHT: 59 IN | OXYGEN SATURATION: 96 % | HEART RATE: 85 BPM | WEIGHT: 135.8 LBS | BODY MASS INDEX: 27.38 KG/M2 | DIASTOLIC BLOOD PRESSURE: 80 MMHG | SYSTOLIC BLOOD PRESSURE: 110 MMHG | TEMPERATURE: 98 F

## 2022-06-10 DIAGNOSIS — I10 ESSENTIAL HYPERTENSION: ICD-10-CM

## 2022-06-10 DIAGNOSIS — E11.43 TYPE 2 DIABETES MELLITUS WITH DIABETIC AUTONOMIC NEUROPATHY, WITHOUT LONG-TERM CURRENT USE OF INSULIN: Primary | ICD-10-CM

## 2022-06-10 DIAGNOSIS — E66.3 OVERWEIGHT (BMI 25.0-29.9): ICD-10-CM

## 2022-06-10 DIAGNOSIS — E78.2 MIXED HYPERLIPIDEMIA: ICD-10-CM

## 2022-06-10 DIAGNOSIS — E11.43 TYPE 2 DIABETES MELLITUS WITH DIABETIC AUTONOMIC NEUROPATHY, WITHOUT LONG-TERM CURRENT USE OF INSULIN: ICD-10-CM

## 2022-06-10 PROBLEM — Z96.0 VAGINAL PESSARY IN SITU: Status: ACTIVE | Noted: 2022-05-19

## 2022-06-10 LAB
ALBUMIN SERPL-MCNC: 4.3 G/DL (ref 3.5–5.2)
ALBUMIN/GLOB SERPL: 2.2 G/DL
ALP SERPL-CCNC: 61 U/L (ref 39–117)
ALT SERPL W P-5'-P-CCNC: 26 U/L (ref 1–33)
ANION GAP SERPL CALCULATED.3IONS-SCNC: 9.7 MMOL/L (ref 5–15)
AST SERPL-CCNC: 25 U/L (ref 1–32)
BILIRUB SERPL-MCNC: 0.3 MG/DL (ref 0–1.2)
BUN SERPL-MCNC: 13 MG/DL (ref 6–20)
BUN/CREAT SERPL: 26.5 (ref 7–25)
CALCIUM SPEC-SCNC: 9.2 MG/DL (ref 8.6–10.5)
CHLORIDE SERPL-SCNC: 101 MMOL/L (ref 98–107)
CHOLEST SERPL-MCNC: 151 MG/DL (ref 0–200)
CO2 SERPL-SCNC: 25.3 MMOL/L (ref 22–29)
CREAT SERPL-MCNC: 0.49 MG/DL (ref 0.57–1)
EGFRCR SERPLBLD CKD-EPI 2021: 111.5 ML/MIN/1.73
EXPIRATION DATE: NORMAL
GLOBULIN UR ELPH-MCNC: 2 GM/DL
GLUCOSE SERPL-MCNC: 95 MG/DL (ref 65–99)
HBA1C MFR BLD: 5.9 %
HDLC SERPL-MCNC: 63 MG/DL (ref 40–60)
LDLC SERPL CALC-MCNC: 72 MG/DL (ref 0–100)
LDLC/HDLC SERPL: 1.13 {RATIO}
Lab: NORMAL
POTASSIUM SERPL-SCNC: 4.4 MMOL/L (ref 3.5–5.2)
PROT SERPL-MCNC: 6.3 G/DL (ref 6–8.5)
SODIUM SERPL-SCNC: 136 MMOL/L (ref 136–145)
TRIGL SERPL-MCNC: 83 MG/DL (ref 0–150)
VLDLC SERPL-MCNC: 16 MG/DL (ref 5–40)

## 2022-06-10 PROCEDURE — 80061 LIPID PANEL: CPT

## 2022-06-10 PROCEDURE — 83036 HEMOGLOBIN GLYCOSYLATED A1C: CPT | Performed by: NURSE PRACTITIONER

## 2022-06-10 PROCEDURE — 80053 COMPREHEN METABOLIC PANEL: CPT

## 2022-06-10 PROCEDURE — 99214 OFFICE O/P EST MOD 30 MIN: CPT | Performed by: NURSE PRACTITIONER

## 2022-06-10 NOTE — PROGRESS NOTES
Chief Complaint   Patient presents with   • Diabetes     Follow up    • Hypertension     Follow up        Subjective   Nelli Morales is a 56 y.o. female    History of Present Illness  Patient in to follow-up on high blood pressure and diabetes.  She also reports she has been cleaning up a water leak and is concerned she may have some lung issues because of this.  She reports her glucose has been fine we did order her Rybelsus but she has not gotten this yet due to insurance issues.  For her diabetes she takes metformin 500 mg 1 tablet twice a day as well as Rybelsus 7 mg of which she has been out for the past few days.  She recently has been on prednisone due to poison ivy.  She also has high blood pressure and does not check this at home.  For high blood pressure she currently does take losartan/hydrochlorothiazide 50/12.5 daily.  She is on simvastatin 20 daily for cholesterol.  She does have issues with chronic constipation and currently is on Trulance 3 mg every day.  She does have muscular bladder spasms and currently takes methocarbamol 500 mg 1 at bedtime.      Allergies   Allergen Reactions   • Ace Inhibitors Cough   • Morphine And Related Rash   • Polymyxin B-Trimethoprim Unknown (See Comments)     Past Medical History:   Diagnosis Date   • Allergic    • Allergic rhinitis    • BV (bacterial vaginosis)    • Cataracts, bilateral    • Class 1 obesity due to excess calories without serious comorbidity with body mass index (BMI) of 32.0 to 32.9 in adult 12/22/2017   • Diabetes mellitus, type 2 (HCC)    • Diverticulitis large intestine w/o perforation or abscess w/o bleeding 04/2017   • Endometriosis     STAGE 4 WITH LARGE LEFT OVARIAN ENDOMETRIOMA;EXTENSVIE DENSE LEFT ADNEXAL ADHESIONS TO SIDEWALL AND SIGMOID COLON. dEEP IMPLANTS LEFT ANTERIOR CULDESAC.   • Female cystocele    • Fibrocystic breast disease    • Glaucoma    • Hormone replacement therapy    • Hyperlipidemia    • Hypertension    • IBS (irritable  bowel syndrome)    • Insomnia    • Obesity (BMI 30.0-34.9)    • Screening breast examination     SELF;ADMITS   • Stress bladder incontinence, female    • Stress bladder incontinence, female     History of prior Alas procedure with good support.   Currently resolved.   • Vaginitis     UNKNOWN ETIOLOGY   • Varicose veins of legs       Past Surgical History:   Procedure Laterality Date   • BLADDER SUSPENSION  10/27/2007   • CYSTOCELE REPAIR     • CYSTOTOMY     • HYSTEROSCOPY     • TOTAL ABDOMINAL HYSTERECTOMY WITH SALPINGO OOPHORECTOMY  12/2013    LYSIS OF ADHESIONS FOR STAGE 4 ENDOMETRIOSIS     Social History     Socioeconomic History   • Marital status: Single   • Number of children: 0   • Years of education: 12   • Highest education level: High school graduate   Tobacco Use   • Smoking status: Never Smoker   • Smokeless tobacco: Never Used   Vaping Use   • Vaping Use: Never used   Substance and Sexual Activity   • Alcohol use: Yes     Alcohol/week: 1.0 standard drink     Types: 1 Cans of beer per week     Comment: occasional   • Drug use: No   • Sexual activity: Yes     Partners: Male     Birth control/protection: Post-menopausal        Current Outpatient Medications:   •  Blood Glucose Monitoring Suppl (ONE TOUCH ULTRA 2) w/Device kit, 2 (Two) Times a Day. as directed, Disp: , Rfl:   •  cetirizine (zyrTEC) 10 MG tablet, Take 1 tablet by mouth Daily., Disp: 90 tablet, Rfl: 3  •  Cholecalciferol (VITAMIN D3) 5000 UNITS capsule capsule, Take 5,000 Units by mouth Daily., Disp: , Rfl:   •  clindamycin (CLEOCIN) 2 % vaginal cream, clindamycin 2 % vaginal cream  Insert 1 applicator for 3 nights prn, Disp: , Rfl:   •  clotrimazole-betamethasone (LOTRISONE) 1-0.05 % cream, , Disp: , Rfl:   •  estradiol (ESTRACE) 0.1 MG/GM vaginal cream, APPLY 0.5 GRAM VAGINALLY 2 TIMES A WEEK, Disp: 42.5 g, Rfl: 0  •  estradiol (MINIVELLE, VIVELLE-DOT) 0.075 MG/24HR patch, Place 1 patch on the skin as directed by provider 2 (Two) Times a  Week., Disp: 24 patch, Rfl: 3  •  fluticasone (Flonase) 50 MCG/ACT nasal spray, 2 sprays into the nostril(s) as directed by provider Daily., Disp: 16 g, Rfl: 3  •  folic acid (FOLVITE) 1 MG tablet, Take 1 tablet by mouth Daily., Disp: 90 tablet, Rfl: 3  •  glucose blood test strip, Use to test blood sugars twice daily, Disp: 100 each, Rfl: 11  •  glucose monitor monitoring kit, 1 each 2 (Two) Times a Day. OneTouch Ultra2, Disp: 1 each, Rfl: 0  •  Lancets misc, Use to test blood sugars twice daily, Disp: 100 each, Rfl: 11  •  latanoprost (XALATAN) 0.005 % ophthalmic solution, , Disp: , Rfl: 1  •  losartan-hydrochlorothiazide (HYZAAR) 50-12.5 MG per tablet, Take 1 tablet by mouth Daily., Disp: 90 tablet, Rfl: 3  •  metFORMIN ER (GLUCOPHAGE-XR) 500 MG 24 hr tablet, Take 1 tablet by mouth 2 (Two) Times a Day With Meals., Disp: 180 tablet, Rfl: 1  •  methocarbamol (ROBAXIN) 500 MG tablet, Take 500 mg by mouth Daily., Disp: , Rfl:   •  predniSONE (DELTASONE) 10 MG tablet, 5 tabs qd X 3 days, 4 tabs qd X 3, 3 tabs qd x 3, 2 tabs qd x 3, 1 tab qd x 3, Disp: 45 tablet, Rfl: 0  •  Semaglutide (Rybelsus) 7 MG tablet, Take 7 mg by mouth Every Morning Before Breakfast., Disp: 90 tablet, Rfl: 1  •  simvastatin (ZOCOR) 20 MG tablet, Take 1 tablet by mouth every night at bedtime., Disp: 90 tablet, Rfl: 3  •  Stool Softener 100 MG capsule, TAKE 1 CAPSULE BY MOUTH TWICE DAILY AS DIRECTED, Disp: , Rfl:   •  triamcinolone (KENALOG) 0.5 % ointment, Apply  topically to the appropriate area as directed 2 (Two) Times a Day., Disp: 30 g, Rfl: 0  •  Trulance 3 MG tablet, Take 1 tablet by mouth Daily., Disp: , Rfl:      Review of Systems   Constitutional: Negative for appetite change, diaphoresis, fatigue and unexpected weight change.   Eyes: Negative for visual disturbance (glasses).   Respiratory: Negative for cough, chest tightness, shortness of breath and wheezing.    Cardiovascular: Negative for chest pain, palpitations and leg swelling.    Gastrointestinal: Negative for constipation (with Trulance), diarrhea, nausea and vomiting.   Endocrine: Negative for polydipsia, polyphagia and polyuria.   Genitourinary: Positive for difficulty urinating (has pessary). Negative for dysuria.   Skin: Negative for color change.   Neurological: Negative for dizziness, weakness, light-headedness and numbness.   Psychiatric/Behavioral: Negative for sleep disturbance.       Objective     Vitals:    06/10/22 0836   BP: 110/80   Pulse: 85   Temp: 98 °F (36.7 °C)   SpO2: 96%         06/10/22  0836   Weight: 61.6 kg (135 lb 12.8 oz)     Body mass index is 27.9 kg/m².  Results for orders placed or performed in visit on 06/10/22   POC Glycosylated Hemoglobin (Hb A1C)    Specimen: Blood   Result Value Ref Range    Hemoglobin A1C 5.9 %    Lot Number [id number]     Expiration Date 2/4/2024        Physical Exam  Vitals reviewed.   Constitutional:       Appearance: She is well-developed.   HENT:      Head: Normocephalic and atraumatic.   Cardiovascular:      Rate and Rhythm: Normal rate and regular rhythm.      Heart sounds: Normal heart sounds.   Pulmonary:      Effort: Pulmonary effort is normal.      Breath sounds: Normal breath sounds.   Skin:     General: Skin is warm and dry.   Neurological:      Mental Status: She is alert and oriented to person, place, and time.   Psychiatric:         Behavior: Behavior normal.         Assessment & Plan   Problems Addressed this Visit        Cardiac and Vasculature    Essential hypertension    Relevant Orders    Comprehensive Metabolic Panel    Mixed hyperlipidemia    Relevant Orders    Lipid Panel       Endocrine and Metabolic    Type 2 diabetes mellitus with diabetic autonomic neuropathy, without long-term current use of insulin ([facility]) - Primary    Relevant Orders    POC Glycosylated Hemoglobin (Hb A1C) (Completed)    Comprehensive Metabolic Panel    Overweight (BMI 25.0-29.9)      Diagnoses       Codes Comments    Type 2 diabetes  mellitus with diabetic autonomic neuropathy, without long-term current use of insulin (HCC)    -  Primary ICD-10-CM: E11.43  ICD-9-CM: 250.60, 337.1     Essential hypertension     ICD-10-CM: I10  ICD-9-CM: 401.9     Mixed hyperlipidemia     ICD-10-CM: E78.2  ICD-9-CM: 272.2     Overweight (BMI 25.0-29.9)     ICD-10-CM: E66.3  ICD-9-CM: 278.02       Patient instructed to check blood pressure daily, record, and bring to next visit. If the readings run 140/90 or greater, the patient is to call my office or return sooner for evaluation. Pt advised to eat a heart healthy diet and get regular aerobic exercise.    Discussed diabetes diet, Watch carb intake and checking blood sugar as instructed. Record and bring to next visit. Reminded about need for yearly eye exam and foot care. Discussed need for exercise to improve glucose control and overall health.  Patient advised to eat a heart healthy diet and get regular aerobic exercise.    We will recheck complete metabolic panel as well as lipids. Will obtain routine labs today and make further recommendations pending results. All lab results are automatically released to [employer] immediately when they return whether they have been reviewed or not. You may see your results show up on your [employer] without any context or comment. Please allow 1-2 business days after the labs have returned for them to be reviewed.    Discussed need for weight management.  I recommend they use a weight loss chavo on their phone, eat no more than [id number] rashad/day, and exercise 30 to 60 minutes 3 times a week.  Discussed weight loss with diet, recommend Weight Watchers or Mediterranean Diet, but stated that whatever method works for this patient is best. The patient agrees with all recommendations at this time. I have discussed a weight loss plan to be determined by this patient.     Time spent on visit, including counseling, education, reviewing the chart, and any recent test results, was  15       Minutes    Return visit in 6 months for physical    Counseling provided on weight management, hypertension, diabetes and hyperlipidemia.    Flaquito Wallace, APRN   6/10/2022   09:30 EDT     Please note that portions of this document were completed with a voice recognition program.

## 2022-06-10 NOTE — TELEPHONE ENCOUNTER
Contacted insurance rep to request quantity vs time override.     I was advised that PA was not needed for this drug it is currently covered, patient has received 30 days quantities on 3/7 and 5/2 and it is not time for refill, which generated rejection from pharmacy.     Override has been approved, medicine is ready for pickup at pharmacy for $50 copay. Patient was included on call with insurance rep.

## 2022-06-10 NOTE — TELEPHONE ENCOUNTER
Caller: Nelli Morales    Relationship to patient: Self    Best call back number: [phone]     What is the call regarding:  PATIENT STATES THAT ALEXI NEEDS TO CALL TEA [phone] AND SAY THAT SHE NEEDS A QUANTITY VERSUS TIME OVERRIDE FOR REBULSIS.

## 2022-06-22 PROCEDURE — U0004 COV-19 TEST NON-CDC HGH THRU: HCPCS | Performed by: FAMILY MEDICINE

## 2022-06-24 ENCOUNTER — TELEPHONE (OUTPATIENT)
Dept: FAMILY MEDICINE CLINIC | Facility: CLINIC | Age: 57
End: 2022-06-24

## 2022-06-24 NOTE — TELEPHONE ENCOUNTER
Called patient told her that we had no same day appointments available  that she could either go to urgent care or be scheduled to be seen next week, she stated she will see if she gets any better.  
Caller: Nelli Morales    Relationship to patient: Self    Best call back number:     [phone]    Chief complaint:     PATIENT STATED SHE HAS SYMPTOMS INCLUDING:    DRAINAGE  RUNNY NOSE  EARACHE IN BOTH EARS  SORE THROAT    Type of visit:     SAME DAY    Requested date:     TODAY, 6/24/22    Additional notes:    PLEASE CONTACT PATIENT IF POSSIBLE TO SQUEEZE HER IN TODAY   
2 = assistive person

## 2022-06-27 ENCOUNTER — OFFICE VISIT (OUTPATIENT)
Dept: FAMILY MEDICINE CLINIC | Facility: CLINIC | Age: 57
End: 2022-06-27

## 2022-06-27 VITALS
WEIGHT: 135 LBS | HEIGHT: 58 IN | DIASTOLIC BLOOD PRESSURE: 80 MMHG | HEART RATE: 90 BPM | SYSTOLIC BLOOD PRESSURE: 120 MMHG | OXYGEN SATURATION: 99 % | BODY MASS INDEX: 28.34 KG/M2 | TEMPERATURE: 97.8 F

## 2022-06-27 DIAGNOSIS — J32.4 PANSINUSITIS, UNSPECIFIED CHRONICITY: Primary | ICD-10-CM

## 2022-06-27 PROCEDURE — 99214 OFFICE O/P EST MOD 30 MIN: CPT | Performed by: INTERNAL MEDICINE

## 2022-06-27 RX ORDER — AMOXICILLIN AND CLAVULANATE POTASSIUM 875; 125 MG/1; MG/1
1 TABLET, FILM COATED ORAL 2 TIMES DAILY
Qty: 14 TABLET | Refills: 0 | Status: SHIPPED | OUTPATIENT
Start: 2022-06-27 | End: 2022-07-04

## 2022-06-27 NOTE — PROGRESS NOTES
Chief Complaint   Patient presents with   • Nasal Congestion     Pt states this started last week and she went to  and tested negative for COVID & flu   • Earache       HPI:  Nelli Morales is a 56 y.o. female who presents today for nasal congestion, sore throat and cough for the past 7 days.  Tested negative for COVID and flu at urgent care last week.  Reports worsening left-sided earache since then.    ROS:  Constitutional: no fevers, night sweats or unexplained weight loss  Eyes: no vision changes  ENT: + runny nose,+ ear pain, +sore throat  Cardio: no chest pain, palpitations  Pulm: no shortness of breath, wheezing, + cough  GI: no abdominal pain or changes in bowel movements  : no difficulty urinating  MSK: no difficulty ambulating, no joint pain  Neuro: no weakness, dizziness or headache  Psych: no trouble sleeping  Endo: no change in appetite      Past Medical History:   Diagnosis Date   • Allergic    • Allergic rhinitis    • BV (bacterial vaginosis)    • Cataracts, bilateral    • Class 1 obesity due to excess calories without serious comorbidity with body mass index (BMI) of 32.0 to 32.9 in adult 12/22/2017   • Diabetes mellitus, type 2 (HCC)    • Diverticulitis large intestine w/o perforation or abscess w/o bleeding 04/2017   • Endometriosis     STAGE 4 WITH LARGE LEFT OVARIAN ENDOMETRIOMA;EXTENSVIE DENSE LEFT ADNEXAL ADHESIONS TO SIDEWALL AND SIGMOID COLON. dEEP IMPLANTS LEFT ANTERIOR CULDESAC.   • Female cystocele    • Fibrocystic breast disease    • Glaucoma    • Hormone replacement therapy    • Hyperlipidemia    • Hypertension    • IBS (irritable bowel syndrome)    • Insomnia    • Obesity (BMI 30.0-34.9)    • Screening breast examination     SELF;ADMITS   • Stress bladder incontinence, female    • Stress bladder incontinence, female     History of prior Alas procedure with good support.   Currently resolved.   • Vaginitis     UNKNOWN ETIOLOGY   • Varicose veins of legs       Family History   Problem  Relation Age of Onset   • Diabetes Mother    • Dementia Mother    • Hypothyroidism Mother    • Hyperlipidemia Mother    • Breast cancer Mother    • Thyroid disease Mother    • ALS Father 70   • Diabetes Sister    • Hyperlipidemia Sister    • Hypothyroidism Sister    • Early death Sister    • Hypertension Brother         Sandro Morales   • Hypertension Sister         Lacey is deceasef   • Thyroid disease Sister       Social History     Socioeconomic History   • Marital status: Single   • Number of children: 0   • Years of education: 12   • Highest education level: High school graduate   Tobacco Use   • Smoking status: Never Smoker   • Smokeless tobacco: Never Used   Vaping Use   • Vaping Use: Never used   Substance and Sexual Activity   • Alcohol use: Yes     Alcohol/week: 1.0 standard drink     Types: 1 Cans of beer per week     Comment: occasional   • Drug use: No   • Sexual activity: Yes     Partners: Male     Birth control/protection: Post-menopausal      Allergies   Allergen Reactions   • Ace Inhibitors Cough   • Morphine And Related Rash   • Polymyxin B-Trimethoprim Unknown (See Comments)      Immunization History   Administered Date(s) Administered   • COVID-19 (PFIZER) PURPLE CAP 01/27/2021, 02/17/2021, 10/02/2021   • Covid-19 (Pfizer) Gray Cap 05/29/2022   • Flu Vaccine Intradermal Quad 18-64YR 10/03/2019   • Flu Vaccine Quad PF >36MO 08/26/2017, 09/15/2019, 09/06/2020, 09/25/2021   • FluLaval/Fluarix/Fluzone >6 08/26/2017, 09/06/2020   • Fluzone Split Quad (Multi-dose) 09/22/2018, 09/15/2019   • Hepatitis A 09/22/2018, 03/23/2019   • Influenza Quad Vaccine (Inpatient) 09/11/2016   • Influenza Whole 08/01/2016   • Influenza, Unspecified 08/26/2017   • Pneumococcal Conjugate 13-Valent (PCV13) 08/26/2017   • Td 06/03/2002, 11/15/2017   • Tdap 01/19/2012   • flucelvax quad pfs =>4 YRS 10/03/2019        PE:  Vitals:    06/27/22 1331   BP: 120/80   Pulse: 90   Temp: 97.8 °F (36.6 °C)   SpO2: 99%      Body mass index  is 28.22 kg/m².    Gen Appearance: NAD  HEENT: Normocephalic, PERRLA, no thyromegaly, trache midline  Heart: RRR, normal S1 and S2, no murmur  Lungs: CTA b/l, no wheezing, no crackles  Abdomen: Soft, non-tender, non-distended, no guarding and BSx4  MSK: Moves all extremities well, normal gait, no peripheral edema  Pulses: Palpable and equal b/l  Lymph nodes: No palpable lymphadenopathy   Neuro: No focal deficits      Current Outpatient Medications   Medication Sig Dispense Refill   • Blood Glucose Monitoring Suppl (ONE TOUCH ULTRA 2) w/Device kit 2 (Two) Times a Day. as directed     • cetirizine (zyrTEC) 10 MG tablet Take 1 tablet by mouth Daily. 90 tablet 3   • Cholecalciferol (VITAMIN D3) 5000 UNITS capsule capsule Take 5,000 Units by mouth Daily.     • clindamycin (CLEOCIN) 2 % vaginal cream clindamycin 2 % vaginal cream   Insert 1 applicator for 3 nights prn     • clotrimazole-betamethasone (LOTRISONE) 1-0.05 % cream      • estradiol (ESTRACE) 0.1 MG/GM vaginal cream APPLY 0.5 GRAM VAGINALLY 2 TIMES A WEEK 42.5 g 0   • estradiol (MINIVELLE, VIVELLE-DOT) 0.075 MG/24HR patch Place 1 patch on the skin as directed by provider 2 (Two) Times a Week. 24 patch 3   • fluticasone (Flonase) 50 MCG/ACT nasal spray 2 sprays into the nostril(s) as directed by provider Daily. 16 g 3   • folic acid (FOLVITE) 1 MG tablet Take 1 tablet by mouth Daily. 90 tablet 3   • glucose blood test strip Use to test blood sugars twice daily 100 each 11   • glucose monitor monitoring kit 1 each 2 (Two) Times a Day. OneTouch Ultra2 1 each 0   • Lancets misc Use to test blood sugars twice daily 100 each 11   • latanoprost (XALATAN) 0.005 % ophthalmic solution   1   • losartan-hydrochlorothiazide (HYZAAR) 50-12.5 MG per tablet Take 1 tablet by mouth Daily. 90 tablet 3   • metFORMIN ER (GLUCOPHAGE-XR) 500 MG 24 hr tablet Take 1 tablet by mouth 2 (Two) Times a Day With Meals. 180 tablet 1   • methocarbamol (ROBAXIN) 500 MG tablet Take 500 mg by  mouth Daily.     • Semaglutide (Rybelsus) 7 MG tablet Take 7 mg by mouth Every Morning Before Breakfast. 90 tablet 1   • simvastatin (ZOCOR) 20 MG tablet Take 1 tablet by mouth every night at bedtime. 90 tablet 3   • Stool Softener 100 MG capsule TAKE 1 CAPSULE BY MOUTH TWICE DAILY AS DIRECTED     • triamcinolone (KENALOG) 0.5 % ointment Apply  topically to the appropriate area as directed 2 (Two) Times a Day. 30 g 0   • Trulance 3 MG tablet Take 1 tablet by mouth Daily.     • amoxicillin-clavulanate (Augmentin) 875-125 MG per tablet Take 1 tablet by mouth 2 (Two) Times a Day for 7 days. 14 tablet 0     No current facility-administered medications for this visit.        Diagnoses and all orders for this visit:    1. Pansinusitis, unspecified chronicity (Primary)  -     amoxicillin-clavulanate (Augmentin) 875-125 MG per tablet; Take 1 tablet by mouth 2 (Two) Times a Day for 7 days.  Dispense: 14 tablet; Refill: 0  Lungs clear on exam today.  Recommend starting on Augmentin for likely sinusitis.       No follow-ups on file.     Dictated Utilizing Dragon Dictation    Please note that portions of this note were completed with a voice recognition program.    Part of this note may be an electronic transcription/translation of spoken language to printed text using the Dragon Dictation System.

## 2022-07-08 ENCOUNTER — TELEPHONE (OUTPATIENT)
Dept: FAMILY MEDICINE CLINIC | Facility: CLINIC | Age: 57
End: 2022-07-08

## 2022-07-08 RX ORDER — AMOXICILLIN AND CLAVULANATE POTASSIUM 875; 125 MG/1; MG/1
1 TABLET, FILM COATED ORAL 2 TIMES DAILY
Qty: 14 TABLET | Refills: 0 | Status: SHIPPED | OUTPATIENT
Start: 2022-07-08 | End: 2022-08-19

## 2022-07-08 NOTE — TELEPHONE ENCOUNTER
Caller: Nelli Morales    Relationship: Self    Best call back number: [phone]    What was the call regarding: PATIENT STATED SHE IS STILL COUGHING UP SOME CHUNKS/PHLEGM AND REQUESTING IF DOCTOR LEXI CAN SEND IN ANOTHER REFILL OF   amoxicillin-clavulanate (Augmentin) 875-125 MG per tablet [[id number]] (Order [id number])    PATIENT ALSO INQUIRED IF SHE CAN SCHEDULE AN APPT FOR TODAY OR TOMORROW.    Do you require a callback: YES        PHARMACY:      [id number] - [place], [place] - [address] AT [facility] [address] - [phone] [facility] [phone]   [phone]

## 2022-08-17 DIAGNOSIS — R92.8 ABNORMAL MAMMOGRAM: Primary | ICD-10-CM

## 2022-08-19 ENCOUNTER — OFFICE VISIT (OUTPATIENT)
Dept: FAMILY MEDICINE CLINIC | Facility: CLINIC | Age: 57
End: 2022-08-19

## 2022-08-19 VITALS
HEIGHT: 58 IN | DIASTOLIC BLOOD PRESSURE: 76 MMHG | SYSTOLIC BLOOD PRESSURE: 124 MMHG | BODY MASS INDEX: 28.76 KG/M2 | WEIGHT: 137 LBS | HEART RATE: 88 BPM | OXYGEN SATURATION: 97 %

## 2022-08-19 DIAGNOSIS — J01.00 ACUTE NON-RECURRENT MAXILLARY SINUSITIS: ICD-10-CM

## 2022-08-19 DIAGNOSIS — H66.90 ACUTE OTITIS MEDIA, UNSPECIFIED OTITIS MEDIA TYPE: ICD-10-CM

## 2022-08-19 DIAGNOSIS — H92.03 EAR PAIN, BILATERAL: Primary | ICD-10-CM

## 2022-08-19 DIAGNOSIS — J34.89 SINUS DRAINAGE: ICD-10-CM

## 2022-08-19 DIAGNOSIS — J02.9 SORE THROAT: ICD-10-CM

## 2022-08-19 LAB
EXPIRATION DATE: NORMAL
EXPIRATION DATE: NORMAL
FLUAV AG UPPER RESP QL IA.RAPID: NOT DETECTED
FLUBV AG UPPER RESP QL IA.RAPID: NOT DETECTED
INTERNAL CONTROL: NORMAL
INTERNAL CONTROL: NORMAL
Lab: NORMAL
Lab: NORMAL
S PYO AG THROAT QL: NEGATIVE
SARS-COV-2 AG UPPER RESP QL IA.RAPID: NOT DETECTED

## 2022-08-19 PROCEDURE — 99213 OFFICE O/P EST LOW 20 MIN: CPT | Performed by: NURSE PRACTITIONER

## 2022-08-19 PROCEDURE — 87428 SARSCOV & INF VIR A&B AG IA: CPT | Performed by: NURSE PRACTITIONER

## 2022-08-19 PROCEDURE — 87880 STREP A ASSAY W/OPTIC: CPT | Performed by: NURSE PRACTITIONER

## 2022-08-19 RX ORDER — AMOXICILLIN AND CLAVULANATE POTASSIUM 875; 125 MG/1; MG/1
1 TABLET, FILM COATED ORAL 2 TIMES DAILY
Qty: 14 TABLET | Refills: 0 | Status: SHIPPED | OUTPATIENT
Start: 2022-08-19 | End: 2022-08-26

## 2022-08-19 NOTE — PROGRESS NOTES
"Chief Complaint  Sinusitis, Sore Throat, and Cough    Subjective        Nelli Morales presents to [facility]  Pt is here today with sinus congestion and sore throat. Symptoms started 4 days ago. She has a history of sinusitis. Pt reports that she tends to get these symptoms with weather changes. She is experiencing increased pressure in ears, with pain in the right ear.     Sinusitis  This is a new problem. The current episode started in the past 7 days. The problem has been gradually worsening since onset. There has been no fever.   Sore Throat   This is a new problem. The current episode started in the past 7 days. The problem has been gradually worsening. There has been no fever. She has tried nothing for the symptoms.     Objective   Vital Signs:  /76   Pulse 88   Ht 147.3 cm (58\")   Wt 62.1 kg (137 lb)   SpO2 97%   BMI 28.63 kg/m²   Estimated body mass index is 28.63 kg/m² as calculated from the following:    Height as of this encounter: 147.3 cm (58\").    Weight as of this encounter: 62.1 kg (137 lb).          Physical Exam  Vitals reviewed.   Constitutional:       Appearance: Normal appearance.   HENT:      Right Ear: A middle ear effusion is present. Tympanic membrane is erythematous.      Left Ear: Tympanic membrane, ear canal and external ear normal.      Nose: Congestion and rhinorrhea present.      Right Sinus: Maxillary sinus tenderness present.      Left Sinus: Maxillary sinus tenderness present.      Mouth/Throat:      Mouth: Mucous membranes are moist.   Eyes:      Pupils: Pupils are equal, round, and reactive to light.   Cardiovascular:      Rate and Rhythm: Normal rate and regular rhythm.      Heart sounds: Normal heart sounds.   Pulmonary:      Effort: Pulmonary effort is normal.      Breath sounds: Normal breath sounds.   Musculoskeletal:         General: Normal range of motion.      Cervical back: Neck supple.   Skin:     General: Skin is warm. "   Neurological:      Mental Status: She is alert and oriented to person, place, and time.   Psychiatric:         Mood and Affect: Mood normal.         Behavior: Behavior normal.         Thought Content: Thought content normal.        Result Review :                Assessment and Plan   Diagnoses and all orders for this visit:    1. Ear pain, bilateral (Primary)  -     POCT SARS-CoV-2 Antigen MARIANA + Flu    2. Sinus drainage  -     POCT SARS-CoV-2 Antigen MARIANA + Flu    3. Sore throat  -     POCT rapid strep A  -     POCT SARS-CoV-2 Antigen MARIANA + Flu    4. Acute otitis media, unspecified otitis media type  -     amoxicillin-clavulanate (Augmentin) 875-125 MG per tablet; Take 1 tablet by mouth 2 (Two) Times a Day for 7 days.  Dispense: 14 tablet; Refill: 0    5. Acute non-recurrent maxillary sinusitis  -     amoxicillin-clavulanate (Augmentin) 875-125 MG per tablet; Take 1 tablet by mouth 2 (Two) Times a Day for 7 days.  Dispense: 14 tablet; Refill: 0             Follow Up   No follow-ups on file.  Patient was given instructions and counseling regarding her condition or for health maintenance advice. Please see specific information pulled into the AVS if appropriate.

## 2022-09-09 DIAGNOSIS — E11.43 TYPE 2 DIABETES MELLITUS WITH DIABETIC AUTONOMIC NEUROPATHY, WITHOUT LONG-TERM CURRENT USE OF INSULIN: ICD-10-CM

## 2022-09-10 RX ORDER — METFORMIN HYDROCHLORIDE 500 MG/1
TABLET, EXTENDED RELEASE ORAL
Qty: 180 TABLET | Refills: 0 | Status: SHIPPED | OUTPATIENT
Start: 2022-09-10 | End: 2022-12-19

## 2022-09-12 ENCOUNTER — PATIENT MESSAGE (OUTPATIENT)
Dept: OBSTETRICS AND GYNECOLOGY | Facility: CLINIC | Age: 57
End: 2022-09-12

## 2022-09-12 RX ORDER — ESTRADIOL 0.07 MG/D
FILM, EXTENDED RELEASE TRANSDERMAL
Qty: 24 PATCH | Refills: 3 | Status: SHIPPED | OUTPATIENT
Start: 2022-09-12 | End: 2022-10-05 | Stop reason: SDUPTHER

## 2022-10-05 ENCOUNTER — OFFICE VISIT (OUTPATIENT)
Dept: OBSTETRICS AND GYNECOLOGY | Facility: CLINIC | Age: 57
End: 2022-10-05

## 2022-10-05 VITALS
WEIGHT: 137.8 LBS | DIASTOLIC BLOOD PRESSURE: 72 MMHG | HEIGHT: 58 IN | BODY MASS INDEX: 28.92 KG/M2 | SYSTOLIC BLOOD PRESSURE: 128 MMHG

## 2022-10-05 DIAGNOSIS — E66.3 OVERWEIGHT (BMI 25.0-29.9): ICD-10-CM

## 2022-10-05 DIAGNOSIS — Z12.4 SCREENING FOR CERVICAL CANCER: ICD-10-CM

## 2022-10-05 DIAGNOSIS — Z12.31 ENCOUNTER FOR SCREENING MAMMOGRAM FOR MALIGNANT NEOPLASM OF BREAST: ICD-10-CM

## 2022-10-05 DIAGNOSIS — Z79.890 HORMONE REPLACEMENT THERAPY: ICD-10-CM

## 2022-10-05 DIAGNOSIS — Z90.710 HISTORY OF TOTAL HYSTERECTOMY WITH BILATERAL SALPINGO-OOPHORECTOMY (BSO): ICD-10-CM

## 2022-10-05 DIAGNOSIS — N95.2 ATROPHIC VAGINITIS: ICD-10-CM

## 2022-10-05 DIAGNOSIS — N81.6 RECTOCELE: ICD-10-CM

## 2022-10-05 DIAGNOSIS — N89.8 VAGINAL ODOR: Primary | ICD-10-CM

## 2022-10-05 DIAGNOSIS — Z90.79 HISTORY OF TOTAL HYSTERECTOMY WITH BILATERAL SALPINGO-OOPHORECTOMY (BSO): ICD-10-CM

## 2022-10-05 DIAGNOSIS — Z80.3 FAMILY HISTORY OF BREAST CANCER IN MOTHER: ICD-10-CM

## 2022-10-05 DIAGNOSIS — Z12.11 SCREENING FOR COLON CANCER: ICD-10-CM

## 2022-10-05 DIAGNOSIS — Z90.722 HISTORY OF TOTAL HYSTERECTOMY WITH BILATERAL SALPINGO-OOPHORECTOMY (BSO): ICD-10-CM

## 2022-10-05 LAB
BILIRUB BLD-MCNC: NEGATIVE MG/DL
CLARITY, POC: CLEAR
COLOR UR: YELLOW
EXPIRATION DATE: 0
GLUCOSE UR STRIP-MCNC: NEGATIVE MG/DL
KETONES UR QL: NEGATIVE
LEUKOCYTE EST, POC: NEGATIVE
Lab: 0
NITRITE UR-MCNC: NEGATIVE MG/ML
PH UR: 5.5 [PH] (ref 5–8)
PROT UR STRIP-MCNC: NEGATIVE MG/DL
RBC # UR STRIP: NEGATIVE /UL
SP GR UR: 1.01 (ref 1–1.03)
UROBILINOGEN UR QL: NORMAL

## 2022-10-05 PROCEDURE — 99213 OFFICE O/P EST LOW 20 MIN: CPT | Performed by: OBSTETRICS & GYNECOLOGY

## 2022-10-05 PROCEDURE — 99396 PREV VISIT EST AGE 40-64: CPT | Performed by: OBSTETRICS & GYNECOLOGY

## 2022-10-05 PROCEDURE — 81003 URINALYSIS AUTO W/O SCOPE: CPT | Performed by: OBSTETRICS & GYNECOLOGY

## 2022-10-05 RX ORDER — ESTRADIOL 0.07 MG/D
1 FILM, EXTENDED RELEASE TRANSDERMAL 2 TIMES WEEKLY
Qty: 24 PATCH | Refills: 3 | Status: SHIPPED | OUTPATIENT
Start: 2022-10-06

## 2022-10-05 RX ORDER — ESTRADIOL 0.1 MG/G
0.5 CREAM VAGINAL 2 TIMES WEEKLY
Qty: 42.5 G | Refills: 3 | Status: SHIPPED | OUTPATIENT
Start: 2022-10-06 | End: 2023-10-06

## 2022-10-05 NOTE — PROGRESS NOTES
Gynecologic Annual Exam Note        GYN Annual Exam     CC - Here for annual exam.        HPI  Nelli Morales is a 56 y.o. female, , who presents for annual well woman exam as a established patient.  She is postmenopausal. Denies vaginal bleeding.  Patient reports problems with: vaginal odor X 2 months. There were no changes to her medical or surgical history since her last visit.. Partner Status: Marital Status: single.  She is is sexually active. She has not had new partners.. STD testing recommendations have been explained to the patient and she does not desire STD testing. Has pessary but not wearing today.  Complaining of vaginal odor.  History of diabetes.  She has a #2 Dobb ring pessary with support she uses for rectocele and states continues to work well.    Additional OB/GYN History   On HRT? Yes. Details: vivelle patch, estradiol cream    Last Pap :20 Results: negative. HPV: not done hysterectomy/endometriosis  Last Completed Pap Smear          PAP SMEAR (Every 3 Years) Next due on 2020  Done - vaginal cuff neg    2018  Done    2013  Patient-Reported (Performed Externally) - [facility]              History of abnormal Pap smear: no  Family history of uterine, colon, breast, or ovarian cancer: yes - Mother Breast  Performs monthly Self-Breast Exam: no  Last mammogram: 10/4/22. [facility] (.last reported BI-RADS (prob benign) Cat. 3 repeat in 6 months)    Last Completed Mammogram          MAMMOGRAM (Every 2 Years) Next due on 10/4/2024    10/04/2022  SCANNED - MAMMO    2022  SCANNED - MAMMO    10/16/2020  SCANNED - MAMMO    10/03/2019  SCANNED - MAMMO    06/15/2018  SCANNED - MAMMO    Only the first 5 history entries have been loaded, but more history exists.              Last colonoscopy: 20 Neg    Last Completed Colonoscopy          COLORECTAL CANCER SCREENING (COLONOSCOPY - Every 5 Years) Next due on 2020  COLONOSCOPY (Done)     12/31/2019  SCANNED - COLONOSCOPY    06/03/2013  COLONOSCOPY (Patient-Reported (Performed Externally) - approx date)    04/01/2013  SCANNED - COLONOSCOPY                  Last bone density scan (DEXA): None  Exercises Regularly: no  Feelings of Anxiety or Depression: no      Tobacco Usage?: No       Current Outpatient Medications:   •  Blood Glucose Monitoring Suppl (ONE TOUCH ULTRA 2) w/Device kit, 2 (Two) Times a Day. as directed, Disp: , Rfl:   •  cetirizine (zyrTEC) 10 MG tablet, Take 1 tablet by mouth Daily., Disp: 90 tablet, Rfl: 3  •  Cholecalciferol (VITAMIN D3) 5000 UNITS capsule capsule, Take 5,000 Units by mouth Daily., Disp: , Rfl:   •  clotrimazole-betamethasone (LOTRISONE) 1-0.05 % cream, , Disp: , Rfl:   •  [START ON 10/6/2022] estradiol (ESTRACE) 0.1 MG/GM vaginal cream, Insert 0.5 g into the vagina 2 (Two) Times a Week., Disp: 42.5 g, Rfl: 3  •  [START ON 10/6/2022] estradiol (MINIVELLE, VIVELLE-DOT) 0.075 MG/24HR patch, Place 1 patch on the skin as directed by provider 2 (Two) Times a Week., Disp: 24 patch, Rfl: 3  •  fluticasone (Flonase) 50 MCG/ACT nasal spray, 2 sprays into the nostril(s) as directed by provider Daily., Disp: 16 g, Rfl: 3  •  folic acid (FOLVITE) 1 MG tablet, Take 1 tablet by mouth Daily., Disp: 90 tablet, Rfl: 3  •  glucose blood test strip, Use to test blood sugars twice daily, Disp: 100 each, Rfl: 11  •  glucose monitor monitoring kit, 1 each 2 (Two) Times a Day. OneTouch Ultra2, Disp: 1 each, Rfl: 0  •  Lancets misc, Use to test blood sugars twice daily, Disp: 100 each, Rfl: 11  •  latanoprost (XALATAN) 0.005 % ophthalmic solution, , Disp: , Rfl: 1  •  losartan-hydrochlorothiazide (HYZAAR) 50-12.5 MG per tablet, Take 1 tablet by mouth Daily., Disp: 90 tablet, Rfl: 3  •  metFORMIN ER (GLUCOPHAGE-XR) 500 MG 24 hr tablet, TAKE 1 TABLET BY MOUTH TWICE DAILY WITH MEALS, Disp: 180 tablet, Rfl: 0  •  methocarbamol (ROBAXIN) 500 MG tablet, Take 500 mg by mouth Daily., Disp: ,  Rfl:   •  Semaglutide (Rybelsus) 7 MG tablet, Take 7 mg by mouth Every Morning Before Breakfast., Disp: 90 tablet, Rfl: 1  •  simvastatin (ZOCOR) 20 MG tablet, Take 1 tablet by mouth every night at bedtime., Disp: 90 tablet, Rfl: 3  •  Stool Softener 100 MG capsule, TAKE 1 CAPSULE BY MOUTH TWICE DAILY AS DIRECTED, Disp: , Rfl:   •  triamcinolone (KENALOG) 0.5 % ointment, Apply  topically to the appropriate area as directed 2 (Two) Times a Day., Disp: 30 g, Rfl: 0  •  Trulance 3 MG tablet, Take 1 tablet by mouth Daily., Disp: , Rfl:     Patient is requesting refills of Vivelle patch and estradiol cream.    OB History        0    Para   0    Term   0       0    AB   0    Living   0       SAB   0    IAB   0    Ectopic   0    Molar   0    Multiple   0    Live Births   0                Past Medical History:   Diagnosis Date   • Allergic    • Allergic rhinitis    • BV (bacterial vaginosis)    • Cataracts, bilateral    • Class 1 obesity due to excess calories without serious comorbidity with body mass index (BMI) of 32.0 to 32.9 in adult 2017   • Diabetes mellitus, type 2 (HCC)    • Diverticulitis large intestine w/o perforation or abscess w/o bleeding 2017   • Endometriosis     STAGE 4 WITH LARGE LEFT OVARIAN ENDOMETRIOMA;EXTENSVIE DENSE LEFT ADNEXAL ADHESIONS TO SIDEWALL AND SIGMOID COLON. dEEP IMPLANTS LEFT ANTERIOR CULDESAC.   • Female cystocele    • Fibrocystic breast disease    • Glaucoma    • Hormone replacement therapy    • Hyperlipidemia    • Hypertension    • IBS (irritable bowel syndrome)    • Insomnia    • Obesity (BMI 30.0-34.9)    • Screening breast examination     SELF;ADMITS   • Stress bladder incontinence, female    • Stress bladder incontinence, female     History of prior Alas procedure with good support.   Currently resolved.   • Vaginitis     UNKNOWN ETIOLOGY   • Varicose veins of legs         Past Surgical History:   Procedure Laterality Date   • BLADDER SUSPENSION  10/27/2007  "  • CYSTOCELE REPAIR     • CYSTOTOMY     • HYSTEROSCOPY     • TOTAL ABDOMINAL HYSTERECTOMY WITH SALPINGO OOPHORECTOMY  12/2013    LYSIS OF ADHESIONS FOR STAGE 4 ENDOMETRIOSIS       Health Maintenance   Topic Date Due   • ZOSTER VACCINE (1 of 2) Never done   • DIABETIC EYE EXAM  10/15/2020   • COVID-19 Vaccine (5 - Booster for Pfizer series) 07/24/2022   • INFLUENZA VACCINE  08/01/2022   • Annual Gynecologic Pelvic and Breast Exam  09/09/2022   • Hepatitis B (1 of 3 - Risk 3-dose series) 06/10/2023 (Originally 12/19/1984)   • Pneumococcal Vaccine 0-64 (2 - PPSV23 or PCV20) 06/10/2023 (Originally 8/26/2018)   • HEMOGLOBIN A1C  12/10/2022   • DIABETIC FOOT EXAM  12/20/2022   • URINE MICROALBUMIN  12/20/2022   • ANNUAL PHYSICAL  12/21/2022   • LIPID PANEL  06/10/2023   • PAP SMEAR  08/12/2023   • MAMMOGRAM  10/04/2024   • COLORECTAL CANCER SCREENING  01/06/2025   • TDAP/TD VACCINES (5 - Td or Tdap) 11/15/2027   • HEPATITIS C SCREENING  Completed       The additional following portions of the patient's history were reviewed and updated as appropriate: allergies, current medications, past family history, past medical history, past social history, past surgical history and problem list.    Review of Systems   Constitutional: Negative for chills and fever.   Respiratory: Negative.    Cardiovascular: Negative.    Gastrointestinal: Negative for abdominal distention and abdominal pain.   Genitourinary: Negative.    Psychiatric/Behavioral: Negative for dysphoric mood and depressed mood.       I have reviewed and agree with the HPI, ROS, and historical information as entered above. Hiro Oliver MD    Objective   /72   Ht 147.3 cm (58\")   Wt 62.5 kg (137 lb 12.8 oz)   LMP  (LMP Unknown)   BMI 28.80 kg/m²     Physical Exam  Vitals and nursing note reviewed. Exam conducted with a chaperone present.   Constitutional:       Appearance: She is well-developed. She is obese.   HENT:      Head: Normocephalic and " atraumatic.   Neck:      Thyroid: No thyroid mass or thyromegaly.   Cardiovascular:      Rate and Rhythm: Normal rate and regular rhythm.      Heart sounds: Normal heart sounds. No murmur heard.  Pulmonary:      Effort: Pulmonary effort is normal. No retractions.      Breath sounds: Normal breath sounds. No wheezing, rhonchi or rales.   Chest:      Chest wall: No mass or tenderness.   Breasts:      Right: Normal. No mass, nipple discharge, skin change or tenderness.      Left: Normal. No mass, nipple discharge, skin change or tenderness.       Abdominal:      General: Bowel sounds are normal.      Palpations: Abdomen is soft. Abdomen is not rigid. There is no mass.      Tenderness: There is no abdominal tenderness. There is no guarding.      Hernia: No hernia is present. There is no hernia in the left inguinal area.   Genitourinary:     General: Normal vulva.      Labia:         Right: No rash, tenderness or lesion.         Left: No rash, tenderness or lesion.       Vagina: Normal. No vaginal discharge or lesions.      Cervix: No cervical motion tenderness, discharge, lesion or cervical bleeding.      Uterus: Normal. Not enlarged, not fixed and not tender.       Adnexa:         Right: No mass or tenderness.          Left: No mass or tenderness.        Rectum: No external hemorrhoid.      Comments: No vaginal discharge.  Status post prior hysterectomy.  Vaginal cuff well supported.Has rectocele but good anterior support.  Musculoskeletal:      Cervical back: Normal range of motion. No muscular tenderness.   Neurological:      Mental Status: She is alert and oriented to person, place, and time.   Psychiatric:         Behavior: Behavior normal.            Assessment and Plan    Problem List Items Addressed This Visit     Family history of breast cancer in mother    Overweight (BMI 25.0-29.9)    Hormone replacement therapy    Overview     On Vivelle dot 0.075 mg patch         Current Assessment & Plan     Wishes to  continue with same dose of Vivelle-Dot.         History of total hysterectomy with bilateral salpingo-oophorectomy (BSO)    Overview     December 2013 NU/BSO lysis of adhesions for stage IV endometriosis.  Dense adhesions left ovary and sigmoid colon.         Rectocele    Overview     Grade 2.  Doing well with #2 Knob ring pessary with support.          Encounter for screening mammogram for malignant neoplasm of breast    Overview     Mammogram 1/3/2022 at [facility] with benign appearing cyst. 6-month repeat recommended.         Screening for colon cancer    Overview     12/2019 [facility]; negative; 10 yr repeat.          Atrophic vaginitis    Overview     On Estrace vaginal cream.         Screening for cervical cancer    Overview     Pap smear 8/12/2020 was negative.  History of prior hysterectomy for endometriosis.           Other Visit Diagnoses     Vaginal odor    -  Primary    Relevant Orders    Urinalysis With Microscopic - Urine, Clean Catch    POC Urinalysis Dipstick, Automated (Completed)    NuSwab BV & Candida - Swab, Vagina          1. GYN annual well woman exam.  56 years of age.  Status post prior hysterectomy.  Pap smear not needed.  2. Rectocele stable; using #2 knob ring pessary with support during the day and removing at night.  3. Mammogram has follow-up ultrasounds scheduled for 6 months.  4. Complains of vaginal odor but discharge appears normal.  Swab sent for BV and yeast.  5. Refill Vivelle dot 0.075 mg sent to pharmacy.  6. Refill estradiol vaginal cream.  7. Reviewed monthly self breast exams.  Instructed to call with lumps, pain, or breast discharge.  Yearly mammograms ordered.  8. Ordered mammogram today.  9. Recommended use of Vitamin D and getting adequate calcium in her diet. (1500mg)  10. Reviewed BMI and weight loss as preventative health measures.   11. Reviewed risks of ERT including increased risk of breast cancer, increased blood clots, increased heart disease.  Patient strongly  desires to stay on or start ERT.  She understands we will use the lowest amount that adequately controls her symptoms.  12. Reccommended Flu Vaccine in Fall of each year.  13. RTC in 1 year or PRN with problems.  14. Return in about 1 year (around 10/5/2023) for Annual physical.     Hiro Oliver MD  10/05/2022

## 2022-10-06 LAB
A VAGINAE DNA VAG QL NAA+PROBE: NORMAL SCORE
BVAB2 DNA VAG QL NAA+PROBE: NORMAL SCORE
C ALBICANS DNA VAG QL NAA+PROBE: NEGATIVE
C GLABRATA DNA VAG QL NAA+PROBE: NEGATIVE
MEGA1 DNA VAG QL NAA+PROBE: NORMAL SCORE

## 2022-10-13 ENCOUNTER — TELEPHONE (OUTPATIENT)
Dept: OBSTETRICS AND GYNECOLOGY | Facility: CLINIC | Age: 57
End: 2022-10-13

## 2022-10-13 NOTE — TELEPHONE ENCOUNTER
Pt made aware that results for BV and yeast were negative. No other questions or concerns at this time.

## 2022-10-13 NOTE — TELEPHONE ENCOUNTER
Pt returning call. Also requesting that if she doesn't answer phone, messaging via [employer] if possible.

## 2022-11-01 ENCOUNTER — TELEPHONE (OUTPATIENT)
Dept: OBSTETRICS AND GYNECOLOGY | Facility: CLINIC | Age: 57
End: 2022-11-01

## 2022-11-01 NOTE — TELEPHONE ENCOUNTER
Pt states she was in for her annual 10/05/2022 and she received a bill for [employer]. She called her insurance and they told her that it was coded incorrectly and we needed to go in and re code it so that it will be covered under and annual appointment.     Please advise

## 2022-11-17 ENCOUNTER — TELEPHONE (OUTPATIENT)
Dept: FAMILY MEDICINE CLINIC | Facility: CLINIC | Age: 57
End: 2022-11-17

## 2022-11-17 NOTE — TELEPHONE ENCOUNTER
Attempted to contact patient, no answer. Left detailed voicemail relaying PCP's message & if pt had additional questions to call the office back (office # given).       Hub may relay message & document.     Flaquito Wallace APRN [facility] [address] Clinical Pool 4 minutes ago (4:16 PM)     LH  Okay for her to take meloxicam with rest for medication.

## 2022-11-17 NOTE — TELEPHONE ENCOUNTER
Caller: Nelli Morales    Relationship: Self    Best call back number: [phone]    What is the best time to reach you: BEFORE 2PM TODAY, PATIENT HAS TO GO BACK TO WORK.    Who are you requesting to speak with (clinical staff, provider,  specific staff member): CLINICAL STAFF    Do you know the name of the person who called: PATIENT    What was the call regarding: PATIENT ADVISES THAT SHE WAS PRESCRIBED MELOXICAM FROM HER PODIATRIST, SHE WANTS TO KNOW IF THIS IS SAFE FOR HER TO TAKE WITH ALL THE OTHER MEDICATIONS SHE IS ON. WAS UNABLE TO WARM TRANSFER AS PATIENT REQUESTED, PER DAVID ALL NURSES WERE IN ROOMS AND TO SEND BACK HIGH PRIORITY CALL. PATIENT REQUESTS TO HAVE CALL BACK BY 2PM BECAUSE SHE HAS TO GO BACK TO WORK. PLEASE ADVISE.    Do you require a callback: YES ASAP

## 2022-12-07 ENCOUNTER — TELEPHONE (OUTPATIENT)
Dept: FAMILY MEDICINE CLINIC | Facility: CLINIC | Age: 57
End: 2022-12-07

## 2022-12-07 ENCOUNTER — TELEPHONE (OUTPATIENT)
Dept: OBSTETRICS AND GYNECOLOGY | Facility: CLINIC | Age: 57
End: 2022-12-07

## 2022-12-07 RX ORDER — BROMPHENIRAMINE MALEATE, PSEUDOEPHEDRINE HYDROCHLORIDE, AND DEXTROMETHORPHAN HYDROBROMIDE 2; 30; 10 MG/5ML; MG/5ML; MG/5ML
5 SYRUP ORAL 4 TIMES DAILY PRN
Qty: 120 ML | Refills: 1 | Status: SHIPPED | OUTPATIENT
Start: 2022-12-07 | End: 2022-12-22

## 2022-12-07 NOTE — TELEPHONE ENCOUNTER
Caller: Andrew Nelli OWENS    Relationship: Self    Best call back number: [phone]    What medication are you requesting: PCP DISCRETION    What are your current symptoms: COUGHING, MUCUS    How long have you been experiencing symptoms: 3 DAYS    Have you had these symptoms before:    [x] Yes  [] No    Have you been treated for these symptoms before:   [x] Yes  [] No    If a prescription is needed, what is your preferred pharmacy and phone number:      [id number] - [place], [place] - [address] AT [facility] [address] [facility] [phone] [facility] [phone] FX        Additional notes:

## 2022-12-07 NOTE — TELEPHONE ENCOUNTER
Previous telephone encounter on 11/01/2022 states that her insurance told her that the visit was coded incorrectly.     I am unsure what the OV had previously been coded as. LVOM to get more information.

## 2022-12-07 NOTE — TELEPHONE ENCOUNTER
Patient reports symptoms onset Monday 12/5. She is experiencing a productive cough, She has been using Mucinex DM and no improvement. Any medical advice?

## 2022-12-09 ENCOUNTER — TELEPHONE (OUTPATIENT)
Dept: OBSTETRICS AND GYNECOLOGY | Facility: CLINIC | Age: 57
End: 2022-12-09

## 2022-12-09 NOTE — TELEPHONE ENCOUNTER
Caller: ALEXANDER PALOMARES    Phone Number: [phone]  Reason for Call: RETURN CALL ABOUT BILLING FOR [facility]  PLEASE CALL AND OKAY TO LEAVE MESSAGE

## 2022-12-09 NOTE — TELEPHONE ENCOUNTER
Mishel calling on behalf of PT about a bill that pt has received for labs at her annual. Per insurance [facility], is requesting occ notes for [facility] to be able to correct the bill for the pt . Her acct. Number with [facility] is [phone]

## 2022-12-18 DIAGNOSIS — E11.43 TYPE 2 DIABETES MELLITUS WITH DIABETIC AUTONOMIC NEUROPATHY, WITHOUT LONG-TERM CURRENT USE OF INSULIN: ICD-10-CM

## 2022-12-19 RX ORDER — METFORMIN HYDROCHLORIDE 500 MG/1
TABLET, EXTENDED RELEASE ORAL
Qty: 180 TABLET | Refills: 0 | Status: SHIPPED | OUTPATIENT
Start: 2022-12-19 | End: 2022-12-22 | Stop reason: SDUPTHER

## 2022-12-19 NOTE — TELEPHONE ENCOUNTER
Rx Refill Note  Requested Prescriptions     Pending Prescriptions Disp Refills   • metFORMIN ER (GLUCOPHAGE-XR) 500 MG 24 hr tablet [Pharmacy Med Name: METFORMIN ER 500MG 24HR TABS] 180 tablet 0     Sig: TAKE 1 TABLET BY MOUTH TWICE DAILY WITH MEALS      Last office visit with prescribing clinician: 8/19/2022   Last telemedicine visit with prescribing clinician: 12/22/2022   Next office visit with prescribing clinician: 12/22/2022                         Would you like a call back once the refill request has been completed: [] Yes [] No    If the office needs to give you a call back, can they leave a voicemail: [] Yes [] No    Pastor Moore MA  12/19/22, 09:35 EST

## 2022-12-22 ENCOUNTER — OFFICE VISIT (OUTPATIENT)
Dept: FAMILY MEDICINE CLINIC | Facility: CLINIC | Age: 57
End: 2022-12-22

## 2022-12-22 ENCOUNTER — LAB (OUTPATIENT)
Dept: LAB | Facility: HOSPITAL | Age: 57
End: 2022-12-22

## 2022-12-22 VITALS
TEMPERATURE: 97.7 F | BODY MASS INDEX: 29.97 KG/M2 | HEIGHT: 58 IN | WEIGHT: 142.8 LBS | SYSTOLIC BLOOD PRESSURE: 120 MMHG | DIASTOLIC BLOOD PRESSURE: 80 MMHG | HEART RATE: 72 BPM | OXYGEN SATURATION: 95 %

## 2022-12-22 DIAGNOSIS — E78.2 MIXED HYPERLIPIDEMIA: ICD-10-CM

## 2022-12-22 DIAGNOSIS — E55.9 VITAMIN D DEFICIENCY: ICD-10-CM

## 2022-12-22 DIAGNOSIS — I10 ESSENTIAL HYPERTENSION: ICD-10-CM

## 2022-12-22 DIAGNOSIS — E66.3 OVERWEIGHT (BMI 25.0-29.9): ICD-10-CM

## 2022-12-22 DIAGNOSIS — L24.7 IRRITANT CONTACT DERMATITIS DUE TO PLANTS, EXCEPT FOOD: ICD-10-CM

## 2022-12-22 DIAGNOSIS — E11.43 TYPE 2 DIABETES MELLITUS WITH DIABETIC AUTONOMIC NEUROPATHY, WITHOUT LONG-TERM CURRENT USE OF INSULIN: ICD-10-CM

## 2022-12-22 DIAGNOSIS — Z13.29 SCREENING FOR THYROID DISORDER: ICD-10-CM

## 2022-12-22 DIAGNOSIS — Z23 NEED FOR COVID-19 VACCINE: ICD-10-CM

## 2022-12-22 DIAGNOSIS — Z13.0 SCREENING FOR DEFICIENCY ANEMIA: ICD-10-CM

## 2022-12-22 DIAGNOSIS — Z00.00 WELLNESS EXAMINATION: Primary | ICD-10-CM

## 2022-12-22 DIAGNOSIS — Z00.00 WELLNESS EXAMINATION: ICD-10-CM

## 2022-12-22 LAB
25(OH)D3 SERPL-MCNC: 24.4 NG/ML (ref 30–100)
ALBUMIN SERPL-MCNC: 4.7 G/DL (ref 3.5–5.2)
ALBUMIN/GLOB SERPL: 2.2 G/DL
ALP SERPL-CCNC: 70 U/L (ref 39–117)
ALT SERPL W P-5'-P-CCNC: 11 U/L (ref 1–33)
ANION GAP SERPL CALCULATED.3IONS-SCNC: 10 MMOL/L (ref 5–15)
AST SERPL-CCNC: 21 U/L (ref 1–32)
BILIRUB SERPL-MCNC: 0.6 MG/DL (ref 0–1.2)
BUN SERPL-MCNC: 8 MG/DL (ref 6–20)
BUN/CREAT SERPL: 13.6 (ref 7–25)
CALCIUM SPEC-SCNC: 9.7 MG/DL (ref 8.6–10.5)
CHLORIDE SERPL-SCNC: 100 MMOL/L (ref 98–107)
CHOLEST SERPL-MCNC: 150 MG/DL (ref 0–200)
CO2 SERPL-SCNC: 27 MMOL/L (ref 22–29)
CREAT SERPL-MCNC: 0.59 MG/DL (ref 0.57–1)
DEPRECATED RDW RBC AUTO: 38.5 FL (ref 37–54)
EGFRCR SERPLBLD CKD-EPI 2021: 105.9 ML/MIN/1.73
ERYTHROCYTE [DISTWIDTH] IN BLOOD BY AUTOMATED COUNT: 12.5 % (ref 12.3–15.4)
EXPIRATION DATE: NORMAL
GLOBULIN UR ELPH-MCNC: 2.1 GM/DL
GLUCOSE SERPL-MCNC: 84 MG/DL (ref 65–99)
HBA1C MFR BLD: 5.9 %
HCT VFR BLD AUTO: 42.9 % (ref 34–46.6)
HDLC SERPL-MCNC: 44 MG/DL (ref 40–60)
HGB BLD-MCNC: 15 G/DL (ref 12–15.9)
LDLC SERPL CALC-MCNC: 76 MG/DL (ref 0–100)
LDLC/HDLC SERPL: 1.59 {RATIO}
Lab: NORMAL
MCH RBC QN AUTO: 29.7 PG (ref 26.6–33)
MCHC RBC AUTO-ENTMCNC: 35 G/DL (ref 31.5–35.7)
MCV RBC AUTO: 85 FL (ref 79–97)
PLATELET # BLD AUTO: 260 10*3/MM3 (ref 140–450)
PMV BLD AUTO: 11.1 FL (ref 6–12)
POTASSIUM SERPL-SCNC: 3.8 MMOL/L (ref 3.5–5.2)
PROT SERPL-MCNC: 6.8 G/DL (ref 6–8.5)
RBC # BLD AUTO: 5.05 10*6/MM3 (ref 3.77–5.28)
SODIUM SERPL-SCNC: 137 MMOL/L (ref 136–145)
TRIGL SERPL-MCNC: 180 MG/DL (ref 0–150)
TSH SERPL DL<=0.05 MIU/L-ACNC: 1.78 UIU/ML (ref 0.27–4.2)
VLDLC SERPL-MCNC: 30 MG/DL (ref 5–40)
WBC NRBC COR # BLD: 5.1 10*3/MM3 (ref 3.4–10.8)

## 2022-12-22 PROCEDURE — 36415 COLL VENOUS BLD VENIPUNCTURE: CPT

## 2022-12-22 PROCEDURE — 82043 UR ALBUMIN QUANTITATIVE: CPT | Performed by: NURSE PRACTITIONER

## 2022-12-22 PROCEDURE — 0124A PR ADM SARSCOV2 30MCG/0.3ML BST: CPT | Performed by: NURSE PRACTITIONER

## 2022-12-22 PROCEDURE — 99396 PREV VISIT EST AGE 40-64: CPT | Performed by: NURSE PRACTITIONER

## 2022-12-22 PROCEDURE — 83036 HEMOGLOBIN GLYCOSYLATED A1C: CPT | Performed by: NURSE PRACTITIONER

## 2022-12-22 PROCEDURE — 80050 GENERAL HEALTH PANEL: CPT

## 2022-12-22 PROCEDURE — 82306 VITAMIN D 25 HYDROXY: CPT

## 2022-12-22 PROCEDURE — 91312 COVID-19 (PFIZER) BIVALENT BOOSTER 12+YRS: CPT | Performed by: NURSE PRACTITIONER

## 2022-12-22 PROCEDURE — 80061 LIPID PANEL: CPT

## 2022-12-22 RX ORDER — ORAL SEMAGLUTIDE 7 MG/1
1 TABLET ORAL
Qty: 90 TABLET | Refills: 3 | Status: SHIPPED | OUTPATIENT
Start: 2022-12-22

## 2022-12-22 RX ORDER — MELOXICAM 7.5 MG/1
7.5 TABLET ORAL DAILY
COMMUNITY
Start: 2022-11-17 | End: 2022-12-22

## 2022-12-22 RX ORDER — BROMPHENIRAMINE MALEATE, PSEUDOEPHEDRINE HYDROCHLORIDE, AND DEXTROMETHORPHAN HYDROBROMIDE 2; 30; 10 MG/5ML; MG/5ML; MG/5ML
5 SYRUP ORAL 4 TIMES DAILY PRN
Qty: 120 ML | Refills: 1 | Status: SHIPPED | OUTPATIENT
Start: 2022-12-22

## 2022-12-22 RX ORDER — LOSARTAN POTASSIUM AND HYDROCHLOROTHIAZIDE 12.5; 5 MG/1; MG/1
1 TABLET ORAL DAILY
Qty: 90 TABLET | Refills: 3 | Status: SHIPPED | OUTPATIENT
Start: 2022-12-22 | End: 2023-03-08 | Stop reason: SDUPTHER

## 2022-12-22 RX ORDER — TRIAMCINOLONE ACETONIDE 5 MG/G
OINTMENT TOPICAL 2 TIMES DAILY
Qty: 30 G | Refills: 0 | Status: SHIPPED | OUTPATIENT
Start: 2022-12-22

## 2022-12-22 RX ORDER — SIMVASTATIN 20 MG
20 TABLET ORAL
Qty: 90 TABLET | Refills: 3 | Status: SHIPPED | OUTPATIENT
Start: 2022-12-22 | End: 2023-01-26

## 2022-12-22 RX ORDER — METFORMIN HYDROCHLORIDE 500 MG/1
500 TABLET, EXTENDED RELEASE ORAL 2 TIMES DAILY WITH MEALS
Qty: 180 TABLET | Refills: 3 | Status: SHIPPED | OUTPATIENT
Start: 2022-12-22 | End: 2023-03-08 | Stop reason: SDUPTHER

## 2022-12-22 NOTE — PROGRESS NOTES
Patient Care Team:  Flaquito Wallace APRN as PCP - General (Family Medicine)  Hiro Oliver MD as Consulting Physician (Obstetrics and Gynecology)     Chief complaint: Patient is in today for a physical     Patient is a 57 y.o. female who presents for her yearly physical exam.     Diabetes  Pertinent negatives for hypoglycemia include no dizziness, headaches or nervousness/anxiousness. Associated symptoms include fatigue. Pertinent negatives for diabetes include no chest pain, no polydipsia, no polyphagia and no polyuria.   Hyperlipidemia  Pertinent negatives include no chest pain, myalgias or shortness of breath.   Hypertension  Pertinent negatives include no chest pain, headaches, neck pain, palpitations or shortness of breath.   She does check her glucose at home and reports that its anywhere from the high 80s to the 200s.  She does not currently check her blood pressure at home.  She does note occasional back and knee pain but this is generally resolved with taken some Tylenol.  Her weight is up 5 pounds from the last visit.      Review of Systems   Constitutional: Positive for fatigue. Negative for appetite change, chills and fever.   HENT: Positive for postnasal drip. Negative for congestion, ear pain, hearing loss, rhinorrhea, sinus pressure and sore throat.         Cough, post nasal drip for 2 weeks, was given Bromfed, did not help   Eyes: Negative for itching and visual disturbance (glasses).        She does see ophthalmology tomorrow   Respiratory: Positive for cough ( Yellow to brown). Negative for shortness of breath and wheezing.    Cardiovascular: Negative for chest pain, palpitations and leg swelling.   Gastrointestinal: Negative for abdominal pain, blood in stool, constipation ( Good with Trulance), diarrhea, nausea and vomiting.   Endocrine: Negative for cold intolerance, heat intolerance, polydipsia, polyphagia and polyuria.   Genitourinary: Negative for difficulty urinating ( Has a  pessary in place), dysuria and hematuria.   Musculoskeletal: Positive for arthralgias ( knees, plantar fasciitis) and back pain. Negative for joint swelling, myalgias and neck pain.   Skin: Negative for rash and wound.   Allergic/Immunologic: Negative for environmental allergies and food allergies.   Neurological: Negative for dizziness, light-headedness, numbness and headaches.   Hematological: Negative for adenopathy. Does not bruise/bleed easily.   Psychiatric/Behavioral: Negative for dysphoric mood and sleep disturbance. The patient is not nervous/anxious.       History  Past Medical History:   Diagnosis Date   • Allergic    • Allergic rhinitis    • BV (bacterial vaginosis)    • Cataracts, bilateral    • Class 1 obesity due to excess calories without serious comorbidity with body mass index (BMI) of 32.0 to 32.9 in adult 12/22/2017   • Diabetes mellitus, type 2 (HCC)    • Diverticulitis large intestine w/o perforation or abscess w/o bleeding 04/2017   • Diverticulosis 04/2017   • Endometriosis     STAGE 4 WITH LARGE LEFT OVARIAN ENDOMETRIOMA;EXTENSVIE DENSE LEFT ADNEXAL ADHESIONS TO SIDEWALL AND SIGMOID COLON. dEEP IMPLANTS LEFT ANTERIOR CULDESAC.   • Female cystocele    • Fibrocystic breast disease    • Glaucoma    • Hormone replacement therapy    • Hyperlipidemia    • Hypertension    • IBS (irritable bowel syndrome)    • Insomnia    • Obesity (BMI 30.0-34.9)    • Screening breast examination     SELF;ADMITS   • Stress bladder incontinence, female    • Stress bladder incontinence, female     History of prior Alas procedure with good support.   Currently resolved.   • Vaginitis     UNKNOWN ETIOLOGY   • Varicose veins of legs       Past Surgical History:   Procedure Laterality Date   • BLADDER SUSPENSION  10/27/2007   • CYSTOCELE REPAIR     • CYSTOTOMY     • HYSTEROSCOPY     • TOTAL ABDOMINAL HYSTERECTOMY WITH SALPINGO OOPHORECTOMY  12/2013    LYSIS OF ADHESIONS FOR STAGE 4 ENDOMETRIOSIS      Allergies    Allergen Reactions   • Ace Inhibitors Cough   • Morphine And Related Rash   • Polymyxin B-Trimethoprim Unknown (See Comments)      Family History   Problem Relation Age of Onset   • Diabetes Mother    • Dementia Mother    • Hypothyroidism Mother    • Hyperlipidemia Mother    • Breast cancer Mother    • Thyroid disease Mother    • ALS Father 70   • Diabetes Sister    • Hyperlipidemia Sister    • Hypothyroidism Sister    • Early death Sister    • Hypertension Brother         Sandro Morales   • Hypertension Sister         Lacey is deceasef   • Thyroid disease Sister      Social History     Socioeconomic History   • Marital status: Single   • Number of children: 0   • Years of education: 12   • Highest education level: High school graduate   Tobacco Use   • Smoking status: Never   • Smokeless tobacco: Never   Vaping Use   • Vaping Use: Never used   Substance and Sexual Activity   • Alcohol use: Yes     Alcohol/week: 1.0 standard drink     Types: 1 Cans of beer per week     Comment: occasional   • Drug use: No   • Sexual activity: Yes     Partners: Male     Birth control/protection: Post-menopausal        Current Outpatient Medications:   •  Blood Glucose Monitoring Suppl (ONE TOUCH ULTRA 2) w/Device kit, 2 (Two) Times a Day. as directed, Disp: , Rfl:   •  cetirizine (zyrTEC) 10 MG tablet, Take 1 tablet by mouth Daily., Disp: 90 tablet, Rfl: 3  •  Cholecalciferol (VITAMIN D3) 5000 UNITS capsule capsule, Take 5,000 Units by mouth Daily., Disp: , Rfl:   •  clotrimazole-betamethasone (LOTRISONE) 1-0.05 % cream, , Disp: , Rfl:   •  estradiol (ESTRACE) 0.1 MG/GM vaginal cream, Insert 0.5 g into the vagina 2 (Two) Times a Week., Disp: 42.5 g, Rfl: 3  •  estradiol (MINIVELLE, VIVELLE-DOT) 0.075 MG/24HR patch, Place 1 patch on the skin as directed by provider 2 (Two) Times a Week., Disp: 24 patch, Rfl: 3  •  fluticasone (Flonase) 50 MCG/ACT nasal spray, 2 sprays into the nostril(s) as directed by provider Daily., Disp: 16 g, Rfl:  3  •  folic acid (FOLVITE) 1 MG tablet, Take 1 tablet by mouth Daily., Disp: 90 tablet, Rfl: 3  •  glucose blood test strip, Use to test blood sugars twice daily, Disp: 100 each, Rfl: 11  •  glucose monitor monitoring kit, 1 each 2 (Two) Times a Day. OneTouch Ultra2, Disp: 1 each, Rfl: 0  •  Lancets misc, Use to test blood sugars twice daily, Disp: 100 each, Rfl: 11  •  latanoprost (XALATAN) 0.005 % ophthalmic solution, , Disp: , Rfl: 1  •  losartan-hydrochlorothiazide (HYZAAR) 50-12.5 MG per tablet, Take 1 tablet by mouth Daily., Disp: 90 tablet, Rfl: 3  •  metFORMIN ER (GLUCOPHAGE-XR) 500 MG 24 hr tablet, Take 1 tablet by mouth 2 (Two) Times a Day With Meals., Disp: 180 tablet, Rfl: 3  •  methocarbamol (ROBAXIN) 500 MG tablet, Take 500 mg by mouth Daily., Disp: , Rfl:   •  Semaglutide (Rybelsus) 7 MG tablet, Take 7 mg by mouth Every Morning Before Breakfast., Disp: 90 tablet, Rfl: 3  •  simvastatin (ZOCOR) 20 MG tablet, Take 1 tablet by mouth every night at bedtime., Disp: 90 tablet, Rfl: 3  •  Stool Softener 100 MG capsule, TAKE 1 CAPSULE BY MOUTH TWICE DAILY AS DIRECTED, Disp: , Rfl:   •  triamcinolone (KENALOG) 0.5 % ointment, Apply  topically to the appropriate area as directed 2 (Two) Times a Day., Disp: 30 g, Rfl: 0  •  Trulance 3 MG tablet, Take 1 tablet by mouth Daily., Disp: , Rfl:     Immunizations   N/A   Prescribed/Refused   Date     Td/Tdap  (Booster Q 10 yrs)   [x]           Prescribed    []     Refused        []           Flu  (Yearly)   [x]        Prescribed    []     Refused        []           Pneumonia      [x]        Prescribed    []     Refused        []                 Hep B     [x]        Prescribed    []     Refused        []           Shingles  (Age 50 and older)   []        Prescribed    [x]     Refused        []           Immunization History   Administered Date(s) Administered   • COVID-19 (PFIZER) BIVALENT BOOSTER 12+YRS 12/22/2022   • COVID-19 (PFIZER) PURPLE CAP 01/27/2021,  02/17/2021, 10/02/2021   • Covid-19 (Pfizer) Gray Cap 05/29/2022   • Flu Vaccine Intradermal Quad 18-64YR 10/03/2019   • Flu Vaccine Quad PF >36MO 08/26/2017, 09/15/2019, 09/06/2020, 09/25/2021   • FluLaval/Fluzone >6mos 08/26/2017, 09/06/2020   • Fluzone Quad >6mos (Multi-dose) 09/22/2018, 09/15/2019   • Hepatitis A 09/22/2018, 03/23/2019   • Influenza Quad Vaccine (Inpatient) 09/11/2016   • Influenza Whole 08/01/2016   • Influenza, Unspecified 08/26/2017, 10/04/2022   • Pneumococcal Conjugate 13-Valent (PCV13) 08/26/2017   • Td 06/03/2002, 11/15/2017   • Tdap 01/19/2012   • flucelvax quad pfs =>4 YRS 10/03/2019     Health Maintenance   Topic Date Due   • ZOSTER VACCINE (1 of 2) Never done   • DIABETIC EYE EXAM  10/15/2020   • URINE MICROALBUMIN  12/20/2022   • Hepatitis B (1 of 3 - 3-dose series) 06/10/2023 (Originally 1965)   • Pneumococcal Vaccine 0-64 (2 - PPSV23 if available, else PCV20) 06/10/2023 (Originally 8/26/2018)   • LIPID PANEL  06/10/2023   • HEMOGLOBIN A1C  06/22/2023   • PAP SMEAR  08/12/2023   • ANNUAL PHYSICAL  12/22/2023   • DIABETIC FOOT EXAM  12/22/2023   • MAMMOGRAM  10/04/2024   • COLORECTAL CANCER SCREENING  01/06/2025   • TDAP/TD VACCINES (5 - Td or Tdap) 11/15/2027   • HEPATITIS C SCREENING  Completed   • COVID-19 Vaccine  Completed   • INFLUENZA VACCINE  Completed        Diabetes  [x] Yes  [] No   N/A      Date     Eye Exam     []             []   Complete     [x]   Recommended Date:  Where:       Foot Exam     []         [x]   Complete          Obesity Counseling     []       [x]   Complete       Hemoglobin A1C   Date Value Ref Range Status   12/22/2022 5.9 % Final   11/05/2019 6.80 (H) 4.80 - 5.60 % Final     Microalbumin, Urine   Date Value Ref Range Status   12/20/2021 <1.2 mg/dL Final       Additional Testing      Date     Colorectal Screening       []   N/A   [x]   Complete    []   Ordered     Date:    Where:       Pap      []   N/A   []   Complete   [x]   OB/GYN  "Date:    Where:       Mammogram        []   N/A   []   Complete   [x]  OB/GYN   []   Ordered Date:    Where:     PSA  (Over age 50)    [x]   N/A   []   Complete   []   Ordered Date:    Where:     US Aorta  (For male smokers, age 65)     [x]   N/A   []   Complete   []   Ordered Date:    Where:     CT for Smoker  (Age 55-75, 30 pk yr)    [x]   N/A   []   Complete   []   Ordered Date:    Where:     Bone Density/DEXA      [x]   N/A   []   Complete   []   Ordered Date:    Follow-up:     Hep. C        []   N/A   [x]   Complete   []   Ordered Date:    Where:     Results for orders placed or performed in visit on 12/22/22   POC Glycosylated Hemoglobin (Hb A1C)    Specimen: Blood   Result Value Ref Range    Hemoglobin A1C 5.9 %    Lot Number [id number]     Expiration Date 10/17/2024             /80   Pulse 72   Temp 97.7 °F (36.5 °C)   Ht 147.3 cm (57.99\")   Wt 64.8 kg (142 lb 12.8 oz)   LMP  (LMP Unknown)   SpO2 95%   BMI 29.85 kg/m²       Physical Exam  Vitals and nursing note reviewed.   Constitutional:       General: She is not in acute distress.     Appearance: Normal appearance. She is well-developed. She is not diaphoretic.   HENT:      Head: Normocephalic and atraumatic.      Right Ear: External ear normal.      Left Ear: External ear normal.      Nose: Nose normal.   Eyes:      General: No scleral icterus.        Right eye: No discharge.         Left eye: No discharge.      Conjunctiva/sclera: Conjunctivae normal.      Pupils: Pupils are equal, round, and reactive to light.   Neck:      Thyroid: No thyromegaly.      Vascular: No JVD.      Trachea: No tracheal deviation.   Cardiovascular:      Rate and Rhythm: Normal rate and regular rhythm.      Pulses:           Dorsalis pedis pulses are 2+ on the right side and 2+ on the left side.        Posterior tibial pulses are 2+ on the right side and 2+ on the left side.      Heart sounds: Normal heart sounds. No murmur heard.    No friction rub. No gallop. "   Pulmonary:      Effort: Pulmonary effort is normal. No respiratory distress or retractions.      Breath sounds: Normal breath sounds. No wheezing or rales.   Chest:      Chest wall: No mass, lacerations, deformity, swelling, tenderness, crepitus or edema.   Breasts:     Breasts are symmetrical.   Abdominal:      General: Bowel sounds are normal. There is no distension.      Palpations: Abdomen is soft. There is no mass.      Tenderness: There is no abdominal tenderness. There is no guarding or rebound.      Hernia: No hernia is present.   Genitourinary:     Comments: deferred  Musculoskeletal:         General: No tenderness ( left medial knee with palpation, no crepitis, laxity noted) or deformity. Normal range of motion.      Cervical back: Normal range of motion and neck supple.   Feet:      Right foot:      Protective Sensation: 7 sites tested. 7 sites sensed.      Skin integrity: No ulcer, blister, warmth or callus.      Toenail Condition: Right toenails are normal. ingrown     Left foot:      Protective Sensation: 7 sites tested. 7 sites sensed.      Skin integrity: No ulcer, blister, warmth or callus.      Toenail Condition: Left toenails are normal. ingrown     Comments: [id number] DIABETIC FOOT EXAM  performed  Lymphadenopathy:      Cervical: No cervical adenopathy.   Skin:     General: Skin is warm and dry.      Coloration: Skin is not pale.      Findings: No erythema or rash.   Neurological:      Mental Status: She is alert and oriented to person, place, and time.      Motor: No abnormal muscle tone.      Deep Tendon Reflexes: Reflexes are normal and symmetric. Reflexes normal.   Psychiatric:         Behavior: Behavior normal.         Thought Content: Thought content normal.         Judgment: Judgment normal.             Counseling provided on weight management, hypertension and diabetes.    Diagnoses and all orders for this visit:    Wellness examination  -     Comprehensive Metabolic Panel; Future  -      Lipid Panel; Future  -     TSH Rfx On Abnormal To Free T4; Future  -     CBC (No Diff); Future  -     MicroAlbumin, Urine, Random - Urine, Clean Catch; Future  -     MicroAlbumin, Urine, Random - Urine, Clean Catch    Type 2 diabetes mellitus with diabetic autonomic neuropathy, without long-term current use of insulin (HCC)  -     POC Glycosylated Hemoglobin (Hb A1C)  -     Comprehensive Metabolic Panel; Future  -     MicroAlbumin, Urine, Random - Urine, Clean Catch; Future  -     metFORMIN ER (GLUCOPHAGE-XR) 500 MG 24 hr tablet; Take 1 tablet by mouth 2 (Two) Times a Day With Meals.  -     Semaglutide (Rybelsus) 7 MG tablet; Take 7 mg by mouth Every Morning Before Breakfast.  -     MicroAlbumin, Urine, Random - Urine, Clean Catch    Essential hypertension  -     Comprehensive Metabolic Panel; Future  -     losartan-hydrochlorothiazide (HYZAAR) 50-12.5 MG per tablet; Take 1 tablet by mouth Daily.    Mixed hyperlipidemia  -     Lipid Panel; Future  -     simvastatin (ZOCOR) 20 MG tablet; Take 1 tablet by mouth every night at bedtime.    Overweight (BMI 25.0-29.9)    Screening for thyroid disorder  -     TSH Rfx On Abnormal To Free T4; Future    Screening for deficiency anemia  -     CBC (No Diff); Future    Vitamin D deficiency  -     Vitamin D,25-Hydroxy; Future    Irritant contact dermatitis due to plants, except food  -     triamcinolone (KENALOG) 0.5 % ointment; Apply  topically to the appropriate area as directed 2 (Two) Times a Day.    Need for COVID-19 vaccine  -     COVID-19 Bivalent Booster (Pfizer) 12+yrs    Other orders  -     Discontinue: meloxicam (MOBIC) 7.5 MG tablet; Take 7.5 mg by mouth Daily.    The preventative exam has been reviewed in detail.  Counseling/Anticipatory Guidance discussion included discussing nutrition needs, physical activity, healthy weight, injury prevention, misuse of tobacco, alcohol and drugs, sexual behavior, dental health, mental health, immunizations, and needed screenings  has been discussed. The patient has been assisted with scheduling healthcare procedures for the coming year and given a written document outlining these recommendations. Age-appropriate screening measures have been ordered for the patient today as indicated above.    Patient instructed to check blood pressure daily, record, and bring to next visit. If the readings run 140/90 or greater, the patient is to call my office or return sooner for evaluation. Pt advised to eat a heart healthy diet and get regular aerobic exercise.    Discussed diabetes diet, Watch carb intake and checking blood sugar as instructed. Record and bring to next visit. Reminded about need for yearly eye exam and foot care. Discussed need for exercise to improve glucose control and overall health.  Patient advised to eat a heart healthy diet and get regular aerobic exercise.    Will obtain routine labs today and make further recommendations pending results. All lab results are automatically released to [employer] immediately when they return whether they have been reviewed or not. The patient will be notified about the results, regardless of the findings. If they have not been contacted by the office within 2 weeks after the test has been performed, I want them to contact us to learn about the results.    Discussed need for weight management.  I recommend they use a weight loss chavo on their phone, eat no more than [id number] rashad/day, and exercise 30 to 60 minutes 3 times a week.  Discussed weight loss with diet, recommend Weight Watchers or Mediterranean Diet, but stated that whatever method works for this patient is best. The patient agrees with all recommendations at this time. I have discussed a weight loss plan to be determined by this patient.      RV- 6 months    Flaquito Wallace, APRN   12/22/2022   09:53 EST          Please note that portions of this document were completed with a voice recognition program.     At [facility], we believe  that sharing information builds trust and better relationships. You are receiving this note because you are receiving care at [facility] or have recently visited. It is possible you will see health information before a provider has talked with you about it. This kind of information can be easy to misunderstand. To help you fully understand what it means for your health, we urge you to discuss this note with your provider.

## 2022-12-22 NOTE — PATIENT INSTRUCTIONS
Carbohydrate Counting for Diabetes Mellitus, Adult  Carbohydrate counting is a method of keeping track of how many carbohydrates you eat. Eating carbohydrates increases the amount of sugar (glucose) in the blood. Counting how many carbohydrates you eat improves how well you manage your blood glucose. This, in turn, helps you manage your diabetes.  Carbohydrates are measured in grams (g) per serving. It is important to know how many carbohydrates (in grams or by serving size) you can have in each meal. This is different for every person. A dietitian can help you make a meal plan and calculate how many carbohydrates you should have at each meal and snack.  What foods contain carbohydrates?  Carbohydrates are found in the following foods:  Grains, such as breads and cereals.  Dried beans and soy products.  Starchy vegetables, such as potatoes, peas, and corn.  Fruit and fruit juices.  Milk and yogurt.  Sweets and snack foods, such as cake, cookies, candy, chips, and soft drinks.  How do I count carbohydrates in foods?  There are two ways to count carbohydrates in food. You can read food labels or learn standard serving sizes of foods. You can use either of these methods or a combination of both.  Using the Nutrition Facts label  The Nutrition Facts list is included on the labels of almost all packaged foods and beverages in the [place]. It includes:  The serving size.  Information about nutrients in each serving, including the grams of carbohydrate per serving.  To use the Nutrition Facts, decide how many servings you will have. Then, multiply the number of servings by the number of carbohydrates per serving. The resulting number is the total grams of carbohydrates that you will be having.  Learning the standard serving sizes of foods  When you eat carbohydrate foods that are not packaged or do not include Nutrition Facts on the label, you need to measure the servings in order to count the grams of  carbohydrates.  Measure the foods that you will eat with a food scale or measuring cup, if needed.  Decide how many standard-size servings you will eat.  Multiply the number of servings by 15. For foods that contain carbohydrates, one serving equals 15 g of carbohydrates.  For example, if you eat 2 cups or 10 oz (300 g) of strawberries, you will have eaten 2 servings and 30 g of carbohydrates (2 servings x 15 g = 30 g).  For foods that have more than one food mixed, such as soups and casseroles, you must count the carbohydrates in each food that is included.  The following list contains standard serving sizes of common carbohydrate-rich foods. Each of these servings has about 15 g of carbohydrates:  1 slice of bread.  1 six-inch (15 cm) tortilla.  ? cup or 2 oz (53 g) cooked rice or pasta.  ½ cup or 3 oz (85 g) cooked or canned, drained and rinsed beans or lentils.  ½ cup or 3 oz (85 g) starchy vegetable, such as peas, corn, or squash.  ½ cup or 4 oz (120 g) hot cereal.  ½ cup or 3 oz (85 g) boiled or mashed potatoes, or ¼ or 3 oz (85 g) of a large baked potato.  ½ cup or 4 fl oz (118 mL) fruit juice.  1 cup or 8 fl oz (237 mL) milk.  1 small or 4 oz (106 g) apple.  ½ or 2 oz (63 g) of a medium banana.  1 cup or 5 oz (150 g) strawberries.  3 cups or 1 oz (28.3 g) popped popcorn.  What is an example of carbohydrate counting?  To calculate the grams of carbohydrates in this sample meal, follow the steps shown below.  Sample meal  3 oz (85 g) chicken breast.  ? cup or 4 oz (106 g) brown rice.  ½ cup or 3 oz (85 g) corn.  1 cup or 8 fl oz (237 mL) milk.  1 cup or 5 oz (150 g) strawberries with sugar-free whipped topping.  Carbohydrate calculation  Identify the foods that contain carbohydrates:  Rice.  Corn.  Milk.  Strawberries.  Calculate how many servings you have of each food:  2 servings rice.  1 serving corn.  1 serving milk.  1 serving strawberries.  Multiply each number of servings by 15  servings rice x 15  g = 30 g.  1 serving corn x 15 g = 15 g.  1 serving milk x 15 g = 15 g.  1 serving strawberries x 15 g = 15 g.  Add together all of the amounts to find the total grams of carbohydrates eaten:  30 g + 15 g + 15 g + 15 g = 75 g of carbohydrates total.  What are tips for following this plan?  Shopping  Develop a meal plan and then make a shopping list.  Buy fresh and frozen vegetables, fresh and frozen fruit, dairy, eggs, beans, lentils, and whole grains.  Look at food labels. Choose foods that have more fiber and less sugar.  Avoid processed foods and foods with added sugars.  Meal planning  Aim to have the same number of grams of carbohydrates at each meal and for each snack time.  Plan to have regular, balanced meals and snacks.  Where to find more information  American Diabetes Association: diabetes.org  Centers for Disease Control and Prevention: cdc.gov  Academy of Nutrition and Dietetics: eatright.org  Association of Diabetes Care & Education Specialists: diabeteseducator.org  Summary  Carbohydrate counting is a method of keeping track of how many carbohydrates you eat.  Eating carbohydrates increases the amount of sugar (glucose) in your blood.  Counting how many carbohydrates you eat improves how well you manage your blood glucose. This helps you manage your diabetes.  A dietitian can help you make a meal plan and calculate how many carbohydrates you should have at each meal and snack.  This information is not intended to replace advice given to you by your health care provider. Make sure you discuss any questions you have with your health care provider.  Document Revised: 07/21/2021 Document Reviewed: 07/21/2021  [employer] Patient Education © 2022 [employer]  Obesity, Adult  Obesity is the condition of having too much total body fat. Being overweight or obese means that your weight is greater than what is considered healthy for your body size. Obesity is determined by a measurement called BMI (body mass  index). BMI is an estimate of body fat and is calculated from height and weight. For adults, a BMI of 30 or higher is considered obese.  Obesity can lead to other health concerns and major illnesses, including:  Stroke.  Coronary artery disease (CAD).  Type 2 diabetes.  Some types of cancer, including cancers of the colon, breast, uterus, and gallbladder.  High blood pressure (hypertension).  High cholesterol.  Gallbladder stones.  Obesity can also contribute to:  Osteoarthritis.  Sleep apnea.  Infertility problems.  What are the causes?  Common causes of this condition include:  Eating daily meals that are high in calories, sugar, and fat.  Drinking high amounts of sugar-sweetened beverages, such as soft drinks.  Being born with genes that may make you more likely to become obese.  Having a medical condition that causes obesity, including:  Hypothyroidism.  Polycystic ovarian syndrome (PCOS).  Binge-eating disorder.  Cushing syndrome.  Taking certain medicines, such as steroids, antidepressants, and seizure medicines.  Not being physically active (sedentary lifestyle).  Not getting enough sleep.  What increases the risk?  The following factors may make you more likely to develop this condition:  Having a family history of obesity.  Living in an area with limited access to:  Stock, recreation centers, or sidewalks.  Healthy food choices, such as grocery stores and [employer] markets.  What are the signs or symptoms?  The main sign of this condition is having too much body fat.  How is this diagnosed?  This condition is diagnosed based on:  Your BMI. If you are an adult with a BMI of 30 or higher, you are considered obese.  Your waist circumference. This measures the distance around your waistline.  Your skinfold thickness. Your health care provider may gently pinch a fold of your skin and measure it.  You may have other tests to check for underlying conditions.  How is this treated?  Treatment for this condition often  includes changing your lifestyle. Treatment may include some or all of the following:  Dietary changes. This may include developing a healthy meal plan.  Regular physical activity. This may include activity that causes your heart to beat faster (aerobic exercise) and strength training. Work with your health care provider to design an exercise program that works for you.  Medicine to help you lose weight if you are unable to lose one pound a week after six weeks of healthy eating and more physical activity.  Treating conditions that cause the obesity (underlying conditions).  Surgery. Surgical options may include gastric banding and gastric bypass. Surgery may be done if:  Other treatments have not helped to improve your condition.  You have a BMI of 40 or higher.  You have life-threatening health problems related to obesity.  Follow these instructions at home:  Eating and drinking    Follow recommendations from your health care provider about what you eat and drink. Your health care provider may advise you to:  Limit fast food, sweets, and processed snack foods.  Choose low-fat options, such as low-fat milk instead of whole milk.  Eat five or more servings of fruits or vegetables every day.  Choose healthy foods when you eat out.  Keep low-fat snacks available.  Limit sugary drinks, such as soda, fruit juice, sweetened iced tea, and flavored milk.  Drink enough water to keep your urine pale yellow.  Do not follow a fad diet. Fad diets can be unhealthy and even dangerous.  Other healthful choices include:  Eat at home more often. This gives you more control over what you eat.  Learn to read food labels. This will help you understand how much food is considered one serving.  Learn what a healthy serving size is.  Physical activity  Exercise regularly, as told by your health care provider.  Most adults should get up to 150 minutes of moderate-intensity exercise every week.  Ask your health care provider what types of  exercise are safe for you and how often you should exercise.  Warm up and stretch before being active.  Cool down and stretch after being active.  Rest between periods of activity.  Lifestyle  Work with your health care provider and a dietitian to set a weight-loss goal that is healthy and reasonable for you.  Limit your screen time.  Find ways to reward yourself that do not involve food.  Do not drink alcohol if:  Your health care provider tells you not to drink.  You are pregnant, may be pregnant, or are planning to become pregnant.  If you drink alcohol:  Limit how much you have to:  0-1 drink a day for women.  0-2 drinks a day for men.  Know how much alcohol is in your drink. In the [place] one drink equals one 12 oz bottle of beer (355 mL), one 5 oz glass of wine (148 mL), or one 1½ oz glass of hard liquor (44 mL).  General instructions  Keep a weight-loss journal to keep track of the food you eat and how much exercise you get.  Take over-the-counter and prescription medicines only as told by your health care provider.  Take vitamins and supplements only as told by your health care provider.  Consider joining a support group. Your health care provider may be able to recommend a support group.  Pay attention to your mental health as obesity can lead to depression or self esteem issues.  Keep all follow-up visits. This is important.  Contact a health care provider if:  You are unable to meet your weight-loss goal after six weeks of dietary and lifestyle changes.  You have trouble breathing.  Summary  Obesity is the condition of having too much total body fat.  Being overweight or obese means that your weight is greater than what is considered healthy for your body size.  Work with your health care provider and a dietitian to set a weight-loss goal that is healthy and reasonable for you.  Exercise regularly, as told by your health care provider. Ask your health care provider what types of exercise are safe for you  and how often you should exercise.  This information is not intended to replace advice given to you by your health care provider. Make sure you discuss any questions you have with your health care provider.  Document Revised: 07/26/2022 Document Reviewed: 07/26/2022  [employer] Patient Education © 2022 [employer]  Managing Your Hypertension  Hypertension, also called high blood pressure, is when the force of the blood pressing against the walls of the arteries is too strong. Arteries are blood vessels that carry blood from your heart throughout your body. Hypertension forces the heart to work harder to pump blood and may cause the arteries to become narrow or stiff.  Understanding blood pressure readings  A blood pressure reading includes a higher number over a lower number:  The first, or top, number is called the systolic pressure. It is a measure of the pressure in your arteries as your heart beats.  The second, or bottom number, is called the diastolic pressure. It is a measure of the pressure in your arteries as the heart relaxes.  For most people, a normal blood pressure is below 120/80. Your personal target blood pressure may vary depending on your medical conditions, your age, and other factors.  Blood pressure is classified into four stages. Based on your blood pressure reading, your health care provider may use the following stages to determine what type of treatment you need, if any. Systolic pressure and diastolic pressure are measured in a unit called millimeters of mercury (mmHg).  Normal  Systolic pressure: below 120.  Diastolic pressure: below 80.  Elevated  Systolic pressure: 120-129.  Diastolic pressure: below 80.  Hypertension stage 1  Systolic pressure: 130-139.  Diastolic pressure: 80-89.  Hypertension stage 2  Systolic pressure: 140 or above.  Diastolic pressure: 90 or above.  How can this condition affect me?  Managing your hypertension is very important. Over time, hypertension can damage  the arteries and decrease blood flow to parts of the body, including the brain, heart, and kidneys. Having untreated or uncontrolled hypertension can lead to:  A heart attack.  A stroke.  A weakened blood vessel (aneurysm).  Heart failure.  Kidney damage.  Eye damage.  Memory and concentration problems.  Vascular dementia.  What actions can I take to manage this condition?  Hypertension can be managed by making lifestyle changes and possibly by taking medicines. Your health care provider will help you make a plan to bring your blood pressure within a normal range. You may be referred for counseling on a healthy diet and physical activity.  Nutrition    Eat a diet that is high in fiber and potassium, and low in salt (sodium), added sugar, and fat. An example eating plan is called the DASH diet. DASH stands for Dietary Approaches to Stop Hypertension. To eat this way:  Eat plenty of fresh fruits and vegetables. Try to fill one-half of your plate at each meal with fruits and vegetables.  Eat whole grains, such as whole-wheat pasta, brown rice, or whole-grain bread. Fill about one-fourth of your plate with whole grains.  Eat low-fat dairy products.  Avoid fatty cuts of meat, processed or cured meats, and poultry with skin. Fill about one-fourth of your plate with lean proteins such as fish, chicken without skin, beans, eggs, and tofu.  Avoid pre-made and processed foods. These tend to be higher in sodium, added sugar, and fat.  Reduce your daily sodium intake. Many people with hypertension should eat less than 1,500 mg of sodium a day.  Lifestyle    Work with your health care provider to maintain a healthy body weight or to lose weight. Ask what an ideal weight is for you.  Get at least 30 minutes of exercise that causes your heart to beat faster (aerobic exercise) most days of the week. Activities may include walking, swimming, or biking.  Include exercise to strengthen your muscles (resistance exercise), such as  weight lifting, as part of your weekly exercise routine. Try to do these types of exercises for 30 minutes at least 3 days a week.  Do not use any products that contain nicotine or tobacco. These products include cigarettes, chewing tobacco, and vaping devices, such as e-cigarettes. If you need help quitting, ask your health care provider.  Control any long-term (chronic) conditions you have, such as high cholesterol or diabetes.  Identify your sources of stress and find ways to manage stress. This may include meditation, deep breathing, or making time for fun activities.  Alcohol use  Do not drink alcohol if:  Your health care provider tells you not to drink.  You are pregnant, may be pregnant, or are planning to become pregnant.  If you drink alcohol:  Limit how much you have to:  0-1 drink a day for women.  0-2 drinks a day for men.  Know how much alcohol is in your drink. In the [place] one drink equals one 12 oz bottle of beer (355 mL), one 5 oz glass of wine (148 mL), or one 1½ oz glass of hard liquor (44 mL).  Medicines  Your health care provider may prescribe medicine if lifestyle changes are not enough to get your blood pressure under control and if:  Your systolic blood pressure is 130 or higher.  Your diastolic blood pressure is 80 or higher.  Take medicines only as told by your health care provider. Follow the directions carefully. Blood pressure medicines must be taken as told by your health care provider. The medicine does not work as well when you skip doses. Skipping doses also puts you at risk for problems.  Monitoring  Before you monitor your blood pressure:  Do not smoke, drink caffeinated beverages, or exercise within 30 minutes before taking a measurement.  Use the bathroom and empty your bladder (urinate).  Sit quietly for at least 5 minutes before taking measurements.  Monitor your blood pressure at home as told by your health care provider. To do this:  Sit with your back straight and  supported.  Place your feet flat on the floor. Do not cross your legs.  Support your arm on a flat surface, such as a table. Make sure your upper arm is at heart level.  Each time you measure, take two or three readings one minute apart and record the results.  You may also need to have your blood pressure checked regularly by your health care provider.  General information  Talk with your health care provider about your diet, exercise habits, and other lifestyle factors that may be contributing to hypertension.  Review all the medicines you take with your health care provider because there may be side effects or interactions.  Keep all follow-up visits. Your health care provider can help you create and adjust your plan for managing your high blood pressure.  Where to find more information  National Heart, Lung, and Blood [place]: www.nhlbi.nih.gov  American Heart Association: www.heart.org  Contact a health care provider if:  You think you are having a reaction to medicines you have taken.  You have repeated (recurrent) headaches.  You feel dizzy.  You have swelling in your ankles.  You have trouble with your vision.  Get help right away if:  You develop a severe headache or confusion.  You have unusual weakness or numbness, or you feel faint.  You have severe pain in your chest or abdomen.  You vomit repeatedly.  You have trouble breathing.  These symptoms may be an emergency. Get help right away. Call 911.  Do not wait to see if the symptoms will go away.  Do not drive yourself to the hospital.  Summary  Hypertension is when the force of blood pumping through your arteries is too strong. If this condition is not controlled, it may put you at risk for serious complications.  Your personal target blood pressure may vary depending on your medical conditions, your age, and other factors. For most people, a normal blood pressure is less than 120/80.  Hypertension is managed by lifestyle changes, medicines, or  "both.  Lifestyle changes to help manage hypertension include losing weight, eating a healthy, low-sodium diet, exercising more, stopping smoking, and limiting alcohol.  This information is not intended to replace advice given to you by your health care provider. Make sure you discuss any questions you have with your health care provider.  Document Revised: 09/01/2022 Document Reviewed: 09/01/2022  [employer] Patient Education © 2022 [employer]  DASH Eating Plan  DASH stands for Dietary Approaches to Stop Hypertension. The DASH eating plan is a healthy eating plan that has been shown to:  Reduce high blood pressure (hypertension).  Reduce your risk for type 2 diabetes, heart disease, and stroke.  Help with weight loss.  What are tips for following this plan?  Reading food labels  Check food labels for the amount of salt (sodium) per serving. Choose foods with less than 5 percent of the Daily Value of sodium. Generally, foods with less than 300 milligrams (mg) of sodium per serving fit into this eating plan.  To find whole grains, look for the word \"whole\" as the first word in the ingredient list.  Shopping  Buy products labeled as \"low-sodium\" or \"no salt added.\"  Buy fresh foods. Avoid canned foods and pre-made or frozen meals.  Cooking  Avoid adding salt when cooking. Use salt-free seasonings or herbs instead of table salt or sea salt. Check with your health care provider or pharmacist before using salt substitutes.  Do not redman foods. Cook foods using healthy methods such as baking, boiling, grilling, roasting, and broiling instead.  Cook with heart-healthy oils, such as olive, canola, avocado, soybean, or sunflower oil.  Meal planning    Eat a balanced diet that includes:  4 or more servings of fruits and 4 or more servings of vegetables each day. Try to fill one-half of your plate with fruits and vegetables.  6-8 servings of whole grains each day.  Less than 6 oz (170 g) of lean meat, poultry, or fish each day. " A 3-oz (85-g) serving of meat is about the same size as a deck of cards. One egg equals 1 oz (28 g).  2-3 servings of low-fat dairy each day. One serving is 1 cup (237 mL).  1 serving of nuts, seeds, or beans 5 times each week.  2-3 servings of heart-healthy fats. Healthy fats called omega-3 fatty acids are found in foods such as walnuts, flaxseeds, fortified milks, and eggs. These fats are also found in cold-water fish, such as sardines, salmon, and mackerel.  Limit how much you eat of:  Canned or prepackaged foods.  Food that is high in trans fat, such as some fried foods.  Food that is high in saturated fat, such as fatty meat.  Desserts and other sweets, sugary drinks, and other foods with added sugar.  Full-fat dairy products.  Do not salt foods before eating.  Do not eat more than 4 egg yolks a week.  Try to eat at least 2 vegetarian meals a week.  Eat more home-cooked food and less restaurant, buffet, and fast food.  Lifestyle  When eating at a restaurant, ask that your food be prepared with less salt or no salt, if possible.  If you drink alcohol:  Limit how much you use to:  0-1 drink a day for women who are not pregnant.  0-2 drinks a day for men.  Be aware of how much alcohol is in your drink. In the [place] one drink equals one 12 oz bottle of beer (355 mL), one 5 oz glass of wine (148 mL), or one 1½ oz glass of hard liquor (44 mL).  General information  Avoid eating more than 2,300 mg of salt a day. If you have hypertension, you may need to reduce your sodium intake to 1,500 mg a day.  Work with your health care provider to maintain a healthy body weight or to lose weight. Ask what an ideal weight is for you.  Get at least 30 minutes of exercise that causes your heart to beat faster (aerobic exercise) most days of the week. Activities may include walking, swimming, or biking.  Work with your health care provider or dietitian to adjust your eating plan to your individual calorie needs.  What foods should I  eat?  Fruits  All fresh, dried, or frozen fruit. Canned fruit in natural juice (without added sugar).  Vegetables  Fresh or frozen vegetables (raw, steamed, roasted, or grilled). Low-sodium or reduced-sodium tomato and vegetable juice. Low-sodium or reduced-sodium tomato sauce and tomato paste. Low-sodium or reduced-sodium canned vegetables.  Grains  Whole-grain or whole-wheat bread. Whole-grain or whole-wheat pasta. Brown rice. Oatmeal. Quinoa. Bulgur. Whole-grain and low-sodium cereals. Valorie bread. Low-fat, low-sodium crackers. Whole-wheat flour tortillas.  Meats and other proteins  Skinless chicken or turkey. Ground chicken or turkey. Pork with fat trimmed off. Fish and seafood. Egg whites. Dried beans, peas, or lentils. Unsalted nuts, nut butters, and seeds. Unsalted canned beans. Lean cuts of beef with fat trimmed off. Low-sodium, lean precooked or cured meat, such as sausages or meat loaves.  Dairy  Low-fat (1%) or fat-free (skim) milk. Reduced-fat, low-fat, or fat-free cheeses. Nonfat, low-sodium ricotta or cottage cheese. Low-fat or nonfat yogurt. Low-fat, low-sodium cheese.  Fats and oils  Soft margarine without trans fats. Vegetable oil. Reduced-fat, low-fat, or light mayonnaise and salad dressings (reduced-sodium). Canola, safflower, olive, avocado, soybean, and sunflower oils. Avocado.  Seasonings and condiments  Herbs. Spices. Seasoning mixes without salt.  Other foods  Unsalted popcorn and pretzels. Fat-free sweets.  The items listed above may not be a complete list of foods and beverages you can eat. Contact a dietitian for more information.  What foods should I avoid?  Fruits  Canned fruit in a light or heavy syrup. Fried fruit. Fruit in cream or butter sauce.  Vegetables  Creamed or fried vegetables. Vegetables in a cheese sauce. Regular canned vegetables (not low-sodium or reduced-sodium). Regular canned tomato sauce and paste (not low-sodium or reduced-sodium). Regular tomato and vegetable juice  (not low-sodium or reduced-sodium). Pickles. Olives.  Grains  Baked goods made with fat, such as croissants, muffins, or some breads. Dry pasta or rice meal packs.  Meats and other proteins  Fatty cuts of meat. Ribs. Fried meat. Miller. Bologna, salami, and other precooked or cured meats, such as sausages or meat loaves. Fat from the back of a pig (fatback). Bratwurst. Salted nuts and seeds. Canned beans with added salt. Canned or smoked fish. Whole eggs or egg yolks. Chicken or turkey with skin.  Dairy  Whole or 2% milk, cream, and half-and-half. Whole or full-fat cream cheese. Whole-fat or sweetened yogurt. Full-fat cheese. Nondairy creamers. Whipped toppings. Processed cheese and cheese spreads.  Fats and oils  Butter. Stick margarine. Lard. Shortening. Ghee. Miller fat. Tropical oils, such as coconut, palm kernel, or palm oil.  Seasonings and condiments  Onion salt, garlic salt, seasoned salt, table salt, and sea salt. Worcestershire sauce. Tartar sauce. Barbecue sauce. Teriyaki sauce. Soy sauce, including reduced-sodium. Steak sauce. Canned and packaged gravies. Fish sauce. Oyster sauce. Cocktail sauce. Store-bought horseradish. Ketchup. Mustard. Meat flavorings and tenderizers. Bouillon cubes. Hot sauces. Pre-made or packaged marinades. Pre-made or packaged taco seasonings. Relishes. Regular salad dressings.  Other foods  Salted popcorn and pretzels.  The items listed above may not be a complete list of foods and beverages you should avoid. Contact a dietitian for more information.  Where to find more information  National Heart, Lung, and Blood [place]: www.nhlbi.nih.gov  American Heart Association: www.heart.org  Academy of Nutrition and Dietetics: www.eatright.org  National Kidney Foundation: www.kidney.org  Summary  The DASH eating plan is a healthy eating plan that has been shown to reduce high blood pressure (hypertension). It may also reduce your risk for type 2 diabetes, heart disease, and  stroke.  When on the DASH eating plan, aim to eat more fresh fruits and vegetables, whole grains, lean proteins, low-fat dairy, and heart-healthy fats.  With the DASH eating plan, you should limit salt (sodium) intake to 2,300 mg a day. If you have hypertension, you may need to reduce your sodium intake to 1,500 mg a day.  Work with your health care provider or dietitian to adjust your eating plan to your individual calorie needs.  This information is not intended to replace advice given to you by your health care provider. Make sure you discuss any questions you have with your health care provider.  Document Revised: 11/20/2020 Document Reviewed: 11/20/2020  Elsevier Patient Education © 2022 Elsevier Inc.

## 2022-12-23 LAB — ALBUMIN UR-MCNC: <1.2 MG/DL

## 2022-12-27 ENCOUNTER — PATIENT MESSAGE (OUTPATIENT)
Dept: FAMILY MEDICINE CLINIC | Facility: CLINIC | Age: 57
End: 2022-12-27
Payer: COMMERCIAL

## 2022-12-27 ENCOUNTER — PATIENT MESSAGE (OUTPATIENT)
Dept: FAMILY MEDICINE CLINIC | Facility: CLINIC | Age: 57
End: 2022-12-27

## 2022-12-27 DIAGNOSIS — E11.43 TYPE 2 DIABETES MELLITUS WITH DIABETIC AUTONOMIC NEUROPATHY, WITHOUT LONG-TERM CURRENT USE OF INSULIN: ICD-10-CM

## 2022-12-27 DIAGNOSIS — H69.83 DYSFUNCTION OF BOTH EUSTACHIAN TUBES: ICD-10-CM

## 2022-12-28 ENCOUNTER — TELEPHONE (OUTPATIENT)
Dept: OBSTETRICS AND GYNECOLOGY | Facility: CLINIC | Age: 57
End: 2022-12-28

## 2022-12-28 ENCOUNTER — TELEPHONE (OUTPATIENT)
Dept: FAMILY MEDICINE CLINIC | Facility: CLINIC | Age: 57
End: 2022-12-28

## 2022-12-28 ENCOUNTER — PATIENT MESSAGE (OUTPATIENT)
Dept: OBSTETRICS AND GYNECOLOGY | Facility: CLINIC | Age: 57
End: 2022-12-28
Payer: COMMERCIAL

## 2022-12-28 NOTE — TELEPHONE ENCOUNTER
Caller: Alexander Morales    Relationship: Self    Best call back number: [phone]    What form or medical record are you requesting: LIST OF APPROVED FOODS TO EAT/NOT EAT DUE TO HER TRIGLYCERIDES ARE HIGH    Who is requesting this form or medical record from you: ALEXANDER MORALES    How would you like to receive the form or medical records (pick-up, mail, fax): WILL      Timeframe paperwork needed: 1066-1003    Additional notes: PLEASE CALL WHEN READY

## 2022-12-28 NOTE — TELEPHONE ENCOUNTER
Contacted patient to discuss diet options over the phone. Advised her to visit [place] for additional resources

## 2022-12-28 NOTE — TELEPHONE ENCOUNTER
Pt called and states she had blood work done at her annual and was charged for it. She said that [facility] told her that we needed to resubmit it with a different code. She called about this on 11/01/2022 and 12/07/2022 and she has not gotten anything resolved. Marlee told me to forward it on to you to look at.

## 2022-12-29 NOTE — TELEPHONE ENCOUNTER
"Pt was having vaginal odor and that is why Dr. Oliver performed a Nuswab. This was not a \"routine annual\" test so the bill for $127.85 is her part from deductible. Pt was notified and states she understands.  "

## 2023-01-03 DIAGNOSIS — H69.83 DYSFUNCTION OF BOTH EUSTACHIAN TUBES: ICD-10-CM

## 2023-01-03 RX ORDER — CETIRIZINE HYDROCHLORIDE 10 MG/1
TABLET ORAL
Qty: 30 TABLET | OUTPATIENT
Start: 2023-01-03

## 2023-01-03 RX ORDER — CETIRIZINE HYDROCHLORIDE 10 MG/1
10 TABLET ORAL DAILY
Qty: 90 TABLET | Refills: 1 | Status: SHIPPED | OUTPATIENT
Start: 2023-01-03

## 2023-01-03 NOTE — TELEPHONE ENCOUNTER
Rx Refill Note  Requested Prescriptions     Pending Prescriptions Disp Refills   • cetirizine (zyrTEC) 10 MG tablet 90 tablet 3     Sig: Take 1 tablet by mouth Daily.     Signed Prescriptions Disp Refills   • glucose blood test strip 100 each 11     Sig: Use to test blood sugars twice daily     Authorizing Provider: ALEXI ELLIOTT     Ordering User: JOSETTE MORENO      Last office visit with prescribing clinician: 12/22/2022    Next office visit with prescribing clinician: 06/23/2023  Josette Moreno MA  01/03/23, 09:27 EST     Last fill: 12/20/2021

## 2023-01-03 NOTE — TELEPHONE ENCOUNTER
Rx Refill Note  Requested Prescriptions     Pending Prescriptions Disp Refills   • cetirizine (zyrTEC) 10 MG tablet [Pharmacy Med Name: CETIRIZINE 10MG TABLETS] 30 tablet      Sig: TAKE 1 TABLET BY MOUTH EVERY DAY      Last office visit with prescribing clinician: 12/22/2022   Last telemedicine visit with prescribing clinician: 6/23/2023   Next office visit with prescribing clinician: 6/23/2023                         Would you like a call back once the refill request has been completed: [] Yes [] No    If the office needs to give you a call back, can they leave a voicemail: [] Yes [] No    Pastor Moore MA  01/03/23, 13:07 EST     DUPLICATE

## 2023-01-03 NOTE — TELEPHONE ENCOUNTER
Rx Refill Note  Requested Prescriptions     Pending Prescriptions Disp Refills   • glucose blood test strip 100 each 11     Sig: Use to test blood sugars twice daily      Last office visit with prescribing clinician: 12/22/2022   Next office visit with prescribing clinician: 06/23/2023  Josette Polanco MA  01/03/23, 08:25 EST     Last fill: 12/7/2021

## 2023-01-26 DIAGNOSIS — Z51.81 ENCOUNTER FOR THERAPEUTIC DRUG MONITORING: ICD-10-CM

## 2023-01-26 DIAGNOSIS — E78.2 MIXED HYPERLIPIDEMIA: Primary | ICD-10-CM

## 2023-01-26 RX ORDER — ROSUVASTATIN CALCIUM 40 MG/1
40 TABLET, COATED ORAL DAILY
Qty: 90 TABLET | Refills: 0 | Status: SHIPPED | OUTPATIENT
Start: 2023-01-26

## 2023-02-09 ENCOUNTER — PATIENT MESSAGE (OUTPATIENT)
Dept: FAMILY MEDICINE CLINIC | Facility: CLINIC | Age: 58
End: 2023-02-09
Payer: COMMERCIAL

## 2023-02-15 ENCOUNTER — PATIENT MESSAGE (OUTPATIENT)
Dept: FAMILY MEDICINE CLINIC | Facility: CLINIC | Age: 58
End: 2023-02-15
Payer: COMMERCIAL

## 2023-02-20 ENCOUNTER — PATIENT MESSAGE (OUTPATIENT)
Dept: FAMILY MEDICINE CLINIC | Facility: CLINIC | Age: 58
End: 2023-02-20
Payer: COMMERCIAL

## 2023-03-08 DIAGNOSIS — D64.9 ANEMIA, UNSPECIFIED TYPE: ICD-10-CM

## 2023-03-08 DIAGNOSIS — I10 ESSENTIAL HYPERTENSION: ICD-10-CM

## 2023-03-08 DIAGNOSIS — E11.43 TYPE 2 DIABETES MELLITUS WITH DIABETIC AUTONOMIC NEUROPATHY, WITHOUT LONG-TERM CURRENT USE OF INSULIN: ICD-10-CM

## 2023-03-08 RX ORDER — FOLIC ACID 1 MG/1
1000 TABLET ORAL DAILY
Qty: 90 TABLET | Refills: 3 | Status: SHIPPED | OUTPATIENT
Start: 2023-03-08

## 2023-03-08 RX ORDER — LOSARTAN POTASSIUM AND HYDROCHLOROTHIAZIDE 12.5; 5 MG/1; MG/1
1 TABLET ORAL DAILY
Qty: 90 TABLET | Refills: 3 | Status: SHIPPED | OUTPATIENT
Start: 2023-03-08

## 2023-03-08 RX ORDER — METFORMIN HYDROCHLORIDE 500 MG/1
500 TABLET, EXTENDED RELEASE ORAL 2 TIMES DAILY WITH MEALS
Qty: 180 TABLET | Refills: 3 | Status: SHIPPED | OUTPATIENT
Start: 2023-03-08

## 2023-03-08 NOTE — TELEPHONE ENCOUNTER
Rx Refill Note  Requested Prescriptions     Signed Prescriptions Disp Refills   • metFORMIN ER (GLUCOPHAGE-XR) 500 MG 24 hr tablet 180 tablet 3     Sig: Take 1 tablet by mouth 2 (Two) Times a Day With Meals.     Authorizing Provider: ALEXI ELLIOTT     Ordering User: EMILY BABIN   • folic acid (FOLVITE) 1 MG tablet 90 tablet 3     Sig: Take 1 tablet by mouth Daily.     Authorizing Provider: ALEXI ELLIOTT     Ordering User: EMILY BABIN   • losartan-hydrochlorothiazide (HYZAAR) 50-12.5 MG per tablet 90 tablet 3     Sig: Take 1 tablet by mouth Daily.     Authorizing Provider: ALEXI ELLIOTT     Ordering User: EMILY BABIN      Last office visit with prescribing clinician: 12/22/2022   Last telemedicine visit with prescribing clinician: 6/23/2023   Next office visit with prescribing clinician: 6/23/2023                         Would you like a call back once the refill request has been completed: [] Yes [] No    If the office needs to give you a call back, can they leave a voicemail: [] Yes [] No    Emily Babin MA  03/08/23, 09:49 EST

## 2023-03-24 ENCOUNTER — PATIENT MESSAGE (OUTPATIENT)
Dept: FAMILY MEDICINE CLINIC | Facility: CLINIC | Age: 58
End: 2023-03-24
Payer: COMMERCIAL

## 2023-04-25 DIAGNOSIS — E78.2 MIXED HYPERLIPIDEMIA: ICD-10-CM

## 2023-04-25 RX ORDER — ROSUVASTATIN CALCIUM 40 MG/1
40 TABLET, COATED ORAL DAILY
Qty: 90 TABLET | Refills: 0 | Status: SHIPPED | OUTPATIENT
Start: 2023-04-25

## 2023-04-25 NOTE — TELEPHONE ENCOUNTER
Rx Refill Note  Requested Prescriptions     Pending Prescriptions Disp Refills   • rosuvastatin (Crestor) 40 MG tablet 90 tablet 0     Sig: Take 1 tablet by mouth Daily.      Last office visit with prescribing clinician: 12/22/2022   Last telemedicine visit with prescribing clinician: 6/23/2023   Next office visit with prescribing clinician: 6/23/2023                         Would you like a call back once the refill request has been completed: [] Yes [] No    If the office needs to give you a call back, can they leave a voicemail: [] Yes [] No    Keya Simon MA  04/25/23, 09:55 EDT

## 2023-04-28 ENCOUNTER — PATIENT MESSAGE (OUTPATIENT)
Dept: OBSTETRICS AND GYNECOLOGY | Facility: CLINIC | Age: 58
End: 2023-04-28
Payer: COMMERCIAL

## 2023-04-28 DIAGNOSIS — R92.8 ABNORMAL MAMMOGRAM: Primary | ICD-10-CM

## 2023-05-01 NOTE — TELEPHONE ENCOUNTER
From: Nelli Morales  To: Hiro Oliver  Sent: 4/28/2023 12:08 PM EDT  Subject: Breast imaging     I have to take another breast imaging test and they said that you will have to request it @ [facility] @ [phone]. Thanks

## 2023-05-08 ENCOUNTER — TELEPHONE (OUTPATIENT)
Dept: OBSTETRICS AND GYNECOLOGY | Facility: CLINIC | Age: 58
End: 2023-05-08
Payer: COMMERCIAL

## 2023-05-08 DIAGNOSIS — Z80.3 FAMILY HISTORY OF BREAST CANCER IN MOTHER: Primary | ICD-10-CM

## 2023-05-08 DIAGNOSIS — Z12.31 ENCOUNTER FOR SCREENING MAMMOGRAM FOR MALIGNANT NEOPLASM OF BREAST: ICD-10-CM

## 2023-05-08 NOTE — TELEPHONE ENCOUNTER
Referral for US of Right Breast Complete was sent over for this patient, radiology sent the referral back stating that due to pts age she will need a diagnostic mammogram. Please advise on next steps

## 2023-05-11 ENCOUNTER — TELEPHONE (OUTPATIENT)
Dept: FAMILY MEDICINE CLINIC | Facility: CLINIC | Age: 58
End: 2023-05-11
Payer: COMMERCIAL

## 2023-05-11 RX ORDER — BROMPHENIRAMINE MALEATE, PSEUDOEPHEDRINE HYDROCHLORIDE, AND DEXTROMETHORPHAN HYDROBROMIDE 2; 30; 10 MG/5ML; MG/5ML; MG/5ML
5 SYRUP ORAL 4 TIMES DAILY PRN
Qty: 120 ML | Refills: 1 | Status: SHIPPED | OUTPATIENT
Start: 2023-05-11

## 2023-05-11 NOTE — TELEPHONE ENCOUNTER
Caller: Nelli Morales    Relationship: Self    Best call back number:  OK TO LEAVE MS    What medication are you requesting: SOMETHING FOR DRAINAGE PHELM    What are your current symptoms: COUGH DUE TO DRAINAGE, CONGESTION, PHELM SOMETIMES COLORED    How long have you been experiencing symptoms: [id number] TUES    Have you had these symptoms before:    [x] Yes  [] No    Have you been treated for these symptoms before:   [] Yes  [] No    If a prescription is needed, what is your preferred pharmacy and phone number: [id number] - [place], [place] - [address] AT [facility] [address] [place] - [phone] [facility] [phone] FX     Additional notes: PATIENT IS AT WORK AND UNABLE TO MAKE AN APPT TODAY        PLEASE CALL TO ADVISE

## 2023-05-11 NOTE — TELEPHONE ENCOUNTER
Caller: Nelli Morales    Relationship: Self    Best call back number: [phone]    What is the best time to reach you: ANY    Who are you requesting to speak with (clinical staff, provider,  specific staff member): ALEXI ELLIOTT OR HIS NURSE    What was the call regarding: PATIENT IS CALLING TO SEE IF THE OFFICE DOES BIOMETRIC SCREENING FOR HER INSURANCE. DOES SHE NEED TO GO SOMEWHERE ELSE FOR THIS?    Do you require a callback: [employer] MESSAGE PREFERRED

## 2023-05-31 ENCOUNTER — PATIENT MESSAGE (OUTPATIENT)
Dept: OBSTETRICS AND GYNECOLOGY | Facility: CLINIC | Age: 58
End: 2023-05-31

## 2023-06-16 ENCOUNTER — OFFICE VISIT (OUTPATIENT)
Dept: FAMILY MEDICINE CLINIC | Facility: CLINIC | Age: 58
End: 2023-06-16
Payer: COMMERCIAL

## 2023-06-16 ENCOUNTER — LAB (OUTPATIENT)
Dept: LAB | Facility: HOSPITAL | Age: 58
End: 2023-06-16
Payer: COMMERCIAL

## 2023-06-16 ENCOUNTER — PATIENT MESSAGE (OUTPATIENT)
Dept: FAMILY MEDICINE CLINIC | Facility: CLINIC | Age: 58
End: 2023-06-16

## 2023-06-16 VITALS
DIASTOLIC BLOOD PRESSURE: 78 MMHG | BODY MASS INDEX: 27.33 KG/M2 | HEIGHT: 58 IN | WEIGHT: 130.2 LBS | HEART RATE: 83 BPM | OXYGEN SATURATION: 99 % | SYSTOLIC BLOOD PRESSURE: 122 MMHG

## 2023-06-16 DIAGNOSIS — E11.43 TYPE 2 DIABETES MELLITUS WITH DIABETIC AUTONOMIC NEUROPATHY, WITHOUT LONG-TERM CURRENT USE OF INSULIN: Primary | ICD-10-CM

## 2023-06-16 DIAGNOSIS — E78.2 MIXED HYPERLIPIDEMIA: ICD-10-CM

## 2023-06-16 DIAGNOSIS — L29.9 ITCHY SCALP: ICD-10-CM

## 2023-06-16 DIAGNOSIS — Z51.81 ENCOUNTER FOR THERAPEUTIC DRUG MONITORING: ICD-10-CM

## 2023-06-16 LAB
ALBUMIN SERPL-MCNC: 4.7 G/DL (ref 3.5–5.2)
ALBUMIN/GLOB SERPL: 2.1 G/DL
ALP SERPL-CCNC: 60 U/L (ref 39–117)
ALT SERPL W P-5'-P-CCNC: 23 U/L (ref 1–33)
ANION GAP SERPL CALCULATED.3IONS-SCNC: 12.7 MMOL/L (ref 5–15)
AST SERPL-CCNC: 22 U/L (ref 1–32)
BILIRUB SERPL-MCNC: 0.4 MG/DL (ref 0–1.2)
BUN SERPL-MCNC: 16 MG/DL (ref 6–20)
BUN/CREAT SERPL: 26.2 (ref 7–25)
CALCIUM SPEC-SCNC: 10.3 MG/DL (ref 8.6–10.5)
CHLORIDE SERPL-SCNC: 103 MMOL/L (ref 98–107)
CHOLEST SERPL-MCNC: 105 MG/DL (ref 0–200)
CO2 SERPL-SCNC: 26.3 MMOL/L (ref 22–29)
CREAT SERPL-MCNC: 0.61 MG/DL (ref 0.57–1)
EGFRCR SERPLBLD CKD-EPI 2021: 105.1 ML/MIN/1.73
EXPIRATION DATE: NORMAL
GLOBULIN UR ELPH-MCNC: 2.2 GM/DL
GLUCOSE SERPL-MCNC: 81 MG/DL (ref 65–99)
HBA1C MFR BLD: 5.6 %
HDLC SERPL-MCNC: 49 MG/DL (ref 40–60)
LDLC SERPL CALC-MCNC: 38 MG/DL (ref 0–100)
LDLC/HDLC SERPL: 0.76 {RATIO}
Lab: NORMAL
POTASSIUM SERPL-SCNC: 4.1 MMOL/L (ref 3.5–5.2)
PROT SERPL-MCNC: 6.9 G/DL (ref 6–8.5)
SODIUM SERPL-SCNC: 142 MMOL/L (ref 136–145)
TRIGL SERPL-MCNC: 95 MG/DL (ref 0–150)
VLDLC SERPL-MCNC: 18 MG/DL (ref 5–40)

## 2023-06-16 PROCEDURE — 80061 LIPID PANEL: CPT | Performed by: NURSE PRACTITIONER

## 2023-06-16 PROCEDURE — 80053 COMPREHEN METABOLIC PANEL: CPT | Performed by: NURSE PRACTITIONER

## 2023-06-16 RX ORDER — PANCRELIPASE 36000; 180000; 114000 [USP'U]/1; [USP'U]/1; [USP'U]/1
CAPSULE, DELAYED RELEASE PELLETS ORAL
COMMUNITY
Start: 2023-03-23 | End: 2023-06-16

## 2023-06-16 RX ORDER — CLINDAMYCIN PHOSPHATE 20 MG/G
1 CREAM VAGINAL AS NEEDED
COMMUNITY
Start: 2023-06-12

## 2023-06-16 NOTE — PROGRESS NOTES
"Chief Complaint  Diabetes (6 month follow up) and Hypertension (6 month follow up)    Subjective        Nelli Morales presents to [facility]  History of Present Illness  Pt is here today for follow up of DM II and HTN. A1C today is 5.6%. BP is stable. She does not need refills on any medication today.     She has some concerns with scalp itching. States she has been using a shampoo with tea tree oil in it. Advised that can be drying. She will switch shampoos and see if that helps with the symptoms.       Diabetes  She presents for her follow-up diabetic visit. She has type 2 diabetes mellitus. Her disease course has been stable. Symptoms are stable. Risk factors for coronary artery disease include diabetes mellitus and hypertension. She is compliant with treatment all of the time. Her weight is stable. Her breakfast blood glucose is taken between 5-6 am. Her breakfast blood glucose range is generally  mg/dl. Her bedtime blood glucose is taken between 8-9 pm. Her bedtime blood glucose range is generally 140-180 mg/dl. She sees a podiatrist.Eye exam is current.   Hypertension      Objective   Vital Signs:  /78   Pulse 83   Ht 147.3 cm (57.99\")   Wt 59.1 kg (130 lb 3.2 oz)   SpO2 99%   BMI 27.22 kg/m²   Estimated body mass index is 27.22 kg/m² as calculated from the following:    Height as of this encounter: 147.3 cm (57.99\").    Weight as of this encounter: 59.1 kg (130 lb 3.2 oz).             Physical Exam  Vitals reviewed.   Constitutional:       Appearance: Normal appearance.   HENT:      Nose: Nose normal.      Mouth/Throat:      Mouth: Mucous membranes are moist.      Pharynx: Oropharynx is clear.   Eyes:      Conjunctiva/sclera: Conjunctivae normal.   Cardiovascular:      Rate and Rhythm: Normal rate and regular rhythm.      Heart sounds: Normal heart sounds.   Pulmonary:      Effort: Pulmonary effort is normal.      Breath sounds: Normal breath sounds. "   Musculoskeletal:         General: Normal range of motion.      Cervical back: Normal range of motion.   Skin:     General: Skin is warm.   Neurological:      Mental Status: She is alert and oriented to person, place, and time.   Psychiatric:         Mood and Affect: Mood normal.         Behavior: Behavior normal.         Thought Content: Thought content normal.      Result Review :                   Assessment and Plan   Diagnoses and all orders for this visit:    1. Type 2 diabetes mellitus with diabetic autonomic neuropathy, without long-term current use of insulin (Primary)  -     POC Glycosylated Hemoglobin (Hb A1C)    2. Encounter for therapeutic drug monitoring  -     Comprehensive Metabolic Panel    3. Mixed hyperlipidemia  -     Lipid Panel    4. Itchy scalp             Follow Up   Return in about 6 months (around 12/16/2023), or After 12/22/23, for Annual.  Patient was given instructions and counseling regarding her condition or for health maintenance advice. Please see specific information pulled into the AVS if appropriate.       Answers submitted by the patient for this visit:  Other (Submitted on 6/15/2023)  Please describe your symptoms.: I don't have any  Have you had these symptoms before?: No  How long have you been having these symptoms?: Greater than 2 weeks  Please list any medications you are currently taking for this condition.: None  Please describe any probable cause for these symptoms. : None  Primary Reason for Visit (Submitted on 6/15/2023)  What is the primary reason for your visit?: Other

## 2023-06-17 DIAGNOSIS — H69.83 DYSFUNCTION OF BOTH EUSTACHIAN TUBES: ICD-10-CM

## 2023-06-19 ENCOUNTER — PATIENT MESSAGE (OUTPATIENT)
Dept: FAMILY MEDICINE CLINIC | Facility: CLINIC | Age: 58
End: 2023-06-19
Payer: COMMERCIAL

## 2023-06-19 RX ORDER — CETIRIZINE HYDROCHLORIDE 10 MG/1
TABLET ORAL
Qty: 90 TABLET | Refills: 1 | Status: SHIPPED | OUTPATIENT
Start: 2023-06-19

## 2023-06-19 NOTE — TELEPHONE ENCOUNTER
From: Nelli Morales  To: Flaquito Wallace  Sent: 6/16/2023 9:46 PM EDT  Subject: Tests     Is my blood work better, what is BUN/creatine, what do I need to do now?

## 2023-09-06 RX ORDER — ESTRADIOL 0.07 MG/D
FILM, EXTENDED RELEASE TRANSDERMAL
Qty: 24 PATCH | Refills: 0 | Status: SHIPPED | OUTPATIENT
Start: 2023-09-06

## 2023-09-26 DIAGNOSIS — H69.93 DYSFUNCTION OF BOTH EUSTACHIAN TUBES: ICD-10-CM

## 2023-09-27 RX ORDER — FLUTICASONE PROPIONATE 50 MCG
2 SPRAY, SUSPENSION (ML) NASAL DAILY
Qty: 16 G | Refills: 3 | Status: SHIPPED | OUTPATIENT
Start: 2023-09-27

## 2023-09-27 NOTE — TELEPHONE ENCOUNTER
Rx Refill Note  Requested Prescriptions     Pending Prescriptions Disp Refills    fluticasone (Flonase) 50 MCG/ACT nasal spray 16 g 3     Si sprays into the nostril(s) as directed by provider Daily.      Last office visit with prescribing clinician: 2023   Last telemedicine visit with prescribing clinician: Visit date not found   Next office visit with prescribing clinician: Visit date not found     Sam Kate Rep  23, 07:59 EDT

## 2023-10-05 ENCOUNTER — OFFICE VISIT (OUTPATIENT)
Dept: FAMILY MEDICINE CLINIC | Facility: CLINIC | Age: 58
End: 2023-10-05
Payer: COMMERCIAL

## 2023-10-05 VITALS
HEART RATE: 74 BPM | RESPIRATION RATE: 16 BRPM | OXYGEN SATURATION: 99 % | HEIGHT: 58 IN | DIASTOLIC BLOOD PRESSURE: 58 MMHG | BODY MASS INDEX: 28.51 KG/M2 | WEIGHT: 135.8 LBS | SYSTOLIC BLOOD PRESSURE: 110 MMHG

## 2023-10-05 DIAGNOSIS — E11.65 CONTROLLED TYPE 2 DIABETES MELLITUS WITH HYPERGLYCEMIA, WITHOUT LONG-TERM CURRENT USE OF INSULIN: Primary | ICD-10-CM

## 2023-10-05 DIAGNOSIS — R30.0 DYSURIA: ICD-10-CM

## 2023-10-05 LAB
BILIRUB BLD-MCNC: NEGATIVE MG/DL
CLARITY, POC: ABNORMAL
COLOR UR: YELLOW
EXPIRATION DATE: ABNORMAL
EXPIRATION DATE: NORMAL
GLUCOSE UR STRIP-MCNC: NEGATIVE MG/DL
HBA1C MFR BLD: 5.5 %
KETONES UR QL: NEGATIVE
LEUKOCYTE EST, POC: NEGATIVE
Lab: ABNORMAL
Lab: NORMAL
NITRITE UR-MCNC: NEGATIVE MG/ML
PH UR: 5.5 [PH] (ref 5–8)
PROT UR STRIP-MCNC: NEGATIVE MG/DL
RBC # UR STRIP: NEGATIVE /UL
SP GR UR: 1.02 (ref 1–1.03)
UROBILINOGEN UR QL: NORMAL

## 2023-10-05 PROCEDURE — 81003 URINALYSIS AUTO W/O SCOPE: CPT | Performed by: NURSE PRACTITIONER

## 2023-10-05 PROCEDURE — 99213 OFFICE O/P EST LOW 20 MIN: CPT | Performed by: NURSE PRACTITIONER

## 2023-10-05 PROCEDURE — 83036 HEMOGLOBIN GLYCOSYLATED A1C: CPT | Performed by: NURSE PRACTITIONER

## 2023-10-05 NOTE — PROGRESS NOTES
"Subjective   Nelli Morales is a 57 y.o. female here to establish care.  Chief Complaint   Patient presents with    Diabetes     3 mth follow up       History of Present Illness   Patient is here for 3 month diabetes follow up, has been taking medications as prescribed, does check glucose at home. Denies hypoglycemic episodes, polyuria, polydipsia, or paresthesias.  Previous patient of Flaquito Oreillybs, has eye exam scheduled  States she got her flu and covid at [facility]  Works 2 jobs and takes care of her mother; tries to eat healthy  The following portions of the patient's history were reviewed and updated as appropriate: allergies, current medications, past family history, past medical history, past social history, past surgical history, and problem list.    Review of Systems   Constitutional:  Positive for fatigue (works 2 job). Negative for activity change, appetite change, chills, diaphoresis, fever and unexpected weight change.   Eyes:  Negative for visual disturbance.   Respiratory:  Negative for shortness of breath.    Cardiovascular:  Negative for chest pain, palpitations and leg swelling.   Gastrointestinal:  Positive for constipation (controlled with Trulance). Negative for abdominal pain and diarrhea.   Genitourinary:  Positive for dysuria (occ). Negative for difficulty urinating.   Musculoskeletal:  Positive for myalgias (on her feet a lot , working, moving furniture).   Skin:  Negative for rash and wound.   Neurological:  Negative for dizziness, light-headedness, numbness and headaches.     Blood pressure 110/74, pulse 74, resp. rate 16, height 146.1 cm (57.52\"), weight 61.6 kg (135 lb 12.8 oz), SpO2 99 %, not currently breastfeeding.    Allergies   Allergen Reactions    Ace Inhibitors Cough    Morphine And Related Rash    Polymyxin B-Trimethoprim Unknown (See Comments)     Past Medical History:   Diagnosis Date    Allergic     Allergic rhinitis     BV (bacterial vaginosis)     Cataracts, bilateral     " Class 1 obesity due to excess calories without serious comorbidity with body mass index (BMI) of 32.0 to 32.9 in adult 2017    Diabetes mellitus, type 2     Diverticulitis large intestine w/o perforation or abscess w/o bleeding 2017    Diverticulosis 2017    Endometriosis     STAGE 4 WITH LARGE LEFT OVARIAN ENDOMETRIOMA;EXTENSVIE DENSE LEFT ADNEXAL ADHESIONS TO SIDEWALL AND SIGMOID COLON. dEEP IMPLANTS LEFT ANTERIOR CULDESAC.    Female cystocele     Fibrocystic breast disease     Glaucoma     Hormone replacement therapy     Hyperlipidemia     Hypertension     IBS (irritable bowel syndrome)     Insomnia     Obesity (BMI 30.0-34.9)     Screening breast examination     SELF;ADMITS    Stress bladder incontinence, female     Stress bladder incontinence, female     History of prior Alas procedure with good support.   Currently resolved.    Vaginitis     UNKNOWN ETIOLOGY    Varicose veins of legs      Past Surgical History:   Procedure Laterality Date    BLADDER SUSPENSION  10/27/2007    CYSTOCELE REPAIR      CYSTOTOMY      HYSTEROSCOPY      TOTAL ABDOMINAL HYSTERECTOMY WITH SALPINGO OOPHORECTOMY  2013    LYSIS OF ADHESIONS FOR STAGE 4 ENDOMETRIOSIS     Family History   Problem Relation Age of Onset    Diabetes Mother     Dementia Mother     Hypothyroidism Mother     Hyperlipidemia Mother     Breast cancer Mother     Thyroid disease Mother     Cancer Mother     ALS Father 70    Diabetes Sister     Hyperlipidemia Sister     Hypothyroidism Sister     Early death Sister     Hypertension Brother         Sandro Morales    Hypertension Sister         Lacey is     Thyroid disease Sister      Social History     Socioeconomic History    Marital status: Single    Number of children: 0    Years of education: 12    Highest education level: High school graduate   Tobacco Use    Smoking status: Never    Smokeless tobacco: Never   Vaping Use    Vaping Use: Never used   Substance and Sexual Activity    Alcohol use:  Yes     Alcohol/week: 1.0 standard drink     Types: 1 Cans of beer per week     Comment: occasional    Drug use: No    Sexual activity: Yes     Partners: Male     Birth control/protection: Post-menopausal, Hysterectomy     Immunization History   Administered Date(s) Administered    COVID-19 (PFIZER) BIVALENT 12+YRS 12/22/2022    COVID-19 (PFIZER) Purple Cap Monovalent 01/27/2021, 02/17/2021, 10/02/2021    Covid-19 (Pfizer) Gray Cap Monovalent 05/29/2022    Flu Vaccine Intradermal Quad 18-64YR 10/03/2019    Flu Vaccine Quad PF >36MO 08/26/2017, 09/15/2019, 09/06/2020, 09/25/2021    Fluzone (or Fluarix & Flulaval for VFC) >6mos 08/26/2017, 09/06/2020, 09/25/2021    Fluzone Quad >6mos (Multi-dose) 09/22/2018, 09/15/2019    Hepatitis A 09/22/2018, 03/23/2019    Influenza Injectable Mdck Pf Quad 10/04/2022    Influenza Quad Vaccine (Inpatient) 09/11/2016    Influenza Whole 08/01/2016    Influenza, Unspecified 08/26/2017, 10/04/2022    Pneumococcal Conjugate 13-Valent (PCV13) 08/26/2017    Td (TDVAX) 06/03/2002, 11/15/2017    Tdap 01/19/2012    flucelvax quad pfs =>4 YRS 10/03/2019       Current Outpatient Medications:     Blood Glucose Monitoring Suppl (ONE TOUCH ULTRA 2) w/Device kit, 2 (Two) Times a Day. as directed, Disp: , Rfl:     cetirizine (zyrTEC) 10 MG tablet, TAKE 1 TABLET BY MOUTH EVERY DAY, Disp: 90 tablet, Rfl: 1    Cholecalciferol (VITAMIN D3) 5000 UNITS capsule capsule, Take 1 capsule by mouth Daily., Disp: , Rfl:     estradiol (ESTRACE) 0.1 MG/GM vaginal cream, Insert 0.5 g into the vagina 2 (Two) Times a Week., Disp: 42.5 g, Rfl: 3    estradiol (MINIVELLE, VIVELLE-DOT) 0.075 MG/24HR patch, APPLY 1 PATCH TOPICALLY TO THE SKIN 2 TIMES A WEEK, Disp: 24 patch, Rfl: 0    fluticasone (Flonase) 50 MCG/ACT nasal spray, 2 sprays into the nostril(s) as directed by provider Daily., Disp: 16 g, Rfl: 3    folic acid (FOLVITE) 1 MG tablet, Take 1 tablet by mouth Daily., Disp: 90 tablet, Rfl: 3    glucose blood test  strip, Use to test blood sugars twice daily, Disp: 100 each, Rfl: 11    glucose monitor monitoring kit, 1 each 2 (Two) Times a Day. OneTouch Ultra2, Disp: 1 each, Rfl: 0    Lancets misc, Use to test blood sugars twice daily, Disp: 100 each, Rfl: 11    latanoprost (XALATAN) 0.005 % ophthalmic solution, , Disp: , Rfl: 1    losartan-hydrochlorothiazide (HYZAAR) 50-12.5 MG per tablet, Take 1 tablet by mouth Daily., Disp: 90 tablet, Rfl: 3    metFORMIN ER (GLUCOPHAGE-XR) 500 MG 24 hr tablet, Take 1 tablet by mouth Daily., Disp: 180 tablet, Rfl: 3    methocarbamol (ROBAXIN) 500 MG tablet, Take 1 tablet by mouth Daily., Disp: , Rfl:     rosuvastatin (Crestor) 40 MG tablet, Take 1 tablet by mouth Daily., Disp: 90 tablet, Rfl: 0    Semaglutide (Rybelsus) 14 MG tablet, Take 1 tablet by mouth Daily., Disp: 90 tablet, Rfl: 1    Stool Softener 100 MG capsule, TAKE 1 CAPSULE BY MOUTH TWICE DAILY AS DIRECTED, Disp: , Rfl:     Trulance 3 MG tablet, Take 1 tablet by mouth Daily., Disp: , Rfl:     brompheniramine-pseudoephedrine-DM 30-2-10 MG/5ML syrup, Take 5 mL by mouth 4 (Four) Times a Day As Needed for Cough., Disp: 120 mL, Rfl: 1    clindamycin (CLEOCIN) 2 % vaginal cream, Insert 1 applicator into the vagina As Needed., Disp: , Rfl:     clotrimazole-betamethasone (LOTRISONE) 1-0.05 % cream, , Disp: , Rfl:     predniSONE (DELTASONE) 10 MG tablet, 4 tabs once days 1-2, 3 tabs once days 3-4, 2 tabs once days 5-6, Disp: 18 tablet, Rfl: 0    triamcinolone (KENALOG) 0.5 % ointment, Apply  topically to the appropriate area as directed 2 (Two) Times a Day., Disp: 30 g, Rfl: 0    Objective   Physical Exam  Vitals reviewed.   Constitutional:       General: She is not in acute distress.     Appearance: Normal appearance. She is not ill-appearing, toxic-appearing or diaphoretic.   HENT:      Head: Normocephalic and atraumatic.      Right Ear: Tympanic membrane normal.      Left Ear: Tympanic membrane normal.   Eyes:      General: No scleral  icterus.  Cardiovascular:      Rate and Rhythm: Normal rate and regular rhythm.      Heart sounds: Normal heart sounds.   Pulmonary:      Effort: Pulmonary effort is normal. No respiratory distress.      Breath sounds: Normal breath sounds.   Musculoskeletal:      Right lower leg: No edema.      Left lower leg: No edema.   Neurological:      Mental Status: She is alert and oriented to person, place, and time.   Psychiatric:         Thought Content: Thought content normal.         Judgment: Judgment normal.       Results for orders placed or performed in visit on 10/05/23   POC Glycosylated Hemoglobin (Hb A1C)    Specimen: Blood   Result Value Ref Range    Hemoglobin A1C 5.5 %    Lot Number [id number]     Expiration Date 06/20/2025    POCT urinalysis dipstick, automated    Specimen: Urine   Result Value Ref Range    Color Yellow Yellow, Straw, Dark Yellow, Janis    Clarity, UA Slightly Cloudy (A) Clear    Specific Gravity  1.025 1.005 - 1.030    pH, Urine 5.5 5.0 - 8.0    Leukocytes Negative Negative    Nitrite, UA Negative Negative    Protein, POC Negative Negative mg/dL    Glucose, UA Negative Negative mg/dL    Ketones, UA Negative Negative    Urobilinogen, UA Normal Normal, 0.2 E.U./dL    Bilirubin Negative Negative    Blood, UA Negative Negative    Lot Number [id number]     Expiration Date 03/25/2024        Assessment & Plan   Diagnoses and all orders for this visit:    1. Controlled type 2 diabetes mellitus with hyperglycemia, without long-term current use of insulin (Primary)  -     POC Glycosylated Hemoglobin (Hb A1C)    2. Dysuria  -     POCT urinalysis dipstick, automated      No orders of the defined types were placed in this encounter.    Diabetes is well controlled, continue current medication  UA dipstick is negative, increase water intake  Patient was encouraged to keep me informed of any acute changes, lack of improvement, or any new concerning symptoms.         Answers submitted by the patient for  this visit:  Other (Submitted on 10/4/2023)  Please describe your symptoms.: Legs are tired/sore, tired lately and this is a follow up  Have you had these symptoms before?: Yes  How long have you been having these symptoms?: 1-2 weeks  Please list any medications you are currently taking for this condition.: Tylenol  Please describe any probable cause for these symptoms. : Still working 2 jobs  Primary Reason for Visit (Submitted on 10/4/2023)  What is the primary reason for your visit?: Other

## 2023-10-10 ENCOUNTER — TELEPHONE (OUTPATIENT)
Dept: FAMILY MEDICINE CLINIC | Facility: CLINIC | Age: 58
End: 2023-10-10
Payer: COMMERCIAL

## 2023-10-10 NOTE — TELEPHONE ENCOUNTER
Sent patient MyChart message but also advised her via phone that it was only abnormal due to the slight cloudiness to her urine color and in [facility]ee BECERRIL notes she should drink more water. Pt verbalized understanding and had no further questions.

## 2023-10-10 NOTE — TELEPHONE ENCOUNTER
Caller: Nelli Morales    Relationship: Self    Best call back number:       [phone] (Mobile)     What test was performed:     PATIENT STATED SHE COMPLETED A URINALYSIS ON 10/5 AND RECEIVED RESULTS AS ABNORMAL    PATIENT STATED SHE DID NOT RECEIVE A RESPONSE TO HER QUESTION OF WHAT SPECIFICALLY DOES THE ABNORMAL RESULT MEAN AND IF SHE SHOULD BE CONCERNED    PATIENT REQUESTED A CALL BACK WITH ANY RECOMMENDATIONS AND/OR NEXT STEPS

## 2023-10-20 ENCOUNTER — TELEPHONE (OUTPATIENT)
Dept: FAMILY MEDICINE CLINIC | Facility: CLINIC | Age: 58
End: 2023-10-20
Payer: COMMERCIAL

## 2023-10-20 DIAGNOSIS — E78.2 MIXED HYPERLIPIDEMIA: ICD-10-CM

## 2023-10-21 NOTE — TELEPHONE ENCOUNTER
Pt called again requesting a refill of her Rosuvastatin 40mg. It looks like the first request was sent to Flaquito's office.

## 2023-10-23 RX ORDER — ROSUVASTATIN CALCIUM 40 MG/1
40 TABLET, COATED ORAL DAILY
Qty: 90 TABLET | Refills: 0 | Status: SHIPPED | OUTPATIENT
Start: 2023-10-23

## 2023-11-07 ENCOUNTER — OFFICE VISIT (OUTPATIENT)
Dept: OBSTETRICS AND GYNECOLOGY | Facility: CLINIC | Age: 58
End: 2023-11-07
Payer: COMMERCIAL

## 2023-11-07 VITALS
WEIGHT: 138 LBS | DIASTOLIC BLOOD PRESSURE: 58 MMHG | HEIGHT: 59 IN | BODY MASS INDEX: 27.82 KG/M2 | SYSTOLIC BLOOD PRESSURE: 116 MMHG

## 2023-11-07 DIAGNOSIS — Z79.890 HORMONE REPLACEMENT THERAPY: ICD-10-CM

## 2023-11-07 DIAGNOSIS — N76.0 RECURRENT VAGINITIS: ICD-10-CM

## 2023-11-07 DIAGNOSIS — Z91.89 AT RISK FOR DECREASED BONE DENSITY: ICD-10-CM

## 2023-11-07 DIAGNOSIS — Z01.419 WOMEN'S ANNUAL ROUTINE GYNECOLOGICAL EXAMINATION: Primary | ICD-10-CM

## 2023-11-07 PROCEDURE — 99396 PREV VISIT EST AGE 40-64: CPT | Performed by: NURSE PRACTITIONER

## 2023-11-07 RX ORDER — PANCRELIPASE 36000; 180000; 114000 [USP'U]/1; [USP'U]/1; [USP'U]/1
CAPSULE, DELAYED RELEASE PELLETS ORAL
COMMUNITY
Start: 2023-11-01

## 2023-11-07 RX ORDER — ESTRADIOL 0.07 MG/D
1 FILM, EXTENDED RELEASE TRANSDERMAL 2 TIMES WEEKLY
Qty: 24 PATCH | Refills: 3 | Status: SHIPPED | OUTPATIENT
Start: 2023-11-09

## 2023-11-07 NOTE — PROGRESS NOTES
Gynecologic Annual Exam Note        GYN Annual Exam     CC - Here for annual exam.        HPI  Nelli Morales is a 57 y.o. female, , who presents for annual well woman exam as a established patient.  She is s/p NU/BSO in 2013 for endometriosis .. Denies vaginal bleeding.  Patient reports problems with: vaginal dryness. There were no changes to her medical or surgical history since her last visit..       Partner Status: Marital Status: single.  She is sexually active. She has not had new partners.. STD testing recommendations have been explained to the patient and she does not desire STD testing.    Additional OB/GYN History   On HRT? Yes. Details: estradiol patch 0.075MG/24hr    Last Pap : 2020. Results: unknown . HPV: unknown .   Last Completed Pap Smear       This patient has no relevant Health Maintenance data.          History of abnormal Pap smear: no  Family history of uterine, colon, breast, or ovarian cancer: yes - mother Breast CA  Performs monthly Self-Breast Exam: no  Last mammogram: 10/40/2022. Done at [facility]. Pt has diagnostic mammogram scheduled in December. If this one normal she can go  back to regular screenings.    Last Completed Mammogram            Ordered - MAMMOGRAM (Every 2 Years) Ordered on 2023      10/04/2022  SCANNED - MAMMO    2022  SCANNED - MAMMO    10/16/2020  SCANNED - MAMMO    10/03/2019  SCANNED - MAMMO    06/15/2018  SCANNED - MAMMO    Only the first 5 history entries have been loaded, but more history exists.                  Last colonoscopy: has had a colonoscopy 4 year(s) ago.    Last Completed Colonoscopy            COLORECTAL CANCER SCREENING (COLONOSCOPY - Every 5 Years) Next due on 2020  COLONOSCOPY (Done)    2019  SCANNED - COLONOSCOPY    2013  COLONOSCOPY (Patient-Reported (Performed Externally) - approx date)    2013  SCANNED - COLONOSCOPY                      Last bone density scan (DEXA):  None  Exercises Regularly: no  Feelings of Anxiety or Depression: no      Tobacco Usage?: No       Current Outpatient Medications:     Blood Glucose Monitoring Suppl (ONE TOUCH ULTRA 2) w/Device kit, 2 (Two) Times a Day. as directed, Disp: , Rfl:     cetirizine (zyrTEC) 10 MG tablet, TAKE 1 TABLET BY MOUTH EVERY DAY, Disp: 90 tablet, Rfl: 1    Cholecalciferol (VITAMIN D3) 5000 UNITS capsule capsule, Take 1 capsule by mouth Daily., Disp: , Rfl:     Creon 20252-376376 units capsule delayed-release particles capsule, TAKE 1 CAPSULE BY MOUTH THREE TIMES DAILY BEFORE MEALS, Disp: , Rfl:     [START ON 11/9/2023] estradiol (MINIVELLE, VIVELLE-DOT) 0.075 MG/24HR patch, Place 1 patch on the skin as directed by provider 2 (Two) Times a Week., Disp: 24 patch, Rfl: 3    fluticasone (Flonase) 50 MCG/ACT nasal spray, 2 sprays into the nostril(s) as directed by provider Daily., Disp: 16 g, Rfl: 3    folic acid (FOLVITE) 1 MG tablet, Take 1 tablet by mouth Daily., Disp: 90 tablet, Rfl: 3    glucose blood test strip, Use to test blood sugars twice daily, Disp: 100 each, Rfl: 11    glucose monitor monitoring kit, 1 each 2 (Two) Times a Day. OneTouch Ultra2, Disp: 1 each, Rfl: 0    Lancets misc, Use to test blood sugars twice daily, Disp: 100 each, Rfl: 11    latanoprost (XALATAN) 0.005 % ophthalmic solution, , Disp: , Rfl: 1    losartan-hydrochlorothiazide (HYZAAR) 50-12.5 MG per tablet, Take 1 tablet by mouth Daily., Disp: 90 tablet, Rfl: 3    metFORMIN ER (GLUCOPHAGE-XR) 500 MG 24 hr tablet, Take 1 tablet by mouth Daily., Disp: 180 tablet, Rfl: 3    methocarbamol (ROBAXIN) 500 MG tablet, Take 1 tablet by mouth Daily., Disp: , Rfl:     rosuvastatin (Crestor) 40 MG tablet, Take 1 tablet by mouth Daily., Disp: 90 tablet, Rfl: 0    Semaglutide (Rybelsus) 14 MG tablet, Take 1 tablet by mouth Daily., Disp: 90 tablet, Rfl: 1    Stool Softener 100 MG capsule, TAKE 1 CAPSULE BY MOUTH TWICE DAILY AS DIRECTED, Disp: , Rfl:     Trulance 3 MG  tablet, Take 1 tablet by mouth Daily., Disp: , Rfl:     Patient denies the need for medication refills today.    OB History          0    Para   0    Term   0       0    AB   0    Living   0         SAB   0    IAB   0    Ectopic   0    Molar   0    Multiple   0    Live Births   0                Past Medical History:   Diagnosis Date    Allergic     Allergic rhinitis     BV (bacterial vaginosis)     Cataracts, bilateral     Class 1 obesity due to excess calories without serious comorbidity with body mass index (BMI) of 32.0 to 32.9 in adult 2017    Diabetes mellitus, type 2     Diverticulitis large intestine w/o perforation or abscess w/o bleeding 2017    Diverticulosis 2017    Endometriosis     STAGE 4 WITH LARGE LEFT OVARIAN ENDOMETRIOMA;EXTENSVIE DENSE LEFT ADNEXAL ADHESIONS TO SIDEWALL AND SIGMOID COLON. dEEP IMPLANTS LEFT ANTERIOR CULDESAC.    Female cystocele     Fibrocystic breast disease     Glaucoma     Hormone replacement therapy     Hyperlipidemia     Hypertension     IBS (irritable bowel syndrome)     Insomnia     Obesity (BMI 30.0-34.9)     Screening breast examination     SELF;ADMITS    Stress bladder incontinence, female     Stress bladder incontinence, female     History of prior Alas procedure with good support.   Currently resolved.    Vaginitis     UNKNOWN ETIOLOGY    Varicose veins of legs         Past Surgical History:   Procedure Laterality Date    BLADDER SUSPENSION  10/27/2007    CYSTOCELE REPAIR      CYSTOTOMY      HYSTEROSCOPY      TOTAL ABDOMINAL HYSTERECTOMY WITH SALPINGO OOPHORECTOMY  2013    LYSIS OF ADHESIONS FOR STAGE 4 ENDOMETRIOSIS       Health Maintenance   Topic Date Due    Hepatitis B (1 of 3 - 3-dose series) Never done    ZOSTER VACCINE (1 of 2) Never done    DIABETIC EYE EXAM  10/15/2020    PAP SMEAR  2023    Pneumococcal Vaccine 0-64 (2 - PPSV23 or PCV20) 2024 (Originally 10/21/2017)    ANNUAL PHYSICAL  2023    DIABETIC FOOT EXAM   "12/22/2023    URINE MICROALBUMIN  12/22/2023    HEMOGLOBIN A1C  04/05/2024    LIPID PANEL  06/16/2024    BMI FOLLOWUP  07/10/2024    MAMMOGRAM  10/04/2024    Annual Gynecologic Pelvic and Breast Exam  11/08/2024    COLORECTAL CANCER SCREENING  01/06/2025    TDAP/TD VACCINES (5 - Td or Tdap) 11/15/2027    HEPATITIS C SCREENING  Completed    COVID-19 Vaccine  Completed    INFLUENZA VACCINE  Completed       The additional following portions of the patient's history were reviewed and updated as appropriate: allergies, current medications, past family history, past medical history, past social history, past surgical history, and problem list.    Review of Systems   Constitutional: Negative.    HENT: Negative.     Eyes: Negative.    Respiratory: Negative.     Cardiovascular: Negative.    Gastrointestinal: Negative.    Endocrine: Negative.    Genitourinary:  Positive for vaginal pain (vaginal dryness).        Vaginal dryness   Musculoskeletal: Negative.    Skin: Negative.    Allergic/Immunologic: Negative.    Neurological: Negative.    Hematological: Negative.    Psychiatric/Behavioral: Negative.     All other systems reviewed and are negative.      I have reviewed and agree with the HPI, ROS, and historical information as entered above. Judith COTTON Adela, APRN      Objective   /58   Ht 149.9 cm (59\")   Wt 62.6 kg (138 lb)   LMP  (LMP Unknown)   BMI 27.87 kg/m²     Physical Exam  Vitals and nursing note reviewed. Exam conducted with a chaperone present.   Constitutional:       Appearance: Normal appearance. She is well-developed.   HENT:      Head: Normocephalic and atraumatic.   Neck:      Thyroid: No thyroid mass or thyromegaly.   Cardiovascular:      Rate and Rhythm: Normal rate.      Heart sounds: No murmur heard.  Pulmonary:      Effort: Pulmonary effort is normal. No retractions.      Breath sounds: No wheezing, rhonchi or rales.   Chest:      Chest wall: No mass or tenderness.   Breasts:     Right: " Normal. No mass, nipple discharge, skin change or tenderness.      Left: Normal. No mass, nipple discharge, skin change or tenderness.   Abdominal:      Palpations: Abdomen is soft. Abdomen is not rigid. There is no mass.      Tenderness: There is no abdominal tenderness. There is no guarding.      Hernia: No hernia is present.   Genitourinary:     General: Normal vulva.      Exam position: Lithotomy position.      Labia:         Right: No rash, tenderness or lesion.         Left: No rash, tenderness or lesion.       Vagina: Normal. No vaginal discharge or lesions.      Uterus: Absent.       Rectum: Normal. No external hemorrhoid.      Comments: S/P NU BSO  Musculoskeletal:      Cervical back: Normal range of motion. No muscular tenderness.   Neurological:      Mental Status: She is alert and oriented to person, place, and time.   Psychiatric:         Behavior: Behavior normal.            Assessment and Plan    Problem List Items Addressed This Visit       Hormone replacement therapy    Overview     On Vivelle dot 0.075 mg patch         Relevant Orders    LIQUID-BASED PAP SMEAR WITH HPV GENOTYPING IF ASCUS (NESS,COR,MAD)     Other Visit Diagnoses       Women's annual routine gynecological examination    -  Primary    Relevant Medications    estradiol (MINIVELLE, VIVELLE-DOT) 0.075 MG/24HR patch (Start on 11/9/2023)    Other Relevant Orders    LIQUID-BASED PAP SMEAR WITH HPV GENOTYPING IF ASCUS (NESS,COR,MAD)    At risk for decreased bone density        Relevant Orders    DEXA Bone Density Axial    Recurrent vaginitis        Relevant Orders    LIQUID-BASED PAP SMEAR WITH HPV GENOTYPING IF ASCUS (NESS,COR,MAD)            GYN annual well woman exam.   Pap guidelines reviewed. Vaginal cuff pap done today with vaginitis panel added per pt request for hx vaginitis.  Doing well with hrt and desires to continue. Refills sent. She reports that vaginal dryness is only a problem if she forgets to use vaginal  moisturizers.  Reviewed monthly self breast exams.  Instructed to call with lumps, pain, or breast discharge.  Yearly mammograms ordered.  Recommended use of Vitamin D and getting adequate calcium in her diet. (1500mg)  Osteoporosis screening ordered today.  Reviewed exercise as a preventative health measures.   Recommended Flu Vaccine in Fall of each year.  RTC in 1 year or PRN with problems.  Return in about 1 year (around 11/7/2024) for Annual physical. Plan for DEXA scan with next annual.    Judith Chapman, APRN  11/07/2023

## 2023-11-13 ENCOUNTER — TELEPHONE (OUTPATIENT)
Dept: OBSTETRICS AND GYNECOLOGY | Facility: CLINIC | Age: 58
End: 2023-11-13
Payer: COMMERCIAL

## 2023-11-13 DIAGNOSIS — B96.89 BV (BACTERIAL VAGINOSIS): ICD-10-CM

## 2023-11-13 DIAGNOSIS — N76.0 BV (BACTERIAL VAGINOSIS): ICD-10-CM

## 2023-11-13 DIAGNOSIS — B37.31 VULVOVAGINAL CANDIDIASIS: Primary | ICD-10-CM

## 2023-11-13 LAB — REF LAB TEST METHOD: NORMAL

## 2023-11-13 RX ORDER — FLUCONAZOLE 150 MG/1
150 TABLET ORAL DAILY
Qty: 3 TABLET | Refills: 0 | Status: SHIPPED | OUTPATIENT
Start: 2023-11-13

## 2023-11-13 RX ORDER — METRONIDAZOLE 500 MG/1
500 TABLET ORAL 2 TIMES DAILY
Qty: 14 TABLET | Refills: 0 | Status: SHIPPED | OUTPATIENT
Start: 2023-11-13 | End: 2023-11-20

## 2023-11-13 NOTE — TELEPHONE ENCOUNTER
----- Message from JERRICA Breen sent at 11/13/2023  2:51 PM EST -----  Pap results show ASCUS, HPV pool negative.  BV positive and yeast positive.  I have sent medication to her pharmacy. Flagyl 500 mg bid x 7 days and diflucan 150 mg every 3 days x 3 doses.

## 2023-12-26 ENCOUNTER — LAB (OUTPATIENT)
Dept: LAB | Facility: HOSPITAL | Age: 58
End: 2023-12-26
Payer: COMMERCIAL

## 2023-12-26 ENCOUNTER — OFFICE VISIT (OUTPATIENT)
Dept: FAMILY MEDICINE CLINIC | Facility: CLINIC | Age: 58
End: 2023-12-26
Payer: COMMERCIAL

## 2023-12-26 VITALS
SYSTOLIC BLOOD PRESSURE: 118 MMHG | TEMPERATURE: 98.3 F | HEIGHT: 59 IN | HEART RATE: 78 BPM | OXYGEN SATURATION: 98 % | BODY MASS INDEX: 27.94 KG/M2 | DIASTOLIC BLOOD PRESSURE: 76 MMHG | WEIGHT: 138.6 LBS

## 2023-12-26 DIAGNOSIS — E11.43 TYPE 2 DIABETES MELLITUS WITH DIABETIC AUTONOMIC NEUROPATHY, WITHOUT LONG-TERM CURRENT USE OF INSULIN: ICD-10-CM

## 2023-12-26 DIAGNOSIS — E55.9 VITAMIN D DEFICIENCY: ICD-10-CM

## 2023-12-26 DIAGNOSIS — E56.9 VITAMIN DEFICIENCY: ICD-10-CM

## 2023-12-26 DIAGNOSIS — Z00.00 ANNUAL PHYSICAL EXAM: Primary | ICD-10-CM

## 2023-12-26 DIAGNOSIS — Z00.00 ANNUAL PHYSICAL EXAM: ICD-10-CM

## 2023-12-26 PROCEDURE — 99396 PREV VISIT EST AGE 40-64: CPT | Performed by: NURSE PRACTITIONER

## 2023-12-26 RX ORDER — ORAL SEMAGLUTIDE 14 MG/1
1 TABLET ORAL DAILY
Qty: 90 TABLET | Refills: 1 | Status: SHIPPED | OUTPATIENT
Start: 2023-12-26

## 2023-12-26 NOTE — PROGRESS NOTES
"  Patient Care Team:  Vee Fair APRN as PCP - General (Nurse Practitioner)  Hiro Oliver MD as Consulting Physician (Obstetrics and Gynecology)     Chief complaint: Patient is in today for a physical     Patient is a 58 y.o. female who presents for her yearly physical exam.   Chief Complaint   Patient presents with    Annual Exam       HPI   Does not get regular exercise; eats  \"whatever I want\"; checks blood sugars, was 107 this morning. Sees GYN,  has diagnostic mammogram scheduled.      Review of Systems   Constitutional:  Positive for fatigue. Negative for appetite change, chills, fever and unexpected weight change.   HENT:  Negative for congestion, ear pain and hearing loss.    Eyes:  Negative for photophobia, redness and visual disturbance.   Respiratory:  Negative for cough, shortness of breath and wheezing.    Cardiovascular:  Negative for chest pain, palpitations and leg swelling.   Gastrointestinal:  Positive for abdominal distention (bloating occ) and constipation (controlled with Trulance). Negative for abdominal pain, blood in stool, diarrhea, nausea and vomiting.   Endocrine: Negative for cold intolerance, heat intolerance and polydipsia.   Genitourinary:  Positive for frequency (sees  urology, Dr. Jains Hoff ; has pessary). Negative for difficulty urinating, dysuria and vaginal bleeding.   Musculoskeletal:  Positive for arthralgias (occ) and back pain (occ).   Skin:  Negative for color change, pallor, rash and wound.        Callous    Neurological:  Positive for numbness (occ in right hand). Negative for dizziness, light-headedness and headaches.   Psychiatric/Behavioral:  Negative for dysphoric mood, self-injury, sleep disturbance and suicidal ideas. The patient is not nervous/anxious.       History  Past Medical History:   Diagnosis Date    Allergic     Allergic rhinitis     BV (bacterial vaginosis)     Cataracts, bilateral     Class 1 obesity due to excess calories without " serious comorbidity with body mass index (BMI) of 32.0 to 32.9 in adult 2017    Diabetes mellitus, type 2     Diverticulitis large intestine w/o perforation or abscess w/o bleeding 2017    Diverticulosis 2017    Endometriosis     STAGE 4 WITH LARGE LEFT OVARIAN ENDOMETRIOMA;EXTENSVIE DENSE LEFT ADNEXAL ADHESIONS TO SIDEWALL AND SIGMOID COLON. dEEP IMPLANTS LEFT ANTERIOR CULDESAC.    Female cystocele     Fibrocystic breast disease     Glaucoma     Hormone replacement therapy     Hyperlipidemia     Hypertension     IBS (irritable bowel syndrome)     Insomnia     Obesity (BMI 30.0-34.9)     Screening breast examination     SELF;ADMITS    Stress bladder incontinence, female     Stress bladder incontinence, female     History of prior Alas procedure with good support.   Currently resolved.    Vaginitis     UNKNOWN ETIOLOGY    Varicose veins of legs       Past Surgical History:   Procedure Laterality Date    BLADDER SUSPENSION  10/27/2007    CYSTOCELE REPAIR      CYSTOTOMY      HYSTEROSCOPY      TOTAL ABDOMINAL HYSTERECTOMY WITH SALPINGO OOPHORECTOMY  2013    LYSIS OF ADHESIONS FOR STAGE 4 ENDOMETRIOSIS      Allergies   Allergen Reactions    Ace Inhibitors Cough    Morphine And Related Rash    Polymyxin B-Trimethoprim Unknown (See Comments)      Family History   Problem Relation Age of Onset    ALS Father 70    Diabetes Mother     Dementia Mother     Hypothyroidism Mother     Hyperlipidemia Mother     Breast cancer Mother     Thyroid disease Mother     Cancer Mother     Hypertension Brother         Sandro Morales    Diabetes Sister     Hyperlipidemia Sister     Hypothyroidism Sister     Early death Sister     Hypertension Sister         Lacey is     Thyroid disease Sister     Ovarian cancer Neg Hx     Uterine cancer Neg Hx     Colon cancer Neg Hx      Social History     Socioeconomic History    Marital status: Single    Number of children: 0    Years of education: 12    Highest education level: High  school graduate   Tobacco Use    Smoking status: Never     Passive exposure: Never    Smokeless tobacco: Never   Vaping Use    Vaping Use: Never used   Substance and Sexual Activity    Alcohol use: Yes     Alcohol/week: 1.0 standard drink of alcohol     Types: 1 Cans of beer per week     Comment: occasional    Drug use: No    Sexual activity: Yes     Partners: Male     Birth control/protection: Post-menopausal, Hysterectomy        Current Outpatient Medications:     Blood Glucose Monitoring Suppl (ONE TOUCH ULTRA 2) w/Device kit, 2 (Two) Times a Day. as directed, Disp: , Rfl:     cetirizine (zyrTEC) 10 MG tablet, TAKE 1 TABLET BY MOUTH EVERY DAY, Disp: 90 tablet, Rfl: 1    Cholecalciferol (VITAMIN D3) 5000 UNITS capsule capsule, Take 1 capsule by mouth Daily., Disp: , Rfl:     Creon 34784-115660 units capsule delayed-release particles capsule, TAKE 1 CAPSULE BY MOUTH THREE TIMES DAILY BEFORE MEALS, Disp: , Rfl:     estradiol (MINIVELLE, VIVELLE-DOT) 0.075 MG/24HR patch, Place 1 patch on the skin as directed by provider 2 (Two) Times a Week., Disp: 24 patch, Rfl: 3    fluconazole (Diflucan) 150 MG tablet, Take 1 tablet by mouth Daily. Repeat dose every 3 days, Disp: 3 tablet, Rfl: 0    fluticasone (Flonase) 50 MCG/ACT nasal spray, 2 sprays into the nostril(s) as directed by provider Daily., Disp: 16 g, Rfl: 3    folic acid (FOLVITE) 1 MG tablet, Take 1 tablet by mouth Daily., Disp: 90 tablet, Rfl: 3    glucose blood test strip, Use to test blood sugars twice daily, Disp: 100 each, Rfl: 11    glucose monitor monitoring kit, 1 each 2 (Two) Times a Day. OneTouch Ultra2, Disp: 1 each, Rfl: 0    Lancets misc, Use to test blood sugars twice daily, Disp: 100 each, Rfl: 11    latanoprost (XALATAN) 0.005 % ophthalmic solution, , Disp: , Rfl: 1    losartan-hydrochlorothiazide (HYZAAR) 50-12.5 MG per tablet, Take 1 tablet by mouth Daily., Disp: 90 tablet, Rfl: 3    metFORMIN ER (GLUCOPHAGE-XR) 500 MG 24 hr tablet, Take 1 tablet  by mouth Daily., Disp: 180 tablet, Rfl: 3    methocarbamol (ROBAXIN) 500 MG tablet, Take 1 tablet by mouth Daily., Disp: , Rfl:     rosuvastatin (Crestor) 40 MG tablet, Take 1 tablet by mouth Daily., Disp: 90 tablet, Rfl: 0    Semaglutide (Rybelsus) 14 MG tablet, Take 1 tablet by mouth Daily., Disp: 90 tablet, Rfl: 1    Stool Softener 100 MG capsule, TAKE 1 CAPSULE BY MOUTH TWICE DAILY AS DIRECTED, Disp: , Rfl:     Trulance 3 MG tablet, Take 1 tablet by mouth Daily., Disp: , Rfl:     Immunizations   N/A   Prescribed/Refused   Date     Td/Tdap  (Booster Q 10 yrs)   []           Prescribed    []     Refused        []           Flu  (Yearly)   []        Prescribed    []     Refused        []           Pneumonia      []        Prescribed    []     Refused        []                 Hep B     []        Prescribed    []     Refused        []           Shingles  (Age 50 and older)   []        Prescribed    []     Refused        []           Immunization History   Administered Date(s) Administered    COVID-19 (PFIZER) BIVALENT 12+YRS 12/22/2022    COVID-19 (PFIZER) Purple Cap Monovalent 01/27/2021, 02/17/2021, 10/02/2021    Covid-19 (Pfizer) Gray Cap Monovalent 05/29/2022    Flu Vaccine Intradermal Quad 18-64YR 10/03/2019    Flu Vaccine Quad PF >36MO 08/26/2017, 09/15/2019, 09/06/2020, 09/25/2021    Fluzone (or Fluarix & Flulaval for VFC) >6mos 08/26/2017, 09/06/2020, 09/25/2021    Fluzone Quad >6mos (Multi-dose) 09/22/2018, 09/15/2019    Hepatitis A 09/22/2018, 03/23/2019    Influenza Injectable Mdck Pf Quad 10/04/2022    Influenza Quad Vaccine (Inpatient) 09/11/2016    Influenza Whole 08/01/2016    Influenza, Unspecified 08/26/2017, 10/04/2022    Pneumococcal Conjugate 13-Valent (PCV13) 08/26/2017    Td (TDVAX) 06/03/2002, 11/15/2017    Tdap 01/19/2012    flucelvax quad pfs =>4 YRS 10/03/2019     Health Maintenance   Topic Date Due    Hepatitis B (1 of 3 - 3-dose series) Never done    ZOSTER VACCINE (1 of 2) Never done     DIABETIC EYE EXAM  10/15/2020    ANNUAL PHYSICAL  12/22/2023    DIABETIC FOOT EXAM  12/22/2023    URINE MICROALBUMIN  12/22/2023    Pneumococcal Vaccine 0-64 (2 - PPSV23 or PCV20) 07/07/2024 (Originally 10/21/2017)    HEMOGLOBIN A1C  04/05/2024    LIPID PANEL  06/16/2024    BMI FOLLOWUP  07/10/2024    MAMMOGRAM  10/04/2024    COLORECTAL CANCER SCREENING  01/06/2025    PAP SMEAR  11/07/2026    TDAP/TD VACCINES (5 - Td or Tdap) 11/15/2027    HEPATITIS C SCREENING  Completed    COVID-19 Vaccine  Completed    INFLUENZA VACCINE  Completed        Diabetes  [] Yes  [] No   N/A      Date     Eye Exam     []             []   Complete     []   Recommended Date:  Where:       Foot Exam     []         []   Complete          Obesity Counseling     []       []   Complete       Hemoglobin A1C   Date Value Ref Range Status   10/05/2023 5.5 % Final   11/05/2019 6.80 (H) 4.80 - 5.60 % Final     Microalbumin, Urine   Date Value Ref Range Status   12/22/2022 <1.2 mg/dL Final       Additional Testing      Date     Colorectal Screening       []   N/A   []   Complete    []   Ordered     Date:    Where:       Pap      []   N/A   []   Complete   []   OB/GYN Date:    Where:       Mammogram        []   N/A   []   Complete   []  OB/GYN   []   Ordered Date:    Where:     PSA  (Over age 50)    []   N/A   []   Complete   []   Ordered Date:    Where:     US Aorta  (For male smokers, age 65)     []   N/A   []   Complete   []   Ordered Date:    Where:     CT for Smoker  (Age 55-75, 30 pk yr)    []   N/A   []   Complete   []   Ordered Date:    Where:     Bone Density/DEXA      []   N/A   []   Complete   []   Ordered Date:    Follow-up:     Hep. C        []   N/A   []   Complete   []   Ordered Date:    Where:     Results for orders placed or performed in visit on 11/07/23   LIQUID-BASED PAP SMEAR WITH HPV GENOTYPING IF ASCUS (NESS,[facility]AD)    Specimen: Cervix; ThinPrep Vial   Result Value Ref Range    Reference Lab Report       Pathology & Cytology  Laboratories  [address] [place] [address]  Phone: [phone] or [phone]  Fax: [phone]  Nilson Silveira M.D., Medical Director    PATIENT NAME                           LABORATORY NO.  ALEXANDER DANIELSON                        [id number]  [id number]                         AGE              SEX  SSN           CLIENT REF #  [id number]                         [id number]      1965  F    [id number]   [id number]    [address]         REQUESTING M.D.     ATTENDING [place] [address]                KAMILAH PASTRANA  DATE COLLECTED      DATE RECEIVED      DATE REPORTED  2023    ThinPrep Pap with Cytyc Imaging    DIAGNOSIS:  Epithelial cell abnormality. (ASC) Atypical squamous cells of undetermined  significance.    Professional interpretation rendered by Adriano Trinidad M.D.,F.C.A.P. at [facility], [address] [place] [address].  SPECIMEN  ADEQUACY:  SATISFACTORY FOR EVALUATION  SOURCE OF SPECIMEN:  VAGINAL  SLIDES:  1  CLINICAL HISTORY:  Women's annual routine gynecological examination  Hormone replacement therapy  Recurrent vaginitis, Hysterectomy    HPV  HR-HPV POOL: Negative    The Aptima HPV assay is an in vitro nucleic acid amplification test for the  qualitative detection of E6/E7 viral messenger RNA from 14 high risk types of  HPV in cervical specimens. The high risk HPV types detected include: 16, 18,  31, 33, 35, 39, 45, 51, 52, 56, 58, 59, 66, 68    Trichomonas  TRICHOMONAS VAGINALIS: Negative    The Aptima Trichomonas vaginalis assay is an in vitro qualitative nucleic acid  amplification test for the detection of ribosomal RNA to aid in the diagnosis of  trichomoniasis.    Sendout Ancillary Vaginosis Tests  ATOPOBIUM VAGINAE: Positive  BVAB2: Negative  CANDIDA GLABRATA: Negative  CANDIDA PARAPSILOSIS: Positive  CANDIDA TROPICALIS: Negative  MEGASPHAERA 1: Negative  MDL CANDIDA ALBICANS:  "Negative    COMMENT:  The above results are intended for medical diagnosis and treatment  purposes only.    Results from this assay (s) should be interpreted in conjunction with other  laboratory and clinical data. The test is not intended to differentiate carriers of  the organism from those with active infections. Therapeutic success or failure  cannot be assessed using this test because DNA may persist following  antimicrobial therapy. Nucleic acid base changes or mutations can result in false  negative reactions. This test was developed and its performance characteristics  determined by [facility] It has not been cleared  or approved by the U.S. Food and Drug Administration. The FDA has  determined that such clearance or approval is not necessary. [facility] ([facility]) is located in [place], [place]    CYTOTECHNOLOGIST:      ELIZABETH JENKINS (ASCP)                            REVIEWED, DIAGNOSED AND  ELECTRONICALLY SIGNED BY:      Adriano Trinidad M.D.,F.PIPER.A.P.    CPT CODES:  66986, 54146, 04822, 87798x3, 77481x4, 91361              /76 (BP Location: Right arm, Patient Position: Sitting, Cuff Size: Adult)   Pulse 78   Temp 98.3 °F (36.8 °C) (Oral)   Ht 149.9 cm (59.02\")   Wt 62.9 kg (138 lb 9.6 oz)   LMP  (LMP Unknown)   SpO2 98%   BMI 27.98 kg/m²       Physical Exam  Vitals and nursing note reviewed.   Constitutional:       General: She is not in acute distress.     Appearance: Normal appearance. She is well-developed. She is not diaphoretic.   HENT:      Head: Normocephalic and atraumatic.      Right Ear: External ear normal.      Left Ear: External ear normal.      Nose: Nose normal.   Eyes:      General: No scleral icterus.        Right eye: No discharge.         Left eye: No discharge.      Conjunctiva/sclera: Conjunctivae normal.      Pupils: Pupils are equal, round, and reactive to light.   Neck:      Thyroid: No thyromegaly.      Vascular: No JVD.      " Trachea: No tracheal deviation.   Cardiovascular:      Rate and Rhythm: Normal rate and regular rhythm.      Pulses:           Dorsalis pedis pulses are 2+ on the right side and 2+ on the left side.        Posterior tibial pulses are 1+ on the right side and 1+ on the left side.      Heart sounds: Normal heart sounds. No murmur heard.     No friction rub. No gallop.   Pulmonary:      Effort: Pulmonary effort is normal. No respiratory distress.      Breath sounds: Normal breath sounds. No stridor. No wheezing or rales.   Chest:      Chest wall: No tenderness.   Abdominal:      General: Bowel sounds are normal. There is no distension.      Palpations: Abdomen is soft. There is no mass.      Tenderness: There is no abdominal tenderness. There is no guarding or rebound.      Hernia: No hernia is present.   Musculoskeletal:         General: No tenderness or deformity. Normal range of motion.      Cervical back: Normal range of motion and neck supple.      Right lower leg: No edema.      Left lower leg: No edema.   Feet:      Right foot:      Protective Sensation:   8 sites sensed.      Skin integrity: Callus, dry skin and fissure present.      Left foot:      Protective Sensation:   8 sites sensed.      Skin integrity: Callus, dry skin and fissure present.      Comments: High arches  Lymphadenopathy:      Cervical: No cervical adenopathy.   Skin:     General: Skin is warm and dry.      Coloration: Skin is not pale.      Findings: No erythema or rash.   Neurological:      Mental Status: She is alert and oriented to person, place, and time.      Cranial Nerves: No cranial nerve deficit.      Motor: No abnormal muscle tone.      Coordination: Coordination normal.      Deep Tendon Reflexes: Reflexes are normal and symmetric. Reflexes normal.   Psychiatric:         Behavior: Behavior normal.         Thought Content: Thought content normal.         Judgment: Judgment normal.             Counseling provided on diet and  nutrition, exercise, weight management, diabetes, and hyperlipidemia.    Diagnoses and all orders for this visit:    Annual physical exam  -     CBC Auto Differential; Future  -     Comprehensive Metabolic Panel; Future  -     TSH Rfx On Abnormal To Free T4; Future  -     CBC & Differential; Future  -     Comprehensive Metabolic Panel; Future  -     TSH Rfx On Abnormal To Free T4; Future  -     Folate; Future  -     Vitamin B12; Future  -     Vitamin D 25 Hydroxy; Future  -     Lipid Panel; Future    Vitamin D deficiency  -     Vitamin D,25-Hydroxy; Future  -     CBC & Differential; Future  -     Comprehensive Metabolic Panel; Future  -     TSH Rfx On Abnormal To Free T4; Future  -     Folate; Future  -     Vitamin B12; Future  -     Vitamin D 25 Hydroxy; Future    Type 2 diabetes mellitus with diabetic autonomic neuropathy, without long-term current use of insulin  -     Semaglutide (Rybelsus) 14 MG tablet; Take 1 tablet by mouth Daily.  -     CBC & Differential; Future  -     Comprehensive Metabolic Panel; Future  -     TSH Rfx On Abnormal To Free T4; Future  -     Folate; Future  -     Vitamin B12; Future  -     Vitamin D 25 Hydroxy; Future    Vitamin deficiency  -     Vitamin B12; Future  -     Folate; Future  -     CBC & Differential; Future  -     Comprehensive Metabolic Panel; Future  -     TSH Rfx On Abnormal To Free T4; Future  -     Folate; Future  -     Vitamin B12; Future  -     Vitamin D 25 Hydroxy; Future    Other orders  -     B Complex-Biotin-FA (B-COMPLEX PO); Take  by mouth.     Screening labs ordered to evaluate chronic conditions. I will contact patient regarding test results and provide instructions regarding any necessary changes in plan of care.  Nutrition and activity goals reviewed including: mainly water to drink, limit white flour/processed sugar, and processed foods, choose fresh fruits, vegetables, fish, lean meats,high fiber carbs, exercise  working toward 150 mins cardio per week,  weight training 2x/week.  Diabetes is controlled, A1c 5.5 in Oct 2023  JERRICA Traylor   12/26/2023   11:01 EST          Please note that portions of this document were completed with a voice recognition program.     At [facility], we believe that sharing information builds trust and better relationships. You are receiving this note because you are receiving care at [facility] or have recently visited. It is possible you will see health information before a provider has talked with you about it. This kind of information can be easy to misunderstand. To help you fully understand what it means for your health, we urge you to discuss this note with your provider.

## 2023-12-29 ENCOUNTER — LAB (OUTPATIENT)
Dept: LAB | Facility: HOSPITAL | Age: 58
End: 2023-12-29
Payer: COMMERCIAL

## 2023-12-29 ENCOUNTER — TELEPHONE (OUTPATIENT)
Dept: FAMILY MEDICINE CLINIC | Facility: CLINIC | Age: 58
End: 2023-12-29
Payer: COMMERCIAL

## 2023-12-29 DIAGNOSIS — E11.43 TYPE 2 DIABETES MELLITUS WITH DIABETIC AUTONOMIC NEUROPATHY, WITHOUT LONG-TERM CURRENT USE OF INSULIN: ICD-10-CM

## 2023-12-29 DIAGNOSIS — Z00.00 ANNUAL PHYSICAL EXAM: ICD-10-CM

## 2023-12-29 DIAGNOSIS — E56.9 VITAMIN DEFICIENCY: ICD-10-CM

## 2023-12-29 DIAGNOSIS — E55.9 VITAMIN D DEFICIENCY: ICD-10-CM

## 2023-12-29 LAB
25(OH)D3 SERPL-MCNC: 61.5 NG/ML (ref 30–100)
ALBUMIN SERPL-MCNC: 4.5 G/DL (ref 3.5–5.2)
ALBUMIN/GLOB SERPL: 1.9 G/DL
ALP SERPL-CCNC: 63 U/L (ref 39–117)
ALT SERPL W P-5'-P-CCNC: 30 U/L (ref 1–33)
ANION GAP SERPL CALCULATED.3IONS-SCNC: 10.9 MMOL/L (ref 5–15)
AST SERPL-CCNC: 31 U/L (ref 1–32)
BASOPHILS # BLD AUTO: 0.02 10*3/MM3 (ref 0–0.2)
BASOPHILS NFR BLD AUTO: 0.3 % (ref 0–1.5)
BILIRUB SERPL-MCNC: 0.5 MG/DL (ref 0–1.2)
BUN SERPL-MCNC: 12 MG/DL (ref 6–20)
BUN/CREAT SERPL: 19.4 (ref 7–25)
CALCIUM SPEC-SCNC: 9.9 MG/DL (ref 8.6–10.5)
CHLORIDE SERPL-SCNC: 103 MMOL/L (ref 98–107)
CHOLEST SERPL-MCNC: 109 MG/DL (ref 0–200)
CO2 SERPL-SCNC: 25.1 MMOL/L (ref 22–29)
CREAT SERPL-MCNC: 0.62 MG/DL (ref 0.57–1)
DEPRECATED RDW RBC AUTO: 37.4 FL (ref 37–54)
EGFRCR SERPLBLD CKD-EPI 2021: 104 ML/MIN/1.73
EOSINOPHIL # BLD AUTO: 0.17 10*3/MM3 (ref 0–0.4)
EOSINOPHIL NFR BLD AUTO: 2.9 % (ref 0.3–6.2)
ERYTHROCYTE [DISTWIDTH] IN BLOOD BY AUTOMATED COUNT: 11.8 % (ref 12.3–15.4)
FOLATE SERPL-MCNC: >20 NG/ML (ref 4.78–24.2)
GLOBULIN UR ELPH-MCNC: 2.4 GM/DL
GLUCOSE SERPL-MCNC: 95 MG/DL (ref 65–99)
HCT VFR BLD AUTO: 42.3 % (ref 34–46.6)
HDLC SERPL-MCNC: 51 MG/DL (ref 40–60)
HGB BLD-MCNC: 14.8 G/DL (ref 12–15.9)
IMM GRANULOCYTES # BLD AUTO: 0.01 10*3/MM3 (ref 0–0.05)
IMM GRANULOCYTES NFR BLD AUTO: 0.2 % (ref 0–0.5)
LDLC SERPL CALC-MCNC: 42 MG/DL (ref 0–100)
LDLC/HDLC SERPL: 0.84 {RATIO}
LYMPHOCYTES # BLD AUTO: 2.16 10*3/MM3 (ref 0.7–3.1)
LYMPHOCYTES NFR BLD AUTO: 37.2 % (ref 19.6–45.3)
MCH RBC QN AUTO: 30.3 PG (ref 26.6–33)
MCHC RBC AUTO-ENTMCNC: 35 G/DL (ref 31.5–35.7)
MCV RBC AUTO: 86.7 FL (ref 79–97)
MONOCYTES # BLD AUTO: 0.34 10*3/MM3 (ref 0.1–0.9)
MONOCYTES NFR BLD AUTO: 5.9 % (ref 5–12)
NEUTROPHILS NFR BLD AUTO: 3.11 10*3/MM3 (ref 1.7–7)
NEUTROPHILS NFR BLD AUTO: 53.5 % (ref 42.7–76)
NRBC BLD AUTO-RTO: 0 /100 WBC (ref 0–0.2)
PLATELET # BLD AUTO: 235 10*3/MM3 (ref 140–450)
PMV BLD AUTO: 11 FL (ref 6–12)
POTASSIUM SERPL-SCNC: 4.1 MMOL/L (ref 3.5–5.2)
PROT SERPL-MCNC: 6.9 G/DL (ref 6–8.5)
RBC # BLD AUTO: 4.88 10*6/MM3 (ref 3.77–5.28)
SODIUM SERPL-SCNC: 139 MMOL/L (ref 136–145)
TRIGL SERPL-MCNC: 76 MG/DL (ref 0–150)
TSH SERPL DL<=0.05 MIU/L-ACNC: 2.16 UIU/ML (ref 0.27–4.2)
VIT B12 BLD-MCNC: 1254 PG/ML (ref 211–946)
VLDLC SERPL-MCNC: 16 MG/DL (ref 5–40)
WBC NRBC COR # BLD AUTO: 5.81 10*3/MM3 (ref 3.4–10.8)

## 2023-12-29 PROCEDURE — 82746 ASSAY OF FOLIC ACID SERUM: CPT

## 2023-12-29 PROCEDURE — 82306 VITAMIN D 25 HYDROXY: CPT

## 2023-12-29 PROCEDURE — 80061 LIPID PANEL: CPT

## 2023-12-29 PROCEDURE — 80050 GENERAL HEALTH PANEL: CPT

## 2023-12-29 PROCEDURE — 36415 COLL VENOUS BLD VENIPUNCTURE: CPT

## 2023-12-29 PROCEDURE — 82607 VITAMIN B-12: CPT

## 2023-12-29 NOTE — TELEPHONE ENCOUNTER
Caller: Nelli Morales    Relationship: Self    Best call back number: [phone]    What is the best time to reach you: ANYTIME    Who are you requesting to speak with (clinical staff, provider,  specific staff member): PROVIDER    What was the call regarding: AT HER PHYSICAL ON TUESDAY 12/26/2023, [facility] ROJAS MENTIONED PATIENT HAD SOME FLUID BEHIND HER LEFT EAR. WERE YOU GOING TO CALL SOMETHING IN FOR THAT? PATIENT IS WONDERING...

## 2023-12-29 NOTE — TELEPHONE ENCOUNTER
I think this is a chronic problem, continue Flonase and daily zyrtec or Claritin. This is common and usually allergy related

## 2024-01-16 ENCOUNTER — TELEPHONE (OUTPATIENT)
Dept: FAMILY MEDICINE CLINIC | Facility: CLINIC | Age: 59
End: 2024-01-16
Payer: COMMERCIAL

## 2024-01-16 NOTE — TELEPHONE ENCOUNTER
SAQIB PAULINO AT [facility]  REF# [id number]. THIS PATIENT HAD A PHYSICAL ON 12/26/23 AND HAD TO COME BACK FOR LAB WORK. 11/29/23  [facility] IS ASKING THAT WE RE CODE THE LAB WORK TO PREVENTATIVE BECAUSE OF THE PHYSICAL SO THAT THE PATIENT'S CLAIM CAN BE PAID.

## 2024-01-19 DIAGNOSIS — E78.2 MIXED HYPERLIPIDEMIA: ICD-10-CM

## 2024-01-22 DIAGNOSIS — E78.2 MIXED HYPERLIPIDEMIA: ICD-10-CM

## 2024-01-22 RX ORDER — ROSUVASTATIN CALCIUM 40 MG/1
40 TABLET, COATED ORAL DAILY
Qty: 90 TABLET | Refills: 0 | Status: SHIPPED | OUTPATIENT
Start: 2024-01-22

## 2024-01-22 RX ORDER — ROSUVASTATIN CALCIUM 40 MG/1
40 TABLET, COATED ORAL DAILY
Qty: 90 TABLET | Refills: 0 | OUTPATIENT
Start: 2024-01-22

## 2024-02-05 ENCOUNTER — TELEPHONE (OUTPATIENT)
Age: 59
End: 2024-02-05
Payer: COMMERCIAL

## 2024-02-05 DIAGNOSIS — E11.43 TYPE 2 DIABETES MELLITUS WITH DIABETIC AUTONOMIC NEUROPATHY, WITHOUT LONG-TERM CURRENT USE OF INSULIN: ICD-10-CM

## 2024-03-08 DIAGNOSIS — I10 ESSENTIAL HYPERTENSION: ICD-10-CM

## 2024-03-08 RX ORDER — LOSARTAN POTASSIUM AND HYDROCHLOROTHIAZIDE 12.5; 5 MG/1; MG/1
1 TABLET ORAL DAILY
Qty: 90 TABLET | Refills: 1 | Status: SHIPPED | OUTPATIENT
Start: 2024-03-08

## 2024-03-08 NOTE — TELEPHONE ENCOUNTER
Caller: Nelli Morales    Relationship: Self    Best call back number: [phone]     Requested Prescriptions:   Requested Prescriptions     Pending Prescriptions Disp Refills    losartan-hydrochlorothiazide (HYZAAR) 50-12.5 MG per tablet 90 tablet 3     Sig: Take 1 tablet by mouth Daily.        Pharmacy where request should be sent: [id number] [place] [id number] [address]  AT [facility] [address] [facility] [phone] [facility] [phone] FX     Last office visit with prescribing clinician: 12/26/2023   Last telemedicine visit with prescribing clinician: Visit date not found   Next office visit with prescribing clinician: 4/1/2024     Additional details provided by patient: COMPLETELY OUT OF MEDICATION    Does the patient have less than a 3 day supply:  [x] Yes  [] No    Would you like a call back once the refill request has been completed: [] Yes [x] No    If the office needs to give you a call back, can they leave a voicemail: [] Yes [x] No    Sam Cook Rep   03/08/24 08:26 EST

## 2024-03-12 DIAGNOSIS — D64.9 ANEMIA, UNSPECIFIED TYPE: ICD-10-CM

## 2024-03-13 RX ORDER — FOLIC ACID 1 MG/1
1000 TABLET ORAL DAILY
Qty: 90 TABLET | Refills: 1 | Status: SHIPPED | OUTPATIENT
Start: 2024-03-13

## 2024-03-16 ENCOUNTER — OFFICE VISIT (OUTPATIENT)
Dept: FAMILY MEDICINE CLINIC | Facility: CLINIC | Age: 59
End: 2024-03-16
Payer: COMMERCIAL

## 2024-03-16 VITALS
TEMPERATURE: 98.6 F | OXYGEN SATURATION: 97 % | DIASTOLIC BLOOD PRESSURE: 74 MMHG | HEIGHT: 59 IN | HEART RATE: 80 BPM | SYSTOLIC BLOOD PRESSURE: 130 MMHG | BODY MASS INDEX: 27.7 KG/M2 | WEIGHT: 137.4 LBS

## 2024-03-16 DIAGNOSIS — R05.1 ACUTE COUGH: ICD-10-CM

## 2024-03-16 DIAGNOSIS — H66.92 LEFT ACUTE OTITIS MEDIA: Primary | ICD-10-CM

## 2024-03-16 PROCEDURE — 99214 OFFICE O/P EST MOD 30 MIN: CPT

## 2024-03-16 RX ORDER — BENZONATATE 100 MG/1
100 CAPSULE ORAL 3 TIMES DAILY PRN
Qty: 30 CAPSULE | Refills: 0 | Status: SHIPPED | OUTPATIENT
Start: 2024-03-16 | End: 2024-03-26

## 2024-03-16 RX ORDER — AMOXICILLIN AND CLAVULANATE POTASSIUM 875; 125 MG/1; MG/1
1 TABLET, FILM COATED ORAL 2 TIMES DAILY
Qty: 20 TABLET | Refills: 0 | Status: SHIPPED | OUTPATIENT
Start: 2024-03-16 | End: 2024-03-26

## 2024-03-16 NOTE — PROGRESS NOTES
Office Note     Name: Nelli Morales    : 1965     MRN: [id number]     Chief Complaint  Cough (Been going on for a week /Drainage, cough, ears feel full, chest congestion)    Subjective     History of Present Illness:  Nelli Morales is a 58 y.o. female who presents today for cough, congestion and ear fullness for 1 week.  She has a past medical history of diverticulitis, type 2 diabetes and hypertension.  Patient states that this started about 1 week ago.  Patient does have history of seasonal allergies and thought that it may just be allergy related.  She has tried to use her Flonase and has had some improvement.  Patient states that her ears are just feeling very full and she is starting to cough up a little bit more mucus.  Denies sore throat or fevers or bodyaches.  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  Denies dizziness or ear discharge.  She states she has also had some sinus congestion in her face.        Review of Systems:   Review of Systems   Constitutional:  Positive for fatigue. Negative for chills and fever.   HENT:  Positive for congestion, rhinorrhea and sinus pressure. Negative for hearing loss, sore throat and tinnitus.    Eyes:  Negative for discharge.   Respiratory:  Positive for cough. Negative for chest tightness, shortness of breath and wheezing.    Cardiovascular:  Negative for chest pain.   Gastrointestinal:  Negative for abdominal pain, nausea and vomiting.   Musculoskeletal:  Negative for myalgias.   Neurological:  Negative for dizziness and headache.       Past Medical History:   Past Medical History:   Diagnosis Date    Allergic     Allergic rhinitis     BV (bacterial vaginosis)     Cataracts, bilateral     Class 1 obesity due to excess calories without serious comorbidity with body mass index (BMI) of 32.0 to 32.9 in adult 2017    Diabetes mellitus, type 2     Diverticulitis large intestine  w/o perforation or abscess w/o bleeding 2017    Diverticulosis 2017    Endometriosis     STAGE 4 WITH LARGE LEFT OVARIAN ENDOMETRIOMA;EXTENSVIE DENSE LEFT ADNEXAL ADHESIONS TO SIDEWALL AND SIGMOID COLON. dEEP IMPLANTS LEFT ANTERIOR CULDESAC.    Female cystocele     Fibrocystic breast disease     Glaucoma     Hormone replacement therapy     Hyperlipidemia     Hypertension     IBS (irritable bowel syndrome)     Insomnia     Obesity (BMI 30.0-34.9)     Screening breast examination     SELF;ADMITS    Stress bladder incontinence, female     Stress bladder incontinence, female     History of prior Alas procedure with good support.   Currently resolved.    Vaginitis     UNKNOWN ETIOLOGY    Varicose veins of legs        Past Surgical History:   Past Surgical History:   Procedure Laterality Date    BLADDER SUSPENSION  10/27/2007    CYSTOCELE REPAIR      CYSTOTOMY      HYSTEROSCOPY      TOTAL ABDOMINAL HYSTERECTOMY WITH SALPINGO OOPHORECTOMY  2013    LYSIS OF ADHESIONS FOR STAGE 4 ENDOMETRIOSIS       Family History:   Family History   Problem Relation Age of Onset    ALS Father 70    Diabetes Mother     Dementia Mother     Hypothyroidism Mother     Hyperlipidemia Mother     Breast cancer Mother     Thyroid disease Mother     Cancer Mother     Hypertension Brother         Sandro Morales    Diabetes Sister     Hyperlipidemia Sister     Hypothyroidism Sister     Early death Sister     Hypertension Sister         Lacey is     Thyroid disease Sister     Ovarian cancer Neg Hx     Uterine cancer Neg Hx     Colon cancer Neg Hx        Social History:   Social History     Socioeconomic History    Marital status: Single    Number of children: 0    Years of education: 12    Highest education level: High school graduate   Tobacco Use    Smoking status: Never     Passive exposure: Never    Smokeless tobacco: Never   Vaping Use    Vaping status: Never Used   Substance and Sexual Activity    Alcohol use: Yes     Alcohol/week:  1.0 standard drink of alcohol     Types: 1 Cans of beer per week     Comment: occasional    Drug use: No    Sexual activity: Yes     Partners: Male     Birth control/protection: Post-menopausal, Hysterectomy       Immunizations:   Immunization History   Administered Date(s) Administered    COVID-19 (PFIZER) BIVALENT 12+YRS 12/22/2022    COVID-19 (PFIZER) Purple Cap Monovalent 01/27/2021, 02/17/2021, 10/02/2021    Covid-19 (Pfizer) Gray Cap Monovalent 05/29/2022    Flu Vaccine Intradermal Quad 18-64YR 10/03/2019    Flu Vaccine Quad PF >36MO 08/26/2017, 09/15/2019, 09/06/2020, 09/25/2021    Fluzone (or Fluarix & Flulaval for VFC) >6mos 08/26/2017, 09/06/2020, 09/25/2021    Fluzone Quad >6mos (Multi-dose) 09/22/2018, 09/15/2019    Hepatitis A 09/22/2018, 03/23/2019    Influenza Injectable Mdck Pf Quad 10/04/2022    Influenza Quad Vaccine (Inpatient) 09/11/2016    Influenza Whole 08/01/2016    Influenza, Unspecified 08/26/2017, 10/04/2022    Pneumococcal Conjugate 13-Valent (PCV13) 08/26/2017    Td (TDVAX) 06/03/2002, 11/15/2017    Tdap 01/19/2012    flucelvax quad pfs =>4 YRS 10/03/2019        Medications:     Current Outpatient Medications:     B Complex-Biotin-FA (B-COMPLEX PO), Take  by mouth., Disp: , Rfl:     Blood Glucose Monitoring Suppl (ONE TOUCH ULTRA 2) w/Device kit, 2 (Two) Times a Day. as directed, Disp: , Rfl:     cetirizine (zyrTEC) 10 MG tablet, TAKE 1 TABLET BY MOUTH EVERY DAY, Disp: 90 tablet, Rfl: 1    Cholecalciferol (VITAMIN D3) 5000 UNITS capsule capsule, Take 1 capsule by mouth Daily., Disp: , Rfl:     estradiol (MINIVELLE, VIVELLE-DOT) 0.075 MG/24HR patch, Place 1 patch on the skin as directed by provider 2 (Two) Times a Week., Disp: 24 patch, Rfl: 3    fluticasone (Flonase) 50 MCG/ACT nasal spray, 2 sprays into the nostril(s) as directed by provider Daily., Disp: 16 g, Rfl: 3    folic acid (FOLVITE) 1 MG tablet, Take 1 tablet by mouth Daily., Disp: 90 tablet, Rfl: 1    glucose blood test strip,  "Use to test blood sugars twice daily, Disp: 100 each, Rfl: 11    glucose monitor monitoring kit, 1 each 2 (Two) Times a Day. OneTouch Ultra2, Disp: 1 each, Rfl: 0    Lancets misc, Use to test blood sugars twice daily, Disp: 100 each, Rfl: 11    latanoprost (XALATAN) 0.005 % ophthalmic solution, , Disp: , Rfl: 1    losartan-hydrochlorothiazide (HYZAAR) 50-12.5 MG per tablet, Take 1 tablet by mouth Daily., Disp: 90 tablet, Rfl: 1    metFORMIN ER (GLUCOPHAGE-XR) 500 MG 24 hr tablet, Take 1 tablet by mouth Daily., Disp: 180 tablet, Rfl: 3    methocarbamol (ROBAXIN) 500 MG tablet, Take 1 tablet by mouth Daily., Disp: , Rfl:     rosuvastatin (CRESTOR) 40 MG tablet, TAKE 1 TABLET BY MOUTH DAILY, Disp: 90 tablet, Rfl: 0    Semaglutide (Rybelsus) 14 MG tablet, Take 1 tablet by mouth Daily., Disp: 90 tablet, Rfl: 1    Stool Softener 100 MG capsule, TAKE 1 CAPSULE BY MOUTH TWICE DAILY AS DIRECTED, Disp: , Rfl:     Trulance 3 MG tablet, Take 1 tablet by mouth Daily., Disp: , Rfl:     amoxicillin-clavulanate (AUGMENTIN) 875-125 MG per tablet, Take 1 tablet by mouth 2 (Two) Times a Day for 10 days., Disp: 20 tablet, Rfl: 0    benzonatate (TESSALON) 100 MG capsule, Take 1 capsule by mouth 3 (Three) Times a Day As Needed for Cough for up to 10 days., Disp: 30 capsule, Rfl: 0    Creon [id number] units capsule delayed-release particles capsule, TAKE 1 CAPSULE BY MOUTH THREE TIMES DAILY BEFORE MEALS (Patient not taking: Reported on 3/16/2024), Disp: , Rfl:     Allergies:   Allergies   Allergen Reactions    Ace Inhibitors Cough    Morphine And Related Rash    Polymyxin B-Trimethoprim Unknown (See Comments)       Objective     Vital Signs  /74   Pulse 80   Temp 98.6 °F (37 °C)   Ht 149.9 cm (59.02\")   Wt 62.3 kg (137 lb 6.4 oz)   SpO2 97%   BMI 27.74 kg/m²   Estimated body mass index is 27.74 kg/m² as calculated from the following:    Height as of this encounter: 149.9 cm (59.02\").    Weight as of this encounter: 62.3 kg " (137 lb 6.4 oz).         Physical Exam  Vitals reviewed.   Constitutional:       Appearance: Normal appearance.   HENT:      Head: Normocephalic and atraumatic.      Right Ear: Hearing and ear canal normal. Tympanic membrane is injected, erythematous and bulging.      Left Ear: Hearing and ear canal normal. Tympanic membrane is injected, erythematous and bulging.   Eyes:      Pupils: Pupils are equal, round, and reactive to light.   Cardiovascular:      Rate and Rhythm: Normal rate and regular rhythm.      Pulses: Normal pulses.      Heart sounds: Normal heart sounds.   Pulmonary:      Effort: Pulmonary effort is normal.      Breath sounds: Normal breath sounds.   Abdominal:      General: Abdomen is flat. Bowel sounds are normal.   Skin:     General: Skin is warm.   Neurological:      General: No focal deficit present.      Mental Status: She is alert and oriented to person, place, and time.   Psychiatric:         Mood and Affect: Mood normal.            Results:  No results found for this or any previous visit (from the past 24 hour(s)).     Assessment and Plan     Assessment/Plan:  Diagnoses and all orders for this visit:    1. Left acute otitis media (Primary)  -     amoxicillin-clavulanate (AUGMENTIN) 875-125 MG per tablet; Take 1 tablet by mouth 2 (Two) Times a Day for 10 days.  Dispense: 20 tablet; Refill: 0    2. Acute cough  -     benzonatate (TESSALON) 100 MG capsule; Take 1 capsule by mouth 3 (Three) Times a Day As Needed for Cough for up to 10 days.  Dispense: 30 capsule; Refill: 0      Start Augmentin for ear infection and sinusitis.  Advised patient that if she begins to feel worse or does not have any improvement in her symptoms to return for reevaluation.  Patient verbalized understanding and agreed to this plan.    Follow Up  Return if symptoms worsen or fail to improve.    Lakisha Gomez PA-C   [facility]

## 2024-03-18 DIAGNOSIS — H69.93 DYSFUNCTION OF BOTH EUSTACHIAN TUBES: ICD-10-CM

## 2024-03-19 ENCOUNTER — TELEPHONE (OUTPATIENT)
Dept: FAMILY MEDICINE CLINIC | Facility: CLINIC | Age: 59
End: 2024-03-19
Payer: COMMERCIAL

## 2024-03-19 DIAGNOSIS — J01.00 ACUTE NON-RECURRENT MAXILLARY SINUSITIS: Primary | ICD-10-CM

## 2024-03-19 RX ORDER — CETIRIZINE HYDROCHLORIDE 10 MG/1
10 TABLET ORAL DAILY
Qty: 90 TABLET | Refills: 1 | Status: SHIPPED | OUTPATIENT
Start: 2024-03-19

## 2024-03-19 RX ORDER — CEFDINIR 300 MG/1
300 CAPSULE ORAL 2 TIMES DAILY
Qty: 14 CAPSULE | Refills: 0 | Status: SHIPPED | OUTPATIENT
Start: 2024-03-19 | End: 2024-03-26

## 2024-03-19 NOTE — TELEPHONE ENCOUNTER
Caller: Nelli Morales    Relationship: Self    Best call back number: [phone]     Which medication are you concerned about: amoxicillin-clavulanate (AUGMENTIN) 875-125 MG per tablet     Who prescribed you this medication: ADDI CORBIN        What are your concerns: PATIENT WAS SEEN IN OFFICE ON SAT 3/16/24. SHE HAD A EAR INFECTION AND WAS PRESCRIBED THE ABOVE MEDICATION . SHE STATED IT IS CAUSING HER DIARRHEA. SHE WANTS A CALLBACK TO SEE IF SHE CAN TAKE SOMETHING ELSE OR WHAT SHE SHOULD DO

## 2024-03-19 NOTE — TELEPHONE ENCOUNTER
Cefdinir prescribed to replace amoxicillin. I am not in office today, if she has questions, let her know that

## 2024-03-19 NOTE — TELEPHONE ENCOUNTER
Rx Refill Note  Requested Prescriptions     Pending Prescriptions Disp Refills    cetirizine (zyrTEC) 10 MG tablet 90 tablet 1     Sig: Take 1 tablet by mouth Daily.      Last office visit with prescribing clinician: 12/26/2023   Last telemedicine visit with prescribing clinician: Visit date not found   Next office visit with prescribing clinician: 4/1/2024     Sam Kate Rep  03/19/24, 07:52 EDT

## 2024-03-19 NOTE — TELEPHONE ENCOUNTER
Rx Refill Note  Requested Prescriptions     Pending Prescriptions Disp Refills    cetirizine (zyrTEC) 10 MG tablet 90 tablet 1     Sig: Take 1 tablet by mouth Daily.      Last office visit with prescribing clinician: 12/26/2023   Last telemedicine visit with prescribing clinician: Visit date not found   Next office visit with prescribing clinician: 4/1/2024                         Would you like a call back once the refill request has been completed: [] Yes [] No    If the office needs to give you a call back, can they leave a voicemail: [] Yes [] No    Keya Simon MA  03/19/24, 07:58 EDT

## 2024-04-01 ENCOUNTER — OFFICE VISIT (OUTPATIENT)
Dept: FAMILY MEDICINE CLINIC | Facility: CLINIC | Age: 59
End: 2024-04-01
Payer: COMMERCIAL

## 2024-04-01 ENCOUNTER — TELEPHONE (OUTPATIENT)
Dept: FAMILY MEDICINE CLINIC | Facility: CLINIC | Age: 59
End: 2024-04-01

## 2024-04-01 VITALS
HEART RATE: 72 BPM | BODY MASS INDEX: 27.62 KG/M2 | HEIGHT: 59 IN | WEIGHT: 137 LBS | SYSTOLIC BLOOD PRESSURE: 118 MMHG | OXYGEN SATURATION: 98 % | DIASTOLIC BLOOD PRESSURE: 76 MMHG

## 2024-04-01 DIAGNOSIS — E11.9 CONTROLLED TYPE 2 DIABETES MELLITUS WITHOUT COMPLICATION, WITHOUT LONG-TERM CURRENT USE OF INSULIN: Primary | ICD-10-CM

## 2024-04-01 DIAGNOSIS — E78.2 MIXED HYPERLIPIDEMIA: ICD-10-CM

## 2024-04-01 DIAGNOSIS — E11.9 CONTROLLED TYPE 2 DIABETES MELLITUS WITHOUT COMPLICATION, WITHOUT LONG-TERM CURRENT USE OF INSULIN: ICD-10-CM

## 2024-04-01 DIAGNOSIS — E11.43 TYPE 2 DIABETES MELLITUS WITH DIABETIC AUTONOMIC NEUROPATHY, WITHOUT LONG-TERM CURRENT USE OF INSULIN: ICD-10-CM

## 2024-04-01 DIAGNOSIS — N89.8 VAGINA ITCHING: ICD-10-CM

## 2024-04-01 LAB
BILIRUB BLD-MCNC: NEGATIVE MG/DL
CLARITY, POC: CLEAR
COLOR UR: YELLOW
EXPIRATION DATE: ABNORMAL
EXPIRATION DATE: NORMAL
GLUCOSE UR STRIP-MCNC: NEGATIVE MG/DL
HBA1C MFR BLD: 5.5 % (ref 4.5–5.7)
KETONES UR QL: NEGATIVE
LEUKOCYTE EST, POC: NEGATIVE
Lab: ABNORMAL
Lab: NORMAL
NITRITE UR-MCNC: NEGATIVE MG/ML
PH UR: 5.5 [PH] (ref 5–8)
PROT UR STRIP-MCNC: ABNORMAL MG/DL
RBC # UR STRIP: NEGATIVE /UL
SP GR UR: 1.03 (ref 1–1.03)
UROBILINOGEN UR QL: NORMAL

## 2024-04-01 PROCEDURE — 87491 CHLMYD TRACH DNA AMP PROBE: CPT | Performed by: NURSE PRACTITIONER

## 2024-04-01 PROCEDURE — 87798 DETECT AGENT NOS DNA AMP: CPT | Performed by: NURSE PRACTITIONER

## 2024-04-01 PROCEDURE — 87661 TRICHOMONAS VAGINALIS AMPLIF: CPT | Performed by: NURSE PRACTITIONER

## 2024-04-01 PROCEDURE — 87801 DETECT AGNT MULT DNA AMPLI: CPT | Performed by: NURSE PRACTITIONER

## 2024-04-01 PROCEDURE — 82570 ASSAY OF URINE CREATININE: CPT | Performed by: NURSE PRACTITIONER

## 2024-04-01 PROCEDURE — 87591 N.GONORRHOEAE DNA AMP PROB: CPT | Performed by: NURSE PRACTITIONER

## 2024-04-01 PROCEDURE — 82043 UR ALBUMIN QUANTITATIVE: CPT | Performed by: NURSE PRACTITIONER

## 2024-04-01 RX ORDER — ROSUVASTATIN CALCIUM 40 MG/1
40 TABLET, COATED ORAL NIGHTLY
Qty: 90 TABLET | Refills: 3 | Status: SHIPPED | OUTPATIENT
Start: 2024-04-01

## 2024-04-01 RX ORDER — METFORMIN HYDROCHLORIDE 500 MG/1
500 TABLET, EXTENDED RELEASE ORAL DAILY
Qty: 180 TABLET | Refills: 3 | Status: SHIPPED | OUTPATIENT
Start: 2024-04-01

## 2024-04-01 RX ORDER — FLUCONAZOLE 150 MG/1
150 TABLET ORAL
Qty: 2 TABLET | Refills: 0 | Status: SHIPPED | OUTPATIENT
Start: 2024-04-01

## 2024-04-01 RX ORDER — ORAL SEMAGLUTIDE 14 MG/1
1 TABLET ORAL DAILY
Qty: 90 TABLET | Refills: 1 | Status: SHIPPED | OUTPATIENT
Start: 2024-04-01

## 2024-04-01 NOTE — TELEPHONE ENCOUNTER
Caller: Nelli Morales    Relationship: Self    Best call back number: [phone]     Caller requesting test results: YES    What test was performed: A1C    When was the test performed: 04/01/2024    Where was the test performed: IN OFFICE    Additional notes: PATIENT WOULD LIKE HER A1C RESULTS FAXED [phone]. [employer]

## 2024-04-02 LAB
ALBUMIN UR-MCNC: 1.5 MG/DL
CREAT UR-MCNC: 173.3 MG/DL
MICROALBUMIN/CREAT UR: 8.7 MG/G (ref 0–29)

## 2024-04-03 ENCOUNTER — OFFICE VISIT (OUTPATIENT)
Dept: OBSTETRICS AND GYNECOLOGY | Facility: CLINIC | Age: 59
End: 2024-04-03
Payer: COMMERCIAL

## 2024-04-03 VITALS
BODY MASS INDEX: 27.25 KG/M2 | SYSTOLIC BLOOD PRESSURE: 118 MMHG | HEIGHT: 60 IN | WEIGHT: 138.8 LBS | DIASTOLIC BLOOD PRESSURE: 80 MMHG

## 2024-04-03 DIAGNOSIS — L29.2 VULVAR ITCHING: ICD-10-CM

## 2024-04-03 DIAGNOSIS — Z12.31 ENCOUNTER FOR SCREENING MAMMOGRAM FOR MALIGNANT NEOPLASM OF BREAST: ICD-10-CM

## 2024-04-03 DIAGNOSIS — Z90.79 HISTORY OF TOTAL HYSTERECTOMY WITH BILATERAL SALPINGO-OOPHORECTOMY (BSO): ICD-10-CM

## 2024-04-03 DIAGNOSIS — Z12.4 SCREENING FOR CERVICAL CANCER: ICD-10-CM

## 2024-04-03 DIAGNOSIS — Z90.722 HISTORY OF TOTAL HYSTERECTOMY WITH BILATERAL SALPINGO-OOPHORECTOMY (BSO): ICD-10-CM

## 2024-04-03 DIAGNOSIS — N81.6 RECTOCELE: ICD-10-CM

## 2024-04-03 DIAGNOSIS — Z79.890 HORMONE REPLACEMENT THERAPY: Primary | ICD-10-CM

## 2024-04-03 DIAGNOSIS — Z90.710 HISTORY OF TOTAL HYSTERECTOMY WITH BILATERAL SALPINGO-OOPHORECTOMY (BSO): ICD-10-CM

## 2024-04-03 LAB — WET PREP GENITAL: NORMAL

## 2024-04-03 RX ORDER — CLOTRIMAZOLE AND BETAMETHASONE DIPROPIONATE 10; .64 MG/G; MG/G
1 CREAM TOPICAL 2 TIMES DAILY
Qty: 15 G | Refills: 3 | Status: SHIPPED | OUTPATIENT
Start: 2024-04-03

## 2024-04-03 NOTE — PROGRESS NOTES
No chief complaint on file.        Subjective   HPI  Nelli Morales is a 58 y.o. female, , who presents for evaluation of burning, odor, and slight vaginal itching . Her symptoms have been present for 1 week(s).  Additional she has noticed  none .    Prior to the onset of symptoms she was on antibiotics, amoxicillin.  She has recently changed soaps/detergents/toilet tissue.  Prior to this visit, she has used fluconazole from pcp in an attempt to improve her symptoms.     Sexual History    She is currently sexually active. In the past year there has been NO new sexual partners. Condoms are never used.  She would not like to be screened for STD's at today's exam.    Current birth control method:  hysterectomy and postmenopausal .    Menstrual History:    No LMP recorded (lmp unknown). Patient has had a hysterectomy.      Post-coital bleeding is absent.      Additional OB/GYN History   Last Pap :   Last Completed Pap Smear            PAP SMEAR (Every 3 Years) Next due on 2023  LIQUID-BASED PAP SMEAR WITH HPV GENOTYPING IF ASCUS ([facility])    2020  Done - vaginal cuff neg    2018  Done    2013  Patient-Reported (Performed Externally) - [facility]                    OB History          0    Para   0    Term   0       0    AB   0    Living   0         SAB   0    IAB   0    Ectopic   0    Molar   0    Multiple   0    Live Births   0                The additional following portions of the patient's history were reviewed and updated as appropriate: allergies, current medications, past family history, past medical history, past social history, past surgical history, and problem list.    Review of Systems   Constitutional: Negative.    HENT: Negative.     Eyes: Negative.    Respiratory: Negative.     Cardiovascular: Negative.    Gastrointestinal: Negative.    Endocrine: Negative.    Genitourinary: Negative.  Negative for dysuria, vaginal bleeding and vaginal  "discharge.   Musculoskeletal: Negative.    Skin: Negative.    Allergic/Immunologic: Negative.    Neurological: Negative.    Hematological: Negative.    Psychiatric/Behavioral: Negative.       All other systems reviewed and are negative.     I have reviewed and agree with the HPI, ROS, and historical information as entered above.   Hiro Oliver MD      Objective   /80   Ht 151.1 cm (59.5\")   Wt 63 kg (138 lb 12.8 oz)   LMP  (LMP Unknown)   BMI 27.57 kg/m²     Physical Exam    Wet mount performed? Yes. Pertinent positives include: Negative    Assessment & Plan     Assessment and Plan    Problem List Items Addressed This Visit       Hormone replacement therapy - Primary    Overview     On Vivelle dot 0.075 mg patch         History of total hysterectomy with bilateral salpingo-oophorectomy (BSO)    Overview     December 2013 NU/BSO lysis of adhesions for stage IV endometriosis.  Dense adhesions left ovary and sigmoid colon.         Rectocele    Overview     Grade 2.  Doing well with #2 Knob ring pessary with support.          Encounter for screening mammogram for malignant neoplasm of breast    Overview     Mammogram 1/3/2022 at [facility] with benign appearing cyst. 6-month repeat recommended.  1/26/2024 for follow-up ultrasound at [facility] was stable.         Screening for cervical cancer    Overview     Pap smear 8/12/2020 was negative.  History of prior hysterectomy for endometriosis.              Vulvar itching.  No evidence of vaginal infection or yeast on wet mount.  Recent treatment with Diflucan post antibiotics.  Can try Lotrisone cream externally as needed for itching or irritation.  Rx sent to pharmacy.  Patient interested and lactobacillus supplementation.  NuSwab sent for yeast and BV.    Return for annual or as needed.        Juana Danielson MA  04/03/2024   "

## 2024-04-04 LAB
A VAGINAE DNA VAG QL NAA+PROBE: ABNORMAL SCORE
BVAB2 DNA VAG QL NAA+PROBE: ABNORMAL SCORE
C ALBICANS DNA VAG QL NAA+PROBE: POSITIVE
C GLABRATA DNA VAG QL NAA+PROBE: NEGATIVE
C TRACH DNA VAG QL NAA+PROBE: NEGATIVE
MEGA1 DNA VAG QL NAA+PROBE: ABNORMAL SCORE
N GONORRHOEA DNA VAG QL NAA+PROBE: NEGATIVE
T VAGINALIS DNA VAG QL NAA+PROBE: NEGATIVE

## 2024-04-05 LAB
A VAGINAE DNA VAG QL NAA+PROBE: NORMAL SCORE
BVAB2 DNA VAG QL NAA+PROBE: NORMAL SCORE
C ALBICANS DNA VAG QL NAA+PROBE: NEGATIVE
C GLABRATA DNA VAG QL NAA+PROBE: NEGATIVE
C KRUSEI DNA VAG QL NAA+PROBE: NEGATIVE
C LUSITANIAE DNA VAG QL NAA+PROBE: NEGATIVE
CANDIDA DNA VAG QL NAA+PROBE: NEGATIVE
MEGA1 DNA VAG QL NAA+PROBE: NORMAL SCORE
T VAGINALIS DNA VAG QL NAA+PROBE: NEGATIVE

## 2024-05-20 NOTE — TELEPHONE ENCOUNTER
Spoke with patient she only wants to take pills she is afraid of needles and doesn't want to do any injections.   [Time Spent: ___ minutes] : I have spent [unfilled] minutes of time on the encounter.

## 2024-05-21 ENCOUNTER — OFFICE VISIT (OUTPATIENT)
Dept: FAMILY MEDICINE CLINIC | Facility: CLINIC | Age: 59
End: 2024-05-21
Payer: COMMERCIAL

## 2024-05-21 VITALS
HEIGHT: 60 IN | SYSTOLIC BLOOD PRESSURE: 122 MMHG | BODY MASS INDEX: 27.09 KG/M2 | DIASTOLIC BLOOD PRESSURE: 74 MMHG | WEIGHT: 138 LBS | OXYGEN SATURATION: 97 % | HEART RATE: 77 BPM

## 2024-05-21 DIAGNOSIS — L23.7 POISON OAK DERMATITIS: Primary | ICD-10-CM

## 2024-05-21 PROCEDURE — 99213 OFFICE O/P EST LOW 20 MIN: CPT | Performed by: STUDENT IN AN ORGANIZED HEALTH CARE EDUCATION/TRAINING PROGRAM

## 2024-05-21 RX ORDER — PREDNISONE 20 MG/1
TABLET ORAL
Qty: 11 TABLET | Refills: 0 | Status: SHIPPED | OUTPATIENT
Start: 2024-05-21 | End: 2024-05-30

## 2024-05-21 NOTE — PROGRESS NOTES
Chief Complaint   Patient presents with    Poison Ivy     Patient noticed yesterday        HPI:  Nelli Morales is a 58 y.o. female who presents today for rash.    Pt presents w red, itchy rash present x 1 day. Was out in her yard working yesterday and thinks she came into contact w poison ivy/oak. Typically gets this at least once per year and states she's highly allergic. She usually does well with prednisone taper.       PE:  Vitals:    05/21/24 1044   BP: 122/74   Pulse: 77   SpO2: 97%      Body mass index is 27.41 kg/m².    Gen Appearance: NAD  HEENT: Normocephalic, EOMI, moderate amount of cerumen to left ear but non impacted, mild serous effusion behind right TM w diminished light reflex.   Lungs: Normal WOB  Skin: Faint erythematous maculopapular rash noted on underside of bilateral Ue's and few scattered lesions on bilateral lower extremities  Neuro: No focal deficits    Current Outpatient Medications   Medication Sig Dispense Refill    B Complex-Biotin-FA (B-COMPLEX PO) Take  by mouth.      Blood Glucose Monitoring Suppl (ONE TOUCH ULTRA 2) w/Device kit 2 (Two) Times a Day. as directed      cetirizine (zyrTEC) 10 MG tablet Take 1 tablet by mouth Daily. 90 tablet 1    Cholecalciferol (VITAMIN D3) 5000 UNITS capsule capsule Take 1 capsule by mouth Daily.      clotrimazole-betamethasone (Lotrisone) 1-0.05 % cream Apply 1 Application topically to the appropriate area as directed 2 (Two) Times a Day. 15 g 3    estradiol (MINIVELLE, VIVELLE-DOT) 0.075 MG/24HR patch Place 1 patch on the skin as directed by provider 2 (Two) Times a Week. 24 patch 3    fluconazole (Diflucan) 150 MG tablet Take 1 tablet by mouth Every 3 (Three) Days. 2 tablet 0    fluticasone (Flonase) 50 MCG/ACT nasal spray 2 sprays into the nostril(s) as directed by provider Daily. 16 g 3    folic acid (FOLVITE) 1 MG tablet Take 1 tablet by mouth Daily. 90 tablet 1    glucose blood test strip Use to test blood sugars twice daily 100 each 11     glucose monitor monitoring kit 1 each 2 (Two) Times a Day. OneTouch Ultra2 1 each 0    Lancets misc Use to test blood sugars twice daily 100 each 11    latanoprost (XALATAN) 0.005 % ophthalmic solution   1    losartan-hydrochlorothiazide (HYZAAR) 50-12.5 MG per tablet Take 1 tablet by mouth Daily. 90 tablet 1    metFORMIN ER (GLUCOPHAGE-XR) 500 MG 24 hr tablet Take 1 tablet by mouth Daily. 180 tablet 3    methocarbamol (ROBAXIN) 500 MG tablet Take 1 tablet by mouth Daily.      rosuvastatin (CRESTOR) 40 MG tablet Take 1 tablet by mouth Every Night. 90 tablet 3    Semaglutide (Rybelsus) 14 MG tablet Take 1 tablet by mouth Daily. 90 tablet 1    Stool Softener 100 MG capsule TAKE 1 CAPSULE BY MOUTH TWICE DAILY AS DIRECTED      Trulance 3 MG tablet Take 1 tablet by mouth Daily.      predniSONE (DELTASONE) 20 MG tablet Take 2 tablets by mouth Daily for 3 days, THEN 1 tablet Daily for 3 days, THEN 0.5 tablets Daily for 3 days. 11 tablet 0     No current facility-administered medications for this visit.        A/P:  Diagnoses and all orders for this visit:    1. Poison oak dermatitis (Primary)  -     predniSONE (DELTASONE) 20 MG tablet; Take 2 tablets by mouth Daily for 3 days, THEN 1 tablet Daily for 3 days, THEN 0.5 tablets Daily for 3 days.  Dispense: 11 tablet; Refill: 0       Trial prednisone taper- reviewed possible adverse effects. Monitor BS and BP closely while on steroids.   Use topical calamine lotion  FU PRN, if symptoms worsen or fail to improve    Return if symptoms worsen or fail to improve.     Dictated Utilizing Dragon Dictation    Please note that portions of this note were completed with a voice recognition program.    Part of this note may be an electronic transcription/translation of spoken language to printed text using the Dragon Dictation System.

## 2024-06-10 ENCOUNTER — TELEPHONE (OUTPATIENT)
Dept: FAMILY MEDICINE CLINIC | Facility: CLINIC | Age: 59
End: 2024-06-10
Payer: COMMERCIAL

## 2024-06-10 NOTE — TELEPHONE ENCOUNTER
Caller: Nelli Morales    Relationship: Self    Best call back number: [phone]    What is the best time to reach you: AFTER 12:30       What was the call regarding: PATIENT ASKED WHAT CAN SHE TAKE FOR POISON IVY.

## 2024-06-11 ENCOUNTER — OFFICE VISIT (OUTPATIENT)
Dept: FAMILY MEDICINE CLINIC | Facility: CLINIC | Age: 59
End: 2024-06-11
Payer: COMMERCIAL

## 2024-06-11 VITALS
WEIGHT: 141.4 LBS | HEART RATE: 70 BPM | SYSTOLIC BLOOD PRESSURE: 112 MMHG | HEIGHT: 59 IN | DIASTOLIC BLOOD PRESSURE: 76 MMHG | OXYGEN SATURATION: 98 % | BODY MASS INDEX: 28.51 KG/M2

## 2024-06-11 DIAGNOSIS — L23.7 POISON IVY: Primary | ICD-10-CM

## 2024-06-11 PROCEDURE — 99213 OFFICE O/P EST LOW 20 MIN: CPT | Performed by: NURSE PRACTITIONER

## 2024-06-11 RX ORDER — PREDNISONE 20 MG/1
20 TABLET ORAL DAILY
Qty: 5 TABLET | Refills: 0 | Status: SHIPPED | OUTPATIENT
Start: 2024-06-11 | End: 2024-06-16

## 2024-06-11 NOTE — PROGRESS NOTES
"Subjective   Nelli Morales is a 58 y.o. female.   Chief Complaint   Patient presents with    Poison Ivy     X 4 days Itchy all over       Poison Ivy       Patient is here with complaint of poison ivy rash, she was cleaning a fence row and has a rash scattered on neck and abdomen, rare spots on legs, few spots on arms  Also treated for poison ivy at end of May.   The following portions of the patient's history were reviewed and updated as appropriate: allergies, current medications, past family history, past medical history, past social history, past surgical history, and problem list.    Review of Systems   Skin:  Positive for rash.       Objective   Physical Exam  Skin:     Findings: Erythema and rash present.       /76   Pulse 70   Ht 151.1 cm (59.49\")   Wt 64.1 kg (141 lb 6.4 oz)   LMP  (LMP Unknown)   SpO2 98%   BMI 28.09 kg/m²     Assessment & Plan   Problems Addressed this Visit    None  Visit Diagnoses       Poison ivy    -  Primary    Relevant Medications    predniSONE (DELTASONE) 20 MG tablet          Diagnoses         Codes Comments    Poison ivy    -  Primary ICD-10-CM: L23.7  ICD-9-CM: 692.6             Short burst of steroid prescribed. Discussed importance of avoiding poison ivy, wash gloves and tools that may come in contact with the leaves, remove clothes, shower with warm water and soap.   Risks, benefits, and potential side effects of current/new medications reviewed with patient.  Patient voiced understanding and wished to proceed with treatment.       "

## 2024-07-08 ENCOUNTER — PRIOR AUTHORIZATION (OUTPATIENT)
Dept: FAMILY MEDICINE CLINIC | Facility: CLINIC | Age: 59
End: 2024-07-08
Payer: COMMERCIAL

## 2024-07-08 NOTE — TELEPHONE ENCOUNTER
(Key: [id number]) - [id number]  Rybelsus 14MG tablets  Status: Question Request  Created: July 7th, 2024 [phone]    As long as you remain covered by your prescription drug plan and there are no changes to your  plan benefits, this request is approved from 07/09/2024 to 07/09/2025.

## 2024-07-19 ENCOUNTER — TELEPHONE (OUTPATIENT)
Dept: FAMILY MEDICINE CLINIC | Facility: CLINIC | Age: 59
End: 2024-07-19
Payer: COMMERCIAL

## 2024-07-23 ENCOUNTER — OFFICE VISIT (OUTPATIENT)
Dept: FAMILY MEDICINE CLINIC | Facility: CLINIC | Age: 59
End: 2024-07-23
Payer: COMMERCIAL

## 2024-07-23 VITALS
WEIGHT: 143 LBS | HEART RATE: 72 BPM | DIASTOLIC BLOOD PRESSURE: 84 MMHG | HEIGHT: 59 IN | OXYGEN SATURATION: 97 % | SYSTOLIC BLOOD PRESSURE: 114 MMHG | BODY MASS INDEX: 28.83 KG/M2

## 2024-07-23 DIAGNOSIS — E11.43 TYPE 2 DIABETES MELLITUS WITH DIABETIC AUTONOMIC NEUROPATHY, WITHOUT LONG-TERM CURRENT USE OF INSULIN: Primary | ICD-10-CM

## 2024-07-23 DIAGNOSIS — E66.3 OVERWEIGHT (BMI 25.0-29.9): ICD-10-CM

## 2024-07-23 LAB
EXPIRATION DATE: ABNORMAL
HBA1C MFR BLD: 5.9 % (ref 4.5–5.7)
Lab: ABNORMAL

## 2024-07-23 PROCEDURE — 83036 HEMOGLOBIN GLYCOSYLATED A1C: CPT | Performed by: STUDENT IN AN ORGANIZED HEALTH CARE EDUCATION/TRAINING PROGRAM

## 2024-07-23 PROCEDURE — 99214 OFFICE O/P EST MOD 30 MIN: CPT | Performed by: STUDENT IN AN ORGANIZED HEALTH CARE EDUCATION/TRAINING PROGRAM

## 2024-07-31 ENCOUNTER — TELEPHONE (OUTPATIENT)
Dept: FAMILY MEDICINE CLINIC | Facility: CLINIC | Age: 59
End: 2024-07-31
Payer: COMMERCIAL

## 2024-07-31 ENCOUNTER — OFFICE VISIT (OUTPATIENT)
Dept: FAMILY MEDICINE CLINIC | Facility: CLINIC | Age: 59
End: 2024-07-31
Payer: COMMERCIAL

## 2024-07-31 VITALS
HEART RATE: 86 BPM | OXYGEN SATURATION: 97 % | HEIGHT: 59 IN | WEIGHT: 142.2 LBS | DIASTOLIC BLOOD PRESSURE: 70 MMHG | BODY MASS INDEX: 28.67 KG/M2 | SYSTOLIC BLOOD PRESSURE: 120 MMHG

## 2024-07-31 DIAGNOSIS — H60.332 ACUTE SWIMMER'S EAR OF LEFT SIDE: Primary | ICD-10-CM

## 2024-07-31 PROBLEM — L29.2 VULVAR ITCHING: Status: RESOLVED | Noted: 2024-04-03 | Resolved: 2024-07-31

## 2024-07-31 PROCEDURE — 99213 OFFICE O/P EST LOW 20 MIN: CPT | Performed by: FAMILY MEDICINE

## 2024-07-31 RX ORDER — CIPROFLOXACIN 0.5 MG/.25ML
SOLUTION/ DROPS AURICULAR (OTIC)
Qty: 5 ML | Refills: 0 | Status: SHIPPED | OUTPATIENT
Start: 2024-07-31 | End: 2024-07-31

## 2024-07-31 RX ORDER — CIPROFLOXACIN AND DEXAMETHASONE 3; 1 MG/ML; MG/ML
4 SUSPENSION/ DROPS AURICULAR (OTIC) 2 TIMES DAILY
Qty: 7.5 ML | Refills: 0 | Status: SHIPPED | OUTPATIENT
Start: 2024-07-31

## 2024-07-31 NOTE — PROGRESS NOTES
Office Note     Name: Nelli Morales    : 1965     MRN: [id number]     Chief Complaint  Earache (Left ear and she states its been ongoing a week )    Subjective     History of Present Illness:  Nelli Morales is a 58 y.o. female who presents today for evaluation of left ear pain.    Patient has the past week she started having left ear pain.  She states this started minimally but has been progressively worsening.  She notes that she went to [facility] and looked at agents to topically treat this but decided to see her provider.  She notes that she rarely takes Flonase has not been taking his regular previous.  She does not been swimming or had any known water in her ear, however she states she may have gotten some in there in the shower.  She has been trying to clean her ears and keep them wax free.  She denies any fevers.  She does note that the pain radiates down her left neck.    Review of Systems:   Review of Systems   HENT:  Positive for ear pain.    All other systems reviewed and are negative.      Past Medical History:   Past Medical History:   Diagnosis Date    Allergic     Allergic rhinitis     BV (bacterial vaginosis)     Cataracts, bilateral     Class 1 obesity due to excess calories without serious comorbidity with body mass index (BMI) of 32.0 to 32.9 in adult 2017    Diabetes mellitus, type 2     Diverticulitis large intestine w/o perforation or abscess w/o bleeding 2017    Diverticulosis 2017    Endometriosis     STAGE 4 WITH LARGE LEFT OVARIAN ENDOMETRIOMA;EXTENSVIE DENSE LEFT ADNEXAL ADHESIONS TO SIDEWALL AND SIGMOID COLON. dEEP IMPLANTS LEFT ANTERIOR CULDESAC.    Female cystocele     Fibrocystic breast disease     Glaucoma     Hormone replacement therapy     Hyperlipidemia     Hypertension     IBS (irritable bowel syndrome)     Insomnia     Obesity (BMI 30.0-34.9)     Screening breast examination     SELF;ADMITS    Stress bladder incontinence, female     Stress bladder  incontinence, female     History of prior Alas procedure with good support.   Currently resolved.    Vaginitis     UNKNOWN ETIOLOGY    Varicose veins of legs        Past Surgical History:   Past Surgical History:   Procedure Laterality Date    BLADDER SUSPENSION  10/27/2007    CYSTOCELE REPAIR      CYSTOTOMY      HYSTEROSCOPY      TOTAL ABDOMINAL HYSTERECTOMY WITH SALPINGO OOPHORECTOMY  2013    LYSIS OF ADHESIONS FOR STAGE 4 ENDOMETRIOSIS       Family History:   Family History   Problem Relation Age of Onset    ALS Father 70    Diabetes Mother     Dementia Mother     Hypothyroidism Mother     Hyperlipidemia Mother     Breast cancer Mother     Thyroid disease Mother     Cancer Mother     Hypertension Brother         Sandro Morlaes    Diabetes Sister     Hyperlipidemia Sister     Hypothyroidism Sister     Early death Sister     Hypertension Sister         Lacey is     Thyroid disease Sister     Ovarian cancer Neg Hx     Uterine cancer Neg Hx     Colon cancer Neg Hx        Social History:   Social History     Socioeconomic History    Marital status: Single    Number of children: 0    Years of education: 12    Highest education level: High school graduate   Tobacco Use    Smoking status: Never     Passive exposure: Never    Smokeless tobacco: Never   Vaping Use    Vaping status: Never Used   Substance and Sexual Activity    Alcohol use: Yes     Alcohol/week: 1.0 standard drink of alcohol     Types: 1 Cans of beer per week     Comment: occasional    Drug use: No    Sexual activity: Yes     Partners: Male     Birth control/protection: Post-menopausal, Hysterectomy       Immunizations:   Immunization History   Administered Date(s) Administered    COVID-19 (PFIZER) BIVALENT 12+YRS 2022    COVID-19 (PFIZER) Purple Cap Monovalent 2021, 2021, 10/02/2021    COVID-19 F23 (PFIZER) 12YRS+ (COMIRNATY) 10/07/2023    Covid-19 (Pfizer) Gray Cap Monovalent 2022    Flu Vaccine Intradermal Quad 18-64YR  10/03/2019    Flu Vaccine Quad PF >36MO 08/26/2017, 09/15/2019, 09/06/2020, 09/25/2021    Fluzone (or Fluarix & Flulaval for VFC) >6mos 08/26/2017, 09/06/2020, 09/25/2021, 10/07/2023    Fluzone Quad >6mos (Multi-dose) 09/22/2018, 09/15/2019    Hepatitis A 09/22/2018, 03/23/2019    Influenza Injectable Mdck Pf Quad 10/04/2022    Influenza Quad Vaccine (Inpatient) 09/11/2016    Influenza Whole 08/01/2016    Influenza, Unspecified 08/26/2017, 10/04/2022    Pneumococcal Conjugate 13-Valent (PCV13) 08/26/2017    Td (TDVAX) 06/03/2002, 11/15/2017    Tdap 01/19/2012    flucelvax quad pfs =>4 YRS 10/03/2019        Medications:     Current Outpatient Medications:     B Complex-Biotin-FA (B-COMPLEX PO), Take  by mouth., Disp: , Rfl:     Blood Glucose Monitoring Suppl (ONE TOUCH ULTRA 2) w/Device kit, 2 (Two) Times a Day. as directed, Disp: , Rfl:     cetirizine (zyrTEC) 10 MG tablet, Take 1 tablet by mouth Daily., Disp: 90 tablet, Rfl: 1    Cholecalciferol (VITAMIN D3) 5000 UNITS capsule capsule, Take 1 capsule by mouth Daily., Disp: , Rfl:     clotrimazole-betamethasone (Lotrisone) 1-0.05 % cream, Apply 1 Application topically to the appropriate area as directed 2 (Two) Times a Day., Disp: 15 g, Rfl: 3    estradiol (MINIVELLE, VIVELLE-DOT) 0.075 MG/24HR patch, Place 1 patch on the skin as directed by provider 2 (Two) Times a Week., Disp: 24 patch, Rfl: 3    fluticasone (Flonase) 50 MCG/ACT nasal spray, 2 sprays into the nostril(s) as directed by provider Daily., Disp: 16 g, Rfl: 3    folic acid (FOLVITE) 1 MG tablet, Take 1 tablet by mouth Daily., Disp: 90 tablet, Rfl: 1    glucose blood test strip, Use to test blood sugars twice daily, Disp: 100 each, Rfl: 11    glucose monitor monitoring kit, 1 each 2 (Two) Times a Day. OneTouch Ultra2, Disp: 1 each, Rfl: 0    Lancets misc, Use to test blood sugars twice daily, Disp: 100 each, Rfl: 11    latanoprost (XALATAN) 0.005 % ophthalmic solution, , Disp: , Rfl: 1     "losartan-hydrochlorothiazide (HYZAAR) 50-12.5 MG per tablet, Take 1 tablet by mouth Daily., Disp: 90 tablet, Rfl: 1    metFORMIN ER (GLUCOPHAGE-XR) 500 MG 24 hr tablet, Take 1 tablet by mouth Daily., Disp: 180 tablet, Rfl: 3    methocarbamol (ROBAXIN) 500 MG tablet, Take 1 tablet by mouth Daily., Disp: , Rfl:     rosuvastatin (CRESTOR) 40 MG tablet, Take 1 tablet by mouth Every Night., Disp: 90 tablet, Rfl: 3    Semaglutide (Rybelsus) 14 MG tablet, Take 1 tablet by mouth Daily., Disp: 90 tablet, Rfl: 1    Stool Softener 100 MG capsule, TAKE 1 CAPSULE BY MOUTH TWICE DAILY AS DIRECTED, Disp: , Rfl:     Trulance 3 MG tablet, Take 1 tablet by mouth Daily., Disp: , Rfl:     Ciprofloxacin HCl (CETRAXAL) 0.2 % otic solution, Instill 2 drops in left ear twice daily for 7, Disp: 5 mL, Rfl: 0    Allergies:   Allergies   Allergen Reactions    Ace Inhibitors Cough    Amoxicillin Diarrhea    Morphine And Codeine Rash    Polymyxin B-Trimethoprim Unknown (See Comments)       Objective     Vital Signs  /70   Pulse 86   Ht 151.1 cm (59.49\")   Wt 64.5 kg (142 lb 3.2 oz)   SpO2 97%   BMI 28.25 kg/m²   Estimated body mass index is 28.25 kg/m² as calculated from the following:    Height as of this encounter: 151.1 cm (59.49\").    Weight as of this encounter: 64.5 kg (142 lb 3.2 oz).         Physical Exam  Vitals and nursing note reviewed.   Constitutional:       Appearance: Normal appearance. She is normal weight.   HENT:      Head: Normocephalic and atraumatic.      Right Ear: Hearing, tympanic membrane, ear canal and external ear normal.      Left Ear: Tympanic membrane normal. Swelling and tenderness present.      Ears:      Comments: Redness to left ear canal     Nose: Nose normal.      Mouth/Throat:      Mouth: Mucous membranes are moist.   Eyes:      Extraocular Movements: Extraocular movements intact.      Pupils: Pupils are equal, round, and reactive to light.   Cardiovascular:      Rate and Rhythm: Normal rate. "   Pulmonary:      Effort: Pulmonary effort is normal.   Abdominal:      General: Abdomen is flat.   Musculoskeletal:         General: Normal range of motion.      Cervical back: Normal range of motion.   Skin:     General: Skin is warm.   Neurological:      General: No focal deficit present.      Mental Status: She is alert and oriented to person, place, and time.   Psychiatric:         Mood and Affect: Mood normal.         Behavior: Behavior normal.         Thought Content: Thought content normal.         Judgment: Judgment normal.            Procedures     Results:  No results found for this or any previous visit (from the past 24 hour(s)).     Assessment and Plan     Assessment/Plan:  Diagnoses and all orders for this visit:    1. Acute swimmer's ear of left side (Primary)  Assessment & Plan:  Topical antibiotic drops to left ear for 1 week.  Advised patient to reach back in the clinic she develop fevers or worsening symptoms    Orders:  -     Ciprofloxacin HCl (CETRAXAL) 0.2 % otic solution; Instill 2 drops in left ear twice daily for 7  Dispense: 5 mL; Refill: 0        Follow Up  Return if symptoms worsen or fail to improve.    Aspen Hall DO   [facility]

## 2024-07-31 NOTE — TELEPHONE ENCOUNTER
Caller: Nelli Morales    Relationship: Self    Best call back number: [phone]    Which medication are you concerned about: CIPROFLOXACIN HCI 0.2 % OTIC SOLUTION    Who prescribed you this medication: PROVIDER RAQUEL    When did you start taking this medication: HASN'T YET    What are your concerns: NOT COVERED UNDER INSURANCE. COST IS OVER $200.   PLEASE SEND IN A DIFFERENT MEDICATION TO TREAT THIS PATIENT'S EAR INFECTION/PAIN TO: [facility] [address], [place], [place]    How long have you had these concerns: TODAY, WHEN WENT TO  RX.

## 2024-07-31 NOTE — ASSESSMENT & PLAN NOTE
Topical antibiotic drops to left ear for 1 week.  Advised patient to reach back in the clinic she develop fevers or worsening symptoms

## 2024-08-01 NOTE — TELEPHONE ENCOUNTER
Left message for Nelli Morales  to return my call.    Hub may relay message and document    Aspen Hall, DO  You16 hours ago (4:04 PM)       I sent alternative ofloxacin to the pharmacy. If this is not covered please let me know

## 2024-08-01 NOTE — TELEPHONE ENCOUNTER
Name: Nelli Morales      Relationship: Self      Best Callback Number: [phone]       HUB PROVIDED THE RELAY MESSAGE FROM THE OFFICE      PATIENT: VOICED UNDERSTANDING AND HAS NO FURTHER QUESTIONS AT THIS TIME    ADDITIONAL INFORMATION:PATIENT PICKED UP THE MEDIATION 07/31/24 AND IT WAS COVERED BY THE INSURANCE.

## 2024-08-09 PROBLEM — R05.9 COUGH: Status: ACTIVE | Noted: 2024-08-09

## 2024-08-10 ENCOUNTER — TELEPHONE (OUTPATIENT)
Dept: URGENT CARE | Facility: CLINIC | Age: 59
End: 2024-08-10
Payer: COMMERCIAL

## 2024-08-10 ENCOUNTER — PATIENT ROUNDING (BHMG ONLY) (OUTPATIENT)
Dept: URGENT CARE | Facility: CLINIC | Age: 59
End: 2024-08-10
Payer: COMMERCIAL

## 2024-08-10 NOTE — TELEPHONE ENCOUNTER
Patient called want's to know if she can go back to work 08/12/2024. Per provider since she was Dx. With Covid , at least quarantine for 5 days. If her work place allow her to work , improving any symptoms & fever free without using fever reducing medication. Patient notify.

## 2024-08-31 DIAGNOSIS — D64.9 ANEMIA, UNSPECIFIED TYPE: ICD-10-CM

## 2024-08-31 DIAGNOSIS — I10 ESSENTIAL HYPERTENSION: ICD-10-CM

## 2024-08-31 DIAGNOSIS — H69.93 DYSFUNCTION OF BOTH EUSTACHIAN TUBES: ICD-10-CM

## 2024-09-03 RX ORDER — FOLIC ACID 1 MG/1
1000 TABLET ORAL DAILY
Qty: 90 TABLET | Refills: 1 | Status: SHIPPED | OUTPATIENT
Start: 2024-09-03

## 2024-09-03 RX ORDER — CETIRIZINE HYDROCHLORIDE 10 MG/1
10 TABLET ORAL DAILY
Qty: 90 TABLET | Refills: 1 | Status: SHIPPED | OUTPATIENT
Start: 2024-09-03

## 2024-09-03 RX ORDER — LOSARTAN POTASSIUM AND HYDROCHLOROTHIAZIDE 12.5; 5 MG/1; MG/1
1 TABLET ORAL DAILY
Qty: 90 TABLET | Refills: 1 | Status: SHIPPED | OUTPATIENT
Start: 2024-09-03

## 2024-10-04 ENCOUNTER — OFFICE VISIT (OUTPATIENT)
Dept: FAMILY MEDICINE CLINIC | Facility: CLINIC | Age: 59
End: 2024-10-04
Payer: COMMERCIAL

## 2024-10-04 VITALS
BODY MASS INDEX: 29.03 KG/M2 | DIASTOLIC BLOOD PRESSURE: 78 MMHG | HEART RATE: 75 BPM | SYSTOLIC BLOOD PRESSURE: 116 MMHG | HEIGHT: 59 IN | WEIGHT: 144 LBS | OXYGEN SATURATION: 95 %

## 2024-10-04 DIAGNOSIS — E11.43 TYPE 2 DIABETES MELLITUS WITH DIABETIC AUTONOMIC NEUROPATHY, WITHOUT LONG-TERM CURRENT USE OF INSULIN: Primary | ICD-10-CM

## 2024-10-04 DIAGNOSIS — I10 ESSENTIAL HYPERTENSION: ICD-10-CM

## 2024-10-04 DIAGNOSIS — E66.3 OVERWEIGHT (BMI 25.0-29.9): ICD-10-CM

## 2024-10-04 DIAGNOSIS — R53.83 OTHER FATIGUE: ICD-10-CM

## 2024-10-04 DIAGNOSIS — D64.9 ANEMIA, UNSPECIFIED TYPE: ICD-10-CM

## 2024-10-04 DIAGNOSIS — K59.04 CHRONIC IDIOPATHIC CONSTIPATION: ICD-10-CM

## 2024-10-04 PROCEDURE — 99214 OFFICE O/P EST MOD 30 MIN: CPT | Performed by: STUDENT IN AN ORGANIZED HEALTH CARE EDUCATION/TRAINING PROGRAM

## 2024-10-04 RX ORDER — METHOCARBAMOL 1000 MG/1
TABLET, FILM COATED ORAL
COMMUNITY
End: 2024-10-04

## 2024-10-04 NOTE — PATIENT INSTRUCTIONS
Please remember to come and have your blood work drawn any time AFTER 10/23/24.    Return if symptoms worsen or new symptoms develop.  Follow-up with your primary care next week for recheck.  No obvious source of your chest pain is noted I do not think this is cardiac in nature.  Take Tylenol for your pain.  If any shortness of breath weakness or other symptoms present please return for further evaluation and care.  Drink plenty of fluids and stay well-hydrated.

## 2024-10-04 NOTE — PROGRESS NOTES
Chief Complaint   Patient presents with    Diabetes       HPI:  Nelli Morales is a 58 y.o. female with T2DM, HTN, HLD, chronic constipation who presents today for diabetes follow up.    Diabetes is typically well controlled. Meds include Metformin, Rybelsus. A1c last visit was 5.9%, up from 5.5% at last few visits. Too early to check today, due after 10/23. Has been walking more. Weight is down some.     Has been feeling more tired. Does work two jobs- one full time and another part time on weekends occasionally. Thinks this may contribute. Sleep isn't great. Has a lot of things to do before bed, chores at home, taking care of her mom. Usually only gets ~5 hours.  Takes Trulance for constipation at night. Sometimes has to stay up until 11 because she will have a bowel movement. Does get diarrhea with this. Wondering if there are alternatives.       PE:  Vitals:    10/04/24 0748   BP: 116/78   Pulse: 75   SpO2: 95%      Body mass index is 29.58 kg/m².    Gen Appearance: NAD  HEENT: Normocephalic, EOMI  Heart: RRR, normal S1 and S2, no murmur  Lungs: CTA b/l, no wheezing, no crackles  MSK: Moves all extremities well, normal gait, no peripheral edema  Neuro: No focal deficits    Current Outpatient Medications   Medication Sig Dispense Refill    B Complex-Biotin-FA (B-COMPLEX PO) Take  by mouth.      Blood Glucose Monitoring Suppl (ONE TOUCH ULTRA 2) w/Device kit 2 (Two) Times a Day. as directed      cetirizine (zyrTEC) 10 MG tablet TAKE 1 TABLET BY MOUTH DAILY 90 tablet 1    Cholecalciferol (VITAMIN D3) 5000 UNITS capsule capsule Take 1 capsule by mouth Daily.      ciprofloxacin-dexAMETHasone (Ciprodex) 0.3-0.1 % otic suspension Administer 4 drops into the left ear 2 (Two) Times a Day. 7.5 mL 0    clotrimazole-betamethasone (Lotrisone) 1-0.05 % cream Apply 1 Application topically to the appropriate area as directed 2 (Two) Times a Day. 15 g 3    estradiol (MINIVELLE, VIVELLE-DOT) 0.075 MG/24HR patch Place 1 patch on  the skin as directed by provider 2 (Two) Times a Week. 24 patch 3    fluticasone (Flonase) 50 MCG/ACT nasal spray 2 sprays into the nostril(s) as directed by provider Daily. 16 g 3    folic acid (FOLVITE) 1 MG tablet TAKE 1 TABLET BY MOUTH DAILY 90 tablet 1    glucose blood test strip Use to test blood sugars twice daily 100 each 11    glucose monitor monitoring kit 1 each 2 (Two) Times a Day. OneTouch Ultra2 1 each 0    Lancets misc Use to test blood sugars twice daily 100 each 11    latanoprost (XALATAN) 0.005 % ophthalmic solution   1    losartan-hydrochlorothiazide (HYZAAR) 50-12.5 MG per tablet TAKE 1 TABLET BY MOUTH DAILY 90 tablet 1    metFORMIN ER (GLUCOPHAGE-XR) 500 MG 24 hr tablet Take 1 tablet by mouth Daily. 180 tablet 3    methocarbamol (ROBAXIN) 500 MG tablet Take 1 tablet by mouth Daily.      rosuvastatin (CRESTOR) 40 MG tablet Take 1 tablet by mouth Every Night. 90 tablet 3    Semaglutide (Rybelsus) 14 MG tablet Take 1 tablet by mouth Daily. 90 tablet 1    Stool Softener 100 MG capsule TAKE 1 CAPSULE BY MOUTH TWICE DAILY AS DIRECTED      Trulance 3 MG tablet Take 1 tablet by mouth Daily.       No current facility-administered medications for this visit.        A/P:  Diagnoses and all orders for this visit:    1. Type 2 diabetes mellitus with diabetic autonomic neuropathy, without long-term current use of insulin (Primary)  T2DM has been well controlled. Too early for A1c today, will order venous draw with blood work prior to next appt (annual).  Current regimen is Metformin, Rybelses (fears needles)  Cont healthy diet, regular physical activity  Urine micro due April 2025  Eye exam Due 3/2025  Foot exam due after 12/2024  -     POC Glycosylated Hemoglobin (Hb A1C)  -     Lipid Panel With / Chol / HDL Ratio; Future  -     Hemoglobin A1c; Future  -     Comprehensive Metabolic Panel; Future  -     Vitamin B12; Future  -     CBC & Differential; Future    2. Essential hypertension  Well controlled w  Losartan-HCTZ  -     Comprehensive Metabolic Panel; Future    3. Anemia, unspecified type  -     CBC & Differential; Future    4. Overweight (BMI 25.0-29.9)  Improving, BMI 29  Cont healthy diet and increasing physical activity    5. Other fatigue  -     Vitamin B12; Future  -     CBC & Differential; Future    6. Constipation  Pt has been on Trulance for maintenance therapy but having watery, frequent stools with this that are disruptive to sleep. I'm not sure she needs this. Advised increasing fiber and water in the diet. Can start nightly magnesium supplement to see if this regulates her.      Return in about 4 months (around 2/4/2025) for Annual/Diabetes follow up.     Dictated Utilizing Dragon Dictation    Please note that portions of this note were completed with a voice recognition program.    Part of this note may be an electronic transcription/translation of spoken language to printed text using the Dragon Dictation System.

## 2024-10-04 NOTE — ASSESSMENT & PLAN NOTE
T2DM has been well controlled. Too early for A1c today, will order venous draw with blood work prior to next appt (annual).  Current regimen is Metformin, Rybelses (fears needles)  Cont healthy diet, regular physical activity  Urine micro due April 2025  Eye exam Due 3/2025  Foot exam due after 12/2024

## 2024-10-24 ENCOUNTER — LAB (OUTPATIENT)
Dept: LAB | Facility: HOSPITAL | Age: 59
End: 2024-10-24
Payer: COMMERCIAL

## 2024-10-24 DIAGNOSIS — I10 ESSENTIAL HYPERTENSION: ICD-10-CM

## 2024-10-24 DIAGNOSIS — E11.43 TYPE 2 DIABETES MELLITUS WITH DIABETIC AUTONOMIC NEUROPATHY, WITHOUT LONG-TERM CURRENT USE OF INSULIN: ICD-10-CM

## 2024-10-24 DIAGNOSIS — D64.9 ANEMIA, UNSPECIFIED TYPE: ICD-10-CM

## 2024-10-24 DIAGNOSIS — R53.83 OTHER FATIGUE: ICD-10-CM

## 2024-10-24 LAB
ALBUMIN SERPL-MCNC: 4.7 G/DL (ref 3.5–5.2)
ALBUMIN/GLOB SERPL: 1.9 G/DL
ALP SERPL-CCNC: 88 U/L (ref 39–117)
ALT SERPL W P-5'-P-CCNC: 44 U/L (ref 1–33)
ANION GAP SERPL CALCULATED.3IONS-SCNC: 14 MMOL/L (ref 5–15)
AST SERPL-CCNC: 42 U/L (ref 1–32)
BASOPHILS # BLD AUTO: 0.04 10*3/MM3 (ref 0–0.2)
BASOPHILS NFR BLD AUTO: 0.5 % (ref 0–1.5)
BILIRUB SERPL-MCNC: 0.5 MG/DL (ref 0–1.2)
BUN SERPL-MCNC: 10 MG/DL (ref 6–20)
BUN/CREAT SERPL: 16.1 (ref 7–25)
CALCIUM SPEC-SCNC: 10.4 MG/DL (ref 8.6–10.5)
CHLORIDE SERPL-SCNC: 97 MMOL/L (ref 98–107)
CHOLEST SERPL-MCNC: 128 MG/DL (ref 0–200)
CO2 SERPL-SCNC: 26 MMOL/L (ref 22–29)
CREAT SERPL-MCNC: 0.62 MG/DL (ref 0.57–1)
DEPRECATED RDW RBC AUTO: 36.8 FL (ref 37–54)
EGFRCR SERPLBLD CKD-EPI 2021: 104 ML/MIN/1.73
EOSINOPHIL # BLD AUTO: 0.12 10*3/MM3 (ref 0–0.4)
EOSINOPHIL NFR BLD AUTO: 1.4 % (ref 0.3–6.2)
ERYTHROCYTE [DISTWIDTH] IN BLOOD BY AUTOMATED COUNT: 11.6 % (ref 12.3–15.4)
GLOBULIN UR ELPH-MCNC: 2.5 GM/DL
GLUCOSE SERPL-MCNC: 94 MG/DL (ref 65–99)
HBA1C MFR BLD: 5.5 % (ref 4.8–5.6)
HCT VFR BLD AUTO: 45.9 % (ref 34–46.6)
HDLC SERPL QL: 2.46
HDLC SERPL-MCNC: 52 MG/DL (ref 40–60)
HGB BLD-MCNC: 15.7 G/DL (ref 12–15.9)
IMM GRANULOCYTES # BLD AUTO: 0.04 10*3/MM3 (ref 0–0.05)
IMM GRANULOCYTES NFR BLD AUTO: 0.5 % (ref 0–0.5)
LDLC SERPL CALC-MCNC: 56 MG/DL (ref 0–100)
LYMPHOCYTES # BLD AUTO: 1.87 10*3/MM3 (ref 0.7–3.1)
LYMPHOCYTES NFR BLD AUTO: 21.5 % (ref 19.6–45.3)
MCH RBC QN AUTO: 30 PG (ref 26.6–33)
MCHC RBC AUTO-ENTMCNC: 34.2 G/DL (ref 31.5–35.7)
MCV RBC AUTO: 87.6 FL (ref 79–97)
MONOCYTES # BLD AUTO: 0.35 10*3/MM3 (ref 0.1–0.9)
MONOCYTES NFR BLD AUTO: 4 % (ref 5–12)
NEUTROPHILS NFR BLD AUTO: 6.28 10*3/MM3 (ref 1.7–7)
NEUTROPHILS NFR BLD AUTO: 72.1 % (ref 42.7–76)
NRBC BLD AUTO-RTO: 0 /100 WBC (ref 0–0.2)
PLATELET # BLD AUTO: 233 10*3/MM3 (ref 140–450)
PMV BLD AUTO: 11.5 FL (ref 6–12)
POTASSIUM SERPL-SCNC: 4.2 MMOL/L (ref 3.5–5.2)
PROT SERPL-MCNC: 7.2 G/DL (ref 6–8.5)
RBC # BLD AUTO: 5.24 10*6/MM3 (ref 3.77–5.28)
SODIUM SERPL-SCNC: 137 MMOL/L (ref 136–145)
TRIGL SERPL-MCNC: 112 MG/DL (ref 0–150)
VIT B12 BLD-MCNC: >2000 PG/ML (ref 211–946)
VLDLC SERPL-MCNC: 20 MG/DL (ref 5–40)
WBC NRBC COR # BLD AUTO: 8.7 10*3/MM3 (ref 3.4–10.8)

## 2024-10-24 PROCEDURE — 85025 COMPLETE CBC W/AUTO DIFF WBC: CPT

## 2024-10-24 PROCEDURE — 83036 HEMOGLOBIN GLYCOSYLATED A1C: CPT

## 2024-10-24 PROCEDURE — 82607 VITAMIN B-12: CPT

## 2024-10-24 PROCEDURE — 80053 COMPREHEN METABOLIC PANEL: CPT

## 2024-10-24 PROCEDURE — 80061 LIPID PANEL: CPT

## 2024-12-15 DIAGNOSIS — D64.9 ANEMIA, UNSPECIFIED TYPE: ICD-10-CM

## 2024-12-16 RX ORDER — FOLIC ACID 1 MG/1
1000 TABLET ORAL DAILY
Qty: 90 TABLET | Refills: 1 | Status: SHIPPED | OUTPATIENT
Start: 2024-12-16

## 2024-12-16 NOTE — TELEPHONE ENCOUNTER
Rx Refill Note  Requested Prescriptions     Pending Prescriptions Disp Refills    folic acid (FOLVITE) 1 MG tablet [Pharmacy Med Name: FOLIC ACID 1MG TABLETS] 90 tablet 1     Sig: TAKE 1 TABLET BY MOUTH DAILY      Last office visit with prescribing clinician: 10/4/2024   Last telemedicine visit with prescribing clinician: Visit date not found   Next office visit with prescribing clinician: 2/4/2025                         Would you like a call back once the refill request has been completed: [] Yes [] No    If the office needs to give you a call back, can they leave a voicemail: [] Yes [] No    Traci Landin MA  12/16/24, 10:42 EST

## 2024-12-21 ENCOUNTER — PATIENT MESSAGE (OUTPATIENT)
Dept: FAMILY MEDICINE CLINIC | Facility: CLINIC | Age: 59
End: 2024-12-21
Payer: COMMERCIAL

## 2024-12-21 DIAGNOSIS — E11.43 TYPE 2 DIABETES MELLITUS WITH DIABETIC AUTONOMIC NEUROPATHY, WITHOUT LONG-TERM CURRENT USE OF INSULIN: ICD-10-CM

## 2025-01-03 ENCOUNTER — TELEPHONE (OUTPATIENT)
Dept: OBSTETRICS AND GYNECOLOGY | Facility: CLINIC | Age: 60
End: 2025-01-03

## 2025-01-03 NOTE — TELEPHONE ENCOUNTER
Caller: Nelli Morales    Relationship to patient: Self    Best call back number: [phone] (home)       Patient is needing: PT CALLED BACK REGARDING HER APPT BEING RESCHEDULE DUE TO POTENTIAL BAD WEATHER ON 01/06. PT AWARE OF NEW APPT DATE/TIME AND IS OKAY WITH IT.

## 2025-01-15 DIAGNOSIS — E11.43 TYPE 2 DIABETES MELLITUS WITH DIABETIC AUTONOMIC NEUROPATHY, WITHOUT LONG-TERM CURRENT USE OF INSULIN: ICD-10-CM

## 2025-01-15 RX ORDER — ORAL SEMAGLUTIDE 14 MG/1
1 TABLET ORAL DAILY
Qty: 90 TABLET | Refills: 1 | Status: SHIPPED | OUTPATIENT
Start: 2025-01-15

## 2025-01-20 ENCOUNTER — OFFICE VISIT (OUTPATIENT)
Dept: OBSTETRICS AND GYNECOLOGY | Facility: CLINIC | Age: 60
End: 2025-01-20
Payer: COMMERCIAL

## 2025-01-20 VITALS
SYSTOLIC BLOOD PRESSURE: 118 MMHG | HEIGHT: 59 IN | BODY MASS INDEX: 27.62 KG/M2 | WEIGHT: 137 LBS | DIASTOLIC BLOOD PRESSURE: 70 MMHG

## 2025-01-20 DIAGNOSIS — E11.43 TYPE 2 DIABETES MELLITUS WITH DIABETIC AUTONOMIC NEUROPATHY, WITHOUT LONG-TERM CURRENT USE OF INSULIN: ICD-10-CM

## 2025-01-20 DIAGNOSIS — Z90.79 HISTORY OF TOTAL HYSTERECTOMY WITH BILATERAL SALPINGO-OOPHORECTOMY (BSO): ICD-10-CM

## 2025-01-20 DIAGNOSIS — Z90.710 HISTORY OF TOTAL HYSTERECTOMY WITH BILATERAL SALPINGO-OOPHORECTOMY (BSO): ICD-10-CM

## 2025-01-20 DIAGNOSIS — Z12.11 SCREENING FOR COLON CANCER: ICD-10-CM

## 2025-01-20 DIAGNOSIS — N81.6 RECTOCELE: Primary | ICD-10-CM

## 2025-01-20 DIAGNOSIS — N95.2 ATROPHIC VAGINITIS: ICD-10-CM

## 2025-01-20 DIAGNOSIS — Z90.722 HISTORY OF TOTAL HYSTERECTOMY WITH BILATERAL SALPINGO-OOPHORECTOMY (BSO): ICD-10-CM

## 2025-01-20 DIAGNOSIS — Z79.890 HORMONE REPLACEMENT THERAPY: ICD-10-CM

## 2025-01-20 DIAGNOSIS — E66.3 OVERWEIGHT (BMI 25.0-29.9): ICD-10-CM

## 2025-01-20 DIAGNOSIS — Z12.31 ENCOUNTER FOR SCREENING MAMMOGRAM FOR MALIGNANT NEOPLASM OF BREAST: ICD-10-CM

## 2025-01-20 DIAGNOSIS — Z12.4 SCREENING FOR CERVICAL CANCER: ICD-10-CM

## 2025-01-20 RX ORDER — ESTRADIOL 0.1 MG/G
1 CREAM VAGINAL 2 TIMES WEEKLY
Qty: 42.5 G | Refills: 3 | Status: SHIPPED | OUTPATIENT
Start: 2025-01-20 | End: 2026-01-20

## 2025-01-20 RX ORDER — ESTRADIOL 0.05 MG/D
1 PATCH, EXTENDED RELEASE TRANSDERMAL 2 TIMES WEEKLY
Qty: 24 PATCH | Refills: 3 | Status: SHIPPED | OUTPATIENT
Start: 2025-01-20 | End: 2026-01-20

## 2025-01-20 NOTE — PROGRESS NOTES
Gynecologic Annual Exam Note        GYN Annual Exam     CC - Here for annual exam.        HPI  Nelli Morales is a 59 y.o. female, , who presents for annual well woman exam as a established patient.  She is s/p NU/BSO in  for endometriosis .. Denies vaginal bleeding.   There were no changes to her medical or surgical history since her last visit. Marital Status: single.  She is sexually active. She has not had new partners.. STD testing recommendations have been explained to the patient and she declines STD testing.    The patient would like to discuss the following complaints today: none    Additional OB/GYN History   On HRT? Yes. Details: estradiol    Last Pap : 23. Results: ASCUS. HPV: negative.   Last Completed Pap Smear            PAP SMEAR (Every 3 Years) Next due on 2023  LIQUID-BASED PAP SMEAR WITH HPV GENOTYPING IF ASCUS (NESS,COR,MAD)    2020  Done - vaginal cuff neg    2018  Done    2013  Patient-Reported (Performed Externally) - [facility]                  History of abnormal Pap smear:  ASCUS  Family history of uterine, colon, breast, or ovarian cancer: yes - mother:breast  Performs monthly Self-Breast Exam: no  Last mammogram: 24.  There is a copy in the chart.    Last Completed Mammogram            Ordered - MAMMOGRAM (Every 2 Years) Ordered on 2024  SCANNED - MAMMO    10/04/2022  SCANNED - MAMMO    2022  SCANNED - MAMMO    10/16/2020  SCANNED - MAMMO    10/03/2019  SCANNED - MAMMO    Only the first 5 history entries have been loaded, but more history exists.                  Last colonoscopy: has had a colonoscopy 4 years ago    Last Completed Colonoscopy       This patient has no relevant Health Maintenance data.            She has never had a bone density scan  Exercises Regularly: yes  Feelings of Anxiety or Depression: no      Tobacco Usage?: No       Current Outpatient Medications:     B Complex-Biotin-FA  (B-COMPLEX PO), Take  by mouth., Disp: , Rfl:     cetirizine (zyrTEC) 10 MG tablet, TAKE 1 TABLET BY MOUTH DAILY, Disp: 90 tablet, Rfl: 1    Cholecalciferol (VITAMIN D3) 5000 UNITS capsule capsule, Take 1 capsule by mouth Daily., Disp: , Rfl:     Creon 84403-715572 units capsule delayed-release particles capsule, Take 1 capsule 3 times a day by oral route before meals for 30 days., Disp: , Rfl:     fluticasone (Flonase) 50 MCG/ACT nasal spray, Administer 1 spray into the nostril(s) as directed by provider Daily., Disp: 16 g, Rfl: 0    folic acid (FOLVITE) 1 MG tablet, TAKE 1 TABLET BY MOUTH DAILY, Disp: 90 tablet, Rfl: 1    losartan-hydrochlorothiazide (HYZAAR) 50-12.5 MG per tablet, TAKE 1 TABLET BY MOUTH DAILY, Disp: 90 tablet, Rfl: 1    metFORMIN ER (GLUCOPHAGE-XR) 500 MG 24 hr tablet, Take 1 tablet by mouth Daily., Disp: 180 tablet, Rfl: 3    methocarbamol (ROBAXIN) 500 MG tablet, Take 1 tablet by mouth Daily., Disp: , Rfl:     rosuvastatin (CRESTOR) 40 MG tablet, Take 1 tablet by mouth Every Night., Disp: 90 tablet, Rfl: 3    Semaglutide (Rybelsus) 14 MG tablet, Take 1 tablet by mouth Daily., Disp: 90 tablet, Rfl: 1    Stool Softener 100 MG capsule, TAKE 1 CAPSULE BY MOUTH TWICE DAILY AS DIRECTED, Disp: , Rfl:     Trulance 3 MG tablet, Take 1 tablet by mouth Daily., Disp: , Rfl:     Blood Glucose Monitoring Suppl (ONE TOUCH ULTRA 2) w/Device kit, 2 (Two) Times a Day. as directed, Disp: , Rfl:     clotrimazole-betamethasone (Lotrisone) 1-0.05 % cream, Apply 1 Application topically to the appropriate area as directed 2 (Two) Times a Day. (Patient not taking: Reported on 1/20/2025), Disp: 15 g, Rfl: 3    estradiol (ESTRACE) 0.1 MG/GM vaginal cream, Insert 1 g into the vagina 2 (Two) Times a Week., Disp: 42.5 g, Rfl: 3    estradiol (Vivelle-Dot) 0.05 MG/24HR patch, Place 1 patch on the skin as directed by provider 2 (Two) Times a Week., Disp: 24 patch, Rfl: 3    glucose blood test strip, Use to test blood sugars  twice daily, Disp: 200 each, Rfl: 3    glucose monitor monitoring kit, 1 each 2 (Two) Times a Day. OneTouch Ultra2, Disp: 1 each, Rfl: 0    ipratropium (ATROVENT) 0.03 % nasal spray, Administer 1 spray into the nostril(s) as directed by provider Every 12 (Twelve) Hours. (Patient not taking: Reported on 2025), Disp: 30 mL, Rfl: 0    Lancets misc, Use to test blood sugars twice daily, Disp: 100 each, Rfl: 11    latanoprost (XALATAN) 0.005 % ophthalmic solution, , Disp: , Rfl: 1    Patient is requesting refills of estradiol.    OB History          0    Para   0    Term   0       0    AB   0    Living   0         SAB   0    IAB   0    Ectopic   0    Molar   0    Multiple   0    Live Births   0                Past Medical History:   Diagnosis Date    Allergic     Allergic rhinitis     BV (bacterial vaginosis)     Cataracts, bilateral     Class 1 obesity due to excess calories without serious comorbidity with body mass index (BMI) of 32.0 to 32.9 in adult 2017    Diabetes mellitus, type 2     Diverticulitis large intestine w/o perforation or abscess w/o bleeding 2017    Endometriosis     STAGE 4 WITH LARGE LEFT OVARIAN ENDOMETRIOMA;EXTENSVIE DENSE LEFT ADNEXAL ADHESIONS TO SIDEWALL AND SIGMOID COLON. dEEP IMPLANTS LEFT ANTERIOR CULDESAC.    Female cystocele     Fibrocystic breast disease     Glaucoma     Hormone replacement therapy     Hyperlipidemia     Hypertension     IBS (irritable bowel syndrome)     Insomnia     Obesity (BMI 30.0-34.9)     Stress bladder incontinence, female     History of prior Alas procedure with good support.   Currently resolved.    Vaginitis     UNKNOWN ETIOLOGY    Varicose veins of legs         Past Surgical History:   Procedure Laterality Date    BLADDER SUSPENSION  10/27/2007    CYSTOCELE REPAIR      CYSTOTOMY      HYSTEROSCOPY      TOTAL ABDOMINAL HYSTERECTOMY WITH SALPINGO OOPHORECTOMY  2013    LYSIS OF ADHESIONS FOR STAGE 4 ENDOMETRIOSIS       Health  "Maintenance   Topic Date Due    Hepatitis B (1 of 3 - 19+ 3-dose series) Never done    ZOSTER VACCINE (1 of 2) Never done    Pneumococcal Vaccine 0-64 (2 of 2 - PPSV23 or PCV20) 10/21/2017    COVID-19 Vaccine (7 - 2024-25 season) 09/01/2024    Annual Gynecologic Pelvic and Breast Exam  11/08/2024    ANNUAL PHYSICAL  12/26/2024    DIABETIC FOOT EXAM  12/26/2024    COLORECTAL CANCER SCREENING  01/06/2025    DIABETIC EYE EXAM  03/15/2025    HEMOGLOBIN A1C  04/24/2025    LIPID PANEL  10/24/2025    BMI FOLLOWUP  01/15/2026    MOST FORM  01/20/2026    MAMMOGRAM  01/26/2026    PAP SMEAR  11/07/2026    TDAP/TD VACCINES (5 - Td or Tdap) 11/15/2027    HEPATITIS C SCREENING  Completed    INFLUENZA VACCINE  Completed    URINE MICROALBUMIN  Discontinued       The additional following portions of the patient's history were reviewed and updated as appropriate: allergies, current medications, past family history, past medical history, past social history, past surgical history, and problem list.    Review of Systems   Constitutional: Negative.    HENT: Negative.     Eyes: Negative.    Respiratory: Negative.     Cardiovascular: Negative.    Gastrointestinal: Negative.    Endocrine: Negative.    Genitourinary: Negative.    Musculoskeletal: Negative.    Skin: Negative.    Allergic/Immunologic: Negative.    Neurological: Negative.    Hematological: Negative.    Psychiatric/Behavioral: Negative.         I have reviewed and agree with the HPI, ROS, and historical information as entered above.   Hiro Oliver MD      Objective   /70   Ht 148.6 cm (58.5\")   Wt 62.1 kg (137 lb)   LMP  (LMP Unknown)   Breastfeeding No   BMI 28.15 kg/m²     Physical Exam  Vitals and nursing note reviewed. Exam conducted with a chaperone present.   Constitutional:       Appearance: Normal appearance. She is well-developed. She is obese.   HENT:      Head: Normocephalic and atraumatic.   Neck:      Thyroid: No thyroid mass or thyromegaly. "   Cardiovascular:      Rate and Rhythm: Normal rate and regular rhythm.      Heart sounds: Normal heart sounds. No murmur heard.  Pulmonary:      Effort: Pulmonary effort is normal. No retractions.      Breath sounds: Normal breath sounds. No wheezing, rhonchi or rales.   Chest:      Chest wall: No mass or tenderness.   Breasts:     Right: Normal. No mass, nipple discharge, skin change or tenderness.      Left: Normal. No mass, nipple discharge, skin change or tenderness.   Abdominal:      General: Bowel sounds are normal.      Palpations: Abdomen is soft. Abdomen is not rigid. There is no mass.      Tenderness: There is no abdominal tenderness. There is no guarding.      Hernia: No hernia is present. There is no hernia in the left inguinal area or right inguinal area.   Genitourinary:     General: Normal vulva.      Exam position: Lithotomy position.      Pubic Area: No rash.       Labia:         Right: No rash, tenderness or lesion.         Left: No rash, tenderness or lesion.       Urethra: No urethral pain or urethral swelling.      Vagina: Prolapsed vaginal walls present. No vaginal discharge, bleeding or lesions.      Uterus: Absent.       Adnexa:         Right: No mass, tenderness or fullness.          Left: No mass, tenderness or fullness.        Rectum: No external hemorrhoid.      Comments: Cervix surgically absent.  Normal vaginal rugate for age.  No vaginal discharge or lesions.  Vaginal cuff intact.  No adnexal masses or tenderness.  Musculoskeletal:      Cervical back: Normal range of motion and neck supple. No muscular tenderness.   Neurological:      Mental Status: She is alert and oriented to person, place, and time.   Psychiatric:         Behavior: Behavior normal.            Assessment and Plan    Problem List Items Addressed This Visit          Gynecologic and Obstetric Problems    Atrophic vaginitis    Overview     On Estrace vaginal cream.            Other    Encounter for screening mammogram  for malignant neoplasm of breast    Overview     Mammogram 1/3/2022 at [facility] with benign appearing cyst. 6-month repeat recommended.  1/26/2024 for follow-up ultrasound at [facility] was stable.         Relevant Orders    Mammo Screening Digital Tomosynthesis Bilateral With CAD    History of total hysterectomy with bilateral salpingo-oophorectomy (BSO)    Overview     December 2013 NU/BSO lysis of adhesions for stage IV endometriosis.  Dense adhesions left ovary and sigmoid colon.         Hormone replacement therapy    Overview     On Vivelle dot 0.075 mg patch    1/20/2025 desires to decrease dose of Vivelle-Dot to 0.05 mg patch.         Overweight (BMI 25.0-29.9)    Rectocele - Primary    Overview     Grade 2.  Doing well with #2 Knob ring pessary with support.          Screening for cervical cancer    Overview     History of prior hysterectomy for endometriosis.  Pap smear 8/12/2020 was negative.  Pap smear 1/20/2025.  Can repeat in 5 years.         Screening for colon cancer    Overview     12/2019 [facility]; negative; 10 yr repeat.          Type 2 diabetes mellitus with diabetic autonomic neuropathy, without long-term current use of insulin    Relevant Medications    glucose monitor monitoring kit    Lancets misc    Blood Glucose Monitoring Suppl (ONE TOUCH ULTRA 2) w/Device kit    metFORMIN ER (GLUCOPHAGE-XR) 500 MG 24 hr tablet    glucose blood test strip    Semaglutide (Rybelsus) 14 MG tablet       GYN annual well woman exam.  57 years of age.  History of hysterectomy for endometriosis.  Hormone replacement therapy.  Wishes to try decreasing dose of Vivelle-Dot to 0.05 mg patch.  Change twice weekly.  Rectocele well-controlled with #2 knob ring pessary with support.  Atrophic vaginitis.  Refill estradiol vaginal cream 1 g PV twice weekly.  May be due for colonoscopy.  Mammogram ordered.  Gets mammograms at [facility].  Reviewed monthly self breast exams.  Instructed to call with lumps,  pain, or breast discharge.  Yearly mammograms ordered.  Ordered mammogram today.  Recommended use of Vitamin D and getting adequate calcium in her diet. (1500mg)  Reviewed BMI and weight loss as preventative health measures.   Colonoscopy recommended.  Reviewed risks of ERT including increased risk of breast cancer, increased blood clots, increased heart disease.  Patient strongly desires to stay on or start ERT.  She understands we will use the lowest amount that adequately controls her symptoms.  Recommended Flu Vaccine in Fall of each year.  RTC in 1 year or PRN with problems.  Return in about 1 year (around 1/20/2026) for Annual physical.         Hiro Oliver MD  01/20/2025

## 2025-01-24 LAB — REF LAB TEST METHOD: NORMAL

## 2025-02-17 ENCOUNTER — OFFICE VISIT (OUTPATIENT)
Dept: FAMILY MEDICINE CLINIC | Facility: CLINIC | Age: 60
End: 2025-02-17
Payer: COMMERCIAL

## 2025-02-17 ENCOUNTER — LAB (OUTPATIENT)
Dept: LAB | Facility: HOSPITAL | Age: 60
End: 2025-02-17
Payer: COMMERCIAL

## 2025-02-17 VITALS
OXYGEN SATURATION: 97 % | HEIGHT: 59 IN | BODY MASS INDEX: 27.26 KG/M2 | SYSTOLIC BLOOD PRESSURE: 118 MMHG | DIASTOLIC BLOOD PRESSURE: 74 MMHG | HEART RATE: 75 BPM | WEIGHT: 135.2 LBS

## 2025-02-17 DIAGNOSIS — K59.04 CHRONIC IDIOPATHIC CONSTIPATION: ICD-10-CM

## 2025-02-17 DIAGNOSIS — E11.43 TYPE 2 DIABETES MELLITUS WITH DIABETIC AUTONOMIC NEUROPATHY, WITHOUT LONG-TERM CURRENT USE OF INSULIN: ICD-10-CM

## 2025-02-17 DIAGNOSIS — Z90.79 HISTORY OF TOTAL HYSTERECTOMY WITH BILATERAL SALPINGO-OOPHORECTOMY (BSO): ICD-10-CM

## 2025-02-17 DIAGNOSIS — Z90.710 HISTORY OF TOTAL HYSTERECTOMY WITH BILATERAL SALPINGO-OOPHORECTOMY (BSO): ICD-10-CM

## 2025-02-17 DIAGNOSIS — Z00.00 ANNUAL PHYSICAL EXAM: Primary | ICD-10-CM

## 2025-02-17 DIAGNOSIS — I10 ESSENTIAL HYPERTENSION: ICD-10-CM

## 2025-02-17 DIAGNOSIS — Z00.00 ANNUAL PHYSICAL EXAM: ICD-10-CM

## 2025-02-17 DIAGNOSIS — E78.2 MIXED HYPERLIPIDEMIA: ICD-10-CM

## 2025-02-17 DIAGNOSIS — Z79.890 HORMONE REPLACEMENT THERAPY: ICD-10-CM

## 2025-02-17 DIAGNOSIS — Z90.722 HISTORY OF TOTAL HYSTERECTOMY WITH BILATERAL SALPINGO-OOPHORECTOMY (BSO): ICD-10-CM

## 2025-02-17 DIAGNOSIS — E66.3 OVERWEIGHT (BMI 25.0-29.9): ICD-10-CM

## 2025-02-17 PROBLEM — R05.9 COUGH: Status: RESOLVED | Noted: 2024-08-09 | Resolved: 2025-02-17

## 2025-02-17 PROBLEM — N63.0 BREAST LUMP: Status: RESOLVED | Noted: 2021-09-08 | Resolved: 2025-02-17

## 2025-02-17 PROBLEM — K57.32 DIVERTICULITIS OF LARGE INTESTINE WITHOUT PERFORATION OR ABSCESS WITHOUT BLEEDING: Status: RESOLVED | Noted: 2018-08-02 | Resolved: 2025-02-17

## 2025-02-17 PROBLEM — N76.0 BACTERIAL VAGINOSIS: Status: RESOLVED | Noted: 2017-12-22 | Resolved: 2025-02-17

## 2025-02-17 PROBLEM — B96.89 BACTERIAL VAGINOSIS: Status: RESOLVED | Noted: 2017-12-22 | Resolved: 2025-02-17

## 2025-02-17 LAB
ALBUMIN SERPL-MCNC: 4.6 G/DL (ref 3.5–5.2)
ALBUMIN UR-MCNC: <1.2 MG/DL
ALBUMIN/GLOB SERPL: 1.7 G/DL
ALP SERPL-CCNC: 72 U/L (ref 39–117)
ALT SERPL W P-5'-P-CCNC: 29 U/L (ref 1–33)
ANION GAP SERPL CALCULATED.3IONS-SCNC: 11.1 MMOL/L (ref 5–15)
AST SERPL-CCNC: 34 U/L (ref 1–32)
BASOPHILS # BLD AUTO: 0.03 10*3/MM3 (ref 0–0.2)
BASOPHILS NFR BLD AUTO: 0.5 % (ref 0–1.5)
BILIRUB SERPL-MCNC: 0.5 MG/DL (ref 0–1.2)
BUN SERPL-MCNC: 18 MG/DL (ref 6–20)
BUN/CREAT SERPL: 27.7 (ref 7–25)
CALCIUM SPEC-SCNC: 9.8 MG/DL (ref 8.6–10.5)
CHLORIDE SERPL-SCNC: 101 MMOL/L (ref 98–107)
CHOLEST SERPL-MCNC: 138 MG/DL (ref 0–200)
CO2 SERPL-SCNC: 24.9 MMOL/L (ref 22–29)
CREAT SERPL-MCNC: 0.65 MG/DL (ref 0.57–1)
CREAT UR-MCNC: 78.6 MG/DL
DEPRECATED RDW RBC AUTO: 38.4 FL (ref 37–54)
EGFRCR SERPLBLD CKD-EPI 2021: 102.2 ML/MIN/1.73
EOSINOPHIL # BLD AUTO: 0.16 10*3/MM3 (ref 0–0.4)
EOSINOPHIL NFR BLD AUTO: 2.8 % (ref 0.3–6.2)
ERYTHROCYTE [DISTWIDTH] IN BLOOD BY AUTOMATED COUNT: 11.8 % (ref 12.3–15.4)
EXPIRATION DATE: NORMAL
GLOBULIN UR ELPH-MCNC: 2.7 GM/DL
GLUCOSE SERPL-MCNC: 101 MG/DL (ref 65–99)
HBA1C MFR BLD: 5.4 % (ref 4.5–5.7)
HBA1C MFR BLD: 5.4 % (ref 4.8–5.6)
HCT VFR BLD AUTO: 45.9 % (ref 34–46.6)
HDLC SERPL QL: 2.82
HDLC SERPL-MCNC: 49 MG/DL (ref 40–60)
HGB BLD-MCNC: 15.6 G/DL (ref 12–15.9)
IMM GRANULOCYTES # BLD AUTO: 0.01 10*3/MM3 (ref 0–0.05)
IMM GRANULOCYTES NFR BLD AUTO: 0.2 % (ref 0–0.5)
LDLC SERPL CALC-MCNC: 68 MG/DL (ref 0–100)
LYMPHOCYTES # BLD AUTO: 1.88 10*3/MM3 (ref 0.7–3.1)
LYMPHOCYTES NFR BLD AUTO: 33.3 % (ref 19.6–45.3)
Lab: NORMAL
MCH RBC QN AUTO: 30.3 PG (ref 26.6–33)
MCHC RBC AUTO-ENTMCNC: 34 G/DL (ref 31.5–35.7)
MCV RBC AUTO: 89.1 FL (ref 79–97)
MICROALBUMIN/CREAT UR: NORMAL MG/G{CREAT}
MONOCYTES # BLD AUTO: 0.38 10*3/MM3 (ref 0.1–0.9)
MONOCYTES NFR BLD AUTO: 6.7 % (ref 5–12)
NEUTROPHILS NFR BLD AUTO: 3.18 10*3/MM3 (ref 1.7–7)
NEUTROPHILS NFR BLD AUTO: 56.5 % (ref 42.7–76)
NRBC BLD AUTO-RTO: 0 /100 WBC (ref 0–0.2)
PLATELET # BLD AUTO: 255 10*3/MM3 (ref 140–450)
PMV BLD AUTO: 11.1 FL (ref 6–12)
POTASSIUM SERPL-SCNC: 4.6 MMOL/L (ref 3.5–5.2)
PROT SERPL-MCNC: 7.3 G/DL (ref 6–8.5)
RBC # BLD AUTO: 5.15 10*6/MM3 (ref 3.77–5.28)
SODIUM SERPL-SCNC: 137 MMOL/L (ref 136–145)
TRIGL SERPL-MCNC: 119 MG/DL (ref 0–150)
VLDLC SERPL-MCNC: 21 MG/DL (ref 5–40)
WBC NRBC COR # BLD AUTO: 5.64 10*3/MM3 (ref 3.4–10.8)

## 2025-02-17 PROCEDURE — 82043 UR ALBUMIN QUANTITATIVE: CPT

## 2025-02-17 PROCEDURE — 80061 LIPID PANEL: CPT

## 2025-02-17 PROCEDURE — 83036 HEMOGLOBIN GLYCOSYLATED A1C: CPT | Performed by: STUDENT IN AN ORGANIZED HEALTH CARE EDUCATION/TRAINING PROGRAM

## 2025-02-17 PROCEDURE — 82570 ASSAY OF URINE CREATININE: CPT

## 2025-02-17 PROCEDURE — 80053 COMPREHEN METABOLIC PANEL: CPT

## 2025-02-17 PROCEDURE — 85025 COMPLETE CBC W/AUTO DIFF WBC: CPT

## 2025-02-17 PROCEDURE — 99396 PREV VISIT EST AGE 40-64: CPT | Performed by: STUDENT IN AN ORGANIZED HEALTH CARE EDUCATION/TRAINING PROGRAM

## 2025-02-17 PROCEDURE — 83036 HEMOGLOBIN GLYCOSYLATED A1C: CPT

## 2025-02-17 RX ORDER — METFORMIN HYDROCHLORIDE 500 MG/1
1000 TABLET, EXTENDED RELEASE ORAL
Qty: 180 TABLET | Refills: 3 | Status: SHIPPED | OUTPATIENT
Start: 2025-02-17

## 2025-02-17 NOTE — ASSESSMENT & PLAN NOTE
Hypertension is stable and controlled  Continue current treatment regimen. Losartan-HCTZ.  Blood pressure will be reassessed  in 4 months .

## 2025-02-17 NOTE — ASSESSMENT & PLAN NOTE
Diabetes is stable.   Medication changes per orders.  Recommended an ADA diet.  Regular aerobic exercise.  Diabetes will be reassessed  in 4 months  STOP Rybelsus, A1c excellent control at 5.4%  Cont Metformin, increase to 1,000mg/day

## 2025-02-17 NOTE — PROGRESS NOTES
Female Physical Note      Date: 2025   Patient Name: Nelli Morales  : 1965   MRN: [id number]     Chief Complaint:    Chief Complaint   Patient presents with    Annual Exam       History of Present Illness: Nelli Morales is a 59 y.o. female  with T2DM, HTN, HLD who is here today for their annual health maintenance and physical.    HPI    Diabetes   Last A1c:    Hemoglobin A1C   Date Value Ref Range Status   2025 5.4 4.5 - 5.7 % Final   10/24/2024 5.50 4.80 - 5.60 % Final   2024 5.9 (A) 4.5 - 5.7 % Final      Patient is compliant with a diabetic low-carb diet. Walking every day at work.   Patient is compliant with current meds. Rybelsus cost recently increased to >$90/month. Has only been taking Metformin once daily since BS had been so well controlled with the addition of Rybelsus.   No N/V/D related to meds.      Colon Ca Screen: Planning to call and scheduled, has letter from Gastro.  Mammogram: DUE, ordered by OB/Gyn  Pap: Done 2025, s/p NU/BSO in  for endometriosis. Saw Ob/Gyn in January for annual.  CT Lung Cancer: Never smoker.  BMI: 27- Improving  Tdap: Td in 2017.  Shingrix: DUE, reminded to get at pharmacy  Pneumonia: Had PCV13 in 2017. DUE, pt declines today. Will get at pharmacy.   Flu: UTD  Hepatitis B: Had this as an adult within last few years, required when she worked at [employer].  COVID: UTD booster 2024      Subjective      Review of Systems:   Review of Systems   Constitutional:  Negative for activity change, appetite change and unexpected weight gain.   Gastrointestinal:  Negative for constipation.   Genitourinary:  Negative for vaginal bleeding.       Past Medical History, Social History, Family History and Care Team were all reviewed with patient and updated as appropriate.     Medications:     Current Outpatient Medications:     B Complex-Biotin-FA (B-COMPLEX PO), Take  by mouth., Disp: , Rfl:     Blood Glucose Monitoring Suppl (ONE TOUCH  ULTRA 2) w/Device kit, 2 (Two) Times a Day. as directed, Disp: , Rfl:     cetirizine (zyrTEC) 10 MG tablet, TAKE 1 TABLET BY MOUTH DAILY, Disp: 90 tablet, Rfl: 1    Cholecalciferol (VITAMIN D3) 5000 UNITS capsule capsule, Take 1 capsule by mouth Daily., Disp: , Rfl:     Creon [id number] units capsule delayed-release particles capsule, Take 1 capsule 3 times a day by oral route before meals for 30 days., Disp: , Rfl:     estradiol (ESTRACE) 0.1 MG/GM vaginal cream, Insert 1 g into the vagina 2 (Two) Times a Week., Disp: 42.5 g, Rfl: 3    estradiol (Vivelle-Dot) 0.05 MG/24HR patch, Place 1 patch on the skin as directed by provider 2 (Two) Times a Week., Disp: 24 patch, Rfl: 3    fluticasone (Flonase) 50 MCG/ACT nasal spray, Administer 1 spray into the nostril(s) as directed by provider Daily., Disp: 16 g, Rfl: 0    folic acid (FOLVITE) 1 MG tablet, TAKE 1 TABLET BY MOUTH DAILY, Disp: 90 tablet, Rfl: 1    glucose blood test strip, Use to test blood sugars twice daily, Disp: 200 each, Rfl: 3    glucose monitor monitoring kit, 1 each 2 (Two) Times a Day. OneTouch Ultra2, Disp: 1 each, Rfl: 0    Lancets misc, Use to test blood sugars twice daily, Disp: 100 each, Rfl: 11    latanoprost (XALATAN) 0.005 % ophthalmic solution, , Disp: , Rfl: 1    losartan-hydrochlorothiazide (HYZAAR) 50-12.5 MG per tablet, TAKE 1 TABLET BY MOUTH DAILY, Disp: 90 tablet, Rfl: 1    metFORMIN ER (GLUCOPHAGE-XR) 500 MG 24 hr tablet, Take 2 tablets by mouth Daily With Breakfast., Disp: 180 tablet, Rfl: 3    methocarbamol (ROBAXIN) 500 MG tablet, Take 1 tablet by mouth Daily., Disp: , Rfl:     rosuvastatin (CRESTOR) 40 MG tablet, Take 1 tablet by mouth Every Night., Disp: 90 tablet, Rfl: 3    Stool Softener 100 MG capsule, TAKE 1 CAPSULE BY MOUTH TWICE DAILY AS DIRECTED, Disp: , Rfl:     Trulance 3 MG tablet, Take 1 tablet by mouth Daily., Disp: , Rfl:     Allergies:   Allergies   Allergen Reactions    Ace Inhibitors Cough    Amoxicillin Diarrhea     Morphine And Codeine Rash    Polymyxin B-Trimethoprim Unknown (See Comments)       Immunizations:    Immunization History   Administered Date(s) Administered    COVID-19 (PFIZER) 12YRS+ (COMIRNATY) 10/07/2023, 12/26/2024    COVID-19 (PFIZER) BIVALENT 12+YRS 12/22/2022    COVID-19 (PFIZER) Purple Cap Monovalent 01/27/2021, 02/17/2021, 10/02/2021    Covid-19 (Pfizer) Gray Cap Monovalent 05/29/2022    Flu Vaccine Intradermal Quad 18-64YR 10/03/2019    Flu Vaccine Quad PF >36MO 08/26/2017, 09/15/2019, 09/06/2020, 09/25/2021    Fluzone  >6mos 09/11/2016    Fluzone (or Fluarix & Flulaval for VFC) >6mos 08/26/2017, 09/06/2020, 09/25/2021, 10/07/2023    Fluzone Quad >6mos (Multi-dose) 09/22/2018, 09/15/2019    Hepatitis A 09/22/2018, 03/23/2019    Influenza Inj MDCK Preserative Free 09/17/2024    Influenza Injectable Mdck Pf Quad 10/04/2022    Influenza Whole 08/01/2016    Influenza, Unspecified 08/26/2017, 10/04/2022    Pneumococcal Conjugate 13-Valent (PCV13) 08/26/2017    Td (TDVAX) 06/03/2002, 11/15/2017    Tdap 01/19/2012    flucelvax quad pfs =>4 YRS 10/03/2019       Colorectal Screening:   DUE, has received letter from Dr. Goodman reminding to call and scheduled. Last C scope in chart 12/2019, had one polyp.   Last Completed Colonoscopy       This patient has no relevant Health Maintenance data.          Pap:  UTD   Last Completed Pap Smear            PAP SMEAR (Every 3 Years) Next due on 1/20/2028 01/20/2025  LIQUID-BASED PAP SMEAR WITH HPV GENOTYPING REGARDLESS OF INTERPRETATION (NESS,COR,MAD)    11/07/2023  LIQUID-BASED PAP SMEAR WITH HPV GENOTYPING IF ASCUS (NESS,COR,MAD)    08/12/2020  Done - vaginal cuff neg    06/01/2018  Done    12/17/2013  Patient-Reported (Performed Externally) - NU    Only the first 5 history entries have been loaded, but more history exists.                   Mammogram:  DUE, ordered   Last Completed Mammogram            Ordered - MAMMOGRAM (Every 2 Years) Ordered on 1/20/2025       "01/26/2024  SCANNED - MAMMO    10/04/2022  SCANNED - MAMMO    01/03/2022  SCANNED - MAMMO    10/16/2020  SCANNED - MAMMO    10/03/2019  SCANNED - MAMMO    Only the first 5 history entries have been loaded, but more history exists.                    Tobacco Use: Low Risk  (2/17/2025)    Patient History     Smoking Tobacco Use: Never     Smokeless Tobacco Use: Never     Passive Exposure: Never       Social History     Substance and Sexual Activity   Alcohol Use Yes    Alcohol/week: 1.0 standard drink of alcohol    Types: 1 Cans of beer per week    Comment: occasional        Social History     Substance and Sexual Activity   Drug Use No        Diet/Physical activity: Healthy    Sexual Health: s/p hysterectomy, post menopausal     PHQ-2 Depression Screening  Little interest or pleasure in doing things? Not at all   Feeling down, depressed, or hopeless? Not at all   PHQ-2 Total Score 0     PHQ-9 Total Score:      Objective     Physical Exam:  Vital Signs:   Vitals:    02/17/25 0755   BP: 118/74   BP Location: Left arm   Patient Position: Sitting   Cuff Size: Adult   Pulse: 75   SpO2: 97%   Weight: 61.3 kg (135 lb 3.2 oz)   Height: 148.6 cm (58.5\")     Body mass index is 27.78 kg/m².     Physical Exam  Constitutional:       Appearance: She is normal weight. She is not ill-appearing.   HENT:      Head: Normocephalic and atraumatic.      Right Ear: Tympanic membrane normal.      Left Ear: Tympanic membrane normal.      Mouth/Throat:      Mouth: Mucous membranes are moist.      Pharynx: Oropharynx is clear. No oropharyngeal exudate or posterior oropharyngeal erythema.   Eyes:      Extraocular Movements: Extraocular movements intact.      Conjunctiva/sclera: Conjunctivae normal.   Cardiovascular:      Rate and Rhythm: Normal rate and regular rhythm.      Heart sounds: Normal heart sounds.   Pulmonary:      Breath sounds: Normal breath sounds. No wheezing or rhonchi.   Abdominal:      General: Abdomen is flat.      " Palpations: Abdomen is soft.      Tenderness: There is no abdominal tenderness.   Musculoskeletal:         General: Normal range of motion.      Cervical back: Normal range of motion and neck supple.   Lymphadenopathy:      Cervical: No cervical adenopathy.   Neurological:      General: No focal deficit present.      Mental Status: She is alert.   Psychiatric:         Mood and Affect: Mood normal.         Thought Content: Thought content normal.             Assessment / Plan      Assessment/Plan:   Diagnoses and all orders for this visit:    1. Annual physical exam (Primary)  -     CBC & Differential; Future    2. Type 2 diabetes mellitus with diabetic autonomic neuropathy, without long-term current use of insulin  Assessment & Plan:  Diabetes is stable.   Medication changes per orders.  Recommended an ADA diet.  Regular aerobic exercise.  Diabetes will be reassessed  in 4 months  STOP Rybelsus, A1c excellent control at 5.4%  Cont Metformin, increase to 1,000mg/day    Orders:  -     POC Glycosylated Hemoglobin (Hb A1C)  -     metFORMIN ER (GLUCOPHAGE-XR) 500 MG 24 hr tablet; Take 2 tablets by mouth Daily With Breakfast.  Dispense: 180 tablet; Refill: 3  -     Lipid Panel With / Chol / HDL Ratio; Future  -     Hemoglobin A1c; Future  -     Microalbumin / Creatinine Urine Ratio - Urine, Clean Catch; Future  -     Comprehensive Metabolic Panel; Future    3. Mixed hyperlipidemia    4. Essential hypertension  Assessment & Plan:  Hypertension is stable and controlled  Continue current treatment regimen. Losartan-HCTZ.  Blood pressure will be reassessed  in 4 months .      5. History of total hysterectomy with bilateral salpingo-oophorectomy (BSO)  6. Hormone replacement therapy  Management per OB/Gyn  Exam is UTD    7. Chronic idiopathic constipation  Assessment & Plan:  Controlled w Trulance      8. Overweight (BMI 25.0-29.9)         Healthcare Maintenance:  Counseling provided based on age appropriate USPSTF guidelines.           Nelli Morales voices understanding and acceptance of this advice and will call back with any further questions or concerns. AVS with preventive healthcare tips printed for patient.         Follow Up:   Return in about 4 months (around 6/17/2025) for Diabetes FU.          Jamilah Sainz DO  [facility] [address]

## 2025-02-24 DIAGNOSIS — I10 ESSENTIAL HYPERTENSION: ICD-10-CM

## 2025-02-24 RX ORDER — LOSARTAN POTASSIUM AND HYDROCHLOROTHIAZIDE 12.5; 5 MG/1; MG/1
1 TABLET ORAL DAILY
Qty: 90 TABLET | Refills: 1 | Status: SHIPPED | OUTPATIENT
Start: 2025-02-24

## 2025-03-15 DIAGNOSIS — D64.9 ANEMIA, UNSPECIFIED TYPE: ICD-10-CM

## 2025-03-17 RX ORDER — FOLIC ACID 1 MG/1
1000 TABLET ORAL DAILY
Qty: 90 TABLET | Refills: 1 | Status: SHIPPED | OUTPATIENT
Start: 2025-03-17

## 2025-03-17 NOTE — TELEPHONE ENCOUNTER
Rx Refill Note  Requested Prescriptions     Pending Prescriptions Disp Refills    folic acid (FOLVITE) 1 MG tablet 90 tablet 1     Sig: Take 1 tablet by mouth Daily.      Last office visit with prescribing clinician: 2/17/2025   Last telemedicine visit with prescribing clinician: Visit date not found   Next office visit with prescribing clinician: 6/17/2025     Josh Ellis MA  03/17/25, 10:15 EDT

## 2025-03-19 ENCOUNTER — OFFICE VISIT (OUTPATIENT)
Dept: FAMILY MEDICINE CLINIC | Facility: CLINIC | Age: 60
End: 2025-03-19
Payer: COMMERCIAL

## 2025-03-19 VITALS
HEIGHT: 59 IN | HEART RATE: 82 BPM | TEMPERATURE: 97.9 F | DIASTOLIC BLOOD PRESSURE: 76 MMHG | SYSTOLIC BLOOD PRESSURE: 130 MMHG | WEIGHT: 137.6 LBS | BODY MASS INDEX: 27.74 KG/M2 | OXYGEN SATURATION: 97 %

## 2025-03-19 DIAGNOSIS — J06.9 UPPER RESPIRATORY TRACT INFECTION, UNSPECIFIED TYPE: Primary | ICD-10-CM

## 2025-03-19 LAB
EXPIRATION DATE: ABNORMAL
EXPIRATION DATE: NORMAL
FLUAV AG UPPER RESP QL IA.RAPID: NOT DETECTED
FLUBV AG UPPER RESP QL IA.RAPID: NOT DETECTED
INTERNAL CONTROL: ABNORMAL
INTERNAL CONTROL: NORMAL
Lab: ABNORMAL
Lab: NORMAL
S PYO AG THROAT QL: NEGATIVE
SARS-COV-2 AG UPPER RESP QL IA.RAPID: NOT DETECTED

## 2025-03-19 PROCEDURE — 87428 SARSCOV & INF VIR A&B AG IA: CPT

## 2025-03-19 PROCEDURE — 99214 OFFICE O/P EST MOD 30 MIN: CPT

## 2025-03-19 PROCEDURE — 87880 STREP A ASSAY W/OPTIC: CPT

## 2025-03-19 RX ORDER — BENZONATATE 100 MG/1
100 CAPSULE ORAL 3 TIMES DAILY PRN
Qty: 30 CAPSULE | Refills: 0 | Status: SHIPPED | OUTPATIENT
Start: 2025-03-19 | End: 2025-03-19

## 2025-03-19 RX ORDER — GUAIFENESIN 600 MG/1
600 TABLET, EXTENDED RELEASE ORAL 2 TIMES DAILY PRN
Qty: 14 TABLET | Refills: 0 | Status: SHIPPED | OUTPATIENT
Start: 2025-03-19 | End: 2025-03-26

## 2025-03-19 RX ORDER — BENZONATATE 100 MG/1
100 CAPSULE ORAL 3 TIMES DAILY PRN
Qty: 30 CAPSULE | Refills: 0 | Status: SHIPPED | OUTPATIENT
Start: 2025-03-19

## 2025-03-19 RX ORDER — GUAIFENESIN 600 MG/1
600 TABLET, EXTENDED RELEASE ORAL 2 TIMES DAILY PRN
Qty: 14 TABLET | Refills: 0 | Status: SHIPPED | OUTPATIENT
Start: 2025-03-19 | End: 2025-03-19

## 2025-03-19 NOTE — PROGRESS NOTES
Office Note     Name: Nelli Morales    : 1965     MRN: [id number]     Chief Complaint  Sinusitis, Ear Drainage, throat drainage, and sneezing    Subjective     History of Present Illness:  Nelli Morales is a 59 y.o. female who presents today for 1 day history of sinus congestion, ear pressure and sore throat and sneezing. Pmhx include allergies HTN, HRT, T2DM. She states her symptoms started last night. Denies fevers, chills or body aches. She states that she is a  and is around kids most of the day. Has a mild dry cough. Denies chest pain or shortness of breath. Denies dizziness. Denies ear drainage or discharge.  She has been using Mucinex DM at home for cough and congestion which she thinks helped some.  She has not taken any Tylenol or ibuprofen.  She states she also has seasonal allergies, but takes Zyrtec daily.  Denies rash.  Denies difficulty swallowing.  Denies abdominal pain, nausea or vomiting.    Review of Systems:   Review of Systems   Constitutional:  Negative for chills and fever.   HENT:  Positive for congestion, ear pain, postnasal drip, sinus pressure and sore throat. Negative for ear discharge.    Eyes:  Negative for visual disturbance.   Respiratory:  Positive for cough. Negative for shortness of breath.    Cardiovascular:  Negative for chest pain.   Gastrointestinal:  Negative for abdominal pain, nausea and vomiting.   Neurological:  Negative for dizziness and light-headedness.       Past Medical History:   Past Medical History:   Diagnosis Date    Allergic     Allergic rhinitis     BV (bacterial vaginosis)     Cataracts, bilateral     Class 1 obesity due to excess calories without serious comorbidity with body mass index (BMI) of 32.0 to 32.9 in adult 2017    Diabetes mellitus, type 2     Diverticulitis large intestine w/o perforation or abscess w/o bleeding 2017    Diverticulitis of large intestine without perforation or abscess without bleeding  2018    Diverticulosis     Endometriosis     STAGE 4 WITH LARGE LEFT OVARIAN ENDOMETRIOMA;EXTENSVIE DENSE LEFT ADNEXAL ADHESIONS TO SIDEWALL AND SIGMOID COLON. dEEP IMPLANTS LEFT ANTERIOR CULDESAC.    Female cystocele     Fibrocystic breast disease     Glaucoma     Hormone replacement therapy     Hyperlipidemia     Hypertension     IBS (irritable bowel syndrome)     Insomnia     Obesity (BMI 30.0-34.9)     Stress bladder incontinence, female     History of prior Alas procedure with good support.   Currently resolved.    Vaginitis     UNKNOWN ETIOLOGY    Varicose veins of legs        Past Surgical History:   Past Surgical History:   Procedure Laterality Date    BLADDER SUSPENSION  10/27/2007    CYSTOCELE REPAIR      CYSTOTOMY      HYSTEROSCOPY      TOTAL ABDOMINAL HYSTERECTOMY WITH SALPINGO OOPHORECTOMY  2013    LYSIS OF ADHESIONS FOR STAGE 4 ENDOMETRIOSIS       Family History:   Family History   Problem Relation Age of Onset    ALS Father 70    Diabetes Mother     Dementia Mother     Hypothyroidism Mother     Hyperlipidemia Mother     Breast cancer Mother     Thyroid disease Mother     Cancer Mother     Hypertension Brother         Sandro Morales    Diabetes Sister     Hyperlipidemia Sister     Hypothyroidism Sister     Early death Sister     Hypertension Sister         Lacey is     Thyroid disease Sister     Ovarian cancer Neg Hx     Uterine cancer Neg Hx     Colon cancer Neg Hx        Social History:   Social History     Socioeconomic History    Marital status: Single    Number of children: 0    Years of education: 12    Highest education level: High school graduate   Tobacco Use    Smoking status: Never     Passive exposure: Never    Smokeless tobacco: Never   Vaping Use    Vaping status: Never Used   Substance and Sexual Activity    Alcohol use: Yes     Alcohol/week: 1.0 standard drink of alcohol     Types: 1 Cans of beer per week     Comment: occasional    Drug use: No    Sexual activity: Yes      Partners: Male     Birth control/protection: Post-menopausal, Hysterectomy       Immunizations:   Immunization History   Administered Date(s) Administered    COVID-19 (PFIZER) 12YRS+ (COMIRNATY) 10/07/2023, 12/26/2024    COVID-19 (PFIZER) BIVALENT 12+YRS 12/22/2022    COVID-19 (PFIZER) Purple Cap Monovalent 01/27/2021, 02/17/2021, 10/02/2021    Covid-19 (Pfizer) Gray Cap Monovalent 05/29/2022    Flu Vaccine Intradermal Quad 18-64YR 10/03/2019    Flu Vaccine Quad PF >36MO 08/26/2017, 09/15/2019, 09/06/2020, 09/25/2021    Fluzone  >6mos 09/11/2016    Fluzone (or Fluarix & Flulaval for VFC) >6mos 08/26/2017, 09/06/2020, 09/25/2021, 10/07/2023    Fluzone Quad >6mos (Multi-dose) 09/22/2018, 09/15/2019    Hepatitis A 09/22/2018, 03/23/2019    Influenza Inj MDCK Preserative Free 09/17/2024    Influenza Injectable Mdck Pf Quad 10/04/2022    Influenza Whole 08/01/2016    Influenza, Unspecified 08/26/2017, 10/04/2022    Pneumococcal Conjugate 13-Valent (PCV13) 08/26/2017    Td (TDVAX) 06/03/2002, 11/15/2017    Tdap 01/19/2012    flucelvax quad pfs =>4 YRS 10/03/2019        Medications:     Current Outpatient Medications:     B Complex-Biotin-FA (B-COMPLEX PO), Take  by mouth., Disp: , Rfl:     benzonatate (Tessalon Perles) 100 MG capsule, Take 1 capsule by mouth 3 (Three) Times a Day As Needed for Cough., Disp: 30 capsule, Rfl: 0    Blood Glucose Monitoring Suppl (ONE TOUCH ULTRA 2) w/Device kit, 2 (Two) Times a Day. as directed, Disp: , Rfl:     cetirizine (zyrTEC) 10 MG tablet, TAKE 1 TABLET BY MOUTH DAILY, Disp: 90 tablet, Rfl: 1    Cholecalciferol (VITAMIN D3) 5000 UNITS capsule capsule, Take 1 capsule by mouth Daily., Disp: , Rfl:     Creon [id number] units capsule delayed-release particles capsule, Take 1 capsule 3 times a day by oral route before meals for 30 days., Disp: , Rfl:     estradiol (ESTRACE) 0.1 MG/GM vaginal cream, Insert 1 g into the vagina 2 (Two) Times a Week., Disp: 42.5 g, Rfl: 3    estradiol  "(Vivelle-Dot) 0.05 MG/24HR patch, Place 1 patch on the skin as directed by provider 2 (Two) Times a Week., Disp: 24 patch, Rfl: 3    fluticasone (Flonase) 50 MCG/ACT nasal spray, Administer 1 spray into the nostril(s) as directed by provider Daily., Disp: 16 g, Rfl: 0    folic acid (FOLVITE) 1 MG tablet, Take 1 tablet by mouth Daily., Disp: 90 tablet, Rfl: 1    glucose blood test strip, Use to test blood sugars twice daily, Disp: 200 each, Rfl: 3    glucose monitor monitoring kit, 1 each 2 (Two) Times a Day. OneTouch Ultra2, Disp: 1 each, Rfl: 0    guaiFENesin (Mucinex) 600 MG 12 hr tablet, Take 1 tablet by mouth 2 (Two) Times a Day As Needed for Congestion for up to 7 days., Disp: 14 tablet, Rfl: 0    Lancets misc, Use to test blood sugars twice daily, Disp: 100 each, Rfl: 11    latanoprost (XALATAN) 0.005 % ophthalmic solution, , Disp: , Rfl: 1    losartan-hydrochlorothiazide (HYZAAR) 50-12.5 MG per tablet, Take 1 tablet by mouth Daily., Disp: 90 tablet, Rfl: 1    metFORMIN ER (GLUCOPHAGE-XR) 500 MG 24 hr tablet, Take 2 tablets by mouth Daily With Breakfast., Disp: 180 tablet, Rfl: 3    methocarbamol (ROBAXIN) 500 MG tablet, Take 1 tablet by mouth Daily., Disp: , Rfl:     rosuvastatin (CRESTOR) 40 MG tablet, Take 1 tablet by mouth Every Night., Disp: 90 tablet, Rfl: 3    Stool Softener 100 MG capsule, TAKE 1 CAPSULE BY MOUTH TWICE DAILY AS DIRECTED, Disp: , Rfl:     Trulance 3 MG tablet, Take 1 tablet by mouth Daily., Disp: , Rfl:     Allergies:   Allergies   Allergen Reactions    Ace Inhibitors Cough    Amoxicillin Diarrhea    Morphine And Codeine Rash    Polymyxin B-Trimethoprim Unknown (See Comments)       Objective     Vital Signs  /76 (BP Location: Right arm, Patient Position: Sitting, Cuff Size: Adult)   Pulse 82   Temp 97.9 °F (36.6 °C) (Oral)   Ht 148.6 cm (58.5\")   Wt 62.4 kg (137 lb 9.6 oz)   SpO2 97%   BMI 28.27 kg/m²   Estimated body mass index is 28.27 kg/m² as calculated from the " "following:    Height as of this encounter: 148.6 cm (58.5\").    Weight as of this encounter: 62.4 kg (137 lb 9.6 oz).           Physical Exam  Vitals reviewed.   Constitutional:       General: She is not in acute distress.     Appearance: Normal appearance. She is not toxic-appearing.   HENT:      Head: Normocephalic and atraumatic.      Right Ear: Ear canal normal. A middle ear effusion is present. There is no impacted cerumen. Tympanic membrane is not erythematous.      Left Ear: Ear canal normal. A middle ear effusion is present. There is no impacted cerumen. Tympanic membrane is not erythematous.      Nose: Nose normal. Congestion present. No rhinorrhea.      Mouth/Throat:      Mouth: Mucous membranes are moist.      Pharynx: Uvula midline. Posterior oropharyngeal erythema present. No oropharyngeal exudate.      Tonsils: No tonsillar exudate or tonsillar abscesses.   Eyes:      Extraocular Movements: Extraocular movements intact.      Conjunctiva/sclera: Conjunctivae normal.      Pupils: Pupils are equal, round, and reactive to light.   Cardiovascular:      Rate and Rhythm: Normal rate and regular rhythm.      Pulses: Normal pulses.      Heart sounds: Normal heart sounds.   Pulmonary:      Effort: Pulmonary effort is normal. No respiratory distress.      Breath sounds: Normal breath sounds. No wheezing, rhonchi or rales.   Musculoskeletal:      Cervical back: Normal range of motion.   Lymphadenopathy:      Cervical: No cervical adenopathy.   Skin:     General: Skin is warm.   Neurological:      General: No focal deficit present.      Mental Status: She is alert and oriented to person, place, and time.   Psychiatric:         Mood and Affect: Mood normal.          Results:  Recent Results (from the past 24 hours)   POCT SARS-CoV-2 + Flu Antigen MARIANA    Collection Time: 03/19/25 10:15 AM    Specimen: Swab   Result Value Ref Range    SARS Antigen Not Detected Not Detected, Presumptive Negative    Influenza A Antigen " MARIANA Not Detected Not Detected    Influenza B Antigen MARIANA Not Detected Not Detected    Internal Control Passed Passed    Lot Number [id number]     Expiration Date 11.21.2025    POC Rapid Strep A    Collection Time: 03/19/25 10:15 AM    Specimen: Swab   Result Value Ref Range    Rapid Strep A Screen Negative Negative, VALID, INVALID, Not Performed    Internal Control Passed Passed    Lot Number [id number]     Expiration Date 03.06.2027         Assessment and Plan     Assessment/Plan:  Diagnoses and all orders for this visit:    1. Upper respiratory tract infection, unspecified type (Primary)  -     POCT SARS-CoV-2 + Flu Antigen MARIANA  -     POC Rapid Strep A  -     Discontinue: benzonatate (Tessalon Perles) 100 MG capsule; Take 1 capsule by mouth 3 (Three) Times a Day As Needed for Cough.  Dispense: 30 capsule; Refill: 0  -     Discontinue: guaiFENesin (Mucinex) 600 MG 12 hr tablet; Take 1 tablet by mouth 2 (Two) Times a Day As Needed for Congestion for up to 7 days.  Dispense: 14 tablet; Refill: 0  -     benzonatate (Tessalon Perles) 100 MG capsule; Take 1 capsule by mouth 3 (Three) Times a Day As Needed for Cough.  Dispense: 30 capsule; Refill: 0  -     guaiFENesin (Mucinex) 600 MG 12 hr tablet; Take 1 tablet by mouth 2 (Two) Times a Day As Needed for Congestion for up to 7 days.  Dispense: 14 tablet; Refill: 0    Patient is negative for COVID, strep and flu today.  Vital signs within normal limits.  Lung sounds clear to auscultation.  She overall is nontoxic-appearing.  At this time I recommend supportive treatment for the patient as this is likely a viral upper respiratory infection.  Patient would like prescription medication for her symptoms.  Will prescribe Mucinex and Tessalon Perles to take as needed for congestion and cough.  Advised not to take any other causes since that she was taking at home with this.  Also encouraged her to use Flonase which she does have at home and does not need refills on today.   Encourage increasing fluid intake and rest.  Viral illness symptoms usually improve within 7 to 10 days.   Recommend staying away from others until symptoms have resolved.    Advised member if symptoms acutely worsen or not improving to return to clinic for reevaluation.  Plan of care reviewed with the patient at the conclusion of today's visit.  Education was provided regarding diagnosis and management.  Patient verbalizes understanding of and agreement with plan.     Follow Up  Return if symptoms worsen or fail to improve.    Lakisha Gomez PA-C   [facility]

## 2025-04-01 DIAGNOSIS — H69.93 DYSFUNCTION OF BOTH EUSTACHIAN TUBES: ICD-10-CM

## 2025-04-01 RX ORDER — CETIRIZINE HYDROCHLORIDE 10 MG/1
10 TABLET ORAL DAILY
Qty: 90 TABLET | Refills: 1 | Status: SHIPPED | OUTPATIENT
Start: 2025-04-01

## 2025-04-01 NOTE — TELEPHONE ENCOUNTER
Caller: Nelli Morales    Relationship: Self    Best call back number: [phone]    Requested Prescriptions:   Requested Prescriptions     Pending Prescriptions Disp Refills    cetirizine (zyrTEC) 10 MG tablet 90 tablet 1     Sig: Take 1 tablet by mouth Daily.        Pharmacy where request should be sent: [facility] [id number] [address] [facility] [phone] [facility] [phone] FX     Last office visit with prescribing clinician: 2/17/2025   Last telemedicine visit with prescribing clinician: Visit date not found   Next office visit with prescribing clinician: 6/17/2025     Additional details provided by patient: PATIENT HAS 4 DAYS LEFT OF MEDICATION    Does the patient have less than a 3 day supply:  [] Yes  [x] No    Would you like a call back once the refill request has been completed: [] Yes [x] No    If the office needs to give you a call back, can they leave a voicemail: [] Yes [x] No    Sam Stone Rep   04/01/25 11:57 EDT

## 2025-04-08 ENCOUNTER — OFFICE VISIT (OUTPATIENT)
Dept: FAMILY MEDICINE CLINIC | Facility: CLINIC | Age: 60
End: 2025-04-08
Payer: COMMERCIAL

## 2025-04-08 VITALS
HEART RATE: 78 BPM | OXYGEN SATURATION: 98 % | HEIGHT: 59 IN | WEIGHT: 139.8 LBS | SYSTOLIC BLOOD PRESSURE: 120 MMHG | BODY MASS INDEX: 28.18 KG/M2 | DIASTOLIC BLOOD PRESSURE: 50 MMHG

## 2025-04-08 DIAGNOSIS — J10.1 INFLUENZA A: ICD-10-CM

## 2025-04-08 DIAGNOSIS — R68.89 FLU-LIKE SYMPTOMS: Primary | ICD-10-CM

## 2025-04-08 LAB
EXPIRATION DATE: ABNORMAL
FLUAV AG UPPER RESP QL IA.RAPID: DETECTED
FLUBV AG UPPER RESP QL IA.RAPID: NOT DETECTED
INTERNAL CONTROL: ABNORMAL
Lab: ABNORMAL
SARS-COV-2 AG UPPER RESP QL IA.RAPID: NOT DETECTED

## 2025-04-08 PROCEDURE — 87428 SARSCOV & INF VIR A&B AG IA: CPT | Performed by: STUDENT IN AN ORGANIZED HEALTH CARE EDUCATION/TRAINING PROGRAM

## 2025-04-08 PROCEDURE — 99213 OFFICE O/P EST LOW 20 MIN: CPT | Performed by: STUDENT IN AN ORGANIZED HEALTH CARE EDUCATION/TRAINING PROGRAM

## 2025-04-08 RX ORDER — BROMPHENIRAMINE MALEATE, PSEUDOEPHEDRINE HYDROCHLORIDE, AND DEXTROMETHORPHAN HYDROBROMIDE 2; 30; 10 MG/5ML; MG/5ML; MG/5ML
5 SYRUP ORAL 4 TIMES DAILY PRN
Qty: 118 ML | Refills: 0 | Status: SHIPPED | OUTPATIENT
Start: 2025-04-08 | End: 2025-04-15

## 2025-04-08 RX ORDER — OSELTAMIVIR PHOSPHATE 75 MG/1
75 CAPSULE ORAL 2 TIMES DAILY
Qty: 10 CAPSULE | Refills: 0 | Status: SHIPPED | OUTPATIENT
Start: 2025-04-08 | End: 2025-04-13

## 2025-04-08 NOTE — PROGRESS NOTES
Chief Complaint   Patient presents with    Nasal Congestion     Patient has been having symptoms of nasal congestion and coughing, pt had a slight fever     Cough       HPI:  Nelli Morales is a 59 y.o. female with T2DM, HTN, chronic constipation who presents today for above chief complaint.     Patient presents with nasal congestion, cough that started 3 days ago.  When symptoms first started she had a low-grade temperature of 99.  Took Tylenol/NSAIDs and fever resolved.  Her congestion and cough have persisted.  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      PE:  Vitals:    04/08/25 0929   BP: 120/50   Pulse: 78   SpO2: 98%      Body mass index is 28.72 kg/m².    Gen Appearance: NAD  HEENT: Normocephalic, moderate erythema around bilateral TM's without effusion, no oropharyngeal erythema  Heart: RRR, normal S1 and S2, no murmur  Lungs: CTA b/l, no wheezing, no crackles  MSK: Moves all extremities well, normal gait, no peripheral edema  Neuro: No focal deficits    Current Outpatient Medications   Medication Sig Dispense Refill    B Complex-Biotin-FA (B-COMPLEX PO) Take  by mouth.      benzonatate (Tessalon Perles) 100 MG capsule Take 1 capsule by mouth 3 (Three) Times a Day As Needed for Cough. 30 capsule 0    Blood Glucose Monitoring Suppl (ONE TOUCH ULTRA 2) w/Device kit 2 (Two) Times a Day. as directed      cetirizine (zyrTEC) 10 MG tablet Take 1 tablet by mouth Daily. 90 tablet 1    Cholecalciferol (VITAMIN D3) 5000 UNITS capsule capsule Take 1 capsule by mouth Daily.      Creon [id number] units capsule delayed-release particles capsule Take 1 capsule 3 times a day by oral route before meals for 30 days.      estradiol (ESTRACE) 0.1 MG/GM vaginal cream Insert 1 g into the vagina 2 (Two) Times a Week. 42.5 g 3    estradiol (Vivelle-Dot) 0.05 MG/24HR patch Place 1 patch on the skin as directed by provider 2 (Two) Times a Week. 24 patch 3    fluticasone  (Flonase) 50 MCG/ACT nasal spray Administer 1 spray into the nostril(s) as directed by provider Daily. 16 g 0    folic acid (FOLVITE) 1 MG tablet Take 1 tablet by mouth Daily. 90 tablet 1    glucose blood test strip Use to test blood sugars twice daily 200 each 3    glucose monitor monitoring kit 1 each 2 (Two) Times a Day. OneTouch Ultra2 1 each 0    Lancets misc Use to test blood sugars twice daily 100 each 11    latanoprost (XALATAN) 0.005 % ophthalmic solution   1    losartan-hydrochlorothiazide (HYZAAR) 50-12.5 MG per tablet Take 1 tablet by mouth Daily. 90 tablet 1    metFORMIN ER (GLUCOPHAGE-XR) 500 MG 24 hr tablet Take 2 tablets by mouth Daily With Breakfast. 180 tablet 3    methocarbamol (ROBAXIN) 500 MG tablet Take 1 tablet by mouth Daily.      rosuvastatin (CRESTOR) 40 MG tablet Take 1 tablet by mouth Every Night. 90 tablet 3    Stool Softener 100 MG capsule TAKE 1 CAPSULE BY MOUTH TWICE DAILY AS DIRECTED      Trulance 3 MG tablet Take 1 tablet by mouth Daily.      brompheniramine-pseudoephedrine-DM 30-2-10 MG/5ML syrup Take 5 mL by mouth 4 (Four) Times a Day As Needed for Cough (mucous) for up to 7 days. 118 mL 0    oseltamivir (Tamiflu) 75 MG capsule Take 1 capsule by mouth 2 (Two) Times a Day for 5 days. 10 capsule 0     No current facility-administered medications for this visit.        A/P:  Assessment & Plan  Influenza A  POC testing positive for influenza A  Patient is just within window for Tamiflu-prescription sent to pharmacy  Discussed conservative treatment for viral syndromes with decongestion, cough medication, fluids and rest  Return to work guidelines were reviewed  Orders:    oseltamivir (Tamiflu) 75 MG capsule; Take 1 capsule by mouth 2 (Two) Times a Day for 5 days.    brompheniramine-pseudoephedrine-DM 30-2-10 MG/5ML syrup; Take 5 mL by mouth 4 (Four) Times a Day As Needed for Cough (mucous) for up to 7 days.    Flu-like symptoms    Orders:    POCT SARS-CoV-2 Antigen MARIANA +  Flu        Return if symptoms worsen or fail to improve.     Dictated Utilizing Dragon Dictation    Please note that portions of this note were completed with a voice recognition program.    Part of this note may be an electronic transcription/translation of spoken language to printed text using the Dragon Dictation System.

## 2025-04-12 DIAGNOSIS — J06.9 UPPER RESPIRATORY TRACT INFECTION, UNSPECIFIED TYPE: ICD-10-CM

## 2025-04-12 DIAGNOSIS — J10.1 INFLUENZA A: ICD-10-CM

## 2025-04-14 RX ORDER — GUAIFENESIN 600 MG/1
TABLET, EXTENDED RELEASE ORAL
Qty: 14 TABLET | Refills: 0 | OUTPATIENT
Start: 2025-04-14

## 2025-04-14 RX ORDER — BROMPHENIRAMINE MALEATE, PSEUDOEPHEDRINE HYDROCHLORIDE, AND DEXTROMETHORPHAN HYDROBROMIDE 2; 30; 10 MG/5ML; MG/5ML; MG/5ML
SYRUP ORAL
Qty: 118 ML | Refills: 0 | OUTPATIENT
Start: 2025-04-14

## 2025-04-16 DIAGNOSIS — E78.2 MIXED HYPERLIPIDEMIA: ICD-10-CM

## 2025-04-16 RX ORDER — ROSUVASTATIN CALCIUM 40 MG/1
40 TABLET, COATED ORAL NIGHTLY
Qty: 90 TABLET | Refills: 1 | Status: SHIPPED | OUTPATIENT
Start: 2025-04-16

## 2025-04-16 NOTE — TELEPHONE ENCOUNTER
Rx Refill Note  Requested Prescriptions     Pending Prescriptions Disp Refills    rosuvastatin (CRESTOR) 40 MG tablet 90 tablet 1     Sig: Take 1 tablet by mouth Every Night.      Last office visit with prescribing clinician: 4/8/2025   Last telemedicine visit with prescribing clinician: Visit date not found   Next office visit with prescribing clinician: 6/17/2025     Josh Ellis MA  04/16/25, 12:30 EDT

## 2025-04-16 NOTE — TELEPHONE ENCOUNTER
Caller: Andrew Nelli OWENS    Relationship: Self    Best call back number:  [phone] (Mobile)     Requested Prescriptions:   Requested Prescriptions     Pending Prescriptions Disp Refills    rosuvastatin (CRESTOR) 40 MG tablet 90 tablet 3     Sig: Take 1 tablet by mouth Every Night.        Pharmacy where request should be sent: [facility] [id number] [address] [facility] [phone] [facility] [phone] FX     Last office visit with prescribing clinician: 4/8/2025   Last telemedicine visit with prescribing clinician: Visit date not found   Next office visit with prescribing clinician: 6/17/2025       Does the patient have less than a 3 day supply:  [x] Yes  [] No    Would you like a call back once the refill request has been completed: [] Yes [x] No    If the office needs to give you a call back, can they leave a voicemail: [] Yes [x] No

## 2025-04-18 ENCOUNTER — PATIENT MESSAGE (OUTPATIENT)
Dept: FAMILY MEDICINE CLINIC | Facility: CLINIC | Age: 60
End: 2025-04-18
Payer: COMMERCIAL

## 2025-04-18 DIAGNOSIS — J06.9 UPPER RESPIRATORY TRACT INFECTION, UNSPECIFIED TYPE: ICD-10-CM

## 2025-04-18 RX ORDER — BENZONATATE 100 MG/1
100-200 CAPSULE ORAL 3 TIMES DAILY PRN
Qty: 30 CAPSULE | Refills: 0 | Status: SHIPPED | OUTPATIENT
Start: 2025-04-18

## 2025-05-15 ENCOUNTER — OFFICE VISIT (OUTPATIENT)
Dept: FAMILY MEDICINE CLINIC | Facility: CLINIC | Age: 60
End: 2025-05-15
Payer: COMMERCIAL

## 2025-05-15 VITALS
DIASTOLIC BLOOD PRESSURE: 62 MMHG | HEIGHT: 59 IN | HEART RATE: 62 BPM | OXYGEN SATURATION: 98 % | SYSTOLIC BLOOD PRESSURE: 120 MMHG | WEIGHT: 136 LBS | BODY MASS INDEX: 27.42 KG/M2

## 2025-05-15 DIAGNOSIS — H65.93 FLUID LEVEL BEHIND TYMPANIC MEMBRANE OF BOTH EARS: Primary | ICD-10-CM

## 2025-05-15 PROCEDURE — 99213 OFFICE O/P EST LOW 20 MIN: CPT | Performed by: STUDENT IN AN ORGANIZED HEALTH CARE EDUCATION/TRAINING PROGRAM

## 2025-05-15 NOTE — PROGRESS NOTES
Chief Complaint   Patient presents with    Earache     BOTH EAR FEEL LIKE THEY ARE FILLED WITH FLUID     Sore Throat       HPI:  Nelli Morales is a 59 y.o. female who presents today for ear fullness.    Patient reports 1 week history of ears feeling like they are full.  She denies any pain.  No other URI symptoms- denies sore throat, cough. Mild nasal congestion some days.  Has been taking her Zyrtec daily for allergy symptoms.  She does have Flonase at home but does not use this consistently.      PE:  Vitals:    05/15/25 0928   BP: 120/62   Pulse: 62   SpO2: 98%      Body mass index is 27.94 kg/m².    Gen Appearance: NAD  HEENT: Normocephalic, EOMI, no thyromegaly, trachea midline, no oropharyngeal lesions or exudates, air-fluid levels with clear fluid noted behind bilateral non bulging TM's no surrounding erythema  Heart: RRR, normal S1 and S2, no murmur  Lungs: CTA b/l, no wheezing, no crackles  MSK: Moves all extremities well, normal gait, no peripheral edema  Neuro: No focal deficits    Current Outpatient Medications   Medication Sig Dispense Refill    B Complex-Biotin-FA (B-COMPLEX PO) Take  by mouth.      benzonatate (Tessalon Perles) 100 MG capsule Take 1-2 capsules by mouth 3 (Three) Times a Day As Needed for Cough. 30 capsule 0    Blood Glucose Monitoring Suppl (ONE TOUCH ULTRA 2) w/Device kit 2 (Two) Times a Day. as directed      cetirizine (zyrTEC) 10 MG tablet Take 1 tablet by mouth Daily. 90 tablet 1    Cholecalciferol (VITAMIN D3) 5000 UNITS capsule capsule Take 1 capsule by mouth Daily.      Creon [id number] units capsule delayed-release particles capsule Take 1 capsule 3 times a day by oral route before meals for 30 days.      estradiol (ESTRACE) 0.1 MG/GM vaginal cream Insert 1 g into the vagina 2 (Two) Times a Week. 42.5 g 3    estradiol (Vivelle-Dot) 0.05 MG/24HR patch Place 1 patch on the skin as directed by provider 2 (Two) Times a Week. 24 patch 3    fluticasone (Flonase) 50 MCG/ACT nasal  spray Administer 1 spray into the nostril(s) as directed by provider Daily. 16 g 0    folic acid (FOLVITE) 1 MG tablet Take 1 tablet by mouth Daily. 90 tablet 1    glucose blood test strip Use to test blood sugars twice daily 200 each 3    glucose monitor monitoring kit 1 each 2 (Two) Times a Day. OneTouch Ultra2 1 each 0    Lancets misc Use to test blood sugars twice daily 100 each 11    latanoprost (XALATAN) 0.005 % ophthalmic solution   1    losartan-hydrochlorothiazide (HYZAAR) 50-12.5 MG per tablet Take 1 tablet by mouth Daily. 90 tablet 1    metFORMIN ER (GLUCOPHAGE-XR) 500 MG 24 hr tablet Take 2 tablets by mouth Daily With Breakfast. 180 tablet 3    methocarbamol (ROBAXIN) 500 MG tablet Take 1 tablet by mouth Daily.      rosuvastatin (CRESTOR) 40 MG tablet Take 1 tablet by mouth Every Night. 90 tablet 1    Stool Softener 100 MG capsule TAKE 1 CAPSULE BY MOUTH TWICE DAILY AS DIRECTED      Trulance 3 MG tablet Take 1 tablet by mouth Daily.       No current facility-administered medications for this visit.        A/P:  Assessment & Plan  Fluid level behind tympanic membrane of both ears  Suspect allergy or sinus induced, no signs of infection on exam  I have advised patient to use Flonase daily over the next week.  If symptoms fail to resolve can add an antihistamine nasal spray such as Astepro to her regimen  Continue Zyrtec daily  Can call/message if symptoms fail to resolve           No follow-ups on file.     Dictated Utilizing Dragon Dictation    Please note that portions of this note were completed with a voice recognition program.    Part of this note may be an electronic transcription/translation of spoken language to printed text using the Dragon Dictation System.

## 2025-05-24 DIAGNOSIS — E11.43 TYPE 2 DIABETES MELLITUS WITH DIABETIC AUTONOMIC NEUROPATHY, WITHOUT LONG-TERM CURRENT USE OF INSULIN: ICD-10-CM

## 2025-05-27 RX ORDER — METFORMIN HYDROCHLORIDE 500 MG/1
500 TABLET, EXTENDED RELEASE ORAL DAILY
Qty: 90 TABLET | Refills: 0 | Status: SHIPPED | OUTPATIENT
Start: 2025-05-27

## 2025-05-27 NOTE — TELEPHONE ENCOUNTER
Rx Refill Note  Requested Prescriptions     Pending Prescriptions Disp Refills    metFORMIN ER (GLUCOPHAGE-XR) 500 MG 24 hr tablet [Pharmacy Med Name: METFORMIN HCL  MG TABLET] 90 tablet 0     Sig: TAKE 1 TABLET BY MOUTH DAILY      Last office visit with prescribing clinician: 6/11/2024   Last telemedicine visit with prescribing clinician: Visit date not found   Next office visit with prescribing clinician: Visit date not found     Josh Ellis MA  05/27/25, 11:04 EDT

## 2025-06-17 NOTE — PROGRESS NOTES
Chief Complaint   Patient presents with    Diabetes       HPI:  Nelli Morales is a 59 y.o. female with T2DM, HTN, HLD who presents today for diabetes follow up.    Diabetes   Last A1c:    Hemoglobin A1C   Date Value Ref Range Status   02/17/2025 5.40 4.80 - 5.60 % Final   02/17/2025 5.4 4.5 - 5.7 % Final   10/24/2024 5.50 4.80 - 5.60 % Final        Patient is compliant with a diabetic low-carb diet.    Patient is compliant with current meds which include Metformin. Has mostly been taking this once per day.   No N/V/D related to meds.    Did eye exam this year already.  No numbness/tingling in feet.    She has been dealing with excessive gas that has been unrelieved w OTC gas ex. She does struggle w chronic constipation and alternates between Trulance, OTC stool softeners for this which has worked well. Also on Creon. She recently started Bioma probiotic and is waiting to see if this helps.      PE:  Vitals:    06/18/25 0750   BP: 126/80   Pulse: 69   SpO2: 96%      Body mass index is 28.56 kg/m².    Gen Appearance: NAD  HEENT: Normocephalic, EOMI, no thyromegaly, trachea midline  Heart: RRR, normal S1 and S2, no murmur  Lungs: CTA b/l, no wheezing, no crackles  MSK: Moves all extremities well, normal gait, no peripheral edema  Neuro: No focal deficits    Current Outpatient Medications   Medication Sig Dispense Refill    B Complex-Biotin-FA (B-COMPLEX PO) Take  by mouth.      benzonatate (Tessalon Perles) 100 MG capsule Take 1-2 capsules by mouth 3 (Three) Times a Day As Needed for Cough. 30 capsule 0    Blood Glucose Monitoring Suppl (ONE TOUCH ULTRA 2) w/Device kit 2 (Two) Times a Day. as directed      cetirizine (zyrTEC) 10 MG tablet Take 1 tablet by mouth Daily. 90 tablet 1    Cholecalciferol (VITAMIN D3) 5000 UNITS capsule capsule Take 1 capsule by mouth Daily.      Creon [id number] units capsule delayed-release particles capsule Take 1 capsule 3 times a day by oral route before meals for 30 days.       estradiol (ESTRACE) 0.1 MG/GM vaginal cream Insert 1 g into the vagina 2 (Two) Times a Week. 42.5 g 3    estradiol (Vivelle-Dot) 0.05 MG/24HR patch Place 1 patch on the skin as directed by provider 2 (Two) Times a Week. 24 patch 3    fluticasone (Flonase) 50 MCG/ACT nasal spray Administer 1 spray into the nostril(s) as directed by provider Daily. 16 g 0    folic acid (FOLVITE) 1 MG tablet Take 1 tablet by mouth Daily. 90 tablet 1    glucose monitor monitoring kit Use 1 each 2 (Two) Times a Day. Accucheck 1 each 0    Lancets misc Use to test blood sugars twice daily 100 each 11    latanoprost (XALATAN) 0.005 % ophthalmic solution   1    losartan-hydrochlorothiazide (HYZAAR) 50-12.5 MG per tablet Take 1 tablet by mouth Daily. 90 tablet 1    metFORMIN ER (GLUCOPHAGE-XR) 500 MG 24 hr tablet TAKE 1 TABLET BY MOUTH DAILY 90 tablet 0    methocarbamol (ROBAXIN) 500 MG tablet Take 1 tablet by mouth Daily.      rosuvastatin (CRESTOR) 40 MG tablet Take 1 tablet by mouth Every Night. 90 tablet 1    glucose blood test strip Use as instructed to check blood sugars once daily 100 each 5    Stool Softener 100 MG capsule TAKE 1 CAPSULE BY MOUTH TWICE DAILY AS DIRECTED (Patient not taking: Reported on 6/18/2025)       No current facility-administered medications for this visit.        A/P:  Assessment & Plan  Type 2 diabetes mellitus with diabetic autonomic neuropathy, without long-term current use of insulin  A1c remains well controlled at 6.2%  Cont Metformin once daily- take with a meal  Labs done 2/2024  Diabetic supplies sent to pharmacy    Orders:    POC Glycosylated Hemoglobin (Hb A1C)    Lancets misc; Use to test blood sugars twice daily    glucose monitor monitoring kit; Use 1 each 2 (Two) Times a Day. Accucheck    Screen for colon cancer    Orders:    Ambulatory Referral For Screening Colonoscopy    Essential hypertension  Well controlled  Cont current regimen as below    Orders:    losartan-hydrochlorothiazide (HYZAAR)  50-12.5 MG per tablet; Take 1 tablet by mouth Daily.    Flatulence  Pt to start daily probiotic  Take metformin w meal  FU if sx's don't improve           Return in about 3 months (around 9/18/2025) for est w Elenita/Diabetes FU 30 min.     Dictated Utilizing Dragon Dictation    Please note that portions of this note were completed with a voice recognition program.    Part of this note may be an electronic transcription/translation of spoken language to printed text using the Dragon Dictation System.

## 2025-06-18 ENCOUNTER — OFFICE VISIT (OUTPATIENT)
Dept: FAMILY MEDICINE CLINIC | Facility: CLINIC | Age: 60
End: 2025-06-18
Payer: COMMERCIAL

## 2025-06-18 VITALS
SYSTOLIC BLOOD PRESSURE: 126 MMHG | BODY MASS INDEX: 28.02 KG/M2 | HEART RATE: 69 BPM | WEIGHT: 139 LBS | DIASTOLIC BLOOD PRESSURE: 80 MMHG | HEIGHT: 59 IN | OXYGEN SATURATION: 96 %

## 2025-06-18 DIAGNOSIS — I10 ESSENTIAL HYPERTENSION: ICD-10-CM

## 2025-06-18 DIAGNOSIS — E11.43 TYPE 2 DIABETES MELLITUS WITH DIABETIC AUTONOMIC NEUROPATHY, WITHOUT LONG-TERM CURRENT USE OF INSULIN: Primary | ICD-10-CM

## 2025-06-18 DIAGNOSIS — R14.3 FLATULENCE: ICD-10-CM

## 2025-06-18 DIAGNOSIS — Z12.11 SCREEN FOR COLON CANCER: ICD-10-CM

## 2025-06-18 LAB
EXPIRATION DATE: ABNORMAL
HBA1C MFR BLD: 6.2 % (ref 4.5–5.7)
Lab: ABNORMAL

## 2025-06-18 PROCEDURE — 83036 HEMOGLOBIN GLYCOSYLATED A1C: CPT | Performed by: STUDENT IN AN ORGANIZED HEALTH CARE EDUCATION/TRAINING PROGRAM

## 2025-06-18 PROCEDURE — 99214 OFFICE O/P EST MOD 30 MIN: CPT | Performed by: STUDENT IN AN ORGANIZED HEALTH CARE EDUCATION/TRAINING PROGRAM

## 2025-06-18 RX ORDER — LOSARTAN POTASSIUM AND HYDROCHLOROTHIAZIDE 12.5; 5 MG/1; MG/1
1 TABLET ORAL DAILY
Qty: 90 TABLET | Refills: 1 | Status: SHIPPED | OUTPATIENT
Start: 2025-06-18

## 2025-06-18 RX ORDER — SODIUM, POTASSIUM,MAG SULFATES 17.5-3.13G
SOLUTION, RECONSTITUTED, ORAL ORAL
Qty: 354 ML | Refills: 0 | Status: SHIPPED | OUTPATIENT
Start: 2025-06-18

## 2025-06-18 RX ORDER — AVOBENZONE, HOMOSALATE, OCTISALATE, OCTOCRYLENE 30; 40; 45; 26 MG/ML; MG/ML; MG/ML; MG/ML
CREAM TOPICAL
Qty: 100 EACH | Refills: 11 | Status: SHIPPED | OUTPATIENT
Start: 2025-06-18

## 2025-06-18 RX ORDER — BLOOD-GLUCOSE METER
1 KIT MISCELLANEOUS 2 TIMES DAILY
Qty: 1 EACH | Refills: 0 | Status: SHIPPED | OUTPATIENT
Start: 2025-06-18

## 2025-06-18 NOTE — ASSESSMENT & PLAN NOTE
Well controlled  Cont current regimen as below    Orders:    losartan-hydrochlorothiazide (HYZAAR) 50-12.5 MG per tablet; Take 1 tablet by mouth Daily.

## 2025-06-18 NOTE — ASSESSMENT & PLAN NOTE
A1c remains well controlled at 6.2%  Cont Metformin once daily- take with a meal  Labs done 2/2024  Diabetic supplies sent to pharmacy    Orders:    POC Glycosylated Hemoglobin (Hb A1C)    Lancets misc; Use to test blood sugars twice daily    glucose monitor monitoring kit; Use 1 each 2 (Two) Times a Day. Accucheck

## 2025-06-19 ENCOUNTER — TELEPHONE (OUTPATIENT)
Dept: GASTROENTEROLOGY | Facility: CLINIC | Age: 60
End: 2025-06-19
Payer: COMMERCIAL

## 2025-07-01 ENCOUNTER — TELEPHONE (OUTPATIENT)
Dept: OBSTETRICS AND GYNECOLOGY | Facility: CLINIC | Age: 60
End: 2025-07-01
Payer: COMMERCIAL

## 2025-07-01 RX ORDER — ESTRADIOL 0.1 MG/G
CREAM VAGINAL
Qty: 42.5 G | Refills: 3 | OUTPATIENT
Start: 2025-07-01

## 2025-07-01 NOTE — TELEPHONE ENCOUNTER
Patient of Dr. Oliver; LOV 01/20/25.  Returned patient's call.   Informed her that refills should be available for both vaginal cream and patches. RXs were sent to [facility] in January.  Patient v/u; states she has changed to using [facility] pharmacy. She will call to have them transferred.

## 2025-07-01 NOTE — TELEPHONE ENCOUNTER
PT is calling in for a refill on her estradiol (Vivelle-Dot) 0.05 MG/24HR patch and estradiol (ESTRACE) 0.1 MG/GM vaginal cream

## 2025-07-01 NOTE — TELEPHONE ENCOUNTER
Spoke with [facility].   She states they have the RX for estradiol cream and will have it ready for patient to  today. The RX for estradiol patches has already been transferred to [facility] pharmacy.   Called patient; informed her.   She v/u. States she was told that the RX for estradiol cream is an old one that expires on the 19th.   Advised her to check with both pharmacies when she goes in to  refills; one of them should still have the RX from January with refills. She can call back if needs further assistance. Patient v/u and agreed.

## 2025-07-01 NOTE — TELEPHONE ENCOUNTER
PT is calling again because [facility] said they don't have the prescription to [facility] to [facility] - please if possible [email or web address] [place]

## 2025-07-05 ENCOUNTER — PATIENT MESSAGE (OUTPATIENT)
Dept: FAMILY MEDICINE CLINIC | Facility: CLINIC | Age: 60
End: 2025-07-05
Payer: COMMERCIAL

## 2025-07-05 DIAGNOSIS — E11.43 TYPE 2 DIABETES MELLITUS WITH DIABETIC AUTONOMIC NEUROPATHY, WITHOUT LONG-TERM CURRENT USE OF INSULIN: ICD-10-CM

## 2025-07-09 ENCOUNTER — TELEPHONE (OUTPATIENT)
Dept: FAMILY MEDICINE CLINIC | Facility: CLINIC | Age: 60
End: 2025-07-09
Payer: COMMERCIAL

## 2025-07-09 NOTE — TELEPHONE ENCOUNTER
Caller: Nelli Morales    Relationship: Self    Best call back number:      [phone] (Mobile)     Equipment requested:     PATIENT STATED SHE WOULD LIKE TO SWITCH HER METER AND TEST STRIPS TO:    TRUE METRIX METER AND TEST STRIPS    Reason for the request:     PATIENT STATED SHE LIKES THE TRUE METRIX BETTER THAN THE ACCU CHEK    Prescribing Provider:     DR DELONG     PATIENT STATED THE TEST STRIPS AND METER PRESCRIPTIONS ARE TO GO TO:    [place], [place]    TELEPHONE CONTACT:    [phone]

## 2025-07-10 RX ORDER — BLOOD-GLUCOSE METER
1 KIT MISCELLANEOUS 2 TIMES DAILY
Qty: 1 EACH | Refills: 0 | Status: SHIPPED | OUTPATIENT
Start: 2025-07-10

## 2025-07-10 RX ORDER — AVOBENZONE, HOMOSALATE, OCTISALATE, OCTOCRYLENE 30; 40; 45; 26 MG/ML; MG/ML; MG/ML; MG/ML
CREAM TOPICAL
Qty: 100 EACH | Refills: 11 | Status: SHIPPED | OUTPATIENT
Start: 2025-07-10